# Patient Record
Sex: MALE | Race: WHITE | NOT HISPANIC OR LATINO | URBAN - METROPOLITAN AREA
[De-identification: names, ages, dates, MRNs, and addresses within clinical notes are randomized per-mention and may not be internally consistent; named-entity substitution may affect disease eponyms.]

---

## 2019-07-09 ENCOUNTER — INPATIENT (INPATIENT)
Facility: HOSPITAL | Age: 59
LOS: 8 days | Discharge: NURSING, REHAB OR LONG-TERM CARE | DRG: 59 | End: 2019-07-18
Attending: INTERNAL MEDICINE | Admitting: INTERNAL MEDICINE
Payer: COMMERCIAL

## 2019-07-09 VITALS
HEIGHT: 76 IN | SYSTOLIC BLOOD PRESSURE: 155 MMHG | TEMPERATURE: 98 F | DIASTOLIC BLOOD PRESSURE: 94 MMHG | RESPIRATION RATE: 18 BRPM | WEIGHT: 265 LBS | OXYGEN SATURATION: 96 % | HEART RATE: 83 BPM

## 2019-07-09 DIAGNOSIS — R29.898 OTHER SYMPTOMS AND SIGNS INVOLVING THE MUSCULOSKELETAL SYSTEM: ICD-10-CM

## 2019-07-09 DIAGNOSIS — G35 MULTIPLE SCLEROSIS: ICD-10-CM

## 2019-07-09 DIAGNOSIS — R33.8 OTHER RETENTION OF URINE: ICD-10-CM

## 2019-07-09 DIAGNOSIS — G40.909 EPILEPSY, UNSPECIFIED, NOT INTRACTABLE, WITHOUT STATUS EPILEPTICUS: ICD-10-CM

## 2019-07-09 DIAGNOSIS — K51.90 ULCERATIVE COLITIS, UNSPECIFIED, WITHOUT COMPLICATIONS: ICD-10-CM

## 2019-07-09 DIAGNOSIS — R09.89 OTHER SPECIFIED SYMPTOMS AND SIGNS INVOLVING THE CIRCULATORY AND RESPIRATORY SYSTEMS: ICD-10-CM

## 2019-07-09 DIAGNOSIS — M06.9 RHEUMATOID ARTHRITIS, UNSPECIFIED: ICD-10-CM

## 2019-07-09 DIAGNOSIS — Z29.9 ENCOUNTER FOR PROPHYLACTIC MEASURES, UNSPECIFIED: ICD-10-CM

## 2019-07-09 LAB
ALBUMIN SERPL ELPH-MCNC: 3.8 G/DL — SIGNIFICANT CHANGE UP (ref 3.3–5)
ALP SERPL-CCNC: 86 U/L — SIGNIFICANT CHANGE UP (ref 40–120)
ALT FLD-CCNC: 21 U/L — SIGNIFICANT CHANGE UP (ref 10–45)
ANION GAP SERPL CALC-SCNC: 12 MMOL/L — SIGNIFICANT CHANGE UP (ref 5–17)
APPEARANCE UR: CLEAR — SIGNIFICANT CHANGE UP
AST SERPL-CCNC: 20 U/L — SIGNIFICANT CHANGE UP (ref 10–40)
BASOPHILS # BLD AUTO: 0 K/UL — SIGNIFICANT CHANGE UP (ref 0–0.2)
BASOPHILS NFR BLD AUTO: 0.4 % — SIGNIFICANT CHANGE UP (ref 0–2)
BILIRUB SERPL-MCNC: 0.4 MG/DL — SIGNIFICANT CHANGE UP (ref 0.2–1.2)
BILIRUB UR-MCNC: NEGATIVE — SIGNIFICANT CHANGE UP
BUN SERPL-MCNC: 19 MG/DL — SIGNIFICANT CHANGE UP (ref 7–23)
CALCIUM SERPL-MCNC: 8.6 MG/DL — SIGNIFICANT CHANGE UP (ref 8.4–10.5)
CHLORIDE SERPL-SCNC: 102 MMOL/L — SIGNIFICANT CHANGE UP (ref 96–108)
CO2 SERPL-SCNC: 22 MMOL/L — SIGNIFICANT CHANGE UP (ref 22–31)
COLOR SPEC: SIGNIFICANT CHANGE UP
CREAT SERPL-MCNC: 0.82 MG/DL — SIGNIFICANT CHANGE UP (ref 0.5–1.3)
DIFF PNL FLD: NEGATIVE — SIGNIFICANT CHANGE UP
EOSINOPHIL # BLD AUTO: 0.3 K/UL — SIGNIFICANT CHANGE UP (ref 0–0.5)
EOSINOPHIL NFR BLD AUTO: 4 % — SIGNIFICANT CHANGE UP (ref 0–6)
GAS PNL BLDV: SIGNIFICANT CHANGE UP
GLUCOSE SERPL-MCNC: 86 MG/DL — SIGNIFICANT CHANGE UP (ref 70–99)
GLUCOSE UR QL: NEGATIVE — SIGNIFICANT CHANGE UP
HCT VFR BLD CALC: 47.7 % — SIGNIFICANT CHANGE UP (ref 39–50)
HGB BLD-MCNC: 16.3 G/DL — SIGNIFICANT CHANGE UP (ref 13–17)
KETONES UR-MCNC: NEGATIVE — SIGNIFICANT CHANGE UP
LEUKOCYTE ESTERASE UR-ACNC: NEGATIVE — SIGNIFICANT CHANGE UP
LYMPHOCYTES # BLD AUTO: 1 K/UL — SIGNIFICANT CHANGE UP (ref 1–3.3)
LYMPHOCYTES # BLD AUTO: 12.2 % — LOW (ref 13–44)
MCHC RBC-ENTMCNC: 33.4 PG — SIGNIFICANT CHANGE UP (ref 27–34)
MCHC RBC-ENTMCNC: 34.1 GM/DL — SIGNIFICANT CHANGE UP (ref 32–36)
MCV RBC AUTO: 97.8 FL — SIGNIFICANT CHANGE UP (ref 80–100)
MONOCYTES # BLD AUTO: 1.1 K/UL — HIGH (ref 0–0.9)
MONOCYTES NFR BLD AUTO: 13.8 % — SIGNIFICANT CHANGE UP (ref 2–14)
NEUTROPHILS # BLD AUTO: 5.6 K/UL — SIGNIFICANT CHANGE UP (ref 1.8–7.4)
NEUTROPHILS NFR BLD AUTO: 69.7 % — SIGNIFICANT CHANGE UP (ref 43–77)
NITRITE UR-MCNC: NEGATIVE — SIGNIFICANT CHANGE UP
PH UR: 7 — SIGNIFICANT CHANGE UP (ref 5–8)
PLATELET # BLD AUTO: 200 K/UL — SIGNIFICANT CHANGE UP (ref 150–400)
POTASSIUM SERPL-MCNC: 3.9 MMOL/L — SIGNIFICANT CHANGE UP (ref 3.5–5.3)
POTASSIUM SERPL-SCNC: 3.9 MMOL/L — SIGNIFICANT CHANGE UP (ref 3.5–5.3)
PROT SERPL-MCNC: 6.3 G/DL — SIGNIFICANT CHANGE UP (ref 6–8.3)
PROT UR-MCNC: NEGATIVE — SIGNIFICANT CHANGE UP
RBC # BLD: 4.88 M/UL — SIGNIFICANT CHANGE UP (ref 4.2–5.8)
RBC # FLD: 11.7 % — SIGNIFICANT CHANGE UP (ref 10.3–14.5)
SODIUM SERPL-SCNC: 136 MMOL/L — SIGNIFICANT CHANGE UP (ref 135–145)
SP GR SPEC: 1.02 — SIGNIFICANT CHANGE UP (ref 1.01–1.02)
UROBILINOGEN FLD QL: NEGATIVE — SIGNIFICANT CHANGE UP
WBC # BLD: 8.1 K/UL — SIGNIFICANT CHANGE UP (ref 3.8–10.5)
WBC # FLD AUTO: 8.1 K/UL — SIGNIFICANT CHANGE UP (ref 3.8–10.5)

## 2019-07-09 PROCEDURE — 99223 1ST HOSP IP/OBS HIGH 75: CPT

## 2019-07-09 PROCEDURE — 99285 EMERGENCY DEPT VISIT HI MDM: CPT

## 2019-07-09 RX ORDER — DICLOFENAC SODIUM 75 MG/1
75 TABLET, DELAYED RELEASE ORAL DAILY
Refills: 0 | Status: DISCONTINUED | OUTPATIENT
Start: 2019-07-09 | End: 2019-07-18

## 2019-07-09 RX ORDER — ASCORBIC ACID 60 MG
500 TABLET,CHEWABLE ORAL DAILY
Refills: 0 | Status: DISCONTINUED | OUTPATIENT
Start: 2019-07-09 | End: 2019-07-18

## 2019-07-09 RX ORDER — PANTOPRAZOLE SODIUM 20 MG/1
40 TABLET, DELAYED RELEASE ORAL
Refills: 0 | Status: DISCONTINUED | OUTPATIENT
Start: 2019-07-09 | End: 2019-07-18

## 2019-07-09 RX ORDER — MESALAMINE 400 MG
1.5 TABLET, DELAYED RELEASE (ENTERIC COATED) ORAL DAILY
Refills: 0 | Status: DISCONTINUED | OUTPATIENT
Start: 2019-07-09 | End: 2019-07-18

## 2019-07-09 RX ORDER — CHOLECALCIFEROL (VITAMIN D3) 125 MCG
5000 CAPSULE ORAL DAILY
Refills: 0 | Status: DISCONTINUED | OUTPATIENT
Start: 2019-07-09 | End: 2019-07-18

## 2019-07-09 RX ORDER — PHENOBARBITAL 60 MG
32.4 TABLET ORAL DAILY
Refills: 0 | Status: DISCONTINUED | OUTPATIENT
Start: 2019-07-09 | End: 2019-07-16

## 2019-07-09 RX ORDER — TAMSULOSIN HYDROCHLORIDE 0.4 MG/1
0.4 CAPSULE ORAL AT BEDTIME
Refills: 0 | Status: DISCONTINUED | OUTPATIENT
Start: 2019-07-09 | End: 2019-07-18

## 2019-07-09 RX ORDER — ENOXAPARIN SODIUM 100 MG/ML
40 INJECTION SUBCUTANEOUS EVERY 24 HOURS
Refills: 0 | Status: DISCONTINUED | OUTPATIENT
Start: 2019-07-09 | End: 2019-07-18

## 2019-07-09 RX ORDER — PREGABALIN 225 MG/1
1000 CAPSULE ORAL DAILY
Refills: 0 | Status: DISCONTINUED | OUTPATIENT
Start: 2019-07-09 | End: 2019-07-18

## 2019-07-09 RX ADMIN — TAMSULOSIN HYDROCHLORIDE 0.4 MILLIGRAM(S): 0.4 CAPSULE ORAL at 22:30

## 2019-07-09 NOTE — H&P ADULT - PROBLEM SELECTOR PLAN 4
-c/w diclofenac -c/w home meds if on formulary; pt aware to bring in am  -stable, no diarrhea or abd pain at this time.

## 2019-07-09 NOTE — H&P ADULT - PROBLEM SELECTOR PLAN 1
-progressive b/l sx; not consistent with radiculopathy per history  -ddx includes but not limited to MS flare vs. pseudoflare vs. dvt vs discopathy  -admit to medicine  -fall precautions  -PT  -neuro eval in am; defer to day attending to arrange, ER has already called Dr. Davis, prev seen by Dr. Sanchez in hospital.   -pending neuro eval, will likely need additional imaging such as MRI w/wo head, MRI C/T/L spine.  Will await neuro eval/exam prior to ordering imaging, pt in agreement  -given hx of dvt and mild LE edema, will check LE dopplers  -defer to neuro regarding role for steroids vs oncrevus, will need to get imaging prior to defining therapeutic plan with neurology input.

## 2019-07-09 NOTE — ED PROVIDER NOTE - OBJECTIVE STATEMENT
59M w/ h/o MS, frequent UTIs pw increased urinary frequency over the past few days and worsening weakness over the past few weeks. States he has had these symptoms in the past when she was diagnosed with UTI. Denies fevers, chills, n/v/d, abdominal pain. States he takes ocrevus infusions every 6 months, next due in 1 week. States he feels his MS is flaring up and is having difficulty walking due to bilateral lower extremity weakness.     Neuro Dr. Davis

## 2019-07-09 NOTE — H&P ADULT - NSHPPHYSICALEXAM_GEN_ALL_CORE
PHYSICAL EXAM:  GENERAL: NAD, well-groomed, well-developed  HEAD:  Atraumatic, Normocephalic  EYES: EOMI, PERRLA, conjunctiva and sclera clear  ENMT: No tonsillar erythema, exudates, or enlargement; Moist mucous membranes, Good dentition, No lesions  NECK: Supple, No JVD, Normal thyroid  CHEST/LUNG: Clear to percussion bilaterally; No rales, rhonchi, wheezing, or rubs no resp distress or accessory muscle usage  HEART: Regular rate and rhythm; No murmurs, rubs, or gallops, trace edema b/l LE.   ABDOMEN: Soft, Nontender, Nondistended; Bowel sounds present no rebound/guarding. no suprapubic tenderness  EXTREMITIES:  2+ Peripheral Pulses, No clubbing, cyanosis.   LYMPH: No lymphadenopathy noted, no lymphangitis  SKIN: No rashes or hives. +dry lesion with minimal surrounding erythema on L shin from where he banged his leg few weeks ago. no warmth, purulence/fluctuance or tenderness.   NERVOUS SYSTEM:  Alert & Oriented X3, Good concentration; Motor Strength 5/5 L upper 5/5 R upper, 4/5 LLE, 3/5 RLE. sensation intact b/l ue/le.   strength diminshed R>L.  tongue midline, no droop, no dysarthria.

## 2019-07-09 NOTE — ED ADULT NURSE REASSESSMENT NOTE - NS ED NURSE REASSESS COMMENT FT1
1930- patient received alert & oriented x4. Daughter at bedside. Foleycatheter in placed draining clear yellow urine- 300ml. PAtient at times complaining of urethral burning sensation. MD aware.
pt on bladder scan 282 ml visualized Dr Oliveira notified ordered for ferrari cath
pt with 16 [place] ferrari placed aseptically with 230ml of alda urine to BSD bag specimens to lab pt tolerated procedure well pt pending lab results

## 2019-07-09 NOTE — ED PROVIDER NOTE - PMH
BPH (benign prostatic hypertrophy)    Colitis    GERD (gastroesophageal reflux disease)    Multiple sclerosis    Seizures

## 2019-07-09 NOTE — ED ADULT NURSE NOTE - OBJECTIVE STATEMENT
pt 60 yo male [place] with c/o weakness frequent urination last 2 weeks and today was incontnent of urine pt hx multiple sclerosis colitis pt accompanied by children pt denies any fever or chills no vomiting vitals stable on arrival pt has some swelling to lower right extremity with hx of DVT last year pt was taken off oral blood thinners by PMD p denies sob pending md evaluation by Dr Oliveira

## 2019-07-09 NOTE — H&P ADULT - NSICDXPASTMEDICALHX_GEN_ALL_CORE_FT
PAST MEDICAL HISTORY:  BPH (benign prostatic hypertrophy)     Colitis     GERD (gastroesophageal reflux disease)     Multiple sclerosis     Seizures

## 2019-07-09 NOTE — H&P ADULT - NSHPLABSRESULTS_GEN_ALL_CORE
Labs personally reviewed  cbc unrevealing, cmp unrevealing, vbg with lactate unrevealing. UA negative.   No imaging done in Er available for personal review  NO ekg in chart available for personal review.

## 2019-07-09 NOTE — H&P ADULT - ASSESSMENT
59 M PMH Multiple Sclerosis since 2014, UC, BPH, sz d/o, DVT off a/c, RA, prior UTI, now p/w urinary frequency and weakness.

## 2019-07-09 NOTE — H&P ADULT - NSHPREVIEWOFSYSTEMS_GEN_ALL_CORE
REVIEW OF SYSTEMS:  CONSTITUTIONAL: +weakness. No fevers. No chills. No weight loss. Good appetite.  EYES: No visual changes. No eye pain.  ENT: No hearing difficulty. No vertigo. No dysphagia. No Sinusitis/rhinorrhea.  NECK: No pain. No stiffness/rigidity.  CARDIAC: No chest pain. No palpitations.  RESPIRATORY: No cough. No SOB. No hemoptysis.  GASTROINTESTINAL: No abdominal pain. No nausea. No vomiting. No hematemesis. No diarrhea. No constipation. No melena. No hematochezia.  GENITOURINARY: No dysuria. +frequency. +hesitancy. No hematuria.  NEUROLOGICAL: No numbness. No focal weakness. No incontinence. No headache.  BACK: No back pain.  EXTREMITIES: + b/l lower extremity edema. Full ROM.  SKIN: No rashes. No itching. No other lesions.  PSYCHIATRIC: No depression. No anxiety. No SI/HI.  ALLERGIC: No lip swelling. No hives.  All other review of systems is negative unless indicated above.

## 2019-07-09 NOTE — H&P ADULT - ATTENDING COMMENTS
Care to be assumed by Dr. Vaelntín Ellis at 8 am.  This patient was assigned to me by the hospitalist in charge; my involvement in this case has consisted of the initial history, physical, chart review, and management plan.  This patient was previously unknown to me.

## 2019-07-09 NOTE — ED PROVIDER NOTE - NS ED ROS FT
General: denies fever, chills  HENT: denies nasal congestion, rhinorrhea  Eyes: denies visual changes, blurred vision  CV: denies chest pain, palpitations  Resp: denies difficulty breathing, cough  Abdominal: denies nausea, vomiting, abdominal pain  : + increased frequency, denies dysuria   MSK: denies muscle aches, leg swelling  Neuro: denies headaches, numbness, + weakness lower extremities   Skin: denies rashes, bruises

## 2019-07-09 NOTE — H&P ADULT - PROBLEM SELECTOR PLAN 3
-c/w home meds if on formulary; pt aware to bring in am  -stable, no diarrhea or abd pain at this time. -frequency alternating with hesitancy  -bladder huggins >280 cc, ferrari placed by ER  -possibly related to MS flare  -would c/w ferrari overnight and measure strict i/o; would remove ferrari in am and do TOV -frequency alternating with hesitancy  -bladder huggins >280 cc, ferrari placed by ER  -possibly related to MS flare  -would c/w ferrari overnight and measure strict i/o; would remove ferrari in am and do TOV  -temp holding solifenacin until TOV passed.

## 2019-07-09 NOTE — ED PROVIDER NOTE - PHYSICAL EXAMINATION
CONSTITUTIONAL: NAD, awake, alert  HEAD: Normocephalic; atraumatic  ENMT: External appears normal, MMM  CARD: Normal Sl, S2; no audible murmurs  RESP: normal wob, lungs ctab  ABD: soft, non-distended; non-tender  EXT: no edema, normal ROM in all four extremities  SKIN: Warm, dry, no rashes  NEURO: aaox3, moving all extremities spontaneously, weakness to lower extremities bilaterally

## 2019-07-09 NOTE — ED ADULT NURSE NOTE - CHPI ED NUR SYMPTOMS NEG
no fever/no dizziness/no change in level of consciousness/no confusion/no blurred vision/no loss of consciousness

## 2019-07-09 NOTE — H&P ADULT - HISTORY OF PRESENT ILLNESS
59 M PMH Multiple Sclerosis since 2014, UC, BPH, sz d/o, DVT off a/c, RA, prior UTI, now p/w urinary frequency and weakness.     VS: 98.2, 77, 154/96, 20, 96% RA. Labs: cbc unrevealing, cmp unrevealing, vbg with lactate unrevealing. UA negative. Pt noted to have ~280 cc of urine on bladder scan, with ferrari placed and draining 230 cc alda urine while in ER. 59 M PMH Multiple Sclerosis since 2014, UC, BPH, sz d/o, DVT off a/c, RA, prior UTI, now p/w urinary frequency and weakness. Pt states he has had chronic baseline R sided weakness. Over last few weeks he has had progressive R sided sx, and now has started having left sided sx. +weakness of both legs R>L, R arm weakness and R arm dec  strength, very minimal sx in L arm/hand. Ordinarily is ambulatory with walker in house and wheelchair outside, can bear weight; over last few days, can barely bear wt, and non ambulatory. +knee stiffness b/l worse in am and mildly imp over the day, c/w his known RA. +falls, most recently few weeks ago (once fell backwards onto rear end, once hit his L shin falling forward; no loc, no head trauma, no syncope).  Pt also notes worsening urinary sx x 1-2 days; inc urinary freq and urgency, at times hesitancy; denies dysuria, f/c, abd pain, back pain. Denies cp/sob, n/v/d, cough/uri sx/rash/nodules/oral sores/ha/visual changes/saddle anesthesia. Is due for q 6monthly infusion of Ocrevus, due next week.     VS: 98.2, 77, 154/96, 20, 96% RA. Labs: cbc unrevealing, cmp unrevealing, vbg with lactate unrevealing. UA negative. Pt noted to have ~280 cc of urine on bladder scan, with ferrari placed and draining 230 cc alda urine while in ER.

## 2019-07-09 NOTE — ED PROVIDER NOTE - ATTENDING CONTRIBUTION TO CARE
Attending MD Oliveira: I personally have seen and examined this patient.  Resident note reviewed and agree on plan of care and except where noted.  See below for details.     Seen in [place], accompanied by daughter Nallely Resendez and daughter's friend  Dr. Harjinder Davis, Neurologist  Dr. Hi Valle, PMD  Dr. Worthington, [place] ([facility])    59M with PMH/PSH including seizure on phenobarb, GERD on Rabeprazole, ulcerative colitis on Apriso, BPH on Tamsulosin, bladder spasm on Solifenacin succinate, DVT not on anticoag (d/c'ed after 6 mo, last taken about a year ago), rheumatoid arthritis on Diclofenac, MS with R sided weakness presents to the ED with weakness and increased urinary frequency and urinary incontinence.  Reports has had two previous hospitalizations for UTIs, last was a year ago March 2018.  Reports chills, daughter reports patient has been afebrile at home.  Denies dysuria, reports incontinence, frequency since this morning.  Reports since arrival has been trying to urinate but has not been able.  Denies fevers.  Denies abdominal pain, nausea, vomiting, diarrhea, blood in stools. Denies hematuria.  Denies chest pain, shortness of breath.  Reports generalized weakness, reports typically is able to walk with a walker and is able to stand to walk with a walker but today has not been able to do so, reports is functionally sedentary, reports has PT twice a week.  Reports is due for his MS infusion next week, gets them every six months, on (Ocrelizumab) Ocrevus.  Reports weakness is most notably in LEs.  Denies headaches, denies change in vision, double vision, sudden loss of vision.  Reports fell last month, fell backwards "on butt".  On exam, NAD, head NCAT, PERRL, FROM at neck, no tenderness to midline palpation, no stepoffs along length of spine, L lower back lipoma unable to pull himself up to sitting on his own, lungs CTAB with good inspiratory effort, +S1S2, no m/r/g, abdomen soft with +BS, NT, no CVAT, moving all extremities, unable to lift LEs off of bed with minimal effort to move LEs laterally on bed, able to lift UEs with some drift but no drop, L hand  >> R hand  strength, sensory grossly intact; A/P: 59M with weakness and urinary complaints, will obtain infectious work up, DDx includes UTI, will obtain labs, UA, CXR, reassess Attending MD Oliveira: I personally have seen and examined this patient.  Resident note reviewed and agree on plan of care and except where noted.  See below for details.     Seen in [place], accompanied by daughter Nallely Resendez and daughter's friend  Dr. Harjinder Davis, Neurologist  Dr. Hi Valle, PMD  Dr. Worthington, [place] ([facility])    59M with PMH/PSH including seizure on phenobarb, GERD on Rabeprazole, ulcerative colitis on Apriso, BPH on Tamsulosin, bladder spasm on Solifenacin succinate, DVT not on anticoag (d/c'ed after 6 mo, last taken about a year ago), rheumatoid arthritis on Diclofenac, MS with R sided weakness presents to the ED with weakness and increased urinary frequency and urinary incontinence.  Reports has had two previous hospitalizations for UTIs, last was a year ago March 2018.  Reports chills, daughter reports patient has been afebrile at home.  Denies dysuria, reports incontinence, frequency since this morning.  Reports since arrival has been trying to urinate but has not been able.  Denies fevers.  Denies abdominal pain, nausea, vomiting, diarrhea, blood in stools. Denies hematuria.  Denies chest pain, shortness of breath.  Reports generalized weakness, reports typically is able to walk with a walker and is able to stand to walk with a walker but today has not been able to do so, reports is functionally sedentary, reports has PT twice a week.  Reports is due for his MS infusion next week, gets them every six months, on (Ocrelizumab) Ocrevus.  Reports weakness is most notably in LEs.  Denies headaches, denies change in vision, double vision, sudden loss of vision.  Reports fell last month, fell backwards "on butt".  On exam, NAD, head NCAT, PERRL, FROM at neck, no tenderness to midline palpation, no stepoffs along length of spine, L lower back lipoma unable to pull himself up to sitting on his own, lungs CTAB with good inspiratory effort, +S1S2, no m/r/g, abdomen soft with +BS, NT, no CVAT, moving all extremities, unable to lift LEs off of bed with minimal effort to move LEs laterally on bed, able to lift UEs with some drift but no drop, L hand  >> R hand  strength, sensory grossly intact, +3 pitting edema to knees; A/P: 59M with weakness and urinary complaints, will obtain infectious work up, DDx includes UTI, will obtain labs, UA, CXR, reassess

## 2019-07-09 NOTE — H&P ADULT - PROBLEM SELECTOR PLAN 2
-neuro eval as noted above  -consider further imaging as noted above  -f/u neuro recs regarding role for steroids, oncrevus, or alternate agent.

## 2019-07-09 NOTE — H&P ADULT - NSHPSOCIALHISTORY_GEN_ALL_CORE
Social History:    Marital Status:  (  x )    (   ) Single    (   )    (  )   Occupation: retired , now on disability  Lives with: (  ) alone  (  x) children   ( x) spouse   (  ) parents  (  ) other    Substance Use (street drugs): (  x) never used  (  ) other:  Tobacco Usage:  (   ) never smoked   ( x  ) former smoker   (   ) current smoker  (     ) pack year  (        ) last cigarette date  Alcohol Usage: denies

## 2019-07-09 NOTE — ED ADULT NURSE NOTE - NSIMPLEMENTINTERV_GEN_ALL_ED
Implemented All Fall with Harm Risk Interventions:  [place] to call system. Call bell, personal items and telephone within reach. Instruct patient to call for assistance. Room bathroom lighting operational. Non-slip footwear when patient is off stretcher. Physically safe environment: no spills, clutter or unnecessary equipment. Stretcher in lowest position, wheels locked, appropriate side rails in place. Provide visual cue, wrist band, yellow gown, etc. Monitor gait and stability. Monitor for mental status changes and reorient to person, place, and time. Review medications for side effects contributing to fall risk. Reinforce activity limits and safety measures with patient and family. Provide visual clues: red socks.

## 2019-07-09 NOTE — H&P ADULT - PROBLEM SELECTOR PROBLEM 5
Seizure disorder Rheumatoid arthritis, involving unspecified site, unspecified rheumatoid factor presence

## 2019-07-09 NOTE — H&P ADULT - PROBLEM SELECTOR PROBLEM 4
Rheumatoid arthritis, involving unspecified site, unspecified rheumatoid factor presence Ulcerative colitis

## 2019-07-09 NOTE — ED PROVIDER NOTE - CLINICAL SUMMARY MEDICAL DECISION MAKING FREE TEXT BOX
59M w/ MS p/w UTI symptoms, evalaute for UTI, will cs neurologist for MS recs, low suspicion for CVA as weakness is bilateral

## 2019-07-09 NOTE — ED PROVIDER NOTE - PROGRESS NOTE DETAILS
Terrance: spoke with covering neurologist Dr. Davis who states patient is likely pseudoflaring from UTI, statesthere is no need for further MS workup at this point, will see patient in hospital for admssion Tong: Dr. Valle requesting admission to Dr. Valentín Ellis. Dr. Ellis paged at [phone]. Franco: admitted to Dr. Ellis for increasing weakness in lower extremities. Stable.

## 2019-07-10 LAB
ALBUMIN SERPL ELPH-MCNC: 3.4 G/DL — SIGNIFICANT CHANGE UP (ref 3.3–5)
ALP SERPL-CCNC: 77 U/L — SIGNIFICANT CHANGE UP (ref 40–120)
ALT FLD-CCNC: 21 U/L — SIGNIFICANT CHANGE UP (ref 10–45)
ANION GAP SERPL CALC-SCNC: 9 MMOL/L — SIGNIFICANT CHANGE UP (ref 5–17)
AST SERPL-CCNC: 20 U/L — SIGNIFICANT CHANGE UP (ref 10–40)
BASOPHILS # BLD AUTO: 0.06 K/UL — SIGNIFICANT CHANGE UP (ref 0–0.2)
BASOPHILS NFR BLD AUTO: 1 % — SIGNIFICANT CHANGE UP (ref 0–2)
BILIRUB SERPL-MCNC: 0.4 MG/DL — SIGNIFICANT CHANGE UP (ref 0.2–1.2)
BUN SERPL-MCNC: 18 MG/DL — SIGNIFICANT CHANGE UP (ref 7–23)
CALCIUM SERPL-MCNC: 8.3 MG/DL — LOW (ref 8.4–10.5)
CHLORIDE SERPL-SCNC: 104 MMOL/L — SIGNIFICANT CHANGE UP (ref 96–108)
CO2 SERPL-SCNC: 28 MMOL/L — SIGNIFICANT CHANGE UP (ref 22–31)
CREAT SERPL-MCNC: 0.86 MG/DL — SIGNIFICANT CHANGE UP (ref 0.5–1.3)
CULTURE RESULTS: NO GROWTH — SIGNIFICANT CHANGE UP
EOSINOPHIL # BLD AUTO: 0.36 K/UL — SIGNIFICANT CHANGE UP (ref 0–0.5)
EOSINOPHIL NFR BLD AUTO: 6 % — SIGNIFICANT CHANGE UP (ref 0–6)
GLUCOSE SERPL-MCNC: 84 MG/DL — SIGNIFICANT CHANGE UP (ref 70–99)
HCT VFR BLD CALC: 43.7 % — SIGNIFICANT CHANGE UP (ref 39–50)
HCV AB S/CO SERPL IA: 0.09 S/CO — SIGNIFICANT CHANGE UP (ref 0–0.99)
HCV AB SERPL-IMP: SIGNIFICANT CHANGE UP
HGB BLD-MCNC: 14.8 G/DL — SIGNIFICANT CHANGE UP (ref 13–17)
IMM GRANULOCYTES NFR BLD AUTO: 0.3 % — SIGNIFICANT CHANGE UP (ref 0–1.5)
LYMPHOCYTES # BLD AUTO: 0.78 K/UL — LOW (ref 1–3.3)
LYMPHOCYTES # BLD AUTO: 13 % — SIGNIFICANT CHANGE UP (ref 13–44)
MAGNESIUM SERPL-MCNC: 1.9 MG/DL — SIGNIFICANT CHANGE UP (ref 1.6–2.6)
MCHC RBC-ENTMCNC: 32.2 PG — SIGNIFICANT CHANGE UP (ref 27–34)
MCHC RBC-ENTMCNC: 33.9 GM/DL — SIGNIFICANT CHANGE UP (ref 32–36)
MCV RBC AUTO: 95 FL — SIGNIFICANT CHANGE UP (ref 80–100)
MONOCYTES # BLD AUTO: 0.9 K/UL — SIGNIFICANT CHANGE UP (ref 0–0.9)
MONOCYTES NFR BLD AUTO: 15 % — HIGH (ref 2–14)
NEUTROPHILS # BLD AUTO: 3.89 K/UL — SIGNIFICANT CHANGE UP (ref 1.8–7.4)
NEUTROPHILS NFR BLD AUTO: 64.7 % — SIGNIFICANT CHANGE UP (ref 43–77)
PHOSPHATE SERPL-MCNC: 3.3 MG/DL — SIGNIFICANT CHANGE UP (ref 2.5–4.5)
PLATELET # BLD AUTO: 194 K/UL — SIGNIFICANT CHANGE UP (ref 150–400)
POTASSIUM SERPL-MCNC: 3.7 MMOL/L — SIGNIFICANT CHANGE UP (ref 3.5–5.3)
POTASSIUM SERPL-SCNC: 3.7 MMOL/L — SIGNIFICANT CHANGE UP (ref 3.5–5.3)
PROT SERPL-MCNC: 5.6 G/DL — LOW (ref 6–8.3)
RBC # BLD: 4.6 M/UL — SIGNIFICANT CHANGE UP (ref 4.2–5.8)
RBC # FLD: 12.4 % — SIGNIFICANT CHANGE UP (ref 10.3–14.5)
SODIUM SERPL-SCNC: 141 MMOL/L — SIGNIFICANT CHANGE UP (ref 135–145)
SPECIMEN SOURCE: SIGNIFICANT CHANGE UP
WBC # BLD: 6.01 K/UL — SIGNIFICANT CHANGE UP (ref 3.8–10.5)
WBC # FLD AUTO: 6.01 K/UL — SIGNIFICANT CHANGE UP (ref 3.8–10.5)

## 2019-07-10 PROCEDURE — 70553 MRI BRAIN STEM W/O & W/DYE: CPT | Mod: 26

## 2019-07-10 PROCEDURE — 93970 EXTREMITY STUDY: CPT | Mod: 26

## 2019-07-10 RX ORDER — CHLORHEXIDINE GLUCONATE 213 G/1000ML
1 SOLUTION TOPICAL DAILY
Refills: 0 | Status: DISCONTINUED | OUTPATIENT
Start: 2019-07-10 | End: 2019-07-12

## 2019-07-10 RX ORDER — SENNA PLUS 8.6 MG/1
2 TABLET ORAL AT BEDTIME
Refills: 0 | Status: DISCONTINUED | OUTPATIENT
Start: 2019-07-10 | End: 2019-07-18

## 2019-07-10 RX ORDER — DOCUSATE SODIUM 100 MG
100 CAPSULE ORAL
Refills: 0 | Status: DISCONTINUED | OUTPATIENT
Start: 2019-07-10 | End: 2019-07-18

## 2019-07-10 RX ADMIN — Medication 5000 UNIT(S): at 11:15

## 2019-07-10 RX ADMIN — PREGABALIN 1000 MICROGRAM(S): 225 CAPSULE ORAL at 11:15

## 2019-07-10 RX ADMIN — Medication 32.4 MILLIGRAM(S): at 11:22

## 2019-07-10 RX ADMIN — ENOXAPARIN SODIUM 40 MILLIGRAM(S): 100 INJECTION SUBCUTANEOUS at 05:04

## 2019-07-10 RX ADMIN — Medication 1.5 GRAM(S): at 11:15

## 2019-07-10 RX ADMIN — DICLOFENAC SODIUM 75 MILLIGRAM(S): 75 TABLET, DELAYED RELEASE ORAL at 11:15

## 2019-07-10 RX ADMIN — CHLORHEXIDINE GLUCONATE 1 APPLICATION(S): 213 SOLUTION TOPICAL at 11:15

## 2019-07-10 RX ADMIN — Medication 1 TABLET(S): at 11:15

## 2019-07-10 RX ADMIN — TAMSULOSIN HYDROCHLORIDE 0.4 MILLIGRAM(S): 0.4 CAPSULE ORAL at 22:01

## 2019-07-10 RX ADMIN — PANTOPRAZOLE SODIUM 40 MILLIGRAM(S): 20 TABLET, DELAYED RELEASE ORAL at 05:03

## 2019-07-10 RX ADMIN — SENNA PLUS 2 TABLET(S): 8.6 TABLET ORAL at 22:01

## 2019-07-10 RX ADMIN — Medication 116 MILLIGRAM(S): at 11:04

## 2019-07-10 RX ADMIN — Medication 500 MILLIGRAM(S): at 11:15

## 2019-07-10 NOTE — PHYSICAL THERAPY INITIAL EVALUATION ADULT - TRANSFER SAFETY CONCERNS NOTED: SIT/STAND, REHAB EVAL
decreased weight-shifting ability/decreased step length/decreased balance during turns/losing balance

## 2019-07-10 NOTE — PHYSICAL THERAPY INITIAL EVALUATION ADULT - PLANNED THERAPY INTERVENTIONS, PT EVAL
bed mobility training/transfer training/strengthening/gait training/Stair training... GOAL: In 4 weeks pt will negotiate 1 flight of stairs with min Ax1 & least restrictive device./balance training

## 2019-07-10 NOTE — CONSULT NOTE ADULT - SUBJECTIVE AND OBJECTIVE BOX
Admitting Diagnosis:  Other symptom or sign involving musculoskeletal system (R29.898): OTH SYMPTOMS AND SIGNS INVOLVING THE MUSCULOSKELETAL SYSTEM      HPI:  This is a 59y year old Male with the below past medical history who presents with the chief complaint of weakness PMH Multiple Sclerosis since , UC, BPH, sz d/o, DVT off a/c, RA, prior UTI, now p/w urinary frequency and weakness. Pt states he has had chronic baseline R sided weakness. Over last few weeks he has had progressive R sided sx, and now has started having left sided sx. +weakness of both legs R>L, R arm weakness and R arm dec  strength, very minimal sx in L arm/hand. Ordinarily is ambulatory with walker in house and wheelchair outside, can bear weight; over last few days, can barely bear wt, and non ambulatory. +knee stiffness b/l worse in am and mildly imp over the day, c/w his known RA. +falls, most recently few weeks ago. rt arm is more diffiucltto use, dec coordination.      Pt also notes worsening urinary sx x 1-2 days; inc urinary freq and urgency, at times hesitancy; denies dysuria, f/c, abd pain, back pain. Denies cp/sob, n/v/d, cough/uri sx/rash/nodules/oral sores/ha/visual changes/saddle anesthesia. Is due for q 6monthly infusion of Ocrevus, due next week.         Past Medical History:  Multiple sclerosis (G35)  Seizures (780.39)  GERD (gastroesophageal reflux disease) (530.81)  BPH (benign prostatic hypertrophy) (600.00)  Colitis (558.9)      Past Surgical History:  No significant past surgical history ([id number])      Social History:  No toxic habits    Family History:  FAMILY HISTORY:  No pertinent family history in first degree relatives      Allergies:  No Known Allergies      ROS:  Constitutional: Patient offers no complaints of fevers or significant weight loss  Ears, Nose, Mouth and Throat: The patient presents with no abnormalities of the head, ears, eyes, nose or throat  Skin: Patient offers no concerns of new rashes or lesions  Respiratory: The patient presents with no abnormalities of the respiratory tract  Cardiovascular: The patient presents with no cardiac abnormalities  Gastrointestinal: The patient presents with no abnormalities of the GI system  Genitourinary: The patient presents with no dysuria, hematuria or frequent urination  Neurological: See HPI  Endocrine: Patient offers no complaints of excessive thirst, urination, or heat/cold intolerance    Advanced care planning reviewed and noted in the chart.    Medications:  ascorbic acid 500 milliGRAM(s) Oral daily  chlorhexidine 2% Cloths 1 Application(s) Topical daily  cholecalciferol 5000 Unit(s) Oral daily  cyanocobalamin 1000 MICROGram(s) Oral daily  diclofenac 75 milliGRAM(s) Oral daily  enoxaparin Injectable 40 milliGRAM(s) SubCutaneous every 24 hours  mesalamine ER (24-Hour) Capsule 1.5 Gram(s) Oral daily  multivitamin 1 Tablet(s) Oral daily  pantoprazole    Tablet 40 milliGRAM(s) Oral before breakfast  PHENobarbital 32.4 milliGRAM(s) Oral daily  tamsulosin 0.4 milliGRAM(s) Oral at bedtime      Labs:  CBC Full  -  ( 2019 17:43 )  WBC Count : 8.1 K/uL  RBC Count : 4.88 M/uL  Hemoglobin : 16.3 g/dL  Hematocrit : 47.7 %  Platelet Count - Automated : 200 K/uL  Mean Cell Volume : 97.8 fl  Mean Cell Hemoglobin : 33.4 pg  Mean Cell Hemoglobin Concentration : 34.1 gm/dL  Auto Neutrophil # : 5.6 K/uL  Auto Lymphocyte # : 1.0 K/uL  Auto Monocyte # : 1.1 K/uL  Auto Eosinophil # : 0.3 K/uL  Auto Basophil # : 0.0 K/uL  Auto Neutrophil % : 69.7 %  Auto Lymphocyte % : 12.2 %  Auto Monocyte % : 13.8 %  Auto Eosinophil % : 4.0 %  Auto Basophil % : 0.4 %    07-10    141  |  104  |  18  ----------------------------<  84  3.7   |  28  |  0.86    Ca    8.3<L>      10 Jul 2019 06:23  Phos  3.3     07-10  Mg     1.9     07-10    TPro  5.6<L>  /  Alb  3.4  /  TBili  0.4  /  DBili  x   /  AST  20  /  ALT  21  /  AlkPhos  77  07-10    CAPILLARY BLOOD GLUCOSE        LIVER FUNCTIONS - ( 10 Jul 2019 06:23 )  Alb: 3.4 g/dL / Pro: 5.6 g/dL / ALK PHOS: 77 U/L / ALT: 21 U/L / AST: 20 U/L / GGT: x             Urinalysis Basic - ( 2019 18:31 )    Color: Light Yellow / Appearance: Clear / S.019 / pH: x  Gluc: x / Ketone: Negative  / Bili: Negative / Urobili: Negative   Blood: x / Protein: Negative / Nitrite: Negative   Leuk Esterase: Negative / RBC: x / WBC x   Sq Epi: x / Non Sq Epi: x / Bacteria: x      Male    Vitals:  Vital Signs Last 24 Hrs  T(C): 36.7 (10 Jul 2019 06:27), Max: 36.8 (2019 17:21)  T(F): 98.1 (10 Jul 2019 06:27), Max: 98.3 (2019 22:42)  HR: 69 (10 Jul 2019 06:27) (69 - 83)  BP: 146/84 (10 Jul 2019 06:27) (145/83 - 155/94)  BP(mean): --  RR: 18 (10 Jul 2019 06:27) (18 - 20)  SpO2: 98% (10 Jul 2019 06:27) (96% - 99%)    NEUROLOGICAL EXAM:    Mental status: Awake, alert, and in no apparent distress. Oriented to person, place and time. Language function is normal. Recent memory, digit span and concentration were normal.     Cranial Nerves: Pupils were equal, round, reactive to light. Extraocular movements were intact. Visual field were full. Fundoscopic exam was deferred. Facial sensation was intact to light touch. There was no facial asymmetry. The palate was upgoing symmetrically and tongue was midline. Hearing acuity was intact to finger rub AU. Shoulder shrug was full bilaterally    Motor exam: Bulk and tone were normal. Strength was 5/5 in left arm, right is 4+ with dec fadi.  Right leg is barely antigravity.  Left leg 4/5 dorsi is 2/3    Reflexes: 2+ in the bilateral upper extremities. 2+ in the bilateral lower extremities. Toes silent    Sensation: Intact to light touch, temperature, vibration and proprioception.     Coordination: Finger-nose-finger awithout dysmetria.     Gait: defer

## 2019-07-10 NOTE — PHYSICAL THERAPY INITIAL EVALUATION ADULT - GAIT DEVIATIONS NOTED, PT EVAL
decreased velocity of limb motion/increased time in double stance/footdrop/decreased step length/decreased stride length/decreased melani/decreased swing-to-stance ratio/decreased weight-shifting ability/Pt unable to lift R foot, able to shuffle

## 2019-07-10 NOTE — CONSULT NOTE ADULT - ASSESSMENT
This is a 59y year old Male with the below past medical history who presents with the chief complaint of weakness PMH Multiple Sclerosis since 2014, UC, BPH, sz d/o, DVT off a/c, RA, prior UTI, now p/w urinary frequency and weakness. Pt states he has had chronic baseline R sided weakness. Over last few weeks he has had progressive R sided sx, and now has started having left sided sx. +weakness of both legs R>L, R arm weakness and R arm dec  strength, very minimal sx in L arm/hand. Ordinarily is ambulatory with walker in house and wheelchair outside, can bear weight; over last few days, can barely bear wt, and non ambulatory. +knee stiffness b/l worse in am and mildly imp over the day, c/w his known RA. +falls, most recently few weeks ago. rt arm is more diffiucltto use, dec coordination.      Pt also notes worsening urinary sx x 1-2 days; inc urinary freq and urgency, at times hesitancy; denies dysuria, f/c, abd pain, back pain. Denies cp/sob, n/v/d, cough/uri sx/rash/nodules/oral sores/ha/visual changes/saddle anesthesia. Is due for q 6monthly infusion of Ocrevus, due next week.     exam with weakness, right arm/leg > left    likely exacerbation of MS    No evidence of infection    recc:  3 days of IV solumedrol 1 gram  MRi brain with and without    d/w pt and NP TIM

## 2019-07-10 NOTE — PHYSICAL THERAPY INITIAL EVALUATION ADULT - ADDITIONAL COMMENTS
Pt reports [place] with no steps from the outside & 6+8 steps to the main level he stays on. Pt amb household distances with RW & uses transport w/c when going to MD appointment. Pt required minimal assistance on stairs prior to admit. Pt owns, RW, WC, bed rails, commode, shower chair, & single axis cane.

## 2019-07-11 LAB
ANION GAP SERPL CALC-SCNC: 13 MMOL/L — SIGNIFICANT CHANGE UP (ref 5–17)
BUN SERPL-MCNC: 28 MG/DL — HIGH (ref 7–23)
CALCIUM SERPL-MCNC: 8.6 MG/DL — SIGNIFICANT CHANGE UP (ref 8.4–10.5)
CHLORIDE SERPL-SCNC: 99 MMOL/L — SIGNIFICANT CHANGE UP (ref 96–108)
CO2 SERPL-SCNC: 22 MMOL/L — SIGNIFICANT CHANGE UP (ref 22–31)
CREAT SERPL-MCNC: 0.77 MG/DL — SIGNIFICANT CHANGE UP (ref 0.5–1.3)
GLUCOSE SERPL-MCNC: 150 MG/DL — HIGH (ref 70–99)
HCT VFR BLD CALC: 47.1 % — SIGNIFICANT CHANGE UP (ref 39–50)
HGB BLD-MCNC: 16 G/DL — SIGNIFICANT CHANGE UP (ref 13–17)
MCHC RBC-ENTMCNC: 32 PG — SIGNIFICANT CHANGE UP (ref 27–34)
MCHC RBC-ENTMCNC: 34 GM/DL — SIGNIFICANT CHANGE UP (ref 32–36)
MCV RBC AUTO: 94.2 FL — SIGNIFICANT CHANGE UP (ref 80–100)
PLATELET # BLD AUTO: 216 K/UL — SIGNIFICANT CHANGE UP (ref 150–400)
POTASSIUM SERPL-MCNC: 4 MMOL/L — SIGNIFICANT CHANGE UP (ref 3.5–5.3)
POTASSIUM SERPL-SCNC: 4 MMOL/L — SIGNIFICANT CHANGE UP (ref 3.5–5.3)
RBC # BLD: 5 M/UL — SIGNIFICANT CHANGE UP (ref 4.2–5.8)
RBC # FLD: 11.9 % — SIGNIFICANT CHANGE UP (ref 10.3–14.5)
SODIUM SERPL-SCNC: 134 MMOL/L — LOW (ref 135–145)
WBC # BLD: 12.15 K/UL — HIGH (ref 3.8–10.5)
WBC # FLD AUTO: 12.15 K/UL — HIGH (ref 3.8–10.5)

## 2019-07-11 RX ADMIN — DICLOFENAC SODIUM 75 MILLIGRAM(S): 75 TABLET, DELAYED RELEASE ORAL at 11:21

## 2019-07-11 RX ADMIN — Medication 1 TABLET(S): at 11:21

## 2019-07-11 RX ADMIN — ENOXAPARIN SODIUM 40 MILLIGRAM(S): 100 INJECTION SUBCUTANEOUS at 05:07

## 2019-07-11 RX ADMIN — Medication 100 MILLIGRAM(S): at 05:08

## 2019-07-11 RX ADMIN — PANTOPRAZOLE SODIUM 40 MILLIGRAM(S): 20 TABLET, DELAYED RELEASE ORAL at 05:12

## 2019-07-11 RX ADMIN — Medication 500 MILLIGRAM(S): at 11:21

## 2019-07-11 RX ADMIN — TAMSULOSIN HYDROCHLORIDE 0.4 MILLIGRAM(S): 0.4 CAPSULE ORAL at 21:49

## 2019-07-11 RX ADMIN — DICLOFENAC SODIUM 75 MILLIGRAM(S): 75 TABLET, DELAYED RELEASE ORAL at 11:25

## 2019-07-11 RX ADMIN — Medication 32.4 MILLIGRAM(S): at 11:20

## 2019-07-11 RX ADMIN — Medication 116 MILLIGRAM(S): at 05:08

## 2019-07-11 RX ADMIN — Medication 1.5 GRAM(S): at 11:21

## 2019-07-11 RX ADMIN — PREGABALIN 1000 MICROGRAM(S): 225 CAPSULE ORAL at 11:21

## 2019-07-11 RX ADMIN — Medication 5000 UNIT(S): at 11:21

## 2019-07-12 ENCOUNTER — TRANSCRIPTION ENCOUNTER (OUTPATIENT)
Age: 59
End: 2019-07-12

## 2019-07-12 LAB
ANION GAP SERPL CALC-SCNC: 10 MMOL/L — SIGNIFICANT CHANGE UP (ref 5–17)
BUN SERPL-MCNC: 33 MG/DL — HIGH (ref 7–23)
CALCIUM SERPL-MCNC: 8.9 MG/DL — SIGNIFICANT CHANGE UP (ref 8.4–10.5)
CHLORIDE SERPL-SCNC: 101 MMOL/L — SIGNIFICANT CHANGE UP (ref 96–108)
CO2 SERPL-SCNC: 25 MMOL/L — SIGNIFICANT CHANGE UP (ref 22–31)
CREAT SERPL-MCNC: 0.82 MG/DL — SIGNIFICANT CHANGE UP (ref 0.5–1.3)
GLUCOSE SERPL-MCNC: 130 MG/DL — HIGH (ref 70–99)
HCT VFR BLD CALC: 47 % — SIGNIFICANT CHANGE UP (ref 39–50)
HGB BLD-MCNC: 16.1 G/DL — SIGNIFICANT CHANGE UP (ref 13–17)
MCHC RBC-ENTMCNC: 32.1 PG — SIGNIFICANT CHANGE UP (ref 27–34)
MCHC RBC-ENTMCNC: 34.3 GM/DL — SIGNIFICANT CHANGE UP (ref 32–36)
MCV RBC AUTO: 93.8 FL — SIGNIFICANT CHANGE UP (ref 80–100)
PLATELET # BLD AUTO: 220 K/UL — SIGNIFICANT CHANGE UP (ref 150–400)
POTASSIUM SERPL-MCNC: 4.1 MMOL/L — SIGNIFICANT CHANGE UP (ref 3.5–5.3)
POTASSIUM SERPL-SCNC: 4.1 MMOL/L — SIGNIFICANT CHANGE UP (ref 3.5–5.3)
RBC # BLD: 5.01 M/UL — SIGNIFICANT CHANGE UP (ref 4.2–5.8)
RBC # FLD: 12.2 % — SIGNIFICANT CHANGE UP (ref 10.3–14.5)
SODIUM SERPL-SCNC: 136 MMOL/L — SIGNIFICANT CHANGE UP (ref 135–145)
WBC # BLD: 15.42 K/UL — HIGH (ref 3.8–10.5)
WBC # FLD AUTO: 15.42 K/UL — HIGH (ref 3.8–10.5)

## 2019-07-12 RX ORDER — SENNA PLUS 8.6 MG/1
2 TABLET ORAL
Qty: 0 | Refills: 0 | DISCHARGE
Start: 2019-07-12

## 2019-07-12 RX ORDER — DOCUSATE SODIUM 100 MG
1 CAPSULE ORAL
Qty: 0 | Refills: 0 | DISCHARGE
Start: 2019-07-12

## 2019-07-12 RX ORDER — SOLIFENACIN SUCCINATE 10 MG/1
1 TABLET ORAL
Qty: 0 | Refills: 0 | DISCHARGE

## 2019-07-12 RX ADMIN — Medication 1 TABLET(S): at 15:00

## 2019-07-12 RX ADMIN — Medication 1.5 GRAM(S): at 15:00

## 2019-07-12 RX ADMIN — DICLOFENAC SODIUM 75 MILLIGRAM(S): 75 TABLET, DELAYED RELEASE ORAL at 15:00

## 2019-07-12 RX ADMIN — PREGABALIN 1000 MICROGRAM(S): 225 CAPSULE ORAL at 15:00

## 2019-07-12 RX ADMIN — ENOXAPARIN SODIUM 40 MILLIGRAM(S): 100 INJECTION SUBCUTANEOUS at 05:17

## 2019-07-12 RX ADMIN — Medication 500 MILLIGRAM(S): at 15:00

## 2019-07-12 RX ADMIN — Medication 32.4 MILLIGRAM(S): at 15:04

## 2019-07-12 RX ADMIN — DICLOFENAC SODIUM 75 MILLIGRAM(S): 75 TABLET, DELAYED RELEASE ORAL at 15:30

## 2019-07-12 RX ADMIN — PANTOPRAZOLE SODIUM 40 MILLIGRAM(S): 20 TABLET, DELAYED RELEASE ORAL at 05:16

## 2019-07-12 RX ADMIN — TAMSULOSIN HYDROCHLORIDE 0.4 MILLIGRAM(S): 0.4 CAPSULE ORAL at 21:56

## 2019-07-12 RX ADMIN — Medication 5000 UNIT(S): at 14:59

## 2019-07-12 RX ADMIN — Medication 116 MILLIGRAM(S): at 05:17

## 2019-07-12 NOTE — OCCUPATIONAL THERAPY INITIAL EVALUATION ADULT - LIVES WITH, PROFILE
children/spouse/Pt lives with adult children (3) and spouse in hi ranch home, +7 stoop steps with rail to front door. Pt enters through back door no steps and ascend 6+7 steps up with rail to living area, +walkin shower with grab bars and shower chair

## 2019-07-12 NOTE — OCCUPATIONAL THERAPY INITIAL EVALUATION ADULT - GENERAL OBSERVATIONS, REHAB EVAL
Pt received semisupine in bed, A+Ox4, reports BLE weakness with RUE weakness /decreased coordination recent onset

## 2019-07-12 NOTE — OCCUPATIONAL THERAPY INITIAL EVALUATION ADULT - PRECAUTIONS/LIMITATIONS, REHAB EVAL
fall precautions/over last few days,can barely bear wt, and non ambulatory. +knee stiffness b/l worse in am and mildly imp over the day, c/w his known RA. +falls, most recently few weeks ago (once fell backwards onto rear end, once hit his L shin falling forward; no loc, no head trauma, no syncope).  Pt also notes worsening urinary sx x 1-2 days; inc urinary freq and urgency, at times hesitancy; denies dysuria, f/c, abd pain, back pain. Denies cp/sob, n/v/d, cough/uri sx/rash/nodules/oral sores/ha/visual changes/saddle anesthesia. Is due for q 6monthly infusion of Ocrevus, due next week

## 2019-07-12 NOTE — OCCUPATIONAL THERAPY INITIAL EVALUATION ADULT - PERTINENT HX OF CURRENT PROBLEM, REHAB EVAL
60yo M PMH MS since 2014,UC,BPH, sz d/o, DVT off a/c, RA, prior UTI, now p/w urinary frequency and weakness. Pt states he has had chronic baseline R sided weakness. Over last few weeks, he has had progressive R sided sx, and now has started having left sided sx. +weakness of both legs R>L, R arm weakness and R arm dec  strength, very minimal sx in L arm/hand.Ordinarily is ambulatory with walker in house and wheelchair outside,can bear weight;

## 2019-07-12 NOTE — DISCHARGE NOTE PROVIDER - CARE PROVIDER_API CALL
Harjinder Davis (MD)  Internal Medicine; Neurology  [address] [facility], [place] [address]  Phone: [phone]  Fax: [phone]  Follow Up Time:

## 2019-07-12 NOTE — OCCUPATIONAL THERAPY INITIAL EVALUATION ADULT - FINE MOTOR COORDINATION TRAINING, OT EVAL
Goal: Goal: Pt will independently manipulate buttons/zippers/fasteners on shirts/pants in 2 weeks to increase independence for ADL performance

## 2019-07-12 NOTE — DISCHARGE NOTE PROVIDER - NSDCCPCAREPLAN_GEN_ALL_CORE_FT
PRINCIPAL DISCHARGE DIAGNOSIS  Diagnosis: Weakness of both legs  Assessment and Plan of Treatment: improved with iv steroids; take all prescribed medication; f/u with Dr Paredes      SECONDARY DISCHARGE DIAGNOSES  Diagnosis: Ulcerative colitis  Assessment and Plan of Treatment: take prescibed medication; f/u with private GI    Diagnosis: Acute urinary retention  Assessment and Plan of Treatment: resolved; ferrari d/c; take prescribed medications PRINCIPAL DISCHARGE DIAGNOSIS  Diagnosis: Weakness of both legs  Assessment and Plan of Treatment: improved with iv steroids; take all prescribed medication; f/u with Dr Paredes      SECONDARY DISCHARGE DIAGNOSES  Diagnosis: Acute urinary retention  Assessment and Plan of Treatment: resolved; ferrari d/c; take prescribed medications    Diagnosis: Seizure disorder  Assessment and Plan of Treatment: continue phenobarb and follow up with your neurologist.    Diagnosis: Ulcerative colitis  Assessment and Plan of Treatment: take prescibed medication; f/u with private GI

## 2019-07-12 NOTE — OCCUPATIONAL THERAPY INITIAL EVALUATION ADULT - DIAGNOSIS, OT EVAL
Pt currently presents with decreased endurance, balance, fine motor coordination, bed mobility and strength limiting independence with ADLs and functional mobility.

## 2019-07-12 NOTE — OCCUPATIONAL THERAPY INITIAL EVALUATION ADULT - TRANSFER TRAINING, PT EVAL
Goal: Pt will perform sit to stand, bed <-> chair, toilet and shower transfers independently with 2 weeks

## 2019-07-12 NOTE — OCCUPATIONAL THERAPY INITIAL EVALUATION ADULT - ADL RETRAINING, OT EVAL
Goal: Pt will perform UE/LE dressing independently within 2 weeks. Goal: Pt will perform bathing with appropriate AD within 2 weeks.

## 2019-07-13 LAB
ANION GAP SERPL CALC-SCNC: 14 MMOL/L — SIGNIFICANT CHANGE UP (ref 5–17)
BUN SERPL-MCNC: 40 MG/DL — HIGH (ref 7–23)
CALCIUM SERPL-MCNC: 8.5 MG/DL — SIGNIFICANT CHANGE UP (ref 8.4–10.5)
CHLORIDE SERPL-SCNC: 97 MMOL/L — SIGNIFICANT CHANGE UP (ref 96–108)
CO2 SERPL-SCNC: 25 MMOL/L — SIGNIFICANT CHANGE UP (ref 22–31)
CREAT SERPL-MCNC: 0.83 MG/DL — SIGNIFICANT CHANGE UP (ref 0.5–1.3)
GLUCOSE SERPL-MCNC: 125 MG/DL — HIGH (ref 70–99)
HCT VFR BLD CALC: 45.8 % — SIGNIFICANT CHANGE UP (ref 39–50)
HGB BLD-MCNC: 15.5 G/DL — SIGNIFICANT CHANGE UP (ref 13–17)
MCHC RBC-ENTMCNC: 32.2 PG — SIGNIFICANT CHANGE UP (ref 27–34)
MCHC RBC-ENTMCNC: 33.8 GM/DL — SIGNIFICANT CHANGE UP (ref 32–36)
MCV RBC AUTO: 95 FL — SIGNIFICANT CHANGE UP (ref 80–100)
PLATELET # BLD AUTO: 214 K/UL — SIGNIFICANT CHANGE UP (ref 150–400)
POTASSIUM SERPL-MCNC: 4 MMOL/L — SIGNIFICANT CHANGE UP (ref 3.5–5.3)
POTASSIUM SERPL-SCNC: 4 MMOL/L — SIGNIFICANT CHANGE UP (ref 3.5–5.3)
RBC # BLD: 4.82 M/UL — SIGNIFICANT CHANGE UP (ref 4.2–5.8)
RBC # FLD: 12.3 % — SIGNIFICANT CHANGE UP (ref 10.3–14.5)
SODIUM SERPL-SCNC: 136 MMOL/L — SIGNIFICANT CHANGE UP (ref 135–145)
WBC # BLD: 14.78 K/UL — HIGH (ref 3.8–10.5)
WBC # FLD AUTO: 14.78 K/UL — HIGH (ref 3.8–10.5)

## 2019-07-13 RX ADMIN — DICLOFENAC SODIUM 75 MILLIGRAM(S): 75 TABLET, DELAYED RELEASE ORAL at 14:20

## 2019-07-13 RX ADMIN — ENOXAPARIN SODIUM 40 MILLIGRAM(S): 100 INJECTION SUBCUTANEOUS at 06:00

## 2019-07-13 RX ADMIN — Medication 500 MILLIGRAM(S): at 13:50

## 2019-07-13 RX ADMIN — DICLOFENAC SODIUM 75 MILLIGRAM(S): 75 TABLET, DELAYED RELEASE ORAL at 13:50

## 2019-07-13 RX ADMIN — Medication 1 TABLET(S): at 13:51

## 2019-07-13 RX ADMIN — Medication 1.5 GRAM(S): at 13:50

## 2019-07-13 RX ADMIN — PREGABALIN 1000 MICROGRAM(S): 225 CAPSULE ORAL at 13:50

## 2019-07-13 RX ADMIN — Medication 32.4 MILLIGRAM(S): at 13:59

## 2019-07-13 RX ADMIN — Medication 100 MILLIGRAM(S): at 06:00

## 2019-07-13 RX ADMIN — Medication 5000 UNIT(S): at 13:50

## 2019-07-13 RX ADMIN — TAMSULOSIN HYDROCHLORIDE 0.4 MILLIGRAM(S): 0.4 CAPSULE ORAL at 21:02

## 2019-07-13 RX ADMIN — PANTOPRAZOLE SODIUM 40 MILLIGRAM(S): 20 TABLET, DELAYED RELEASE ORAL at 06:00

## 2019-07-14 LAB
ANION GAP SERPL CALC-SCNC: 9 MMOL/L — SIGNIFICANT CHANGE UP (ref 5–17)
BUN SERPL-MCNC: 36 MG/DL — HIGH (ref 7–23)
CALCIUM SERPL-MCNC: 7.7 MG/DL — LOW (ref 8.4–10.5)
CHLORIDE SERPL-SCNC: 103 MMOL/L — SIGNIFICANT CHANGE UP (ref 96–108)
CO2 SERPL-SCNC: 26 MMOL/L — SIGNIFICANT CHANGE UP (ref 22–31)
CREAT SERPL-MCNC: 0.89 MG/DL — SIGNIFICANT CHANGE UP (ref 0.5–1.3)
GLUCOSE SERPL-MCNC: 90 MG/DL — SIGNIFICANT CHANGE UP (ref 70–99)
HCT VFR BLD CALC: 46.9 % — SIGNIFICANT CHANGE UP (ref 39–50)
HGB BLD-MCNC: 15.6 G/DL — SIGNIFICANT CHANGE UP (ref 13–17)
MCHC RBC-ENTMCNC: 32 PG — SIGNIFICANT CHANGE UP (ref 27–34)
MCHC RBC-ENTMCNC: 33.3 GM/DL — SIGNIFICANT CHANGE UP (ref 32–36)
MCV RBC AUTO: 96.1 FL — SIGNIFICANT CHANGE UP (ref 80–100)
PLATELET # BLD AUTO: 164 K/UL — SIGNIFICANT CHANGE UP (ref 150–400)
POTASSIUM SERPL-MCNC: 4 MMOL/L — SIGNIFICANT CHANGE UP (ref 3.5–5.3)
POTASSIUM SERPL-SCNC: 4 MMOL/L — SIGNIFICANT CHANGE UP (ref 3.5–5.3)
RBC # BLD: 4.88 M/UL — SIGNIFICANT CHANGE UP (ref 4.2–5.8)
RBC # FLD: 12.4 % — SIGNIFICANT CHANGE UP (ref 10.3–14.5)
SODIUM SERPL-SCNC: 138 MMOL/L — SIGNIFICANT CHANGE UP (ref 135–145)
WBC # BLD: 8.44 K/UL — SIGNIFICANT CHANGE UP (ref 3.8–10.5)
WBC # FLD AUTO: 8.44 K/UL — SIGNIFICANT CHANGE UP (ref 3.8–10.5)

## 2019-07-14 RX ADMIN — Medication 1 TABLET(S): at 11:05

## 2019-07-14 RX ADMIN — Medication 5000 UNIT(S): at 11:10

## 2019-07-14 RX ADMIN — TAMSULOSIN HYDROCHLORIDE 0.4 MILLIGRAM(S): 0.4 CAPSULE ORAL at 22:33

## 2019-07-14 RX ADMIN — PANTOPRAZOLE SODIUM 40 MILLIGRAM(S): 20 TABLET, DELAYED RELEASE ORAL at 06:02

## 2019-07-14 RX ADMIN — Medication 32.4 MILLIGRAM(S): at 11:09

## 2019-07-14 RX ADMIN — PREGABALIN 1000 MICROGRAM(S): 225 CAPSULE ORAL at 11:03

## 2019-07-14 RX ADMIN — DICLOFENAC SODIUM 75 MILLIGRAM(S): 75 TABLET, DELAYED RELEASE ORAL at 11:04

## 2019-07-14 RX ADMIN — Medication 500 MILLIGRAM(S): at 11:04

## 2019-07-14 RX ADMIN — ENOXAPARIN SODIUM 40 MILLIGRAM(S): 100 INJECTION SUBCUTANEOUS at 06:02

## 2019-07-14 RX ADMIN — Medication 1.5 GRAM(S): at 11:02

## 2019-07-15 DIAGNOSIS — F32.9 MAJOR DEPRESSIVE DISORDER, SINGLE EPISODE, UNSPECIFIED: ICD-10-CM

## 2019-07-15 DIAGNOSIS — F43.21 ADJUSTMENT DISORDER WITH DEPRESSED MOOD: ICD-10-CM

## 2019-07-15 LAB
ANION GAP SERPL CALC-SCNC: 9 MMOL/L — SIGNIFICANT CHANGE UP (ref 5–17)
BUN SERPL-MCNC: 25 MG/DL — HIGH (ref 7–23)
CALCIUM SERPL-MCNC: 8 MG/DL — LOW (ref 8.4–10.5)
CHLORIDE SERPL-SCNC: 98 MMOL/L — SIGNIFICANT CHANGE UP (ref 96–108)
CO2 SERPL-SCNC: 29 MMOL/L — SIGNIFICANT CHANGE UP (ref 22–31)
CREAT SERPL-MCNC: 0.89 MG/DL — SIGNIFICANT CHANGE UP (ref 0.5–1.3)
GLUCOSE SERPL-MCNC: 89 MG/DL — SIGNIFICANT CHANGE UP (ref 70–99)
POTASSIUM SERPL-MCNC: 3.9 MMOL/L — SIGNIFICANT CHANGE UP (ref 3.5–5.3)
POTASSIUM SERPL-SCNC: 3.9 MMOL/L — SIGNIFICANT CHANGE UP (ref 3.5–5.3)
SODIUM SERPL-SCNC: 136 MMOL/L — SIGNIFICANT CHANGE UP (ref 135–145)

## 2019-07-15 PROCEDURE — 99222 1ST HOSP IP/OBS MODERATE 55: CPT

## 2019-07-15 RX ADMIN — Medication 100 MILLIGRAM(S): at 17:05

## 2019-07-15 RX ADMIN — Medication 32.4 MILLIGRAM(S): at 13:32

## 2019-07-15 RX ADMIN — DICLOFENAC SODIUM 75 MILLIGRAM(S): 75 TABLET, DELAYED RELEASE ORAL at 14:00

## 2019-07-15 RX ADMIN — PREGABALIN 1000 MICROGRAM(S): 225 CAPSULE ORAL at 13:26

## 2019-07-15 RX ADMIN — ENOXAPARIN SODIUM 40 MILLIGRAM(S): 100 INJECTION SUBCUTANEOUS at 05:46

## 2019-07-15 RX ADMIN — Medication 1.5 GRAM(S): at 13:27

## 2019-07-15 RX ADMIN — Medication 1 TABLET(S): at 13:26

## 2019-07-15 RX ADMIN — Medication 500 MILLIGRAM(S): at 13:26

## 2019-07-15 RX ADMIN — PANTOPRAZOLE SODIUM 40 MILLIGRAM(S): 20 TABLET, DELAYED RELEASE ORAL at 05:46

## 2019-07-15 RX ADMIN — DICLOFENAC SODIUM 75 MILLIGRAM(S): 75 TABLET, DELAYED RELEASE ORAL at 13:27

## 2019-07-15 RX ADMIN — Medication 5000 UNIT(S): at 13:26

## 2019-07-15 RX ADMIN — TAMSULOSIN HYDROCHLORIDE 0.4 MILLIGRAM(S): 0.4 CAPSULE ORAL at 21:21

## 2019-07-15 NOTE — DIETITIAN INITIAL EVALUATION ADULT. - OTHER INFO
Per RN, pt with fair po intakes in-house, nursing flowsheet reviewed. No acute GI distress noted, +BM 7/12 per flowsheet. Per previous ED nurse note, pt wt was documented as 246.9 pounds (9/16/2016), current wt is 265 pounds - suspect wt gain over time vs fluids as pt with edema.

## 2019-07-15 NOTE — DIETITIAN INITIAL EVALUATION ADULT. - ENERGY NEEDS
Height: 76 inches, Weight: 265 pounds  BMI: 32 kg/m2 IBW: 202 pounds (+/-10%), %IBW: 131%  Pertinent Info: No edema, no pressure ulcers noted at this time. Height: 76 inches, Weight: 265 pounds  BMI: 32 kg/m2 IBW: 202 pounds (+/-10%), %IBW: 131%  Pertinent Info: Per chart, 60y/o Male with PMH Multiple Sclerosis, UC, seizure d/o, DVT , RA, admitted with urinary frequency and bilateral legs weakness, psych following for depression. +2 bilateral legs edema, no pressure ulcers noted at this time.

## 2019-07-15 NOTE — DIETITIAN INITIAL EVALUATION ADULT. - PROBLEM SELECTOR PLAN 3
-frequency alternating with hesitancy  -bladder huggins >280 cc, ferrari placed by ER  -possibly related to MS flare  -would c/w ferrari overnight and measure strict i/o; would remove ferrari in am and do TOV  -temp holding solifenacin until TOV passed.

## 2019-07-15 NOTE — DIETITIAN INITIAL EVALUATION ADULT. - ADD RECOMMEND
1) Continue to monitor weight, lab values, and diet tolerance. 2) Will add Health Shakes 2 x day as nutrient dense snack option for pt to have between meals. 3) RD remains available.

## 2019-07-15 NOTE — BEHAVIORAL HEALTH ASSESSMENT NOTE - SUMMARY
60 y/o M, , on disabled, domiciled with wife Abran, 2 sons, and 1 daughter, no PPHx, PMH of Multiple Sclerosis since 2014, UC, BPH, sz d/o, DVT off a/c, RA, prior UTI, use of Marijuana long time ago, drinks socially,  admitted urinary frequency and weakness. Psychiatry team consulted for depression. pt reported mild depressive symptoms, mostly related to his medical issues, passive SI occasionally, no active si/i/p. Pt reported some anxiety, dec sleep.

## 2019-07-15 NOTE — DIETITIAN INITIAL EVALUATION ADULT. - PROBLEM SELECTOR PLAN 4
-c/w home meds if on formulary; pt aware to bring in am  -stable, no diarrhea or abd pain at this time.

## 2019-07-15 NOTE — DIETITIAN INITIAL EVALUATION ADULT. - PERTINENT LABORATORY DATA
Na 136 [135 - 145], K+ 3.9 [3.5 - 5.3], BUN 25 [7 - 23], Cr 0.89 [0.50 - 1.30], BG 89 [70 - 99], Phos --, Alk Phos --, AST --, ALT --, Mg --, Ca 8.0 [8.4 - 10.5], HbA1c --

## 2019-07-15 NOTE — BEHAVIORAL HEALTH ASSESSMENT NOTE - RISK ASSESSMENT
pt overall low risk for suicide attempt, no active si, no hx attempts, future oriented, supportive family. risk factors include medical issues.

## 2019-07-15 NOTE — DIETITIAN INITIAL EVALUATION ADULT. - PERTINENT MEDS FT
MEDICATIONS  (STANDING):  ascorbic acid 500 milliGRAM(s) Oral daily  cholecalciferol 5000 Unit(s) Oral daily  cyanocobalamin 1000 MICROGram(s) Oral daily  diclofenac 75 milliGRAM(s) Oral daily  docusate sodium 100 milliGRAM(s) Oral two times a day  enoxaparin Injectable 40 milliGRAM(s) SubCutaneous every 24 hours  mesalamine ER (24-Hour) Capsule 1.5 Gram(s) Oral daily  multivitamin 1 Tablet(s) Oral daily  pantoprazole    Tablet 40 milliGRAM(s) Oral before breakfast  PHENobarbital 32.4 milliGRAM(s) Oral daily  senna 2 Tablet(s) Oral at bedtime  tamsulosin 0.4 milliGRAM(s) Oral at bedtime

## 2019-07-15 NOTE — BEHAVIORAL HEALTH ASSESSMENT NOTE - NSBHCHARTREVIEWLAB_PSY_A_CORE FT
15.6   8.44  )-----------( 164      ( 14 Jul 2019 10:01 )             46.9     07-15    136  |  98  |  25<H>  ----------------------------<  89  3.9   |  29  |  0.89    Ca    8.0<L>      15 Jul 2019 06:25

## 2019-07-15 NOTE — BEHAVIORAL HEALTH ASSESSMENT NOTE - NSBHCHARTREVIEWVS_PSY_A_CORE FT
Vital Signs Last 24 Hrs  T(C): 36.4 (15 Jul 2019 05:34), Max: 36.8 (14 Jul 2019 21:00)  T(F): 97.5 (15 Jul 2019 05:34), Max: 98.2 (14 Jul 2019 21:00)  HR: 64 (15 Jul 2019 05:34) (64 - 76)  BP: 113/77 (15 Jul 2019 05:34) (113/77 - 129/83)  BP(mean): --  RR: 18 (15 Jul 2019 05:34) (18 - 18)  SpO2: 95% (15 Jul 2019 05:34) (95% - 98%)

## 2019-07-15 NOTE — BEHAVIORAL HEALTH ASSESSMENT NOTE - NSBHSUICPROTECTFACT_PSY_A_CORE
Future oriented/Supportive social network or family/Identifies reasons for living/Responsibility to family and others/Fear of death or dying due to pain/suffering

## 2019-07-15 NOTE — DIETITIAN INITIAL EVALUATION ADULT. - REASON INDICATOR FOR ASSESSMENT
Pt seen for LOS initial assessment. Source: RN, EMR including previous admission records; limited subjective information obtained as pt declined conversation

## 2019-07-15 NOTE — BEHAVIORAL HEALTH ASSESSMENT NOTE - HPI (INCLUDE ILLNESS QUALITY, SEVERITY, DURATION, TIMING, CONTEXT, MODIFYING FACTORS, ASSOCIATED SIGNS AND SYMPTOMS)
58 y/o M, , on disabled, domiciled with wife Abran, 2 sons, and 1 daughter, no PPHx, PMH of Multiple Sclerosis since 2014, UC, BPH, sz d/o, DVT off a/c, RA, prior UTI, use of Marijuana long time ago, drinks socially,  admitted urinary frequency and weakness. Psychiatry team consulted for depression.    During interview, pt is alert and oriented to person, place, time, and events. He feels depressed and less energetic because of worsening weakness and frequent urination which he cannot control. He is also anxious about urinary frequency now. It kept him up almost all night last night since he had to go to bathroom constantly. Before coming to the hospital he avoids going out because he is afraid that he cannot find an accessible bathroom on time. Pt reported passive SI at times due to his medical issues, no active si, intent, plan. no access to guns. no hx suicide attempts. Pt states he would never hurt himself due to his family. He feels angry that he has MS, but he joe with it by talking to his family. Pt reports increased irritable mood at times, arguments with his family, feels his mood is impacting his relationships. no hx physical violence. pt  denies loss of concentration or interest, AVH, or maniac symptoms. He had marijuana use long time ago and denies other substance abuse; alcohol use socially. His second son was diagnosed with anxiety a few years ago.     spoke to Daughter with permission: daughter didn't . message left.

## 2019-07-16 RX ORDER — SERTRALINE 25 MG/1
50 TABLET, FILM COATED ORAL DAILY
Refills: 0 | Status: DISCONTINUED | OUTPATIENT
Start: 2019-07-16 | End: 2019-07-18

## 2019-07-16 RX ORDER — ACETAMINOPHEN 500 MG
650 TABLET ORAL EVERY 6 HOURS
Refills: 0 | Status: DISCONTINUED | OUTPATIENT
Start: 2019-07-16 | End: 2019-07-18

## 2019-07-16 RX ORDER — PHENOBARBITAL 60 MG
32.4 TABLET ORAL DAILY
Refills: 0 | Status: DISCONTINUED | OUTPATIENT
Start: 2019-07-16 | End: 2019-07-18

## 2019-07-16 RX ADMIN — Medication 650 MILLIGRAM(S): at 14:20

## 2019-07-16 RX ADMIN — Medication 5000 UNIT(S): at 11:34

## 2019-07-16 RX ADMIN — DICLOFENAC SODIUM 75 MILLIGRAM(S): 75 TABLET, DELAYED RELEASE ORAL at 12:34

## 2019-07-16 RX ADMIN — Medication 1 TABLET(S): at 11:35

## 2019-07-16 RX ADMIN — TAMSULOSIN HYDROCHLORIDE 0.4 MILLIGRAM(S): 0.4 CAPSULE ORAL at 21:02

## 2019-07-16 RX ADMIN — Medication 500 MILLIGRAM(S): at 11:35

## 2019-07-16 RX ADMIN — PANTOPRAZOLE SODIUM 40 MILLIGRAM(S): 20 TABLET, DELAYED RELEASE ORAL at 06:12

## 2019-07-16 RX ADMIN — Medication 32.4 MILLIGRAM(S): at 11:35

## 2019-07-16 RX ADMIN — SERTRALINE 50 MILLIGRAM(S): 25 TABLET, FILM COATED ORAL at 11:35

## 2019-07-16 RX ADMIN — DICLOFENAC SODIUM 75 MILLIGRAM(S): 75 TABLET, DELAYED RELEASE ORAL at 11:34

## 2019-07-16 RX ADMIN — Medication 650 MILLIGRAM(S): at 13:20

## 2019-07-16 RX ADMIN — PREGABALIN 1000 MICROGRAM(S): 225 CAPSULE ORAL at 11:35

## 2019-07-16 RX ADMIN — Medication 1.5 GRAM(S): at 11:34

## 2019-07-16 RX ADMIN — ENOXAPARIN SODIUM 40 MILLIGRAM(S): 100 INJECTION SUBCUTANEOUS at 06:12

## 2019-07-17 RX ORDER — SERTRALINE 25 MG/1
1 TABLET, FILM COATED ORAL
Qty: 0 | Refills: 0 | DISCHARGE
Start: 2019-07-17

## 2019-07-17 RX ORDER — ACETAMINOPHEN 500 MG
2 TABLET ORAL
Qty: 0 | Refills: 0 | DISCHARGE
Start: 2019-07-17

## 2019-07-17 RX ADMIN — PREGABALIN 1000 MICROGRAM(S): 225 CAPSULE ORAL at 11:45

## 2019-07-17 RX ADMIN — Medication 500 MILLIGRAM(S): at 11:44

## 2019-07-17 RX ADMIN — DICLOFENAC SODIUM 75 MILLIGRAM(S): 75 TABLET, DELAYED RELEASE ORAL at 12:30

## 2019-07-17 RX ADMIN — Medication 1.5 GRAM(S): at 11:46

## 2019-07-17 RX ADMIN — Medication 5000 UNIT(S): at 11:44

## 2019-07-17 RX ADMIN — ENOXAPARIN SODIUM 40 MILLIGRAM(S): 100 INJECTION SUBCUTANEOUS at 05:05

## 2019-07-17 RX ADMIN — DICLOFENAC SODIUM 75 MILLIGRAM(S): 75 TABLET, DELAYED RELEASE ORAL at 11:45

## 2019-07-17 RX ADMIN — Medication 1 TABLET(S): at 11:46

## 2019-07-17 RX ADMIN — TAMSULOSIN HYDROCHLORIDE 0.4 MILLIGRAM(S): 0.4 CAPSULE ORAL at 21:09

## 2019-07-17 RX ADMIN — SERTRALINE 50 MILLIGRAM(S): 25 TABLET, FILM COATED ORAL at 11:47

## 2019-07-17 RX ADMIN — Medication 32.4 MILLIGRAM(S): at 11:51

## 2019-07-17 RX ADMIN — PANTOPRAZOLE SODIUM 40 MILLIGRAM(S): 20 TABLET, DELAYED RELEASE ORAL at 05:05

## 2019-07-18 ENCOUNTER — TRANSCRIPTION ENCOUNTER (OUTPATIENT)
Age: 59
End: 2019-07-18

## 2019-07-18 VITALS
TEMPERATURE: 98 F | DIASTOLIC BLOOD PRESSURE: 82 MMHG | RESPIRATION RATE: 18 BRPM | HEART RATE: 62 BPM | OXYGEN SATURATION: 96 % | SYSTOLIC BLOOD PRESSURE: 134 MMHG

## 2019-07-18 PROCEDURE — 80053 COMPREHEN METABOLIC PANEL: CPT

## 2019-07-18 PROCEDURE — 99285 EMERGENCY DEPT VISIT HI MDM: CPT | Mod: 25

## 2019-07-18 PROCEDURE — 84132 ASSAY OF SERUM POTASSIUM: CPT

## 2019-07-18 PROCEDURE — 97530 THERAPEUTIC ACTIVITIES: CPT

## 2019-07-18 PROCEDURE — A9585: CPT

## 2019-07-18 PROCEDURE — 82947 ASSAY GLUCOSE BLOOD QUANT: CPT

## 2019-07-18 PROCEDURE — 87086 URINE CULTURE/COLONY COUNT: CPT

## 2019-07-18 PROCEDURE — 86803 HEPATITIS C AB TEST: CPT

## 2019-07-18 PROCEDURE — 85014 HEMATOCRIT: CPT

## 2019-07-18 PROCEDURE — 82435 ASSAY OF BLOOD CHLORIDE: CPT

## 2019-07-18 PROCEDURE — 51702 INSERT TEMP BLADDER CATH: CPT

## 2019-07-18 PROCEDURE — 97116 GAIT TRAINING THERAPY: CPT

## 2019-07-18 PROCEDURE — 82803 BLOOD GASES ANY COMBINATION: CPT

## 2019-07-18 PROCEDURE — 82330 ASSAY OF CALCIUM: CPT

## 2019-07-18 PROCEDURE — 81003 URINALYSIS AUTO W/O SCOPE: CPT

## 2019-07-18 PROCEDURE — 85027 COMPLETE CBC AUTOMATED: CPT

## 2019-07-18 PROCEDURE — 80048 BASIC METABOLIC PNL TOTAL CA: CPT

## 2019-07-18 PROCEDURE — 97162 PT EVAL MOD COMPLEX 30 MIN: CPT

## 2019-07-18 PROCEDURE — 70553 MRI BRAIN STEM W/O & W/DYE: CPT

## 2019-07-18 PROCEDURE — 84100 ASSAY OF PHOSPHORUS: CPT

## 2019-07-18 PROCEDURE — 84295 ASSAY OF SERUM SODIUM: CPT

## 2019-07-18 PROCEDURE — 83605 ASSAY OF LACTIC ACID: CPT

## 2019-07-18 PROCEDURE — 83735 ASSAY OF MAGNESIUM: CPT

## 2019-07-18 PROCEDURE — 97110 THERAPEUTIC EXERCISES: CPT

## 2019-07-18 PROCEDURE — 93970 EXTREMITY STUDY: CPT

## 2019-07-18 PROCEDURE — 97166 OT EVAL MOD COMPLEX 45 MIN: CPT

## 2019-07-18 RX ORDER — CHOLECALCIFEROL (VITAMIN D3) 125 MCG
1 CAPSULE ORAL
Qty: 0 | Refills: 0 | DISCHARGE

## 2019-07-18 RX ORDER — PREGABALIN 225 MG/1
1 CAPSULE ORAL
Qty: 0 | Refills: 0 | DISCHARGE

## 2019-07-18 RX ORDER — ASCORBIC ACID 60 MG
1 TABLET,CHEWABLE ORAL
Qty: 0 | Refills: 0 | DISCHARGE

## 2019-07-18 RX ADMIN — Medication 650 MILLIGRAM(S): at 00:39

## 2019-07-18 RX ADMIN — ENOXAPARIN SODIUM 40 MILLIGRAM(S): 100 INJECTION SUBCUTANEOUS at 05:45

## 2019-07-18 RX ADMIN — Medication 500 MILLIGRAM(S): at 11:42

## 2019-07-18 RX ADMIN — Medication 32.4 MILLIGRAM(S): at 11:42

## 2019-07-18 RX ADMIN — Medication 5000 UNIT(S): at 11:42

## 2019-07-18 RX ADMIN — PANTOPRAZOLE SODIUM 40 MILLIGRAM(S): 20 TABLET, DELAYED RELEASE ORAL at 05:45

## 2019-07-18 RX ADMIN — Medication 1 TABLET(S): at 11:42

## 2019-07-18 RX ADMIN — SERTRALINE 50 MILLIGRAM(S): 25 TABLET, FILM COATED ORAL at 11:42

## 2019-07-18 RX ADMIN — Medication 650 MILLIGRAM(S): at 00:09

## 2019-07-18 NOTE — PROGRESS NOTE ADULT - PROBLEM SELECTOR PLAN 4
continue home meds
continue Diclofenac
continue home meds
continue home meds
continue Diclofenac
continue Diclofenac

## 2019-07-18 NOTE — PROGRESS NOTE ADULT - ATTENDING COMMENTS
discussed with patient
discussed with patient in detail, all questions answered.
discussed with  psych evaluation for depression
discussed with patient in detail, all questions answered.
discussed with patient in detail, all questions answered.
stable for discharge today
discussed with patient in detail, all questions answered.
discharge to Subacute Rehab when bed available
discussed with patient in detail, all questions answered.

## 2019-07-18 NOTE — DISCHARGE NOTE NURSING/CASE MANAGEMENT/SOCIAL WORK - NSDCPEEMAIL_GEN_ALL_CORE
[facility] email [email or web address]

## 2019-07-18 NOTE — PROGRESS NOTE ADULT - PROBLEM SELECTOR PLAN 3
no evidence of infection  TOV tomorrow
ruled out on admission   voiding well  TOV successful   will continue to monitor
UTI ruled out  voiding well
UTI ruled out  voiding well
no evidence of infection  TOV successful   will continue to monitor
no evidence of infection  TOV today
ruled out on admission   voiding well  TOV successful   will continue to monitor
Bladder non-tender and non-distended. Urine clear yellow.
ruled out on admission   voiding well  TOV successful   will continue to monitor
Bladder non-tender and non-distended. Urine clear yellow.
ruled out on admission   voiding well  TOV successful   will continue to monitor

## 2019-07-18 NOTE — PROGRESS NOTE ADULT - PROBLEM SELECTOR PLAN 6
psych Recommend Zoloft 50 po qd  will discuss with patient to see if amenable to start
psych Recommend Zoloft 50 po qd  will continue   patient amenable
continue phenobarbitone
SQ enoxaparin
SQ enoxaparin
continue phenobarbitone
continue phenobarbitone
will continue  Zoloft 50 po qd  patient amenable
will continue  Zoloft 50 po qd  patient amenable

## 2019-07-18 NOTE — DISCHARGE NOTE NURSING/CASE MANAGEMENT/SOCIAL WORK - NSDCDPATPORTLINK_GEN_ALL_CORE
You can access the [facility], offered by [facility], by registering with the following website: [email or web address]

## 2019-07-18 NOTE — PROGRESS NOTE ADULT - PROBLEM SELECTOR PROBLEM 5
Seizure disorder
Rheumatoid arthritis, involving unspecified site, unspecified rheumatoid factor presence
Seizure disorder
Rheumatoid arthritis, involving unspecified site, unspecified rheumatoid factor presence
Rheumatoid arthritis, involving unspecified site, unspecified rheumatoid factor presence
Seizure disorder

## 2019-07-18 NOTE — PROGRESS NOTE ADULT - PROBLEM SELECTOR PROBLEM 3
Acute urinary retention

## 2019-07-18 NOTE — PROGRESS NOTE ADULT - PROBLEM SELECTOR PLAN 5
continue phenobarbitone
continue Diclofenac
continue phenobarbitone
continue Diclofenac
continue Diclofenac
continue phenobarbitone

## 2019-07-18 NOTE — PROGRESS NOTE ADULT - PROBLEM SELECTOR PROBLEM 4
Ulcerative colitis
Rheumatoid arthritis, involving unspecified site, unspecified rheumatoid factor presence
Ulcerative colitis
Ulcerative colitis
Rheumatoid arthritis, involving unspecified site, unspecified rheumatoid factor presence
Rheumatoid arthritis, involving unspecified site, unspecified rheumatoid factor presence

## 2019-07-18 NOTE — PROGRESS NOTE ADULT - PROBLEM SELECTOR PROBLEM 6
Depression
Depression
Seizure disorder
Prophylactic measure
Depression
Depression
Prophylactic measure
Seizure disorder
Seizure disorder

## 2019-07-18 NOTE — PROGRESS NOTE ADULT - PROBLEM SELECTOR PROBLEM 1
Weakness of both legs

## 2019-07-18 NOTE — PROGRESS NOTE ADULT - PROBLEM SELECTOR PLAN 1
will defer to neurology  start IV solumedrol 1000 mg qd  likely MS flare
s/p 3 doses of solumedrol IV with improved strength  states however that he is weakening again  will discuss again
s/p 3 doses of solumedrol IV with improved strength  awaiting Subacute Rehab
s/p 3 doses of solumedrol IV with improved strength  awaiting Subacute Rehab today
s/p 3 doses of solumedrol IV with improved strength  no further intervention required at this time  discharge to Subacute Rehab pending
will continue to follow neurology recommendations    continue IV solumedrol 1000 mg qd  likely MS flare
will continue to follow neurology recommendations   s/p 3 doses of solumedrol IV with improved strength  no further intervention required at this time
s/p 3 doses of solumedrol IV with improved strength  d/w Dr Payton neurology attending who feels that any additional doses are unlikely to benefit patient, and he recommends Subacute Rehab asap
s/p 3 doses of solumedrol IV with improved strength  awaiting Subacute Rehab

## 2019-07-18 NOTE — PROGRESS NOTE ADULT - PROBLEM SELECTOR PLAN 2
continue high dose solumedrol IV
defer to neurology recommendations   PT evaluation: Subacute Rehab   fall precautions
continue high dose solumedrol IV  PT evaluation pending  fall precautions
defer to neurology recommendations   PT evaluation: Subacute Rehab   fall precautions
defer to neurology recommendations   PT evaluation: Subacute Rehab   fall precautions
improved  PT evaluation: Subacute Rehab   fall precautions
improved  PT evaluation: Subacute Rehab   fall precautions
defer to neurology recommendations   PT evaluation: Subacute Rehab   fall precautions
defer to neurology recommendations   PT evaluation: Subacute Rehab   fall precautions

## 2019-07-18 NOTE — PROGRESS NOTE ADULT - REASON FOR ADMISSION
weakness, urinary frequency

## 2019-07-18 NOTE — PROGRESS NOTE ADULT - ASSESSMENT
59 M PMH Multiple Sclerosis since 2014, UC, BPH, sz d/o, DVT off a/c, RA, prior UTI, now p/w urinary frequency and weakness.
59 M PMH Multiple Sclerosis since 2014, UC, BPH, sz d/o, DVT off a/c, RA, prior UTI, now p/w urinary frequency and weakness.
This is a 59y year old Male with the below past medical history who presents with the chief complaint of weakness PMH Multiple Sclerosis since 2014, UC, BPH, sz d/o, DVT off a/c, RA, prior UTI, now p/w urinary frequency and weakness. Pt states he has had chronic baseline R sided weakness. today moving his R leg with more power than yesterday, some improvement.    Dx: Relapsing/Remitting Multiple Sclerosis with exacerbation    1. S/p 3 days of pulse dose IV solumedrol for suspected MS exacerbation.     2. pt states over weekend feels weaker but in fact remains stronger than when came in, likely feels off as stopped steroids which can bring feeling of strenght that disappears when stop, but more subjective.  Do not believe requires more steroids    3. Plan for discharge to rehab , awaiting approval by insurance     4. PT/OT for patient to get OOB    5. Will follow up with his neurologist, Dr. Davis of my office, as outpatient after completing rehab    6. Otherwise no further inpatient neurologic intervention at this time. no neuro c/i to d/c to rehab asap
59 M PMH Multiple Sclerosis since 2014, UC, BPH, sz d/o, DVT off a/c, RA, prior UTI, now p/w urinary frequency and weakness likely secondary to MS exacerbation
59 M PMH Multiple Sclerosis since 2014, UC, BPH, sz d/o, DVT off a/c, RA, prior UTI, now p/w urinary frequency and weakness likely secondary to MS exacerbation
59 M PMH Multiple Sclerosis since 2014, UC, BPH, sz d/o, DVT off a/c, RA, prior UTI, now p/w urinary frequency and weakness.
This is a 59y year old Male with the below past medical history who presents with the chief complaint of weakness PMH Multiple Sclerosis since 2014, UC, BPH, sz d/o, DVT off a/c, RA, prior UTI, now p/w urinary frequency and weakness. Pt states he has had chronic baseline R sided weakness.     Dx: Relapsing/Remitting Multiple Sclerosis with exacerbation    1. S/p 3 days of pulse dose IV solumedrol for suspected MS exacerbation.     2. Monitor leukocytosis. Likely steroid related    3. Plan for eventual discharge to rehab     4. PT/OT    5. Will follow up with his neurologist, Dr. Davis of my office, as outpatient after completing rehab    6. Otherwise no further inpatient neurologic intervention at this time. Please call back with any questions
This is a 59y year old Male with the below past medical history who presents with the chief complaint of weakness PMH Multiple Sclerosis since 2014, UC, BPH, sz d/o, DVT off a/c, RA, prior UTI, now p/w urinary frequency and weakness. Pt states he has had chronic baseline R sided weakness.     Dx: Relapsing/Remitting Multiple Sclerosis with exacerbation    1. S/p 3 days of pulse dose IV solumedrol for suspected MS exacerbation.     2. pt states over weekend feels weaker but in fact remains stronger than when came in, likely feel off as stopped steroids which can bring feeling of strenght that disappears when stop, but more subjective.  Do not believe requires more steroids    3. Plan for eventual discharge to rehab     4. PT/OT    5. Will follow up with his neurologist, Dr. Davis of my office, as outpatient after completing rehab    6. Otherwise no further inpatient neurologic intervention at this time. no neuro c/i to d/c to rehab asap
This is a 59y year old Male with the below past medical history who presents with the chief complaint of weakness PMH Multiple Sclerosis since 2014, UC, BPH, sz d/o, DVT off a/c, RA, prior UTI, now p/w urinary frequency and weakness. Pt states he has had chronic baseline R sided weakness.     Dx: Relapsing/Remitting Multiple Sclerosis with exacerbation    1. S/p 3 days of pulse dose IV solumedrol for suspected MS exacerbation.     2. pt states over weekend feels weaker but in fact remains stronger than when came in, likely feels off as stopped steroids which can bring feeling of strenght that disappears when stop, but more subjective.  Do not believe requires more steroids    3. Plan for eventual discharge to rehab , awaiting    4. PT/OT    5. Will follow up with his neurologist, Dr. Davis of my office, as outpatient after completing rehab    6. Otherwise no further inpatient neurologic intervention at this time. no neuro c/i to d/c to rehab asap
This is a 59y year old Male with the below past medical history who presents with the chief complaint of weakness PMH Multiple Sclerosis since 2014, UC, BPH, sz d/o, DVT off a/c, RA, prior UTI, now p/w urinary frequency and weakness. Pt states he has had chronic baseline R sided weakness. Over last few weeks he has had progressive R sided sx, and now has started having left sided sx. +weakness of both legs R>L, R arm weakness and R arm dec  strength, very minimal sx in L arm/hand. Ordinarily is ambulatory with walker in house and wheelchair outside, can bear weight; over last few days, can barely bear wt, and non ambulatory. +knee stiffness b/l worse in am and mildly imp over the day, c/w his known RA. +falls, most recently few weeks ago. rt arm is more diffiucltto use, dec coordination.      Pt also notes worsening urinary sx x 1-2 days; inc urinary freq and urgency, at times hesitancy; denies dysuria, f/c, abd pain, back pain. Denies cp/sob, n/v/d, cough/uri sx/rash/nodules/oral sores/ha/visual changes/saddle anesthesia. Is due for q 6monthly infusion of Ocrevus, due next week.     exam with weakness, right arm/leg > left    likely exacerbation of MS    No evidence of infection    recc:  3 days of IV solumedrol 1 gram done, now improved  MRi brain noted above, cspine not done here in past so cannot compare, will hold   believe that would be appropriate to stop steroids and get him to subacute rehab at this time  no further steroids or imaging      d/w pt and NP TIM
This is a 59y year old Male with the below past medical history who presents with the chief complaint of weakness PMH Multiple Sclerosis since 2014, UC, BPH, sz d/o, DVT off a/c, RA, prior UTI, now p/w urinary frequency and weakness. Pt states he has had chronic baseline R sided weakness. Over last few weeks he has had progressive R sided sx, and now has started having left sided sx. +weakness of both legs R>L, R arm weakness and R arm dec  strength, very minimal sx in L arm/hand. Ordinarily is ambulatory with walker in house and wheelchair outside, can bear weight; over last few days, can barely bear wt, and non ambulatory. +knee stiffness b/l worse in am and mildly imp over the day, c/w his known RA. +falls, most recently few weeks ago. rt arm is more diffiucltto use, dec coordination.      Pt also notes worsening urinary sx x 1-2 days; inc urinary freq and urgency, at times hesitancy; denies dysuria, f/c, abd pain, back pain. Denies cp/sob, n/v/d, cough/uri sx/rash/nodules/oral sores/ha/visual changes/saddle anesthesia. Is due for q 6monthly infusion of Ocrevus, due next week.     exam with weakness, right arm/leg > left    likely exacerbation of MS    No evidence of infection    recc:  3 days of IV solumedrol 1 gram now day 2, appears to be improving  MRi brain noted above, cspine not done here in past so cannot compare, will decide on this  d/w TIM, need pt to assess for walking and then will decide if need to do 3 or 5 days of steroids.  if goes home, often do iv steroids in office    MRi brain with and without    d/w pt and NP TIM
This is a 59y year old Male with the below past medical history who presents with the chief complaint of weakness PMH Multiple Sclerosis since 2014, UC, BPH, sz d/o, DVT off a/c, RA, prior UTI, now p/w urinary frequency and weakness. Pt states he has had chronic baseline R sided weakness. today moving his R leg with more power than yesterday, some improvement.    Dx: Relapsing/Remitting Multiple Sclerosis with exacerbation    1. S/p 3 days of pulse dose IV solumedrol for suspected MS exacerbation.     2. pt states over weekend feels weaker but in fact remains stronger than when came in, likely feels off as stopped steroids which can bring feeling of strenght that disappears when stop, but more subjective.  Do not believe requires more steroids    3. Plan for discharge to rehab ,     4. PT/OT for patient to get OOB    5. Will follow up with his neurologist, Dr. Davis of my office, as outpatient after completing rehab    6.  no neuro c/i to d/c to rehab asap
59 M PMH Multiple Sclerosis since 2014, UC, BPH, sz d/o, DVT off a/c, RA, prior UTI, now p/w urinary frequency and weakness.
59 M PMH Multiple Sclerosis since 2014, UC, BPH, sz d/o, DVT off a/c, RA, prior UTI, now p/w urinary frequency and weakness.

## 2019-07-18 NOTE — PROGRESS NOTE ADULT - PROBLEM SELECTOR PROBLEM 2
Multiple sclerosis

## 2019-07-18 NOTE — PROGRESS NOTE ADULT - PROVIDER SPECIALTY LIST ADULT
Internal Medicine
Neurology
Internal Medicine

## 2019-07-18 NOTE — DISCHARGE NOTE NURSING/CASE MANAGEMENT/SOCIAL WORK - NSDCPEWEB_GEN_ALL_CORE
[facility] for Tobacco Control website --- [email or web address] --- [email or web address]

## 2020-02-12 NOTE — PROGRESS NOTE ADULT - SUBJECTIVE AND OBJECTIVE BOX
Patient is a 59y old  Male who presents with a chief complaint of weakness, urinary frequency (13 Jul 2019 11:23)      SUBJECTIVE / OVERNIGHT EVENTS: overnight events noted    ROS:  Resp: No cough no sputum production  CVS: No chest pain no palpitations no orthopnea  GI: no N/V/D  : no dysuria, no hematuria  Neuro: no weakness no paresthesias  Heme: No petechiae no easy bruising  Msk: No joint pain no swelling  Skin: No rash no itching        MEDICATIONS  (STANDING):  ascorbic acid 500 milliGRAM(s) Oral daily  cholecalciferol 5000 Unit(s) Oral daily  cyanocobalamin 1000 MICROGram(s) Oral daily  diclofenac 75 milliGRAM(s) Oral daily  docusate sodium 100 milliGRAM(s) Oral two times a day  enoxaparin Injectable 40 milliGRAM(s) SubCutaneous every 24 hours  mesalamine ER (24-Hour) Capsule 1.5 Gram(s) Oral daily  multivitamin 1 Tablet(s) Oral daily  pantoprazole    Tablet 40 milliGRAM(s) Oral before breakfast  PHENobarbital 32.4 milliGRAM(s) Oral daily  senna 2 Tablet(s) Oral at bedtime  tamsulosin 0.4 milliGRAM(s) Oral at bedtime    MEDICATIONS  (PRN):        CAPILLARY BLOOD GLUCOSE        I&O's Summary    13 Jul 2019 07:01  -  14 Jul 2019 07:00  --------------------------------------------------------  IN: 160 mL / OUT: 508 mL / NET: -348 mL        Vital Signs Last 24 Hrs  T(C): 36.6 (14 Jul 2019 13:59), Max: 36.8 (13 Jul 2019 14:37)  T(F): 97.8 (14 Jul 2019 13:59), Max: 98.3 (13 Jul 2019 14:37)  HR: 67 (14 Jul 2019 13:59) (66 - 76)  BP: 122/81 (14 Jul 2019 13:59) (122/80 - 146/87)  BP(mean): --  RR: 18 (14 Jul 2019 13:59) (18 - 18)  SpO2: 97% (14 Jul 2019 13:59) (96% - 98%)    PHYSICAL EXAM: vital signs as above  in no apparent distress  HEENT: ROGER EOMI  Neck: Supple, no JVD, no thyromegaly  Lungs: no rhonchi, no wheeze, no crackles  CVS: S1 S2 no M/R/G  Abdomen: no tenderness, no organomegaly, BS present obese  Neuro: AO x 3 improved strength LE 3 - 4/5 bilaterally  no sensory abnormalities  Psych: appropriate affect  Skin: warm, dry  Ext: no cyanosis or clubbing, much improved edema  Msk: no joint swelling or deformities  Back: no CVA tenderness, no kyphosis/scoliosis    LABS:                        15.6   8.44  )-----------( 164      ( 14 Jul 2019 10:01 )             46.9     07-14    138  |  103  |  36<H>  ----------------------------<  90  4.0   |  26  |  0.89    Ca    7.7<L>      14 Jul 2019 05:51                  All consultant(s) notes reviewed and care discussed with other providers        Contact Number, Dr Ellis [phone]
SUBJECTIVE:    patient feels stronger in left leg and some in right, states he's still waiting for insurance approval for rehab. really wants to get out of bed but he cannot get his legs out of bed and stand on his own.    Medications:  acetaminophen   Tablet .. 650 milliGRAM(s) Oral every 6 hours PRN  ascorbic acid 500 milliGRAM(s) Oral daily  cholecalciferol 5000 Unit(s) Oral daily  cyanocobalamin 1000 MICROGram(s) Oral daily  diclofenac 75 milliGRAM(s) Oral daily  docusate sodium 100 milliGRAM(s) Oral two times a day  enoxaparin Injectable 40 milliGRAM(s) SubCutaneous every 24 hours  mesalamine ER (24-Hour) Capsule 1.5 Gram(s) Oral daily  multivitamin 1 Tablet(s) Oral daily  pantoprazole    Tablet 40 milliGRAM(s) Oral before breakfast  PHENobarbital 32.4 milliGRAM(s) Oral daily  senna 2 Tablet(s) Oral at bedtime  sertraline 50 milliGRAM(s) Oral daily  tamsulosin 0.4 milliGRAM(s) Oral at bedtime      Labs:          CAPILLARY BLOOD GLUCOSE                  Vitals:  Vital Signs Last 24 Hrs  T(C): 36.3 (17 Jul 2019 05:16), Max: 36.8 (16 Jul 2019 14:10)  T(F): 97.3 (17 Jul 2019 05:16), Max: 98.2 (16 Jul 2019 14:10)  HR: 66 (17 Jul 2019 05:16) (66 - 92)  BP: 127/77 (17 Jul 2019 05:16) (127/77 - 137/81)  BP(mean): --  RR: 18 (17 Jul 2019 05:16) (18 - 18)  SpO2: 97% (17 Jul 2019 05:16) (95% - 97%)    NEUROLOGICAL EXAM:    Mental status: Awake, alert, and in no apparent distress. lying in bed in good spirits. Oriented to person, place and time. Language function is normal. Recent memory, digit span and concentration were normal.     Cranial Nerves: Pupils were equal, round, reactive to light. Extraocular movements were intact. Visual field were full. Facial sensation was intact to light touch. There was no facial asymmetry. The palate was upgoing symmetrically and tongue was midline. Hearing acuity was intact to finger rub AU. Shoulder shrug was full bilaterally    Motor exam: Bulk and tone were normal. Strength was 5/5 in b/l arms.  Right leg improved hip flexion and knee flexion, 5-/5, .   Left leg 5-/5 throughout maybe improved slightly    Reflexes: 2+ in the bilateral upper extremities. 2+ in the bilateral lower extremities. Toes mute b/l    Sensation: Intact to light touch, temperature, vibration and proprioception.     Coordination: Finger-nose-finger without dysmetria.     Gait: defer    < from: MR Head w/wo IV Cont (07.10.19 @ 17:48) >    EXAM:  MR BRAIN WAW IC                            PROCEDURE DATE:  07/10/2019            INTERPRETATION:  HISTORY: Multiple sclerosis. Weakness. Multiple   sclerosis exacerbation. Weakness in the legs for 3 weeks..    Description: MRI of the brain with and without gadolinium contrast was   performed.    COMPARISON: Brain MRI 09/20/2016..    Sagittal T1, axial T1, T2, FLAIR, SWI, and diffusion-weighted series were   obtained before contrast. After intravenous gadolinium contrast   administration, sagittal, coronal, and axial T1 postcontrast series were   obtained.    10 cc intravenous Gadovist gadolinium contrast was administered, 0 cc   contrast was discarded.    Multiple intracranial white matter demyelinating plaques are again noted,   with associated increased T2 and FLAIR signal. The size, number, and   distribution of the white matter plaques is grossly stable compared to   the 2016 MRI study, given the differences in slice selection, scan   angulation, and technique between the examinations. No enhancing plaques   or plaques associated with restricted diffusion are present.    There is no evidence for intracranial enhancing mass, acute infarct,   acute hemorrhage, or hydrocephalus.    Generalized cerebral volume loss is noted with secondary proportional   prominence of the sulci and ventricles, mildly increased compared to the   2016 MRI study.    IMPRESSION:    The intracranial demyelinating plaques are grossly stable compared to the   2016 brain MRI study. If the patienthas a new and persistent neurologic   deficit, consider spine MRI imaging follow-up, given that the   intracranial plaques appear grossly stable.                    PAM RIVAS M.D., ATTENDING RADIOLOGIST  This document has been electronically signed. Jul 11 2019  8:14AM                < end of copied text >
Admitting Diagnosis:  Other symptom or sign involving musculoskeletal system (R29.898): OTH SYMPTOMS AND SIGNS INVOLVING THE MUSCULOSKELETAL SYSTEM      HPI:  This is a 59y year old Male with the below past medical history who presents with the chief complaint of weakness PMH Multiple Sclerosis since , UC, BPH, sz d/o, DVT off a/c, RA, prior UTI, now p/w urinary frequency and weakness. Pt states he has had chronic baseline R sided weakness. Over last few weeks he has had progressive R sided sx, and now has started having left sided sx. +weakness of both legs R>L, R arm weakness and R arm dec  strength, very minimal sx in L arm/hand. Ordinarily is ambulatory with walker in house and wheelchair outside, can bear weight; over last few days, can barely bear wt, and non ambulatory. +knee stiffness b/l worse in am and mildly imp over the day, c/w his known RA. +falls, most recently few weeks ago. rt arm is more diffiucltto use, dec coordination.      Pt also notes worsening urinary sx x 1-2 days; inc urinary freq and urgency, at times hesitancy; denies dysuria, f/c, abd pain, back pain. Denies cp/sob, n/v/d, cough/uri sx/rash/nodules/oral sores/ha/visual changes/saddle anesthesia. Is due for q 6monthly infusion of Ocrevus, due next week.     pt feels getting benefit from the steroids        Past Medical History:  Multiple sclerosis (G35)  Seizures (780.39)  GERD (gastroesophageal reflux disease) (530.81)  BPH (benign prostatic hypertrophy) (600.00)  Colitis (558.9)      Past Surgical History:  No significant past surgical history ([id number])      Social History:  No toxic habits    Family History:  FAMILY HISTORY:  No pertinent family history in first degree relatives      Allergies:  No Known Allergies      ROS:  Constitutional: Patient offers no complaints of fevers or significant weight loss  Ears, Nose, Mouth and Throat: The patient presents with no abnormalities of the head, ears, eyes, nose or throat  Skin: Patient offers no concerns of new rashes or lesions  Respiratory: The patient presents with no abnormalities of the respiratory tract  Cardiovascular: The patient presents with no cardiac abnormalities  Gastrointestinal: The patient presents with no abnormalities of the GI system  Genitourinary: The patient presents with no dysuria, hematuria or frequent urination  Neurological: See HPI  Endocrine: Patient offers no complaints of excessive thirst, urination, or heat/cold intolerance    Advanced care planning reviewed and noted in the chart.    Medications:  ascorbic acid 500 milliGRAM(s) Oral daily  chlorhexidine 2% Cloths 1 Application(s) Topical daily  cholecalciferol 5000 Unit(s) Oral daily  cyanocobalamin 1000 MICROGram(s) Oral daily  diclofenac 75 milliGRAM(s) Oral daily  docusate sodium 100 milliGRAM(s) Oral two times a day  enoxaparin Injectable 40 milliGRAM(s) SubCutaneous every 24 hours  mesalamine ER (24-Hour) Capsule 1.5 Gram(s) Oral daily  methylPREDNISolone sodium succinate IVPB 1000 milliGRAM(s) IV Intermittent daily  multivitamin 1 Tablet(s) Oral daily  pantoprazole    Tablet 40 milliGRAM(s) Oral before breakfast  PHENobarbital 32.4 milliGRAM(s) Oral daily  senna 2 Tablet(s) Oral at bedtime  tamsulosin 0.4 milliGRAM(s) Oral at bedtime      Labs:  CBC Full  -  ( 2019 08:18 )  WBC Count : 12.15 K/uL  RBC Count : 5.00 M/uL  Hemoglobin : 16.0 g/dL  Hematocrit : 47.1 %  Platelet Count - Automated : 216 K/uL  Mean Cell Volume : 94.2 fl  Mean Cell Hemoglobin : 32.0 pg  Mean Cell Hemoglobin Concentration : 34.0 gm/dL  Auto Neutrophil # : x  Auto Lymphocyte # : x  Auto Monocyte # : x  Auto Eosinophil # : x  Auto Basophil # : x  Auto Neutrophil % : x  Auto Lymphocyte % : x  Auto Monocyte % : x  Auto Eosinophil % : x  Auto Basophil % : x    -    134<L>  |  99  |  28<H>  ----------------------------<  150<H>  4.0   |  22  |  0.77    Ca    8.6      2019 06:26  Phos  3.3     07-10  Mg     1.9     07-10    TPro  5.6<L>  /  Alb  3.4  /  TBili  0.4  /  DBili  x   /  AST  20  /  ALT  21  /  AlkPhos  77  07-10    CAPILLARY BLOOD GLUCOSE        LIVER FUNCTIONS - ( 10 Jul 2019 06:23 )  Alb: 3.4 g/dL / Pro: 5.6 g/dL / ALK PHOS: 77 U/L / ALT: 21 U/L / AST: 20 U/L / GGT: x             Urinalysis Basic - ( 2019 18:31 )    Color: Light Yellow / Appearance: Clear / S.019 / pH: x  Gluc: x / Ketone: Negative  / Bili: Negative / Urobili: Negative   Blood: x / Protein: Negative / Nitrite: Negative   Leuk Esterase: Negative / RBC: x / WBC x   Sq Epi: x / Non Sq Epi: x / Bacteria: x          Vitals:  Vital Signs Last 24 Hrs  T(C): 36.6 (2019 07:55), Max: 37 (10 Jul 2019 13:40)  T(F): 97.9 (2019 07:55), Max: 98.6 (10 Jul 2019 13:40)  HR: 84 (2019 07:55) (76 - 91)  BP: 145/94 (2019 07:55) (145/94 - 148/81)  BP(mean): --  RR: 16 (2019 07:55) (16 - 18)  SpO2: 94% (2019 07:55) (91% - 96%)    NEUROLOGICAL EXAM:    Mental status: Awake, alert, and in no apparent distress. Oriented to person, place and time. Language function is normal. Recent memory, digit span and concentration were normal.     Cranial Nerves: Pupils were equal, round, reactive to light. Extraocular movements were intact. Visual field were full. Fundoscopic exam was deferred. Facial sensation was intact to light touch. There was no facial asymmetry. The palate was upgoing symmetrically and tongue was midline. Hearing acuity was intact to finger rub AU. Shoulder shrug was full bilaterally    Motor exam: Bulk and tone were normal. Strength was 5/5 in left arm, right is 4+ with dec fadi.  Right leg 2-3/5 with foot drop.   Left leg 4/5    Reflexes: 2+ in the bilateral upper extremities. 2+ in the bilateral lower extremities. Toes silent    Sensation: Intact to light touch, temperature, vibration and proprioception.     Coordination: Finger-nose-finger without dysmetria.     Gait: defer    < from: MR Head w/wo IV Cont (07.10.19 @ 17:48) >    EXAM:  MR BRAIN WAW IC                            PROCEDURE DATE:  07/10/2019            INTERPRETATION:  HISTORY: Multiple sclerosis. Weakness. Multiple   sclerosis exacerbation. Weakness in the legs for 3 weeks..    Description: MRI of the brain with and without gadolinium contrast was   performed.    COMPARISON: Brain MRI 2016..    Sagittal T1, axial T1, T2, FLAIR, SWI, and diffusion-weighted series were   obtained before contrast. After intravenous gadolinium contrast   administration, sagittal, coronal, and axial T1 postcontrast series were   obtained.    10 cc intravenous Gadovist gadolinium contrast was administered, 0 cc   contrast was discarded.    Multiple intracranial white matter demyelinating plaques are again noted,   with associated increased T2 and FLAIR signal. The size, number, and   distribution of the white matter plaques is grossly stable compared to   the 2016 MRI study, given the differences in slice selection, scan   angulation, and technique between the examinations. No enhancing plaques   or plaques associated with restricted diffusion are present.    There is no evidence for intracranial enhancing mass, acute infarct,   acute hemorrhage, or hydrocephalus.    Generalized cerebral volume loss is noted with secondary proportional   prominence of the sulci and ventricles, mildly increased compared to the   2016 MRI study.    IMPRESSION:    The intracranial demyelinating plaques are grossly stable compared to the   2016 brain MRI study. If the patienthas a new and persistent neurologic   deficit, consider spine MRI imaging follow-up, given that the   intracranial plaques appear grossly stable.                    PAM RIVAS M.D., ATTENDING RADIOLOGIST  This document has been electronically signed. 2019  8:14AM                < end of copied text >
Admitting Diagnosis:  Other symptom or sign involving musculoskeletal system (R29.898): OTH SYMPTOMS AND SIGNS INVOLVING THE MUSCULOSKELETAL SYSTEM      HPI:  This is a 59y year old Male with the below past medical history who presents with the chief complaint of weakness PMH Multiple Sclerosis since 2014, UC, BPH, sz d/o, DVT off a/c, RA, prior UTI, now p/w urinary frequency and weakness. Pt states he has had chronic baseline R sided weakness. Over last few weeks he has had progressive R sided sx, and now has started having left sided sx. +weakness of both legs R>L, R arm weakness and R arm dec  strength, very minimal sx in L arm/hand. Ordinarily is ambulatory with walker in house and wheelchair outside, can bear weight; over last few days, can barely bear wt, and non ambulatory. +knee stiffness b/l worse in am and mildly imp over the day, c/w his known RA. +falls, most recently few weeks ago. rt arm is more diffiucltto use, dec coordination.      Pt also notes worsening urinary sx x 1-2 days; inc urinary freq and urgency, at times hesitancy; denies dysuria, f/c, abd pain, back pain. Denies cp/sob, n/v/d, cough/uri sx/rash/nodules/oral sores/ha/visual changes/saddle anesthesia. Is due for q 6monthly infusion of Ocrevus, due next week.     pt feels getting benefit from the steroids, was seen by PT        Past Medical History:  Multiple sclerosis (G35)  Seizures (780.39)  GERD (gastroesophageal reflux disease) (530.81)  BPH (benign prostatic hypertrophy) (600.00)  Colitis (558.9)      Past Surgical History:  No significant past surgical history ([id number])      Social History:  No toxic habits    Family History:  FAMILY HISTORY:  No pertinent family history in first degree relatives      Allergies:  No Known Allergies      ROS:  Constitutional: Patient offers no complaints of fevers or significant weight loss  Ears, Nose, Mouth and Throat: The patient presents with no abnormalities of the head, ears, eyes, nose or throat  Skin: Patient offers no concerns of new rashes or lesions  Respiratory: The patient presents with no abnormalities of the respiratory tract  Cardiovascular: The patient presents with no cardiac abnormalities  Gastrointestinal: The patient presents with no abnormalities of the GI system  Genitourinary: The patient presents with no dysuria, hematuria or frequent urination  Neurological: See HPI  Endocrine: Patient offers no complaints of excessive thirst, urination, or heat/cold intolerance    Advanced care planning reviewed and noted in the chart.    Medications:  ascorbic acid 500 milliGRAM(s) Oral daily  chlorhexidine 2% Cloths 1 Application(s) Topical daily  cholecalciferol 5000 Unit(s) Oral daily  cyanocobalamin 1000 MICROGram(s) Oral daily  diclofenac 75 milliGRAM(s) Oral daily  docusate sodium 100 milliGRAM(s) Oral two times a day  enoxaparin Injectable 40 milliGRAM(s) SubCutaneous every 24 hours  mesalamine ER (24-Hour) Capsule 1.5 Gram(s) Oral daily  methylPREDNISolone sodium succinate IVPB 1000 milliGRAM(s) IV Intermittent daily  multivitamin 1 Tablet(s) Oral daily  pantoprazole    Tablet 40 milliGRAM(s) Oral before breakfast  PHENobarbital 32.4 milliGRAM(s) Oral daily  senna 2 Tablet(s) Oral at bedtime  tamsulosin 0.4 milliGRAM(s) Oral at bedtime      Labs:  CBC Full  -  ( 11 Jul 2019 08:18 )  WBC Count : 12.15 K/uL  RBC Count : 5.00 M/uL  Hemoglobin : 16.0 g/dL  Hematocrit : 47.1 %  Platelet Count - Automated : 216 K/uL  Mean Cell Volume : 94.2 fl  Mean Cell Hemoglobin : 32.0 pg  Mean Cell Hemoglobin Concentration : 34.0 gm/dL  Auto Neutrophil # : x  Auto Lymphocyte # : x  Auto Monocyte # : x  Auto Eosinophil # : x  Auto Basophil # : x  Auto Neutrophil % : x  Auto Lymphocyte % : x  Auto Monocyte % : x  Auto Eosinophil % : x  Auto Basophil % : x    07-12    136  |  101  |  33<H>  ----------------------------<  130<H>  4.1   |  25  |  0.82    Ca    8.9      12 Jul 2019 07:23      CAPILLARY BLOOD GLUCOSE                  Vitals:  Vital Signs Last 24 Hrs  T(C): 36.4 (12 Jul 2019 05:02), Max: 36.8 (11 Jul 2019 21:00)  T(F): 97.5 (12 Jul 2019 05:02), Max: 98.2 (11 Jul 2019 21:00)  HR: 80 (12 Jul 2019 05:02) (80 - 88)  BP: 114/87 (12 Jul 2019 05:02) (114/87 - 147/84)  BP(mean): --  RR: 18 (12 Jul 2019 05:02) (18 - 18)  SpO2: 95% (12 Jul 2019 05:02) (93% - 95%)    NEUROLOGICAL EXAM:    Mental status: Awake, alert, and in no apparent distress. Oriented to person, place and time. Language function is normal. Recent memory, digit span and concentration were normal.     Cranial Nerves: Pupils were equal, round, reactive to light. Extraocular movements were intact. Visual field were full. Facial sensation was intact to light touch. There was no facial asymmetry. The palate was upgoing symmetrically and tongue was midline. Hearing acuity was intact to finger rub AU. Shoulder shrug was full bilaterally    Motor exam: Bulk and tone were normal. Strength was 5/5 in left arm, right is 4+ with dec fadi.  Right leg 4/5 with foot drop.   Left leg 4/5    Reflexes: 2+ in the bilateral upper extremities. 2+ in the bilateral lower extremities. Toes silent    Sensation: Intact to light touch, temperature, vibration and proprioception.     Coordination: Finger-nose-finger without dysmetria.     Gait: defer    < from: MR Head w/wo IV Cont (07.10.19 @ 17:48) >    EXAM:  MR BRAIN WAW IC                            PROCEDURE DATE:  07/10/2019            INTERPRETATION:  HISTORY: Multiple sclerosis. Weakness. Multiple   sclerosis exacerbation. Weakness in the legs for 3 weeks..    Description: MRI of the brain with and without gadolinium contrast was   performed.    COMPARISON: Brain MRI 09/20/2016..    Sagittal T1, axial T1, T2, FLAIR, SWI, and diffusion-weighted series were   obtained before contrast. After intravenous gadolinium contrast   administration, sagittal, coronal, and axial T1 postcontrast series were   obtained.    10 cc intravenous Gadovist gadolinium contrast was administered, 0 cc   contrast was discarded.    Multiple intracranial white matter demyelinating plaques are again noted,   with associated increased T2 and FLAIR signal. The size, number, and   distribution of the white matter plaques is grossly stable compared to   the 2016 MRI study, given the differences in slice selection, scan   angulation, and technique between the examinations. No enhancing plaques   or plaques associated with restricted diffusion are present.    There is no evidence for intracranial enhancing mass, acute infarct,   acute hemorrhage, or hydrocephalus.    Generalized cerebral volume loss is noted with secondary proportional   prominence of the sulci and ventricles, mildly increased compared to the   2016 MRI study.    IMPRESSION:    The intracranial demyelinating plaques are grossly stable compared to the   2016 brain MRI study. If the patienthas a new and persistent neurologic   deficit, consider spine MRI imaging follow-up, given that the   intracranial plaques appear grossly stable.                    PAM RIVAS M.D., ATTENDING RADIOLOGIST  This document has been electronically signed. Jul 11 2019  8:14AM                < end of copied text >
Patient is a 59y old  Male who presents with a chief complaint of weakness, urinary frequency (12 Jul 2019 11:14)      SUBJECTIVE / OVERNIGHT EVENTS: overnight events noted    ROS:  Resp: No cough no sputum production  CVS: No chest pain no palpitations no orthopnea  GI: no N/V/D  : no dysuria, no hematuria  Neuro: no weakness no paresthesias  Heme: No petechiae no easy bruising  Msk: No joint pain no swelling  Skin: No rash no itching        MEDICATIONS  (STANDING):  ascorbic acid 500 milliGRAM(s) Oral daily  cholecalciferol 5000 Unit(s) Oral daily  cyanocobalamin 1000 MICROGram(s) Oral daily  diclofenac 75 milliGRAM(s) Oral daily  docusate sodium 100 milliGRAM(s) Oral two times a day  enoxaparin Injectable 40 milliGRAM(s) SubCutaneous every 24 hours  mesalamine ER (24-Hour) Capsule 1.5 Gram(s) Oral daily  multivitamin 1 Tablet(s) Oral daily  pantoprazole    Tablet 40 milliGRAM(s) Oral before breakfast  PHENobarbital 32.4 milliGRAM(s) Oral daily  senna 2 Tablet(s) Oral at bedtime  tamsulosin 0.4 milliGRAM(s) Oral at bedtime    MEDICATIONS  (PRN):        CAPILLARY BLOOD GLUCOSE        I&O's Summary    11 Jul 2019 07:01  -  12 Jul 2019 07:00  --------------------------------------------------------  IN: 330 mL / OUT: 1275 mL / NET: -945 mL        Vital Signs Last 24 Hrs  T(C): 36.4 (12 Jul 2019 05:02), Max: 36.8 (11 Jul 2019 21:00)  T(F): 97.5 (12 Jul 2019 05:02), Max: 98.2 (11 Jul 2019 21:00)  HR: 67 (12 Jul 2019 12:17) (67 - 88)  BP: 151/95 (12 Jul 2019 12:17) (114/87 - 151/95)  BP(mean): --  RR: 18 (12 Jul 2019 05:02) (18 - 18)  SpO2: 96% (12 Jul 2019 12:17) (93% - 96%)    PHYSICAL EXAM: vital signs as above  in no apparent distress  HEENT: ROGER EOMI  Neck: Supple, no JVD, no thyromegaly  Lungs: no rhonchi, no wheeze, no crackles  CVS: S1 S2 no M/R/G  Abdomen: no tenderness, no organomegaly, BS present obese  Neuro: AO x 3 improved strength LE 4/5 bilaterally  no sensory abnormalities  Psych: appropriate affect  Skin: warm, dry  Ext: no cyanosis or clubbing, much improved edema  Msk: no joint swelling or deformities  Back: no CVA tenderness, no kyphosis/scoliosis     LABS:                        16.1   15.42 )-----------( 220      ( 12 Jul 2019 09:55 )             47.0     07-12    136  |  101  |  33<H>  ----------------------------<  130<H>  4.1   |  25  |  0.82    Ca    8.9      12 Jul 2019 07:23                  All consultant(s) notes reviewed and care discussed with other providers        Contact Number, Dr Ellis [phone]
Patient is a 59y old  Male who presents with a chief complaint of weakness, urinary frequency (13 Jul 2019 09:27)      SUBJECTIVE / OVERNIGHT EVENTS: overnight events noted    ROS:  Resp: No cough no sputum production  CVS: No chest pain no palpitations no orthopnea  GI: no N/V/D  : no dysuria, no hematuria  Neuro: no weakness no paresthesias  Heme: No petechiae no easy bruising  Msk: No joint pain no swelling  Skin: No rash no itching        MEDICATIONS  (STANDING):  ascorbic acid 500 milliGRAM(s) Oral daily  cholecalciferol 5000 Unit(s) Oral daily  cyanocobalamin 1000 MICROGram(s) Oral daily  diclofenac 75 milliGRAM(s) Oral daily  docusate sodium 100 milliGRAM(s) Oral two times a day  enoxaparin Injectable 40 milliGRAM(s) SubCutaneous every 24 hours  mesalamine ER (24-Hour) Capsule 1.5 Gram(s) Oral daily  multivitamin 1 Tablet(s) Oral daily  pantoprazole    Tablet 40 milliGRAM(s) Oral before breakfast  PHENobarbital 32.4 milliGRAM(s) Oral daily  senna 2 Tablet(s) Oral at bedtime  tamsulosin 0.4 milliGRAM(s) Oral at bedtime    MEDICATIONS  (PRN):        CAPILLARY BLOOD GLUCOSE        I&O's Summary    12 Jul 2019 07:01  -  13 Jul 2019 07:00  --------------------------------------------------------  IN: 360 mL / OUT: 1325 mL / NET: -965 mL        Vital Signs Last 24 Hrs  T(C): 36.6 (13 Jul 2019 05:43), Max: 36.8 (12 Jul 2019 20:36)  T(F): 97.9 (13 Jul 2019 05:43), Max: 98.3 (12 Jul 2019 20:36)  HR: 64 (13 Jul 2019 05:43) (64 - 88)  BP: 143/88 (13 Jul 2019 05:43) (137/84 - 160/86)  BP(mean): --  RR: 18 (13 Jul 2019 05:43) (18 - 18)  SpO2: 94% (13 Jul 2019 05:43) (94% - 96%)    PHYSICAL EXAM: vital signs as above  in no apparent distress  HEENT: ROGER EOMI  Neck: Supple, no JVD, no thyromegaly  Lungs: no rhonchi, no wheeze, no crackles  CVS: S1 S2 no M/R/G  Abdomen: no tenderness, no organomegaly, BS present obese  Neuro: AO x 3 improved strength LE 4/5 bilaterally  no sensory abnormalities  Psych: appropriate affect  Skin: warm, dry  Ext: no cyanosis or clubbing, much improved edema  Msk: no joint swelling or deformities  Back: no CVA tenderness, no kyphosis/scoliosis     LABS:                        15.5   14.78 )-----------( 214      ( 13 Jul 2019 10:54 )             45.8     07-13    136  |  97  |  40<H>  ----------------------------<  125<H>  4.0   |  25  |  0.83    Ca    8.5      13 Jul 2019 07:01                  All consultant(s) notes reviewed and care discussed with other providers        Contact Number, Dr Ellis [phone]
Patient is a 59y old  Male who presents with a chief complaint of weakness, urinary frequency (17 Jul 2019 09:47)      SUBJECTIVE / OVERNIGHT EVENTS: overnight events noted  no new events    ROS:  Resp: No cough no sputum production  CVS: No chest pain no palpitations no orthopnea  GI: no N/V/D  : no dysuria, no hematuria  Neuro: no weakness no paresthesias  Heme: No petechiae no easy bruising  Msk: No joint pain no swelling  Skin: No rash no itching        MEDICATIONS  (STANDING):  ascorbic acid 500 milliGRAM(s) Oral daily  cholecalciferol 5000 Unit(s) Oral daily  cyanocobalamin 1000 MICROGram(s) Oral daily  diclofenac 75 milliGRAM(s) Oral daily  docusate sodium 100 milliGRAM(s) Oral two times a day  enoxaparin Injectable 40 milliGRAM(s) SubCutaneous every 24 hours  mesalamine ER (24-Hour) Capsule 1.5 Gram(s) Oral daily  multivitamin 1 Tablet(s) Oral daily  pantoprazole    Tablet 40 milliGRAM(s) Oral before breakfast  PHENobarbital 32.4 milliGRAM(s) Oral daily  senna 2 Tablet(s) Oral at bedtime  sertraline 50 milliGRAM(s) Oral daily  tamsulosin 0.4 milliGRAM(s) Oral at bedtime    MEDICATIONS  (PRN):  acetaminophen   Tablet .. 650 milliGRAM(s) Oral every 6 hours PRN Mild Pain (1 - 3)        CAPILLARY BLOOD GLUCOSE        I&O's Summary    16 Jul 2019 07:01  -  17 Jul 2019 07:00  --------------------------------------------------------  IN: 820 mL / OUT: 1325 mL / NET: -505 mL    17 Jul 2019 07:01  -  17 Jul 2019 16:04  --------------------------------------------------------  IN: 0 mL / OUT: 700 mL / NET: -700 mL        Vital Signs Last 24 Hrs  T(C): 36.9 (17 Jul 2019 14:13), Max: 36.9 (17 Jul 2019 14:13)  T(F): 98.4 (17 Jul 2019 14:13), Max: 98.4 (17 Jul 2019 14:13)  HR: 79 (17 Jul 2019 14:13) (66 - 79)  BP: 134/87 (17 Jul 2019 14:13) (127/77 - 137/81)  BP(mean): --  RR: 189 (17 Jul 2019 14:13) (18 - 189)  SpO2: 97% (17 Jul 2019 05:16) (95% - 97%)    PHYSICAL EXAM: vital signs as above  in no apparent distress  HEENT: ROGER EOMI  Neck: Supple, no JVD, no thyromegaly  Lungs: no rhonchi, no wheeze, no crackles  CVS: S1 S2 no M/R/G  Abdomen: no tenderness, no organomegaly, BS present obese  Neuro: AO x 3 improved strength LE 3 - 4/5 bilaterally  no sensory abnormalities  Psych: appropriate affect  Skin: warm, dry  Ext: no cyanosis or clubbing, much improved edema  Msk: no joint swelling or deformities  Back: no CVA tenderness, no kyphosis/scoliosis    LABS:                      All consultant(s) notes reviewed and care discussed with other providers        Contact Number, Dr Ellis [phone]
Patient is a 59y old  Male who presents with a chief complaint of weakness, urinary frequency (18 Jul 2019 09:36)      SUBJECTIVE / OVERNIGHT EVENTS: overnight events noted    ROS:  Resp: No cough no sputum production  CVS: No chest pain no palpitations no orthopnea  GI: no N/V/D  : no dysuria, no hematuria  Neuro: no weakness no paresthesias  Heme: No petechiae no easy bruising  Msk: No joint pain no swelling  Skin: No rash no itching        MEDICATIONS  (STANDING):  ascorbic acid 500 milliGRAM(s) Oral daily  cholecalciferol 5000 Unit(s) Oral daily  cyanocobalamin 1000 MICROGram(s) Oral daily  diclofenac 75 milliGRAM(s) Oral daily  docusate sodium 100 milliGRAM(s) Oral two times a day  enoxaparin Injectable 40 milliGRAM(s) SubCutaneous every 24 hours  mesalamine ER (24-Hour) Capsule 1.5 Gram(s) Oral daily  multivitamin 1 Tablet(s) Oral daily  pantoprazole    Tablet 40 milliGRAM(s) Oral before breakfast  PHENobarbital 32.4 milliGRAM(s) Oral daily  senna 2 Tablet(s) Oral at bedtime  sertraline 50 milliGRAM(s) Oral daily  tamsulosin 0.4 milliGRAM(s) Oral at bedtime    MEDICATIONS  (PRN):  acetaminophen   Tablet .. 650 milliGRAM(s) Oral every 6 hours PRN Mild Pain (1 - 3)        CAPILLARY BLOOD GLUCOSE        I&O's Summary    17 Jul 2019 07:01  -  18 Jul 2019 07:00  --------------------------------------------------------  IN: 800 mL / OUT: 1100 mL / NET: -300 mL    18 Jul 2019 07:01  -  18 Jul 2019 12:36  --------------------------------------------------------  IN: 240 mL / OUT: 250 mL / NET: -10 mL        Vital Signs Last 24 Hrs  T(C): 36.4 (18 Jul 2019 05:53), Max: 36.9 (17 Jul 2019 14:13)  T(F): 97.5 (18 Jul 2019 05:53), Max: 98.4 (17 Jul 2019 14:13)  HR: 62 (18 Jul 2019 05:53) (62 - 79)  BP: 134/82 (18 Jul 2019 05:53) (134/77 - 134/87)  BP(mean): --  RR: 18 (18 Jul 2019 05:53) (18 - 189)  SpO2: 96% (18 Jul 2019 05:53) (96% - 96%)    PHYSICAL EXAM: vital signs as above  in no apparent distress  HEENT: ROGER EOMI  Neck: Supple, no JVD, no thyromegaly  Lungs: no rhonchi, no wheeze, no crackles  CVS: S1 S2 no M/R/G  Abdomen: no tenderness, no organomegaly, BS present obese  Neuro: AO x 3 improved strength LE 3 - 4/5 bilaterally  no sensory abnormalities  Psych: appropriate affect  Skin: warm, dry  Ext: no cyanosis or clubbing, resolved edema  Msk: no joint swelling or deformities  Back: no CVA tenderness, no kyphosis/scoliosis    LABS:                      All consultant(s) notes reviewed and care discussed with other providers        Contact Number, Dr Ellis [phone]
Patient is a 59y old  Male who presents with a chief complaint of weakness, urinary frequency (2019 09:58)      SUBJECTIVE / OVERNIGHT EVENTS: overnight events noted    ROS:  Resp: No cough no sputum production  CVS: No chest pain no palpitations no orthopnea  GI: no N/V/D  : no dysuria, no hematuria  Neuro: no weakness no paresthesias  Heme: No petechiae no easy bruising  Msk: No joint pain no swelling  Skin: No rash no itching        MEDICATIONS  (STANDING):  ascorbic acid 500 milliGRAM(s) Oral daily  chlorhexidine 2% Cloths 1 Application(s) Topical daily  cholecalciferol 5000 Unit(s) Oral daily  cyanocobalamin 1000 MICROGram(s) Oral daily  diclofenac 75 milliGRAM(s) Oral daily  docusate sodium 100 milliGRAM(s) Oral two times a day  enoxaparin Injectable 40 milliGRAM(s) SubCutaneous every 24 hours  mesalamine ER (24-Hour) Capsule 1.5 Gram(s) Oral daily  methylPREDNISolone sodium succinate IVPB 1000 milliGRAM(s) IV Intermittent daily  multivitamin 1 Tablet(s) Oral daily  pantoprazole    Tablet 40 milliGRAM(s) Oral before breakfast  PHENobarbital 32.4 milliGRAM(s) Oral daily  senna 2 Tablet(s) Oral at bedtime  tamsulosin 0.4 milliGRAM(s) Oral at bedtime    MEDICATIONS  (PRN):        CAPILLARY BLOOD GLUCOSE        I&O's Summary    10 Jul 2019 07:  -  2019 07:00  --------------------------------------------------------  IN: 1750 mL / OUT: 2700 mL / NET: -950 mL    2019 07:  -  2019 16:12  --------------------------------------------------------  IN: 0 mL / OUT: 450 mL / NET: -450 mL        Vital Signs Last 24 Hrs  T(C): 36.6 (2019 14:16), Max: 36.9 (10 Jul 2019 21:30)  T(F): 97.9 (2019 14:16), Max: 98.5 (10 Jul 2019 21:30)  HR: 88 (2019 14:16) (84 - 91)  BP: 139/89 (2019 14:16) (139/89 - 146/82)  BP(mean): --  RR: 18 (2019 14:16) (16 - 18)  SpO2: 94% (2019 14:16) (91% - 94%)    PHYSICAL EXAM: vital signs as above  in no apparent distress  HEENT: ROGER EOMI  Neck: Supple, no JVD, no thyromegaly  Lungs: no rhonchi, no wheeze, no crackles  CVS: S1 S2 no M/R/G  Abdomen: no tenderness, no organomegaly, BS present obese  Neuro: AO x 3 no focal weakness, no sensory abnormalities  Psych: appropriate affect  Skin: warm, dry  Ext: no cyanosis or clubbing, much improved edema  Msk: no joint swelling or deformities  Back: no CVA tenderness, no kyphosis/scoliosis     LABS:                        16.0   12.15 )-----------( 216      ( 2019 08:18 )             47.1     07-11    134<L>  |  99  |  28<H>  ----------------------------<  150<H>  4.0   |  22  |  0.77    Ca    8.6      2019 06:26  Phos  3.3     07-10  Mg     1.9     07-10    TPro  5.6<L>  /  Alb  3.4  /  TBili  0.4  /  DBili  x   /  AST  20  /  ALT  21  /  AlkPhos  77  07-10          Urinalysis Basic - ( 2019 18:31 )    Color: Light Yellow / Appearance: Clear / S.019 / pH: x  Gluc: x / Ketone: Negative  / Bili: Negative / Urobili: Negative   Blood: x / Protein: Negative / Nitrite: Negative   Leuk Esterase: Negative / RBC: x / WBC x   Sq Epi: x / Non Sq Epi: x / Bacteria: x          All consultant(s) notes reviewed and care discussed with other providers        Contact Number, Dr Ellis [phone]
SUBJECTIVE:    doing little better      NEUROLOGICAL EXAM:    Mental status: Awake, alert, and in no apparent distress. lying in bed in good spirits. Oriented to person, place and time. Language function is normal. Recent memory, digit span and concentration were normal.     Cranial Nerves: Pupils were equal, round, reactive to light. Extraocular movements were intact. Visual field were full. Facial sensation was intact to light touch. There was no facial asymmetry. The palate was upgoing symmetrically and tongue was midline. Hearing acuity was intact to finger rub AU. Shoulder shrug was full bilaterally    Motor exam: Bulk and tone were normal. Strength was 5/5 in b/l arms.  Right leg improved hip flexion and knee flexion, 5-/5, .   Left leg 5-/5 throughout maybe improved slightly    Reflexes: 2+ in the bilateral upper extremities. 2+ in the bilateral lower extremities. Toes mute b/l    Sensation: Intact to light touch, temperature, vibration and proprioception.     Coordination: Finger-nose-finger without dysmetria.     Gait: defer    < from: MR Head w/wo IV Cont (07.10.19 @ 17:48) >    EXAM:  MR BRAIN WAW IC                            PROCEDURE DATE:  07/10/2019            INTERPRETATION:  HISTORY: Multiple sclerosis. Weakness. Multiple   sclerosis exacerbation. Weakness in the legs for 3 weeks..    Description: MRI of the brain with and without gadolinium contrast was   performed.    COMPARISON: Brain MRI 09/20/2016..    Sagittal T1, axial T1, T2, FLAIR, SWI, and diffusion-weighted series were   obtained before contrast. After intravenous gadolinium contrast   administration, sagittal, coronal, and axial T1 postcontrast series were   obtained.    10 cc intravenous Gadovist gadolinium contrast was administered, 0 cc   contrast was discarded.    Multiple intracranial white matter demyelinating plaques are again noted,   with associated increased T2 and FLAIR signal. The size, number, and   distribution of the white matter plaques is grossly stable compared to   the 2016 MRI study, given the differences in slice selection, scan   angulation, and technique between the examinations. No enhancing plaques   or plaques associated with restricted diffusion are present.    There is no evidence for intracranial enhancing mass, acute infarct,   acute hemorrhage, or hydrocephalus.    Generalized cerebral volume loss is noted with secondary proportional   prominence of the sulci and ventricles, mildly increased compared to the   2016 MRI study.    IMPRESSION:    The intracranial demyelinating plaques are grossly stable compared to the   2016 brain MRI study. If the patienthas a new and persistent neurologic   deficit, consider spine MRI imaging follow-up, given that the   intracranial plaques appear grossly stable.                    PAM RIVAS M.D., ATTENDING RADIOLOGIST  This document has been electronically signed. Jul 11 2019  8:14AM                < end of copied text >
SUBJECTIVE:    pt says had bad weekend, felt weaker, but did not sleep at all    Medications:  ascorbic acid 500 milliGRAM(s) Oral daily  cholecalciferol 5000 Unit(s) Oral daily  cyanocobalamin 1000 MICROGram(s) Oral daily  diclofenac 75 milliGRAM(s) Oral daily  docusate sodium 100 milliGRAM(s) Oral two times a day  enoxaparin Injectable 40 milliGRAM(s) SubCutaneous every 24 hours  mesalamine ER (24-Hour) Capsule 1.5 Gram(s) Oral daily  multivitamin 1 Tablet(s) Oral daily  pantoprazole    Tablet 40 milliGRAM(s) Oral before breakfast  PHENobarbital 32.4 milliGRAM(s) Oral daily  senna 2 Tablet(s) Oral at bedtime  tamsulosin 0.4 milliGRAM(s) Oral at bedtime      Labs:  CBC Full  -  ( 14 Jul 2019 10:01 )  WBC Count : 8.44 K/uL  RBC Count : 4.88 M/uL  Hemoglobin : 15.6 g/dL  Hematocrit : 46.9 %  Platelet Count - Automated : 164 K/uL  Mean Cell Volume : 96.1 fl  Mean Cell Hemoglobin : 32.0 pg  Mean Cell Hemoglobin Concentration : 33.3 gm/dL  Auto Neutrophil # : x  Auto Lymphocyte # : x  Auto Monocyte # : x  Auto Eosinophil # : x  Auto Basophil # : x  Auto Neutrophil % : x  Auto Lymphocyte % : x  Auto Monocyte % : x  Auto Eosinophil % : x  Auto Basophil % : x    07-15    136  |  98  |  25<H>  ----------------------------<  89  3.9   |  29  |  0.89    Ca    8.0<L>      15 Jul 2019 06:25      CAPILLARY BLOOD GLUCOSE                  Vitals:  Vital Signs Last 24 Hrs  T(C): 36.4 (15 Jul 2019 05:34), Max: 36.8 (14 Jul 2019 21:00)  T(F): 97.5 (15 Jul 2019 05:34), Max: 98.2 (14 Jul 2019 21:00)  HR: 64 (15 Jul 2019 05:34) (64 - 76)  BP: 113/77 (15 Jul 2019 05:34) (113/77 - 129/83)  BP(mean): --  RR: 18 (15 Jul 2019 05:34) (18 - 18)  SpO2: 95% (15 Jul 2019 05:34) (95% - 98%)  NEUROLOGICAL EXAM:    Mental status: Awake, alert, and in no apparent distress. Oriented to person, place and time. Language function is normal. Recent memory, digit span and concentration were normal.     Cranial Nerves: Pupils were equal, round, reactive to light. Extraocular movements were intact. Visual field were full. Facial sensation was intact to light touch. There was no facial asymmetry. The palate was upgoing symmetrically and tongue was midline. Hearing acuity was intact to finger rub AU. Shoulder shrug was full bilaterally    Motor exam: Bulk and tone were normal. Strength was 5/5 in left arm, right is 4+ with dec fadi.  Right leg 4/5 with foot drop.   Left leg 4/5    Reflexes: 2+ in the bilateral upper extremities. 2+ in the bilateral lower extremities. Toes silent    Sensation: Intact to light touch, temperature, vibration and proprioception.     Coordination: Finger-nose-finger without dysmetria.     Gait: defer    < from: MR Head w/wo IV Cont (07.10.19 @ 17:48) >    EXAM:  MR BRAIN WAW IC                            PROCEDURE DATE:  07/10/2019            INTERPRETATION:  HISTORY: Multiple sclerosis. Weakness. Multiple   sclerosis exacerbation. Weakness in the legs for 3 weeks..    Description: MRI of the brain with and without gadolinium contrast was   performed.    COMPARISON: Brain MRI 09/20/2016..    Sagittal T1, axial T1, T2, FLAIR, SWI, and diffusion-weighted series were   obtained before contrast. After intravenous gadolinium contrast   administration, sagittal, coronal, and axial T1 postcontrast series were   obtained.    10 cc intravenous Gadovist gadolinium contrast was administered, 0 cc   contrast was discarded.    Multiple intracranial white matter demyelinating plaques are again noted,   with associated increased T2 and FLAIR signal. The size, number, and   distribution of the white matter plaques is grossly stable compared to   the 2016 MRI study, given the differences in slice selection, scan   angulation, and technique between the examinations. No enhancing plaques   or plaques associated with restricted diffusion are present.    There is no evidence for intracranial enhancing mass, acute infarct,   acute hemorrhage, or hydrocephalus.    Generalized cerebral volume loss is noted with secondary proportional   prominence of the sulci and ventricles, mildly increased compared to the   2016 MRI study.    IMPRESSION:    The intracranial demyelinating plaques are grossly stable compared to the   2016 brain MRI study. If the patienthas a new and persistent neurologic   deficit, consider spine MRI imaging follow-up, given that the   intracranial plaques appear grossly stable.                    PAM RIVAS M.D., ATTENDING RADIOLOGIST  This document has been electronically signed. Jul 11 2019  8:14AM                < end of copied text >
SUBJECTIVE:    pt says initially felt stronger over weekend, but now feels he has ceased to improve his weakness and he cannot get out of bed at all. cannot move his legs to side of bed and get up.    Medications:  acetaminophen   Tablet .. 650 milliGRAM(s) Oral every 6 hours PRN  ascorbic acid 500 milliGRAM(s) Oral daily  cholecalciferol 5000 Unit(s) Oral daily  cyanocobalamin 1000 MICROGram(s) Oral daily  diclofenac 75 milliGRAM(s) Oral daily  docusate sodium 100 milliGRAM(s) Oral two times a day  enoxaparin Injectable 40 milliGRAM(s) SubCutaneous every 24 hours  mesalamine ER (24-Hour) Capsule 1.5 Gram(s) Oral daily  multivitamin 1 Tablet(s) Oral daily  pantoprazole    Tablet 40 milliGRAM(s) Oral before breakfast  senna 2 Tablet(s) Oral at bedtime  sertraline 50 milliGRAM(s) Oral daily  tamsulosin 0.4 milliGRAM(s) Oral at bedtime      Labs:    07-15    136  |  98  |  25<H>  ----------------------------<  89  3.9   |  29  |  0.89    Ca    8.0<L>      15 Jul 2019 06:25      CAPILLARY BLOOD GLUCOSE                  Vitals:  Vital Signs Last 24 Hrs  T(C): 36.8 (16 Jul 2019 14:10), Max: 36.9 (15 Jul 2019 21:35)  T(F): 98.2 (16 Jul 2019 14:10), Max: 98.5 (15 Jul 2019 21:35)  HR: 92 (16 Jul 2019 14:10) (74 - 92)  BP: 132/78 (16 Jul 2019 14:10) (104/68 - 132/78)  BP(mean): --  RR: 18 (16 Jul 2019 14:10) (18 - 18)  SpO2: 96% (16 Jul 2019 14:10) (94% - 96%)    NEUROLOGICAL EXAM:    Mental status: Awake, alert, and in no apparent distress. lying in bed. Oriented to person, place and time. Language function is normal. Recent memory, digit span and concentration were normal.     Cranial Nerves: Pupils were equal, round, reactive to light. Extraocular movements were intact. Visual field were full. Facial sensation was intact to light touch. There was no facial asymmetry. The palate was upgoing symmetrically and tongue was midline. Hearing acuity was intact to finger rub AU. Shoulder shrug was full bilaterally    Motor exam: Bulk and tone were normal. Strength was 5/5 in left arm, right is 4+ with dec ROM.  Right leg 4/5, .   Left leg 5-/5 improved from yesterday    Reflexes: 2+ in the bilateral upper extremities. 2+ in the bilateral lower extremities. Toes mute b/l    Sensation: Intact to light touch, temperature, vibration and proprioception.     Coordination: Finger-nose-finger without dysmetria.     Gait: defer    < from: MR Head w/wo IV Cont (07.10.19 @ 17:48) >    EXAM:  MR BRAIN WAW IC                            PROCEDURE DATE:  07/10/2019            INTERPRETATION:  HISTORY: Multiple sclerosis. Weakness. Multiple   sclerosis exacerbation. Weakness in the legs for 3 weeks..    Description: MRI of the brain with and without gadolinium contrast was   performed.    COMPARISON: Brain MRI 09/20/2016..    Sagittal T1, axial T1, T2, FLAIR, SWI, and diffusion-weighted series were   obtained before contrast. After intravenous gadolinium contrast   administration, sagittal, coronal, and axial T1 postcontrast series were   obtained.    10 cc intravenous Gadovist gadolinium contrast was administered, 0 cc   contrast was discarded.    Multiple intracranial white matter demyelinating plaques are again noted,   with associated increased T2 and FLAIR signal. The size, number, and   distribution of the white matter plaques is grossly stable compared to   the 2016 MRI study, given the differences in slice selection, scan   angulation, and technique between the examinations. No enhancing plaques   or plaques associated with restricted diffusion are present.    There is no evidence for intracranial enhancing mass, acute infarct,   acute hemorrhage, or hydrocephalus.    Generalized cerebral volume loss is noted with secondary proportional   prominence of the sulci and ventricles, mildly increased compared to the   2016 MRI study.    IMPRESSION:    The intracranial demyelinating plaques are grossly stable compared to the   2016 brain MRI study. If the patienthas a new and persistent neurologic   deficit, consider spine MRI imaging follow-up, given that the   intracranial plaques appear grossly stable.                    PAM RIVAS M.D., ATTENDING RADIOLOGIST  This document has been electronically signed. Jul 11 2019  8:14AM                < end of copied text >
SUBJECTIVE:  No new complaints. Doing well.     Medications:  ascorbic acid 500 milliGRAM(s) Oral daily  cholecalciferol 5000 Unit(s) Oral daily  cyanocobalamin 1000 MICROGram(s) Oral daily  diclofenac 75 milliGRAM(s) Oral daily  docusate sodium 100 milliGRAM(s) Oral two times a day  enoxaparin Injectable 40 milliGRAM(s) SubCutaneous every 24 hours  mesalamine ER (24-Hour) Capsule 1.5 Gram(s) Oral daily  multivitamin 1 Tablet(s) Oral daily  pantoprazole    Tablet 40 milliGRAM(s) Oral before breakfast  PHENobarbital 32.4 milliGRAM(s) Oral daily  senna 2 Tablet(s) Oral at bedtime  tamsulosin 0.4 milliGRAM(s) Oral at bedtime      Labs:  CBC Full  -  ( 12 Jul 2019 09:55 )  WBC Count : 15.42 K/uL  RBC Count : 5.01 M/uL  Hemoglobin : 16.1 g/dL  Hematocrit : 47.0 %  Platelet Count - Automated : 220 K/uL  Mean Cell Volume : 93.8 fl  Mean Cell Hemoglobin : 32.1 pg  Mean Cell Hemoglobin Concentration : 34.3 gm/dL  Auto Neutrophil # : x  Auto Lymphocyte # : x  Auto Monocyte # : x  Auto Eosinophil # : x  Auto Basophil # : x  Auto Neutrophil % : x  Auto Lymphocyte % : x  Auto Monocyte % : x  Auto Eosinophil % : x  Auto Basophil % : x    07-13    136  |  97  |  40<H>  ----------------------------<  125<H>  4.0   |  25  |  0.83    Ca    8.5      13 Jul 2019 07:01      CAPILLARY BLOOD GLUCOSE                  Vitals:  Vital Signs Last 24 Hrs  T(C): 36.6 (13 Jul 2019 05:43), Max: 36.8 (12 Jul 2019 20:36)  T(F): 97.9 (13 Jul 2019 05:43), Max: 98.3 (12 Jul 2019 20:36)  HR: 64 (13 Jul 2019 05:43) (64 - 88)  BP: 143/88 (13 Jul 2019 05:43) (137/84 - 160/86)  BP(mean): --  RR: 18 (13 Jul 2019 05:43) (18 - 18)  SpO2: 94% (13 Jul 2019 05:43) (94% - 96%)  NEUROLOGICAL EXAM:    Mental status: Awake, alert, and in no apparent distress. Oriented to person, place and time. Language function is normal. Recent memory, digit span and concentration were normal.     Cranial Nerves: Pupils were equal, round, reactive to light. Extraocular movements were intact. Visual field were full. Facial sensation was intact to light touch. There was no facial asymmetry. The palate was upgoing symmetrically and tongue was midline. Hearing acuity was intact to finger rub AU. Shoulder shrug was full bilaterally    Motor exam: Bulk and tone were normal. Strength was 5/5 in left arm, right is 4+ with dec fadi.  Right leg 4/5 with foot drop.   Left leg 4/5    Reflexes: 2+ in the bilateral upper extremities. 2+ in the bilateral lower extremities. Toes silent    Sensation: Intact to light touch, temperature, vibration and proprioception.     Coordination: Finger-nose-finger without dysmetria.     Gait: defer    < from: MR Head w/wo IV Cont (07.10.19 @ 17:48) >    EXAM:  MR BRAIN WAW IC                            PROCEDURE DATE:  07/10/2019            INTERPRETATION:  HISTORY: Multiple sclerosis. Weakness. Multiple   sclerosis exacerbation. Weakness in the legs for 3 weeks..    Description: MRI of the brain with and without gadolinium contrast was   performed.    COMPARISON: Brain MRI 09/20/2016..    Sagittal T1, axial T1, T2, FLAIR, SWI, and diffusion-weighted series were   obtained before contrast. After intravenous gadolinium contrast   administration, sagittal, coronal, and axial T1 postcontrast series were   obtained.    10 cc intravenous Gadovist gadolinium contrast was administered, 0 cc   contrast was discarded.    Multiple intracranial white matter demyelinating plaques are again noted,   with associated increased T2 and FLAIR signal. The size, number, and   distribution of the white matter plaques is grossly stable compared to   the 2016 MRI study, given the differences in slice selection, scan   angulation, and technique between the examinations. No enhancing plaques   or plaques associated with restricted diffusion are present.    There is no evidence for intracranial enhancing mass, acute infarct,   acute hemorrhage, or hydrocephalus.    Generalized cerebral volume loss is noted with secondary proportional   prominence of the sulci and ventricles, mildly increased compared to the   2016 MRI study.    IMPRESSION:    The intracranial demyelinating plaques are grossly stable compared to the   2016 brain MRI study. If the patienthas a new and persistent neurologic   deficit, consider spine MRI imaging follow-up, given that the   intracranial plaques appear grossly stable.                    PAM RIVAS M.D., ATTENDING RADIOLOGIST  This document has been electronically signed. Jul 11 2019  8:14AM                < end of copied text >
Patient is a 59y old  Male who presents with a chief complaint of weakness, urinary frequency (15 Jul 2019 08:38)      SUBJECTIVE / OVERNIGHT EVENTS: overnight events noted  about the same     ROS:  Resp: No cough no sputum production  CVS: No chest pain no palpitations no orthopnea  GI: no N/V/D  : no dysuria, no hematuria  Neuro: no weakness no paresthesias  Heme: No petechiae no easy bruising  Msk: No joint pain no swelling  Skin: No rash no itching        MEDICATIONS  (STANDING):  ascorbic acid 500 milliGRAM(s) Oral daily  cholecalciferol 5000 Unit(s) Oral daily  cyanocobalamin 1000 MICROGram(s) Oral daily  diclofenac 75 milliGRAM(s) Oral daily  docusate sodium 100 milliGRAM(s) Oral two times a day  enoxaparin Injectable 40 milliGRAM(s) SubCutaneous every 24 hours  mesalamine ER (24-Hour) Capsule 1.5 Gram(s) Oral daily  multivitamin 1 Tablet(s) Oral daily  pantoprazole    Tablet 40 milliGRAM(s) Oral before breakfast  PHENobarbital 32.4 milliGRAM(s) Oral daily  senna 2 Tablet(s) Oral at bedtime  tamsulosin 0.4 milliGRAM(s) Oral at bedtime    MEDICATIONS  (PRN):        CAPILLARY BLOOD GLUCOSE        I&O's Summary    14 Jul 2019 07:01  -  15 Jul 2019 07:00  --------------------------------------------------------  IN: 0 mL / OUT: 2525 mL / NET: -2525 mL    15 Jul 2019 07:01  -  15 Jul 2019 11:58  --------------------------------------------------------  IN: 0 mL / OUT: 450 mL / NET: -450 mL        Vital Signs Last 24 Hrs  T(C): 36.4 (15 Jul 2019 05:34), Max: 36.8 (14 Jul 2019 21:00)  T(F): 97.5 (15 Jul 2019 05:34), Max: 98.2 (14 Jul 2019 21:00)  HR: 64 (15 Jul 2019 05:34) (64 - 76)  BP: 113/77 (15 Jul 2019 05:34) (113/77 - 129/83)  BP(mean): --  RR: 18 (15 Jul 2019 05:34) (18 - 18)  SpO2: 95% (15 Jul 2019 05:34) (95% - 98%)    PHYSICAL EXAM: vital signs as above  in no apparent distress  HEENT: ROGER EOMI  Neck: Supple, no JVD, no thyromegaly  Lungs: no rhonchi, no wheeze, no crackles  CVS: S1 S2 no M/R/G  Abdomen: no tenderness, no organomegaly, BS present obese  Neuro: AO x 3 improved strength LE 3 - 4/5 bilaterally  no sensory abnormalities  Psych: appropriate affect  Skin: warm, dry  Ext: no cyanosis or clubbing, much improved edema  Msk: no joint swelling or deformities  Back: no CVA tenderness, no kyphosis/scoliosis    LABS:                        15.6   8.44  )-----------( 164      ( 14 Jul 2019 10:01 )             46.9     07-15    136  |  98  |  25<H>  ----------------------------<  89  3.9   |  29  |  0.89    Ca    8.0<L>      15 Jul 2019 06:25                  All consultant(s) notes reviewed and care discussed with other providers        Contact Number, Dr Ellis [phone]
Patient is a 59y old  Male who presents with a chief complaint of weakness, urinary frequency (16 Jul 2019 15:10)      SUBJECTIVE / OVERNIGHT EVENTS: overnight events noted    ROS:  Resp: No cough no sputum production  CVS: No chest pain no palpitations no orthopnea  GI: no N/V/D  : no dysuria, no hematuria  Neuro: no weakness no paresthesias  Heme: No petechiae no easy bruising  Msk: No joint pain no swelling  Skin: No rash no itching        MEDICATIONS  (STANDING):  ascorbic acid 500 milliGRAM(s) Oral daily  cholecalciferol 5000 Unit(s) Oral daily  cyanocobalamin 1000 MICROGram(s) Oral daily  diclofenac 75 milliGRAM(s) Oral daily  docusate sodium 100 milliGRAM(s) Oral two times a day  enoxaparin Injectable 40 milliGRAM(s) SubCutaneous every 24 hours  mesalamine ER (24-Hour) Capsule 1.5 Gram(s) Oral daily  multivitamin 1 Tablet(s) Oral daily  pantoprazole    Tablet 40 milliGRAM(s) Oral before breakfast  senna 2 Tablet(s) Oral at bedtime  sertraline 50 milliGRAM(s) Oral daily  tamsulosin 0.4 milliGRAM(s) Oral at bedtime    MEDICATIONS  (PRN):  acetaminophen   Tablet .. 650 milliGRAM(s) Oral every 6 hours PRN Mild Pain (1 - 3)        CAPILLARY BLOOD GLUCOSE        I&O's Summary    15 Jul 2019 07:01  -  16 Jul 2019 07:00  --------------------------------------------------------  IN: 1270 mL / OUT: 1983 mL / NET: -713 mL    16 Jul 2019 07:01  -  16 Jul 2019 15:44  --------------------------------------------------------  IN: 250 mL / OUT: 500 mL / NET: -250 mL        Vital Signs Last 24 Hrs  T(C): 36.8 (16 Jul 2019 14:10), Max: 36.9 (15 Jul 2019 21:35)  T(F): 98.2 (16 Jul 2019 14:10), Max: 98.5 (15 Jul 2019 21:35)  HR: 92 (16 Jul 2019 14:10) (74 - 92)  BP: 132/78 (16 Jul 2019 14:10) (104/68 - 132/78)  BP(mean): --  RR: 18 (16 Jul 2019 14:10) (18 - 18)  SpO2: 96% (16 Jul 2019 14:10) (94% - 96%)    PHYSICAL EXAM: vital signs as above  in no apparent distress  HEENT: ROGER EOMI  Neck: Supple, no JVD, no thyromegaly  Lungs: no rhonchi, no wheeze, no crackles  CVS: S1 S2 no M/R/G  Abdomen: no tenderness, no organomegaly, BS present obese  Neuro: AO x 3 improved strength LE 3 - 4/5 bilaterally  no sensory abnormalities  Psych: appropriate affect  Skin: warm, dry  Ext: no cyanosis or clubbing, much improved edema  Msk: no joint swelling or deformities  Back: no CVA tenderness, no kyphosis/scoliosis    LABS:    07-15    136  |  98  |  25<H>  ----------------------------<  89  3.9   |  29  |  0.89    Ca    8.0<L>      15 Jul 2019 06:25                  All consultant(s) notes reviewed and care discussed with other providers        Contact Number, Dr Ellis [phone]
Patient is a 59y old  Male who presents with a chief complaint of weakness, urinary frequency (10 Jul 2019 09:32)  patient not known to me, assigned this AM to assume care  chart reviewed and events thus far noted  admitted overnight by full time hospitalist service     SUBJECTIVE / OVERNIGHT EVENTS: overnight events noted    ROS:  Resp: No cough no sputum production  CVS: No chest pain no palpitations no orthopnea  GI: no N/V/D  : no dysuria, no hematuria  Neuro: no weakness no paresthesias  Heme: No petechiae no easy bruising  Msk: No joint pain no swelling  Skin: No rash no itching        MEDICATIONS  (STANDING):  ascorbic acid 500 milliGRAM(s) Oral daily  chlorhexidine 2% Cloths 1 Application(s) Topical daily  cholecalciferol 5000 Unit(s) Oral daily  cyanocobalamin 1000 MICROGram(s) Oral daily  diclofenac 75 milliGRAM(s) Oral daily  enoxaparin Injectable 40 milliGRAM(s) SubCutaneous every 24 hours  mesalamine ER (24-Hour) Capsule 1.5 Gram(s) Oral daily  methylPREDNISolone sodium succinate IVPB 1000 milliGRAM(s) IV Intermittent daily  multivitamin 1 Tablet(s) Oral daily  pantoprazole    Tablet 40 milliGRAM(s) Oral before breakfast  PHENobarbital 32.4 milliGRAM(s) Oral daily  tamsulosin 0.4 milliGRAM(s) Oral at bedtime    MEDICATIONS  (PRN):        CAPILLARY BLOOD GLUCOSE        I&O's Summary    2019 07:  -  10 Jul 2019 07:00  --------------------------------------------------------  IN: 170 mL / OUT: 1225 mL / NET: -1055 mL    10 Jul 2019 07:01  -  10 Jul 2019 11:36  --------------------------------------------------------  IN: 480 mL / OUT: 750 mL / NET: -270 mL        Vital Signs Last 24 Hrs  T(C): 36.7 (10 Jul 2019 06:27), Max: 36.8 (2019 17:21)  T(F): 98.1 (10 Jul 2019 06:27), Max: 98.3 (2019 22:42)  HR: 69 (10 Jul 2019 06:27) (69 - 83)  BP: 146/84 (10 Jul 2019 06:27) (145/83 - 155/94)  BP(mean): --  RR: 18 (10 Jul 2019 06:27) (18 - 20)  SpO2: 98% (10 Jul 2019 06:27) (96% - 99%)    PHYSICAL EXAM: vital signs as above  in no apparent distress  HEENT: ROGER EOMI  Neck: Supple, no JVD, no thyromegaly  Lungs: no rhonchi, no wheeze, no crackles  CVS: S1 S2 no M/R/G  Abdomen: no tenderness, no organomegaly, BS present obese  Neuro: AO x 3 no focal weakness, no sensory abnormalities  Psych: appropriate affect  Skin: warm, dry  Ext: no cyanosis or clubbing, 2 + pitting edema  Msk: no joint swelling or deformities  Back: no CVA tenderness, no kyphosis/scoliosis     LABS:                        14.8   6.01  )-----------( 194      ( 10 Jul 2019 10:27 )             43.7     07-10    141  |  104  |  18  ----------------------------<  84  3.7   |  28  |  0.86    Ca    8.3<L>      10 Jul 2019 06:23  Phos  3.3     07-10  Mg     1.9     07-10    TPro  5.6<L>  /  Alb  3.4  /  TBili  0.4  /  DBili  x   /  AST  20  /  ALT  21  /  AlkPhos  77  07-10          Urinalysis Basic - ( 2019 18:31 )    Color: Light Yellow / Appearance: Clear / S.019 / pH: x  Gluc: x / Ketone: Negative  / Bili: Negative / Urobili: Negative   Blood: x / Protein: Negative / Nitrite: Negative   Leuk Esterase: Negative / RBC: x / WBC x   Sq Epi: x / Non Sq Epi: x / Bacteria: x          All consultant(s) notes reviewed and care discussed with other providers        Contact Number, Dr Ellis [phone]
independent
independent

## 2020-11-13 ENCOUNTER — APPOINTMENT (OUTPATIENT)
Dept: UROLOGY | Facility: CLINIC | Age: 60
End: 2020-11-13
Payer: MEDICARE

## 2020-11-13 VITALS
HEIGHT: 76 IN | HEART RATE: 76 BPM | TEMPERATURE: 97.5 F | WEIGHT: 236 LBS | SYSTOLIC BLOOD PRESSURE: 108 MMHG | DIASTOLIC BLOOD PRESSURE: 75 MMHG | BODY MASS INDEX: 28.74 KG/M2 | RESPIRATION RATE: 16 BRPM

## 2020-11-13 DIAGNOSIS — Z80.52 FAMILY HISTORY OF MALIGNANT NEOPLASM OF BLADDER: ICD-10-CM

## 2020-11-13 DIAGNOSIS — G35 MULTIPLE SCLEROSIS: ICD-10-CM

## 2020-11-13 PROCEDURE — 51798 US URINE CAPACITY MEASURE: CPT

## 2020-11-13 PROCEDURE — 99214 OFFICE O/P EST MOD 30 MIN: CPT | Mod: 25

## 2020-11-13 PROCEDURE — 51701 INSERT BLADDER CATHETER: CPT

## 2020-11-13 NOTE — PHYSICAL EXAM
[General Appearance - Well Developed] : well developed [General Appearance - Well Nourished] : well nourished [Normal Appearance] : normal appearance [Well Groomed] : well groomed [General Appearance - In No Acute Distress] : no acute distress [Edema] : no peripheral edema [Respiration, Rhythm And Depth] : normal respiratory rhythm and effort [Exaggerated Use Of Accessory Muscles For Inspiration] : no accessory muscle use [Abdomen Soft] : soft [Abdomen Tenderness] : non-tender [Costovertebral Angle Tenderness] : no ~M costovertebral angle tenderness [Urethral Meatus] : meatus normal [Urinary Bladder Findings] : the bladder was normal on palpation [Scrotum] : the scrotum was normal [Testes Mass (___cm)] : there were no testicular masses [No Prostate Nodules] : no prostate nodules [Normal Station and Gait] : the gait and station were normal for the patient's age [] : no rash [No Focal Deficits] : no focal deficits [Oriented To Time, Place, And Person] : oriented to person, place, and time [Affect] : the affect was normal [Mood] : the mood was normal [Not Anxious] : not anxious [No Palpable Adenopathy] : no palpable adenopathy [Penis Abnormality] : normal uncircumcised penis [Epididymis] : the epididymides were normal [Testes Tenderness] : no tenderness of the testes [Anus Abnormality] : the anus and perineum were normal [Rectal Exam - Rectum] : digital rectal exam was normal [Prostate Enlargement] : the prostate was not enlarged [Prostate Tenderness] : the prostate was not tender

## 2020-11-15 PROBLEM — G35 MULTIPLE SCLEROSIS, PRIMARY PROGRESSIVE: Status: ACTIVE | Noted: 2020-11-15

## 2020-11-15 PROBLEM — Z80.52 FAMILY HISTORY OF MALIGNANT NEOPLASM OF URINARY BLADDER: Status: ACTIVE | Noted: 2020-11-13

## 2020-11-15 RX ORDER — SERTRALINE 25 MG/1
TABLET, FILM COATED ORAL
Refills: 0 | Status: ACTIVE | COMMUNITY

## 2020-11-15 RX ORDER — MESALAMINE 0.38 G/1
0.38 CAPSULE, EXTENDED RELEASE ORAL
Refills: 0 | Status: ACTIVE | COMMUNITY

## 2020-11-15 RX ORDER — TAMSULOSIN HYDROCHLORIDE 0.4 MG/1
0.4 CAPSULE ORAL
Refills: 0 | Status: ACTIVE | COMMUNITY

## 2020-11-15 RX ORDER — SERTRALINE HYDROCHLORIDE 50 MG/1
50 TABLET, FILM COATED ORAL
Refills: 0 | Status: ACTIVE | COMMUNITY

## 2020-11-15 RX ORDER — RABEPRAZOLE SODIUM 20 MG/1
20 TABLET, DELAYED RELEASE ORAL
Refills: 0 | Status: ACTIVE | COMMUNITY

## 2020-11-15 RX ORDER — DICLOFENAC SODIUM 75 MG/1
75 TABLET, DELAYED RELEASE ORAL
Refills: 0 | Status: ACTIVE | COMMUNITY

## 2020-11-15 NOTE — END OF VISIT
[FreeTextEntry3] : Medical record entries made by the scribe today, were at my direction and personally dictated to them by me, Dr. Isai Vasquez on 11/13/2020. I have reviewed the chart and agree that the record accurately reflects my personal performance of the history, physical exam, assessment, and plan.

## 2020-11-15 NOTE — ASSESSMENT
[FreeTextEntry1] : 60 year old male with urinary urge incontinence.\par \par Cath culture tpday\par PSA sent today\par \par Explained that both MS and his immobility may have an effect on his incontinence. I suggested that he keep a urinal near him at all times.\par ?increase anticolinergic\par \par await prior record\par

## 2020-11-15 NOTE — LETTER BODY
[Dear  ___] : Dear  [unfilled], [Courtesy Letter:] : I had the pleasure of seeing your patient, [unfilled], in my office today. [Please see my note below.] : Please see my note below. [Sincerely,] : Sincerely, [FreeTextEntry1] : see below\par poss increase anticholinergic [FreeTextEntry3] : Isai Vasquez MD FACS\par \par Attending Urologist-[facility]\par Director - Strategic Operations Urology\par Assistant Clinical Professor-[facility]\par \par

## 2020-11-15 NOTE — HISTORY OF PRESENT ILLNESS
[FreeTextEntry1] : 60 year old male Hx of MS since 2014 with c/o incontinence, pt is currently in a wheelchair.\par IPSS: 21\par Improved with medical therapy\par Incontinence largely related to mobility issues and urgency\par \par bowels okay, no dysuria, hematuria\par \par Has had full evaluation in prior  practice\par Has had subsequent  retention and UTI\par \par Tried on anticholinergic and diuretics in the past.- Pending previous records as well as full list of current medications

## 2020-11-15 NOTE — ADDENDUM
[FreeTextEntry1] : I, Kristen Parks, solely acted as scribe for Dr. Isai Vasquez on 11/13/2020.\par

## 2020-11-18 LAB
BACTERIA UR CULT: ABNORMAL
PSA SERPL-MCNC: 2.76 NG/ML

## 2020-12-30 ENCOUNTER — APPOINTMENT (OUTPATIENT)
Dept: UROLOGY | Facility: CLINIC | Age: 60
End: 2020-12-30

## 2021-01-11 ENCOUNTER — APPOINTMENT (OUTPATIENT)
Dept: UROLOGY | Facility: CLINIC | Age: 61
End: 2021-01-11

## 2021-01-25 ENCOUNTER — APPOINTMENT (OUTPATIENT)
Dept: UROLOGY | Facility: CLINIC | Age: 61
End: 2021-01-25

## 2021-03-12 ENCOUNTER — APPOINTMENT (OUTPATIENT)
Dept: UROLOGY | Facility: CLINIC | Age: 61
End: 2021-03-12
Payer: MEDICARE

## 2021-03-12 VITALS
HEART RATE: 76 BPM | RESPIRATION RATE: 85 BRPM | SYSTOLIC BLOOD PRESSURE: 132 MMHG | DIASTOLIC BLOOD PRESSURE: 87 MMHG | TEMPERATURE: 98 F

## 2021-03-12 PROCEDURE — 51701 INSERT BLADDER CATHETER: CPT

## 2021-03-12 PROCEDURE — 99212 OFFICE O/P EST SF 10 MIN: CPT | Mod: 25

## 2021-03-14 RX ORDER — BACLOFEN 10 MG/1
10 TABLET ORAL
Qty: 60 | Refills: 0 | Status: ACTIVE | COMMUNITY
Start: 2021-02-03

## 2021-03-14 RX ORDER — SULFAMETHOXAZOLE AND TRIMETHOPRIM 800; 160 MG/1; MG/1
800-160 TABLET ORAL
Qty: 14 | Refills: 0 | Status: DISCONTINUED | COMMUNITY
Start: 2020-08-24 | End: 2021-03-14

## 2021-03-14 RX ORDER — CEFDINIR 300 MG/1
300 CAPSULE ORAL TWICE DAILY
Qty: 20 | Refills: 0 | Status: DISCONTINUED | COMMUNITY
Start: 2020-11-18 | End: 2021-03-14

## 2021-03-14 NOTE — ASSESSMENT
[FreeTextEntry1] : 61 year old male with urinary urge incontinence, ?UTI.\par Ua micro, cath culture today\par PVR: 100\par \par Continue on Flomax, oxybutynin \par Follow up pending report.

## 2021-03-14 NOTE — HISTORY OF PRESENT ILLNESS
[FreeTextEntry1] : 60 year old male with urinary urge incontinence.\par Last visit 11/20-uti-symptoms resolved with atb though always has baseline LUTS likely related to MS and BPH\par \par Mobility issues also large component of incont\par \par UTI last visit 11/13 - given 10 days cefdinir symptoms improved but have now returned.\par \par Hx of MS since 2014 .\par \par  pt is currently in a wheelchair.-had exacerbation of MS recently with multiple rescheduled office visitis\par \par bowels okay. no dysuria, pain or hematuria . c/o frequency, urgency, incontinence with occasional burning.\par Patient took Uristat yesterday,\par \par PSA 11/13: 2.76

## 2021-03-14 NOTE — END OF VISIT
[FreeTextEntry3] : Medical record entries made by the scribe today, were at my direction and personally dictated to them by me, Dr. Isai Vasquez on 03/12/2021. I have reviewed the chart and agree that the record accurately reflects my personal performance of the history, physical exam, assessment, and plan.

## 2021-03-15 LAB — BACTERIA UR CULT: ABNORMAL

## 2021-03-16 ENCOUNTER — NON-APPOINTMENT (OUTPATIENT)
Age: 61
End: 2021-03-16

## 2021-04-27 ENCOUNTER — APPOINTMENT (OUTPATIENT)
Dept: UROLOGY | Facility: CLINIC | Age: 61
End: 2021-04-27
Payer: MEDICARE

## 2021-04-27 DIAGNOSIS — R33.9 RETENTION OF URINE, UNSPECIFIED: ICD-10-CM

## 2021-04-27 DIAGNOSIS — N39.41 URGE INCONTINENCE: ICD-10-CM

## 2021-04-27 DIAGNOSIS — R35.0 FREQUENCY OF MICTURITION: ICD-10-CM

## 2021-04-27 DIAGNOSIS — N13.30 UNSPECIFIED HYDRONEPHROSIS: ICD-10-CM

## 2021-04-27 DIAGNOSIS — N39.0 URINARY TRACT INFECTION, SITE NOT SPECIFIED: ICD-10-CM

## 2021-04-27 PROCEDURE — 99213 OFFICE O/P EST LOW 20 MIN: CPT | Mod: 25

## 2021-04-27 PROCEDURE — 51701 INSERT BLADDER CATHETER: CPT | Mod: 59

## 2021-04-27 PROCEDURE — 76775 US EXAM ABDO BACK WALL LIM: CPT

## 2021-04-27 RX ORDER — CEFDINIR 300 MG/1
300 CAPSULE ORAL TWICE DAILY
Qty: 20 | Refills: 0 | Status: DISCONTINUED | COMMUNITY
Start: 2021-03-15 | End: 2021-04-27

## 2021-04-27 NOTE — ASSESSMENT
[FreeTextEntry1] : 61 year old male with hydronephrosis, LUTS, UTI\par Cath culture, BMP sent today\par \par PVR: 250 - pt did not urinate before-not true pvr\par \par Renal US: The left kidney demonstrates moderate hydronephrosis with no obstructive stones seen. The right kidney appears unremarkable. Both kidneys are normal in size and echogenicity without stones, or solid masses visualized\par \par Continue on Flomax, oxybutynin\par \par To have CT urogram-abd pelvis\par Follow up pending results, repeat pvr

## 2021-04-27 NOTE — PHYSICAL EXAM
[General Appearance - Well Developed] : well developed [General Appearance - Well Nourished] : well nourished [Normal Appearance] : normal appearance [Well Groomed] : well groomed [General Appearance - In No Acute Distress] : no acute distress [Abdomen Soft] : soft [Abdomen Tenderness] : non-tender [Costovertebral Angle Tenderness] : no ~M costovertebral angle tenderness [Urethral Meatus] : meatus normal [FreeTextEntry1] : wheelchair bound-appears brighter than previously

## 2021-04-27 NOTE — END OF VISIT
[FreeTextEntry3] : Medical record entries made by the scribe today, were at my direction and personally dictated to them by me, Dr. Isai Vasquez on 04/27/2021. I have reviewed the chart and agree that the record accurately reflects my personal performance of the history, physical exam, assessment, and plan.

## 2021-04-27 NOTE — HISTORY OF PRESENT ILLNESS
[FreeTextEntry1] : 61 year old male following up UTI, urge incontinence\par here today for renal sono\par \par Culture 3/13/21: positive \par given course of cefdinir- symptoms improved but still has intermittent freq/urgency and some urge incont\par -corresponds to exacerbation of MS\par \par c/o incomplete emptying\par \par Hx MS since 2014

## 2021-04-29 LAB
ANION GAP SERPL CALC-SCNC: 15 MMOL/L
BACTERIA UR CULT: NORMAL
BUN SERPL-MCNC: 28 MG/DL
CALCIUM SERPL-MCNC: 9.3 MG/DL
CHLORIDE SERPL-SCNC: 101 MMOL/L
CO2 SERPL-SCNC: 26 MMOL/L
CREAT SERPL-MCNC: 0.87 MG/DL
GLUCOSE SERPL-MCNC: 71 MG/DL
POTASSIUM SERPL-SCNC: 4.2 MMOL/L
SODIUM SERPL-SCNC: 142 MMOL/L

## 2021-06-09 ENCOUNTER — OUTPATIENT (OUTPATIENT)
Dept: OUTPATIENT SERVICES | Facility: HOSPITAL | Age: 61
LOS: 1 days | End: 2021-06-09
Payer: MEDICARE

## 2021-06-09 ENCOUNTER — APPOINTMENT (OUTPATIENT)
Dept: ULTRASOUND IMAGING | Facility: CLINIC | Age: 61
End: 2021-06-09
Payer: MEDICARE

## 2021-06-09 ENCOUNTER — APPOINTMENT (OUTPATIENT)
Dept: CT IMAGING | Facility: CLINIC | Age: 61
End: 2021-06-09

## 2021-06-09 DIAGNOSIS — R33.9 RETENTION OF URINE, UNSPECIFIED: ICD-10-CM

## 2021-06-09 PROCEDURE — 76775 US EXAM ABDO BACK WALL LIM: CPT | Mod: 26

## 2021-06-09 PROCEDURE — 76775 US EXAM ABDO BACK WALL LIM: CPT

## 2021-06-09 PROCEDURE — 82565 ASSAY OF CREATININE: CPT

## 2021-06-09 PROCEDURE — 74178 CT ABD&PLV WO CNTR FLWD CNTR: CPT | Mod: 26,MH

## 2021-06-09 PROCEDURE — 74178 CT ABD&PLV WO CNTR FLWD CNTR: CPT

## 2021-06-14 ENCOUNTER — NON-APPOINTMENT (OUTPATIENT)
Age: 61
End: 2021-06-14

## 2021-07-19 ENCOUNTER — INPATIENT (INPATIENT)
Facility: HOSPITAL | Age: 61
LOS: 3 days | Discharge: ANOTHER HOSPITAL | DRG: 59 | End: 2021-07-23
Attending: INTERNAL MEDICINE | Admitting: INTERNAL MEDICINE
Payer: MEDICARE

## 2021-07-19 VITALS
HEIGHT: 76 IN | HEART RATE: 76 BPM | DIASTOLIC BLOOD PRESSURE: 97 MMHG | OXYGEN SATURATION: 95 % | SYSTOLIC BLOOD PRESSURE: 158 MMHG | WEIGHT: 270.07 LBS | TEMPERATURE: 98 F | RESPIRATION RATE: 18 BRPM

## 2021-07-19 DIAGNOSIS — R60.0 LOCALIZED EDEMA: ICD-10-CM

## 2021-07-19 LAB
ALBUMIN SERPL ELPH-MCNC: 3.9 G/DL — SIGNIFICANT CHANGE UP (ref 3.3–5)
ALP SERPL-CCNC: 109 U/L — SIGNIFICANT CHANGE UP (ref 40–120)
ALT FLD-CCNC: 22 U/L — SIGNIFICANT CHANGE UP (ref 10–45)
ANION GAP SERPL CALC-SCNC: 12 MMOL/L — SIGNIFICANT CHANGE UP (ref 5–17)
AST SERPL-CCNC: 18 U/L — SIGNIFICANT CHANGE UP (ref 10–40)
BASOPHILS # BLD AUTO: 0.05 K/UL — SIGNIFICANT CHANGE UP (ref 0–0.2)
BASOPHILS NFR BLD AUTO: 0.8 % — SIGNIFICANT CHANGE UP (ref 0–2)
BILIRUB SERPL-MCNC: 0.4 MG/DL — SIGNIFICANT CHANGE UP (ref 0.2–1.2)
BUN SERPL-MCNC: 28 MG/DL — HIGH (ref 7–23)
CALCIUM SERPL-MCNC: 9 MG/DL — SIGNIFICANT CHANGE UP (ref 8.4–10.5)
CHLORIDE SERPL-SCNC: 101 MMOL/L — SIGNIFICANT CHANGE UP (ref 96–108)
CO2 SERPL-SCNC: 27 MMOL/L — SIGNIFICANT CHANGE UP (ref 22–31)
CREAT SERPL-MCNC: 0.8 MG/DL — SIGNIFICANT CHANGE UP (ref 0.5–1.3)
EOSINOPHIL # BLD AUTO: 0.5 K/UL — SIGNIFICANT CHANGE UP (ref 0–0.5)
EOSINOPHIL NFR BLD AUTO: 8.1 % — HIGH (ref 0–6)
GLUCOSE SERPL-MCNC: 88 MG/DL — SIGNIFICANT CHANGE UP (ref 70–99)
HCT VFR BLD CALC: 45.9 % — SIGNIFICANT CHANGE UP (ref 39–50)
HGB BLD-MCNC: 15.3 G/DL — SIGNIFICANT CHANGE UP (ref 13–17)
IMM GRANULOCYTES NFR BLD AUTO: 0.3 % — SIGNIFICANT CHANGE UP (ref 0–1.5)
LYMPHOCYTES # BLD AUTO: 0.83 K/UL — LOW (ref 1–3.3)
LYMPHOCYTES # BLD AUTO: 13.5 % — SIGNIFICANT CHANGE UP (ref 13–44)
MCHC RBC-ENTMCNC: 31.9 PG — SIGNIFICANT CHANGE UP (ref 27–34)
MCHC RBC-ENTMCNC: 33.3 GM/DL — SIGNIFICANT CHANGE UP (ref 32–36)
MCV RBC AUTO: 95.8 FL — SIGNIFICANT CHANGE UP (ref 80–100)
MONOCYTES # BLD AUTO: 0.73 K/UL — SIGNIFICANT CHANGE UP (ref 0–0.9)
MONOCYTES NFR BLD AUTO: 11.8 % — SIGNIFICANT CHANGE UP (ref 2–14)
NEUTROPHILS # BLD AUTO: 4.04 K/UL — SIGNIFICANT CHANGE UP (ref 1.8–7.4)
NEUTROPHILS NFR BLD AUTO: 65.5 % — SIGNIFICANT CHANGE UP (ref 43–77)
NRBC # BLD: 0 /100 WBCS — SIGNIFICANT CHANGE UP (ref 0–0)
PLATELET # BLD AUTO: 215 K/UL — SIGNIFICANT CHANGE UP (ref 150–400)
POTASSIUM SERPL-MCNC: 3.7 MMOL/L — SIGNIFICANT CHANGE UP (ref 3.5–5.3)
POTASSIUM SERPL-SCNC: 3.7 MMOL/L — SIGNIFICANT CHANGE UP (ref 3.5–5.3)
PROT SERPL-MCNC: 6.4 G/DL — SIGNIFICANT CHANGE UP (ref 6–8.3)
RBC # BLD: 4.79 M/UL — SIGNIFICANT CHANGE UP (ref 4.2–5.8)
RBC # FLD: 12.9 % — SIGNIFICANT CHANGE UP (ref 10.3–14.5)
SARS-COV-2 RNA SPEC QL NAA+PROBE: SIGNIFICANT CHANGE UP
SODIUM SERPL-SCNC: 140 MMOL/L — SIGNIFICANT CHANGE UP (ref 135–145)
TROPONIN T, HIGH SENSITIVITY RESULT: 12 NG/L — SIGNIFICANT CHANGE UP (ref 0–51)
WBC # BLD: 6.17 K/UL — SIGNIFICANT CHANGE UP (ref 3.8–10.5)
WBC # FLD AUTO: 6.17 K/UL — SIGNIFICANT CHANGE UP (ref 3.8–10.5)

## 2021-07-19 PROCEDURE — 93010 ELECTROCARDIOGRAM REPORT: CPT

## 2021-07-19 PROCEDURE — 93970 EXTREMITY STUDY: CPT | Mod: 26

## 2021-07-19 PROCEDURE — 99285 EMERGENCY DEPT VISIT HI MDM: CPT | Mod: 25

## 2021-07-19 PROCEDURE — 71045 X-RAY EXAM CHEST 1 VIEW: CPT | Mod: 26

## 2021-07-19 RX ORDER — SERTRALINE 25 MG/1
50 TABLET, FILM COATED ORAL DAILY
Refills: 0 | Status: DISCONTINUED | OUTPATIENT
Start: 2021-07-19 | End: 2021-07-23

## 2021-07-19 RX ORDER — HEPARIN SODIUM 5000 [USP'U]/ML
5000 INJECTION INTRAVENOUS; SUBCUTANEOUS EVERY 12 HOURS
Refills: 0 | Status: DISCONTINUED | OUTPATIENT
Start: 2021-07-19 | End: 2021-07-23

## 2021-07-19 RX ORDER — FUROSEMIDE 40 MG
20 TABLET ORAL DAILY
Refills: 0 | Status: DISCONTINUED | OUTPATIENT
Start: 2021-07-19 | End: 2021-07-20

## 2021-07-19 RX ORDER — PANTOPRAZOLE SODIUM 20 MG/1
40 TABLET, DELAYED RELEASE ORAL
Refills: 0 | Status: DISCONTINUED | OUTPATIENT
Start: 2021-07-19 | End: 2021-07-23

## 2021-07-19 RX ORDER — TAMSULOSIN HYDROCHLORIDE 0.4 MG/1
0.4 CAPSULE ORAL AT BEDTIME
Refills: 0 | Status: DISCONTINUED | OUTPATIENT
Start: 2021-07-19 | End: 2021-07-23

## 2021-07-19 RX ORDER — PHENOBARBITAL 60 MG
32.4 TABLET ORAL AT BEDTIME
Refills: 0 | Status: DISCONTINUED | OUTPATIENT
Start: 2021-07-19 | End: 2021-07-19

## 2021-07-19 RX ORDER — FUROSEMIDE 40 MG
20 TABLET ORAL ONCE
Refills: 0 | Status: COMPLETED | OUTPATIENT
Start: 2021-07-19 | End: 2021-07-19

## 2021-07-19 RX ORDER — BACLOFEN 100 %
5 POWDER (GRAM) MISCELLANEOUS AT BEDTIME
Refills: 0 | Status: DISCONTINUED | OUTPATIENT
Start: 2021-07-19 | End: 2021-07-23

## 2021-07-19 RX ADMIN — Medication 20 MILLIGRAM(S): at 14:42

## 2021-07-19 NOTE — ED PROVIDER NOTE - OBJECTIVE STATEMENT
Patient is a 61 year old male with pmhx DVT, MS, RA, UC, BPH(following PSA yearly), recurrent UTI, acid reflux who presents with LLE swelling for the last two days. Patient states he has had long term RLE swelling as well but is worried since the swelling is now B/L. Patient states compression socks make the swelling better in the past. Patient denies pain but is TTP in his swollen feet. Patient states the swelling is worse at night. Patient states he is weak on his right side with right foot drop due to MS. Patient denies CP, SOB, palpitations, fevers, recent illness, N/V/D, travel, sick contacts. Patient denies heart disease, strokes, HTN, DM2. Patient admits to smoking in the past but has not smoked in the past 10 years. Patient is a 61 year old male with pmhx DVT, MS, RA, UC, BPH(following PSA yearly), recurrent UTI, acid reflux who presents with LLE swelling for the last two days. Patient states he has had long term RLE swelling as well but is worried since the swelling is now B/L. Patient states compression socks make the swelling better in the past. Patient denies pain but is TTP in his swollen feet. Patient states the swelling is worse at night. Patient states he is weak on his right side with right foot drop due to MS. Ambulates with a walker. Reports unprovoked DVT in RLE in 2016, short course of AC that has since been d/c. Patient denies CP, SOB, palpitations, fevers, recent illness, N/V/D, recent travel, sick contacts, injury to lower extremities. Patient denies heart disease, strokes, HTN, DM2. Patient admits to smoking in the past but has not smoked in the past 10 years.    No PCP

## 2021-07-19 NOTE — H&P ADULT - NSHPADDITIONALINFOADULT_GEN_ALL_CORE
advance acre planning : d/w patinet regarding advance directive, remain full code. d/w him regarding intubation , CPR , chest compression if the need arise, and he agrees for every thing . time spend 15 min

## 2021-07-19 NOTE — H&P ADULT - NSHPLABSRESULTS_GEN_ALL_CORE
15.3   6.17  )-----------( 215      ( 19 Jul 2021 11:57 )             45.9     07-19    140  |  101  |  28<H>  ----------------------------<  88  3.7   |  27  |  0.80    Ca    9.0      19 Jul 2021 11:57    TPro  6.4  /  Alb  3.9  /  TBili  0.4  /  DBili  x   /  AST  18  /  ALT  22  /  AlkPhos  109  07-19

## 2021-07-19 NOTE — ED ADULT NURSE NOTE - NSIMPLEMENTINTERV_GEN_ALL_ED
Implemented All Fall Risk Interventions:  Lavinia to call system. Call bell, personal items and telephone within reach. Instruct patient to call for assistance. Room bathroom lighting operational. Non-slip footwear when patient is off stretcher. Physically safe environment: no spills, clutter or unnecessary equipment. Stretcher in lowest position, wheels locked, appropriate side rails in place. Provide visual cue, wrist band, yellow gown, etc. Monitor gait and stability. Monitor for mental status changes and reorient to person, place, and time. Review medications for side effects contributing to fall risk. Reinforce activity limits and safety measures with patient and family.

## 2021-07-19 NOTE — ED ADULT NURSE REASSESSMENT NOTE - NS ED NURSE REASSESS COMMENT FT1
patient resting comfortably in bed in no acute distress. denies pain at this time. pending delta trops. patient and daughter aware of plan of care, will continue to monitor. call bell in reach

## 2021-07-19 NOTE — H&P ADULT - HISTORY OF PRESENT ILLNESS
Patient is a 61 year old male with pmhx DVT, MS, RA, UC, BPH(following PSA yearly), recurrent UTI, acid reflux who presents with LLE swelling for the last two days. Patient states he has had long term RLE swelling as well but is worried since the swelling is now B/L. Patient states compression socks make the swelling better in the past. Patient denies pain but is TTP in his swollen feet. Patient states the swelling is worse at night. Patient states he is weak on his right side with right foot drop due to MS. Ambulates with a walker. Reports unprovoked DVT in RLE in 2016, short course of AC that has since been d/c. Patient denies CP, SOB, palpitations, fevers, recent illness, N/V/D, recent travel, sick contacts, injury to lower extremities. Patient denies heart disease, strokes, HTN, DM2. Patient admits to smoking in the past but has not smoked in the past 10 years.    In The ED : had venous doppler : neg for DVT

## 2021-07-19 NOTE — ED PROVIDER NOTE - ATTENDING CONTRIBUTION TO CARE
RGUJRAL 60 yo male hx of Multiple Sclerosis since 2014, UC, BPH, seizure d/o, DVT not on AC, RA, prior UTI presents with B/L LE swelling x 2 days. Pt has chronic RLE edema and now with new onset LLE edema. As per daughter pt was on lasix which was stopped prior. Denies any chest pain, palp, SOB, orthopnea. No recent travel, fall, f/c. Pt has R side weakness due to MS.   On exam, Patient is awake, alert and oriented x 3.  Patient is well appearing and in no acute distress.  NCAT  Neck is supple, No LAD.  Lungs are CTA B/L,+S1S2 no murmurs,  Abdomen:Soft nd/nt+bs no rebound or guarding.  B/L LE edema 3+   Check labs, xray chest, US to eval for DVT. DDx ro CHF, venous insufficiency.

## 2021-07-19 NOTE — ED ADULT NURSE NOTE - OBJECTIVE STATEMENT
60 yo male with PMH BPH, UTI, DVT on aspirin, RA, MS, colitis on immunotherapy Q6 months, next therapy is in 1 month presents, to the ED from home c/o bilateral lower extremity edema worsening over the last 2 days. patient states right foot has been swollen for months and over the last 2 days swelling has progressed up the leg to the knee along with left lower extremity swelling. no numbness or tinging. no pain, redness, warmth on palpation. +4 pitting edema noted. patient is AAOx3. ambulatory with walker due to MS. right sided weakness at baseline as per patient from MS. patient lung sounds CTA bilaterally. cap refill <3sec. +2 radial pulses noted. abdomen is soft, distended at baseline, nontender. skin intact. denies chest pain, SOB, fevers, chills, cough, abdominal pain, back pain, dysuria, HA, dizziness, N/V/D. HTN noted in ED. NSR on monitor. call bell in reach. PA and MD at the bedside. call bell in reach, will continue to monitor.

## 2021-07-19 NOTE — H&P ADULT - NSHPPHYSICALEXAM_GEN_ALL_CORE
pt. seen and examined, NAD     Vital Signs Last 24 Hrs  T(C): 36.9 (19 Jul 2021 23:22), Max: 36.9 (19 Jul 2021 23:22)  T(F): 98.5 (19 Jul 2021 23:22), Max: 98.5 (19 Jul 2021 23:22)  HR: 80 (19 Jul 2021 23:22) (76 - 89)  BP: 151/94 (19 Jul 2021 23:22) (141/98 - 162/107)  BP(mean): 113 (19 Jul 2021 19:43) (113 - 113)  RR: 18 (19 Jul 2021 23:22) (16 - 18)  SpO2: 96% (19 Jul 2021 23:22) (95% - 98%)    heent: nc/at , no pallor   neck: supple, no JVD  Lungs: B/l fair A/E , no w/r/r  heart: s1s2 nml  abd: soft, NABS, NT/ND  ext: B/L edema ++ , no c/c pulses 1+  neuro: aaox3 , generalized weakness 2/2 MS

## 2021-07-19 NOTE — ED PROVIDER NOTE - EXTREMITY EXAM
RUE and RLE strength 3-4/5; LUE 5/5, LLE able to range but decreased 2/2 edema RUE and RLE strength 3-4/5; LUE 5/5, LLE able to range but decreased ROM and strength 2/2 edema

## 2021-07-20 DIAGNOSIS — K21.9 GASTRO-ESOPHAGEAL REFLUX DISEASE WITHOUT ESOPHAGITIS: ICD-10-CM

## 2021-07-20 DIAGNOSIS — K52.9 NONINFECTIVE GASTROENTERITIS AND COLITIS, UNSPECIFIED: ICD-10-CM

## 2021-07-20 DIAGNOSIS — G35 MULTIPLE SCLEROSIS: ICD-10-CM

## 2021-07-20 DIAGNOSIS — R60.0 LOCALIZED EDEMA: ICD-10-CM

## 2021-07-20 LAB
ALBUMIN SERPL ELPH-MCNC: 3.5 G/DL — SIGNIFICANT CHANGE UP (ref 3.3–5)
ALP SERPL-CCNC: 99 U/L — SIGNIFICANT CHANGE UP (ref 40–120)
ALT FLD-CCNC: 21 U/L — SIGNIFICANT CHANGE UP (ref 10–45)
ANION GAP SERPL CALC-SCNC: 10 MMOL/L — SIGNIFICANT CHANGE UP (ref 5–17)
AST SERPL-CCNC: 19 U/L — SIGNIFICANT CHANGE UP (ref 10–40)
BILIRUB SERPL-MCNC: 0.3 MG/DL — SIGNIFICANT CHANGE UP (ref 0.2–1.2)
BUN SERPL-MCNC: 25 MG/DL — HIGH (ref 7–23)
CALCIUM SERPL-MCNC: 8.9 MG/DL — SIGNIFICANT CHANGE UP (ref 8.4–10.5)
CHLORIDE SERPL-SCNC: 103 MMOL/L — SIGNIFICANT CHANGE UP (ref 96–108)
CO2 SERPL-SCNC: 27 MMOL/L — SIGNIFICANT CHANGE UP (ref 22–31)
COVID-19 SPIKE DOMAIN AB INTERP: NEGATIVE — SIGNIFICANT CHANGE UP
COVID-19 SPIKE DOMAIN ANTIBODY RESULT: 0.4 U/ML — SIGNIFICANT CHANGE UP
CREAT SERPL-MCNC: 0.75 MG/DL — SIGNIFICANT CHANGE UP (ref 0.5–1.3)
GLUCOSE SERPL-MCNC: 125 MG/DL — HIGH (ref 70–99)
HCT VFR BLD CALC: 44 % — SIGNIFICANT CHANGE UP (ref 39–50)
HGB BLD-MCNC: 14.8 G/DL — SIGNIFICANT CHANGE UP (ref 13–17)
MCHC RBC-ENTMCNC: 32 PG — SIGNIFICANT CHANGE UP (ref 27–34)
MCHC RBC-ENTMCNC: 33.6 GM/DL — SIGNIFICANT CHANGE UP (ref 32–36)
MCV RBC AUTO: 95.2 FL — SIGNIFICANT CHANGE UP (ref 80–100)
NRBC # BLD: 0 /100 WBCS — SIGNIFICANT CHANGE UP (ref 0–0)
PLATELET # BLD AUTO: 206 K/UL — SIGNIFICANT CHANGE UP (ref 150–400)
POTASSIUM SERPL-MCNC: 3.6 MMOL/L — SIGNIFICANT CHANGE UP (ref 3.5–5.3)
POTASSIUM SERPL-SCNC: 3.6 MMOL/L — SIGNIFICANT CHANGE UP (ref 3.5–5.3)
PROT SERPL-MCNC: 5.9 G/DL — LOW (ref 6–8.3)
RBC # BLD: 4.62 M/UL — SIGNIFICANT CHANGE UP (ref 4.2–5.8)
RBC # FLD: 12.9 % — SIGNIFICANT CHANGE UP (ref 10.3–14.5)
SARS-COV-2 IGG+IGM SERPL QL IA: 0.4 U/ML — SIGNIFICANT CHANGE UP
SARS-COV-2 IGG+IGM SERPL QL IA: NEGATIVE — SIGNIFICANT CHANGE UP
SODIUM SERPL-SCNC: 140 MMOL/L — SIGNIFICANT CHANGE UP (ref 135–145)
WBC # BLD: 6.99 K/UL — SIGNIFICANT CHANGE UP (ref 3.8–10.5)
WBC # FLD AUTO: 6.99 K/UL — SIGNIFICANT CHANGE UP (ref 3.8–10.5)

## 2021-07-20 PROCEDURE — 93306 TTE W/DOPPLER COMPLETE: CPT | Mod: 26

## 2021-07-20 RX ORDER — ACETAMINOPHEN 500 MG
650 TABLET ORAL ONCE
Refills: 0 | Status: COMPLETED | OUTPATIENT
Start: 2021-07-20 | End: 2021-07-20

## 2021-07-20 RX ORDER — FUROSEMIDE 40 MG
20 TABLET ORAL ONCE
Refills: 0 | Status: COMPLETED | OUTPATIENT
Start: 2021-07-20 | End: 2021-07-20

## 2021-07-20 RX ORDER — OXYBUTYNIN CHLORIDE 5 MG
10 TABLET ORAL DAILY
Refills: 0 | Status: DISCONTINUED | OUTPATIENT
Start: 2021-07-20 | End: 2021-07-20

## 2021-07-20 RX ORDER — SENNA PLUS 8.6 MG/1
2 TABLET ORAL AT BEDTIME
Refills: 0 | Status: DISCONTINUED | OUTPATIENT
Start: 2021-07-20 | End: 2021-07-23

## 2021-07-20 RX ORDER — MESALAMINE 400 MG
4 TABLET, DELAYED RELEASE (ENTERIC COATED) ORAL
Qty: 0 | Refills: 0 | DISCHARGE

## 2021-07-20 RX ORDER — MESALAMINE 400 MG
1.5 TABLET, DELAYED RELEASE (ENTERIC COATED) ORAL DAILY
Refills: 0 | Status: DISCONTINUED | OUTPATIENT
Start: 2021-07-20 | End: 2021-07-23

## 2021-07-20 RX ORDER — OXYCODONE AND ACETAMINOPHEN 5; 325 MG/1; MG/1
1 TABLET ORAL ONCE
Refills: 0 | Status: DISCONTINUED | OUTPATIENT
Start: 2021-07-20 | End: 2021-07-20

## 2021-07-20 RX ORDER — FUROSEMIDE 40 MG
40 TABLET ORAL DAILY
Refills: 0 | Status: DISCONTINUED | OUTPATIENT
Start: 2021-07-21 | End: 2021-07-21

## 2021-07-20 RX ORDER — POLYETHYLENE GLYCOL 3350 17 G/17G
17 POWDER, FOR SOLUTION ORAL ONCE
Refills: 0 | Status: COMPLETED | OUTPATIENT
Start: 2021-07-20 | End: 2021-07-20

## 2021-07-20 RX ORDER — DICLOFENAC SODIUM 75 MG/1
75 TABLET, DELAYED RELEASE ORAL
Refills: 0 | Status: DISCONTINUED | OUTPATIENT
Start: 2021-07-20 | End: 2021-07-23

## 2021-07-20 RX ORDER — OXYBUTYNIN CHLORIDE 5 MG
5 TABLET ORAL
Refills: 0 | Status: DISCONTINUED | OUTPATIENT
Start: 2021-07-20 | End: 2021-07-23

## 2021-07-20 RX ADMIN — TAMSULOSIN HYDROCHLORIDE 0.4 MILLIGRAM(S): 0.4 CAPSULE ORAL at 00:22

## 2021-07-20 RX ADMIN — Medication 5 MILLIGRAM(S): at 21:27

## 2021-07-20 RX ADMIN — HEPARIN SODIUM 5000 UNIT(S): 5000 INJECTION INTRAVENOUS; SUBCUTANEOUS at 05:02

## 2021-07-20 RX ADMIN — Medication 20 MILLIGRAM(S): at 12:15

## 2021-07-20 RX ADMIN — DICLOFENAC SODIUM 75 MILLIGRAM(S): 75 TABLET, DELAYED RELEASE ORAL at 17:52

## 2021-07-20 RX ADMIN — DICLOFENAC SODIUM 75 MILLIGRAM(S): 75 TABLET, DELAYED RELEASE ORAL at 17:32

## 2021-07-20 RX ADMIN — SERTRALINE 50 MILLIGRAM(S): 25 TABLET, FILM COATED ORAL at 11:07

## 2021-07-20 RX ADMIN — Medication 650 MILLIGRAM(S): at 08:26

## 2021-07-20 RX ADMIN — Medication 1.5 GRAM(S): at 17:31

## 2021-07-20 RX ADMIN — TAMSULOSIN HYDROCHLORIDE 0.4 MILLIGRAM(S): 0.4 CAPSULE ORAL at 21:27

## 2021-07-20 RX ADMIN — POLYETHYLENE GLYCOL 3350 17 GRAM(S): 17 POWDER, FOR SOLUTION ORAL at 04:31

## 2021-07-20 RX ADMIN — PANTOPRAZOLE SODIUM 40 MILLIGRAM(S): 20 TABLET, DELAYED RELEASE ORAL at 05:02

## 2021-07-20 RX ADMIN — Medication 650 MILLIGRAM(S): at 01:17

## 2021-07-20 RX ADMIN — Medication 5 MILLIGRAM(S): at 00:46

## 2021-07-20 RX ADMIN — HEPARIN SODIUM 5000 UNIT(S): 5000 INJECTION INTRAVENOUS; SUBCUTANEOUS at 17:32

## 2021-07-20 RX ADMIN — Medication 650 MILLIGRAM(S): at 00:22

## 2021-07-20 RX ADMIN — Medication 650 MILLIGRAM(S): at 10:00

## 2021-07-20 RX ADMIN — Medication 20 MILLIGRAM(S): at 05:02

## 2021-07-20 RX ADMIN — Medication 5 MILLIGRAM(S): at 17:32

## 2021-07-20 NOTE — CONSULT NOTE ADULT - ASSESSMENT
ECHO 9/22/16: normal LV function    a/p     61 year old male with pmhx DVT, MS, RA, UC, BPH(following PSA yearly), recurrent UTI, acid reflux who presents with LLE swelling for the last two days.     #Acute CHF, unknown EF   -pt. presenting with b/l le edema improved with IV lasix  -no CP/SOB/PEOPLES   -EKG with no acute ischemia  -c/w lasix 20mg IVP daily   -strict I/IOs , daily weights  -check echo , further w/u pending echo results  -bp mildly elevated, continue to monitor for now, will consider low dose BB if needed.     # MS, hx  -neuro eval pending    dvt ppx      ECHO 9/22/16: normal LV function    a/p     61 year old male with pmhx DVT, MS, RA, UC, BPH(following PSA yearly), recurrent UTI, acid reflux who presents with LLE swelling for the last two days.     #Acute CHF, unknown EF   -pt. presenting with b/l le edema   -slightly improved with IV lasix, however pt. reports no increase in UO   -no CP/SOB/PEOPLES   -EKG with no acute ischemia  -give extra lasix 20mg IVP now, increase lasix to 40mg daily   -strict I/IOs , daily weights  -check echo , further w/u pending echo results  -bp mildly elevated, continue to monitor for now, will consider low dose BB if needed.     # MS, hx  -neuro eval pending    dvt ppx

## 2021-07-20 NOTE — CONSULT NOTE ADULT - TIME BILLING
Patient seen and examined, agree with the above assessment and plan by [facility] Stallings.  61 year old male with pmhx DVT, MS, RA, UC, BPH(following PSA yearly), recurrent UTI, acid reflux who presents with LLE swelling for the last two days.     #Acute CHF, unknown EF   -pt. presenting with b/l le edema   -slightly improved with IV lasix, however pt. reports no increase in UO   -no CP/SOB/PEOPLES   -EKG with no acute ischemia  -give extra lasix 20mg IVP now, increase lasix to 40mg daily   -strict I/IOs , daily weights  -check echo , further w/u pending echo results  -bp mildly elevated, continue to monitor for now, will consider low dose BB if needed.     # MS, hx  -neuro eval pending    dvt ppx

## 2021-07-20 NOTE — CONSULT NOTE ADULT - SUBJECTIVE AND OBJECTIVE BOX
CARDIOLOGY CONSULT - Dr. Dennis     CHIEF COMPLAINT: LE swelling.     HPI: 61 year old male with pmhx DVT, MS, RA, UC, BPH(following PSA yearly), recurrent UTI, acid reflux who presents with LLE swelling for the last two days. Patient states he has had long term RLE swelling as well but is worried since the swelling is now B/L. Patient states compression socks make the swelling better in the past. Patient denies pain but is TTP in his swollen feet. Patient states the swelling is worse at night. Patient states he is weak on his right side with right foot drop due to MS. Ambulates with a walker. Reports unprovoked DVT in RLE in 2016, short course of AC that has since been d/c. Patient denies CP, SOB, palpitations, fevers, recent illness, N/V/D, recent travel, sick contacts, injury to lower extremities. Patient denies heart disease, strokes, HTN, DM2. Patient admits to smoking in the past but has not smoked in the past 10 years.    In The ED : had venous doppler : neg for DVT    pt. seen and examined, started on IV lasix with reported improvement. Patient denies any chest pain, dyspnea, palpitations, cough, syncope, edema, exertional symptoms, nausea, abdominal pain, fever, chills,  or rash.  Denies any history of CAD, MI, valve disease, cardiomyopathy, or congenital heart disease. NO recent cardiac testing.       PAST MEDICAL & SURGICAL HISTORY:  Colitis    BPH (benign prostatic hypertrophy)    GERD (gastroesophageal reflux disease)    Seizures    Multiple sclerosis    No significant past surgical history            PREVIOUS DIAGNOSTIC TESTING:    [ ] Echocardiogram: < from: Transthoracic Echocardiogram (09.22.16 @ 12:10) >  ------------------------------------------------------------------------  Conclusions:  1. Normal left ventricular internal dimensions and wall  thicknesses.  2. Normal left ventricular systolic function. No segmental  wall motion abnormalities.  3. Normal right ventricular size and function.  4. Normal tricuspid valve. Mild tricuspid regurgitation.  *** No previous Echo exam.    < end of copied text >    [ ]  Catheterization:   [ ] Stress Test:  	    MEDICATIONS:  HOME MEDICATIONS:  acetaminophen 325 mg oral tablet: 2 tab(s) orally every 6 hours, As needed, Mild Pain (1 - 3) (18 Jul 2019 09:07)  Apriso 0.375 g oral capsule, extended release: 4 cap(s) orally once a day (in the morning) (18 Jul 2019 09:07)  Cranberry oral capsule:  (18 Jul 2019 09:07)  diclofenac sodium 75 mg oral delayed release tablet: 1 tab(s) orally once a day (18 Jul 2019 09:07)  docusate sodium 100 mg oral capsule: 1 cap(s) orally 2 times a day (18 Jul 2019 09:07)  multivitamin: 1 tab(s) orally once a day (18 Jul 2019 09:07)  PHENobarbital 32.4 mg oral tablet: 1 tab(s) orally once a day (at bedtime) (18 Jul 2019 09:07)  RABEprazole 20 mg oral delayed release tablet: 1 tab(s) orally once a day (18 Jul 2019 09:07)  senna oral tablet: 2 tab(s) orally once a day (at bedtime) (18 Jul 2019 09:07)  sertraline 50 mg oral tablet: 1 tab(s) orally once a day (18 Jul 2019 09:07)  tamsulosin 0.4 mg oral capsule: 1 cap(s) orally once a day (at bedtime) (18 Jul 2019 09:07)  Vitamin B12 1000 mcg oral tablet: 1 tab(s) orally once a day (18 Jul 2019 09:07)  Vitamin C 500 mg oral tablet: 1 tab(s) orally once a day (18 Jul 2019 09:07)  Vitamin D3 5000 intl units oral capsule: 1 cap(s) orally once a day (18 Jul 2019 09:07)      MEDICATIONS  (STANDING):  baclofen 5 milliGRAM(s) Oral at bedtime  furosemide   Injectable 20 milliGRAM(s) IV Push daily  heparin   Injectable 5000 Unit(s) SubCutaneous every 12 hours  pantoprazole    Tablet 40 milliGRAM(s) Oral before breakfast  senna 2 Tablet(s) Oral at bedtime  sertraline 50 milliGRAM(s) Oral daily  tamsulosin 0.4 milliGRAM(s) Oral at bedtime          FAMILY HISTORY:  No pertinent family history in first degree relatives        SOCIAL HISTORY:    [x ] Non-smoker  [ ] Smoker  [ ] Alcohol    Allergies    No Known Allergies    Intolerances    	    REVIEW OF SYSTEMS:  CONSTITUTIONAL: No fever, weight loss, or fatigue  EYES: No eye pain, visual disturbances, or discharge  ENMT:  No difficulty hearing, tinnitus, vertigo; No sinus or throat pain  NECK: No pain or stiffness  RESPIRATORY: No cough, wheezing, chills or hemoptysis; No Shortness of Breath  CARDIOVASCULAR: No chest pain, palpitations, passing out, dizziness, + leg swelling  GASTROINTESTINAL: No abdominal or epigastric pain. No nausea, vomiting, or hematemesis; No diarrhea or constipation. No melena or hematochezia.  GENITOURINARY: No dysuria, frequency, hematuria, or incontinence  NEUROLOGICAL: No headaches, memory loss, loss of strength, numbness, or tremors  SKIN: No itching, burning, rashes, or lesions   	     x] All others negative	  [ ] Unable to obtain    PHYSICAL EXAM:  T(C): 36.7 (07-20-21 @ 04:34), Max: 36.9 (07-19-21 @ 23:22)  HR: 80 (07-20-21 @ 04:34) (79 - 89)  BP: 144/87 (07-20-21 @ 04:34) (141/98 - 162/107)  RR: 18 (07-20-21 @ 04:34) (16 - 18)  SpO2: 95% (07-20-21 @ 04:34) (95% - 98%)  Wt(kg): --  I&O's Summary    19 Jul 2021 07:01  -  20 Jul 2021 07:00  --------------------------------------------------------  IN: 480 mL / OUT: 925 mL / NET: -445 mL    20 Jul 2021 07:01  -  20 Jul 2021 10:53  --------------------------------------------------------  IN: 0 mL / OUT: 300 mL / NET: -300 mL        Appearance: Normal	  Psychiatry: A & O x 3, Mood & affect appropriate  HEENT:   Normal oral mucosa, PERRL, EOMI	  Lymphatic: No lymphadenopathy  Cardiovascular: Normal S1 S2,RRR, No JVD, No murmurs  Respiratory: Lungs clear to auscultation	  Gastrointestinal:  Soft, Non-tender, + BS	  Skin: No rashes, No ecchymoses, No cyanosis	  Neurologic: Non-focal  Extremities: Normal range of motion, No clubbing, b/l le edema   Vascular: Peripheral pulses palpable 2+ bilaterally    TELEMETRY:     ECG:  	NSR 81 bpm   RADIOLOGY: < from: Xray Chest 1 View AP/PA (07.19.21 @ 12:41) >  IMPRESSION:    The heart is normal in size. Lungs are clear. No pleural effusion. No pneumothorax. No acute bony pathology could be identified.    --- End of Report ---    < end of copied text >    OTHER: 	  	  LABS:	 	    CARDIAC MARKERS:                                  14.8   6.99  )-----------( 206      ( 20 Jul 2021 06:27 )             44.0     07-20    140  |  103  |  25<H>  ----------------------------<  125<H>  3.6   |  27  |  0.75    Ca    8.9      20 Jul 2021 06:27    TPro  5.9<L>  /  Alb  3.5  /  TBili  0.3  /  DBili  x   /  AST  19  /  ALT  21  /  AlkPhos  99  07-20      proBNP: Serum Pro-Brain Natriuretic Peptide: 58 pg/mL (07-19 @ 11:57)    Lipid Profile:   HgA1c:   TSH:    CARDIOLOGY CONSULT - Dr. Dennis     CHIEF COMPLAINT: LE swelling.     HPI: 61 year old male with pmhx DVT, MS, RA, UC, BPH(following PSA yearly), recurrent UTI, acid reflux who presents with LLE swelling for the last two days. Patient states he has had long term RLE swelling as well but is worried since the swelling is now B/L. Patient states compression socks make the swelling better in the past. Patient denies pain but is TTP in his swollen feet. Patient states the swelling is worse at night. Patient states he is weak on his right side with right foot drop due to MS. Ambulates with a walker. Reports unprovoked DVT in RLE in 2016, short course of AC that has since been d/c. Patient denies CP, SOB, palpitations, fevers, recent illness, N/V/D, recent travel, sick contacts, injury to lower extremities. Patient denies heart disease, strokes, HTN, DM2. Patient admits to smoking in the past but has not smoked in the past 10 years.    In The ED : had venous doppler : neg for DVT    pt. seen and examined, started on IV lasix 20mg IVP daily, pt. states that swelling mildly improved, but does not report increased UO w diuresis. Patient denies any chest pain, dyspnea, palpitations, cough, syncope, edema, exertional symptoms, nausea, abdominal pain, fever, chills,  or rash.  Denies any history of CAD, MI, valve disease, cardiomyopathy, or congenital heart disease. NO recent cardiac testing.       PAST MEDICAL & SURGICAL HISTORY:  Colitis    BPH (benign prostatic hypertrophy)    GERD (gastroesophageal reflux disease)    Seizures    Multiple sclerosis    No significant past surgical history            PREVIOUS DIAGNOSTIC TESTING:    [ ] Echocardiogram: < from: Transthoracic Echocardiogram (09.22.16 @ 12:10) >  ------------------------------------------------------------------------  Conclusions:  1. Normal left ventricular internal dimensions and wall  thicknesses.  2. Normal left ventricular systolic function. No segmental  wall motion abnormalities.  3. Normal right ventricular size and function.  4. Normal tricuspid valve. Mild tricuspid regurgitation.  *** No previous Echo exam.    < end of copied text >    [ ]  Catheterization:   [ ] Stress Test:  	    MEDICATIONS:  HOME MEDICATIONS:  acetaminophen 325 mg oral tablet: 2 tab(s) orally every 6 hours, As needed, Mild Pain (1 - 3) (18 Jul 2019 09:07)  Apriso 0.375 g oral capsule, extended release: 4 cap(s) orally once a day (in the morning) (18 Jul 2019 09:07)  Cranberry oral capsule:  (18 Jul 2019 09:07)  diclofenac sodium 75 mg oral delayed release tablet: 1 tab(s) orally once a day (18 Jul 2019 09:07)  docusate sodium 100 mg oral capsule: 1 cap(s) orally 2 times a day (18 Jul 2019 09:07)  multivitamin: 1 tab(s) orally once a day (18 Jul 2019 09:07)  PHENobarbital 32.4 mg oral tablet: 1 tab(s) orally once a day (at bedtime) (18 Jul 2019 09:07)  RABEprazole 20 mg oral delayed release tablet: 1 tab(s) orally once a day (18 Jul 2019 09:07)  senna oral tablet: 2 tab(s) orally once a day (at bedtime) (18 Jul 2019 09:07)  sertraline 50 mg oral tablet: 1 tab(s) orally once a day (18 Jul 2019 09:07)  tamsulosin 0.4 mg oral capsule: 1 cap(s) orally once a day (at bedtime) (18 Jul 2019 09:07)  Vitamin B12 1000 mcg oral tablet: 1 tab(s) orally once a day (18 Jul 2019 09:07)  Vitamin C 500 mg oral tablet: 1 tab(s) orally once a day (18 Jul 2019 09:07)  Vitamin D3 5000 intl units oral capsule: 1 cap(s) orally once a day (18 Jul 2019 09:07)      MEDICATIONS  (STANDING):  baclofen 5 milliGRAM(s) Oral at bedtime  furosemide   Injectable 20 milliGRAM(s) IV Push daily  heparin   Injectable 5000 Unit(s) SubCutaneous every 12 hours  pantoprazole    Tablet 40 milliGRAM(s) Oral before breakfast  senna 2 Tablet(s) Oral at bedtime  sertraline 50 milliGRAM(s) Oral daily  tamsulosin 0.4 milliGRAM(s) Oral at bedtime          FAMILY HISTORY:  No pertinent family history in first degree relatives        SOCIAL HISTORY:    [x ] Non-smoker  [ ] Smoker  [ ] Alcohol    Allergies    No Known Allergies    Intolerances    	    REVIEW OF SYSTEMS:  CONSTITUTIONAL: No fever, weight loss, or fatigue  EYES: No eye pain, visual disturbances, or discharge  ENMT:  No difficulty hearing, tinnitus, vertigo; No sinus or throat pain  NECK: No pain or stiffness  RESPIRATORY: No cough, wheezing, chills or hemoptysis; No Shortness of Breath  CARDIOVASCULAR: No chest pain, palpitations, passing out, dizziness, + leg swelling  GASTROINTESTINAL: No abdominal or epigastric pain. No nausea, vomiting, or hematemesis; No diarrhea or constipation. No melena or hematochezia.  GENITOURINARY: No dysuria, frequency, hematuria, or incontinence  NEUROLOGICAL: No headaches, memory loss, loss of strength, numbness, or tremors  SKIN: No itching, burning, rashes, or lesions   	     x] All others negative	  [ ] Unable to obtain    PHYSICAL EXAM:  T(C): 36.7 (07-20-21 @ 04:34), Max: 36.9 (07-19-21 @ 23:22)  HR: 80 (07-20-21 @ 04:34) (79 - 89)  BP: 144/87 (07-20-21 @ 04:34) (141/98 - 162/107)  RR: 18 (07-20-21 @ 04:34) (16 - 18)  SpO2: 95% (07-20-21 @ 04:34) (95% - 98%)  Wt(kg): --  I&O's Summary    19 Jul 2021 07:01  -  20 Jul 2021 07:00  --------------------------------------------------------  IN: 480 mL / OUT: 925 mL / NET: -445 mL    20 Jul 2021 07:01  -  20 Jul 2021 10:53  --------------------------------------------------------  IN: 0 mL / OUT: 300 mL / NET: -300 mL        Appearance: Normal	  Psychiatry: A & O x 3, Mood & affect appropriate  HEENT:   Normal oral mucosa, PERRL, EOMI	  Lymphatic: No lymphadenopathy  Cardiovascular: Normal S1 S2,RRR, No JVD, No murmurs  Respiratory: Lungs clear to auscultation	  Gastrointestinal:  Soft, Non-tender, + BS	  Skin: No rashes, No ecchymoses, No cyanosis	  Neurologic: Non-focal  Extremities: Normal range of motion, No clubbing, b/l le edema   Vascular: Peripheral pulses palpable 2+ bilaterally    TELEMETRY:     ECG:  	NSR 81 bpm   RADIOLOGY: < from: Xray Chest 1 View AP/PA (07.19.21 @ 12:41) >  IMPRESSION:    The heart is normal in size. Lungs are clear. No pleural effusion. No pneumothorax. No acute bony pathology could be identified.    --- End of Report ---    < end of copied text >    OTHER: 	  	  LABS:	 	    CARDIAC MARKERS:                                  14.8   6.99  )-----------( 206      ( 20 Jul 2021 06:27 )             44.0     07-20    140  |  103  |  25<H>  ----------------------------<  125<H>  3.6   |  27  |  0.75    Ca    8.9      20 Jul 2021 06:27    TPro  5.9<L>  /  Alb  3.5  /  TBili  0.3  /  DBili  x   /  AST  19  /  ALT  21  /  AlkPhos  99  07-20      proBNP: Serum Pro-Brain Natriuretic Peptide: 58 pg/mL (07-19 @ 11:57)    Lipid Profile:   HgA1c:   TSH:

## 2021-07-20 NOTE — PROGRESS NOTE ADULT - SUBJECTIVE AND OBJECTIVE BOX
Patient is a 61y old  Male who presents with a chief complaint of Leg swelling (20 Jul 2021 10:53)      INTERVAL HPI/OVERNIGHT EVENTS: seen and examined, leg edema is little better   neg DVt on doppler     T(C): 36.6 (07-20-21 @ 12:08), Max: 36.9 (07-19-21 @ 23:22)  HR: 71 (07-20-21 @ 12:08) (71 - 89)  BP: 142/86 (07-20-21 @ 12:08) (141/98 - 151/94)  RR: 18 (07-20-21 @ 12:08) (18 - 18)  SpO2: 96% (07-20-21 @ 12:08) (95% - 98%)  Wt(kg): --  I&O's Summary    19 Jul 2021 07:01  -  20 Jul 2021 07:00  --------------------------------------------------------  IN: 480 mL / OUT: 925 mL / NET: -445 mL    20 Jul 2021 07:01  -  20 Jul 2021 20:13  --------------------------------------------------------  IN: 1050 mL / OUT: 1000 mL / NET: 50 mL        PAST MEDICAL & SURGICAL HISTORY:  Colitis    BPH (benign prostatic hypertrophy)    GERD (gastroesophageal reflux disease)    Seizures    Multiple sclerosis    No significant past surgical history        SOCIAL HISTORY  Alcohol:  Tobacco:  Illicit substance use:    FAMILY HISTORY:    REVIEW OF SYSTEMS:  CONSTITUTIONAL: No fever, weight loss, or fatigue  EYES: No eye pain, visual disturbances, or discharge  ENMT:  No difficulty hearing, tinnitus, vertigo; No sinus or throat pain  NECK: No pain or stiffness  RESPIRATORY: No cough, wheezing, chills or hemoptysis; No shortness of breath  CARDIOVASCULAR: No chest pain, palpitations, dizziness, or leg swelling  GASTROINTESTINAL: No abdominal or epigastric pain. No nausea, vomiting, or hematemesis; No diarrhea or constipation. No melena or hematochezia.  GENITOURINARY: No dysuria, frequency, hematuria, or incontinence  NEUROLOGICAL: No headaches, memory loss, loss of strength, numbness, or tremors  SKIN: No itching, burning, rashes, or lesions   LYMPH NODES: No enlarged glands  ENDOCRINE: No heat or cold intolerance; No hair loss  MUSCULOSKELETAL: No joint pain or swelling; No muscle, back, or extremity pain  PSYCHIATRIC: No depression, anxiety, mood swings, or difficulty sleeping  HEME/LYMPH: No easy bruising, or bleeding gums  ALLERY AND IMMUNOLOGIC: No hives or eczema    RADIOLOGY & ADDITIONAL TESTS:    Imaging Personally Reviewed:  [ ] YES  [ ] NO    Consultant(s) Notes Reviewed:  [ ] YES  [ ] NO    PHYSICAL EXAM:  GENERAL: NAD, well-groomed, well-developed  HEAD:  Atraumatic, Normocephalic  EYES: EOMI, PERRLA, conjunctiva and sclera clear  ENMT: No tonsillar erythema, exudates, or enlargement; Moist mucous membranes, Good dentition, No lesions  NECK: Supple, No JVD, Normal thyroid  NERVOUS SYSTEM:  Alert & Oriented X3, Good concentration; Motor Strength 5/5 B/L upper and lower extremities; DTRs 2+ intact and symmetric  CHEST/LUNG: Clear to percussion bilaterally; No rales, rhonchi, wheezing, or rubs  HEART: Regular rate and rhythm; No murmurs, rubs, or gallops  ABDOMEN: Soft, Nontender, Nondistended; Bowel sounds present  EXTREMITIES:  2+ Peripheral Pulses, No clubbing, cyanosis, or edema  LYMPH: No lymphadenopathy noted  SKIN: No rashes or lesions    LABS:                        14.8   6.99  )-----------( 206      ( 20 Jul 2021 06:27 )             44.0     07-20    140  |  103  |  25<H>  ----------------------------<  125<H>  3.6   |  27  |  0.75    Ca    8.9      20 Jul 2021 06:27    TPro  5.9<L>  /  Alb  3.5  /  TBili  0.3  /  DBili  x   /  AST  19  /  ALT  21  /  AlkPhos  99  07-20        CAPILLARY BLOOD GLUCOSE                MEDICATIONS  (STANDING):  baclofen 5 milliGRAM(s) Oral at bedtime  diclofenac 75 milliGRAM(s) Oral two times a day  heparin   Injectable 5000 Unit(s) SubCutaneous every 12 hours  mesalamine ER (24-Hour) Capsule 1.5 Gram(s) Oral daily  oxybutynin 5 milliGRAM(s) Oral two times a day  pantoprazole    Tablet 40 milliGRAM(s) Oral before breakfast  senna 2 Tablet(s) Oral at bedtime  sertraline 50 milliGRAM(s) Oral daily  tamsulosin 0.4 milliGRAM(s) Oral at bedtime    MEDICATIONS  (PRN):      Care Discussed with Consultants/Other Providers [ ] YES  [ ] NO

## 2021-07-21 DIAGNOSIS — H61.20 IMPACTED CERUMEN, UNSPECIFIED EAR: ICD-10-CM

## 2021-07-21 LAB
ANION GAP SERPL CALC-SCNC: 11 MMOL/L — SIGNIFICANT CHANGE UP (ref 5–17)
BUN SERPL-MCNC: 24 MG/DL — HIGH (ref 7–23)
CALCIUM SERPL-MCNC: 9 MG/DL — SIGNIFICANT CHANGE UP (ref 8.4–10.5)
CHLORIDE SERPL-SCNC: 101 MMOL/L — SIGNIFICANT CHANGE UP (ref 96–108)
CO2 SERPL-SCNC: 27 MMOL/L — SIGNIFICANT CHANGE UP (ref 22–31)
CREAT SERPL-MCNC: 0.81 MG/DL — SIGNIFICANT CHANGE UP (ref 0.5–1.3)
GLUCOSE SERPL-MCNC: 88 MG/DL — SIGNIFICANT CHANGE UP (ref 70–99)
MAGNESIUM SERPL-MCNC: 2 MG/DL — SIGNIFICANT CHANGE UP (ref 1.6–2.6)
POTASSIUM SERPL-MCNC: 3.7 MMOL/L — SIGNIFICANT CHANGE UP (ref 3.5–5.3)
POTASSIUM SERPL-SCNC: 3.7 MMOL/L — SIGNIFICANT CHANGE UP (ref 3.5–5.3)
SODIUM SERPL-SCNC: 139 MMOL/L — SIGNIFICANT CHANGE UP (ref 135–145)

## 2021-07-21 PROCEDURE — 69210 REMOVE IMPACTED EAR WAX UNI: CPT

## 2021-07-21 PROCEDURE — 99222 1ST HOSP IP/OBS MODERATE 55: CPT | Mod: 25

## 2021-07-21 RX ORDER — FUROSEMIDE 40 MG
40 TABLET ORAL
Refills: 0 | Status: DISCONTINUED | OUTPATIENT
Start: 2021-07-21 | End: 2021-07-22

## 2021-07-21 RX ADMIN — Medication 1.5 GRAM(S): at 11:44

## 2021-07-21 RX ADMIN — DICLOFENAC SODIUM 75 MILLIGRAM(S): 75 TABLET, DELAYED RELEASE ORAL at 06:06

## 2021-07-21 RX ADMIN — TAMSULOSIN HYDROCHLORIDE 0.4 MILLIGRAM(S): 0.4 CAPSULE ORAL at 21:22

## 2021-07-21 RX ADMIN — DICLOFENAC SODIUM 75 MILLIGRAM(S): 75 TABLET, DELAYED RELEASE ORAL at 17:15

## 2021-07-21 RX ADMIN — SERTRALINE 50 MILLIGRAM(S): 25 TABLET, FILM COATED ORAL at 11:44

## 2021-07-21 RX ADMIN — Medication 5 MILLIGRAM(S): at 21:21

## 2021-07-21 RX ADMIN — Medication 40 MILLIGRAM(S): at 17:14

## 2021-07-21 RX ADMIN — HEPARIN SODIUM 5000 UNIT(S): 5000 INJECTION INTRAVENOUS; SUBCUTANEOUS at 17:14

## 2021-07-21 RX ADMIN — Medication 40 MILLIGRAM(S): at 05:39

## 2021-07-21 RX ADMIN — Medication 5 MILLIGRAM(S): at 17:15

## 2021-07-21 RX ADMIN — HEPARIN SODIUM 5000 UNIT(S): 5000 INJECTION INTRAVENOUS; SUBCUTANEOUS at 05:39

## 2021-07-21 RX ADMIN — DICLOFENAC SODIUM 75 MILLIGRAM(S): 75 TABLET, DELAYED RELEASE ORAL at 05:40

## 2021-07-21 RX ADMIN — PANTOPRAZOLE SODIUM 40 MILLIGRAM(S): 20 TABLET, DELAYED RELEASE ORAL at 05:39

## 2021-07-21 RX ADMIN — SENNA PLUS 2 TABLET(S): 8.6 TABLET ORAL at 21:22

## 2021-07-21 RX ADMIN — Medication 5 MILLIGRAM(S): at 05:39

## 2021-07-21 NOTE — CONSULT NOTE ADULT - ASSESSMENT
62yo male seen for b/l hearing loss. On exam, + b/l cerumen impaction noted. Large amount of cerumen removed from b/l ears, partial visualization of TMs. No evidence of infection. Pt states improvement of hearing bilaterally. Recommend debrox ear drops to b/l ears x 5 days and follow up outpatient.  60yo male seen for b/l hearing loss. On exam, + b/l cerumen impaction noted. Large amount of cerumen removed from b/l ears, partial visualization of TMs. No evidence of infection. Pt states improvement of hearing bilaterally.  follow up outpatient.

## 2021-07-21 NOTE — PROGRESS NOTE ADULT - SUBJECTIVE AND OBJECTIVE BOX
Patient is a 61y old  Male who presents with a chief complaint of Leg swelling (21 Jul 2021 14:03)      INTERVAL HPI/OVERNIGHT EVENTS: doing fair , no c/o   T(C): 36.6 (07-21-21 @ 12:25), Max: 36.7 (07-20-21 @ 21:51)  HR: 73 (07-21-21 @ 12:25) (73 - 85)  BP: 120/82 (07-21-21 @ 12:25) (120/82 - 134/89)  RR: 18 (07-21-21 @ 12:25) (18 - 18)  SpO2: 94% (07-21-21 @ 12:25) (94% - 95%)  Wt(kg): --  I&O's Summary    20 Jul 2021 07:01  -  21 Jul 2021 07:00  --------------------------------------------------------  IN: 1050 mL / OUT: 1950 mL / NET: -900 mL    21 Jul 2021 07:01  -  21 Jul 2021 15:02  --------------------------------------------------------  IN: 0 mL / OUT: 800 mL / NET: -800 mL        PAST MEDICAL & SURGICAL HISTORY:  Colitis    BPH (benign prostatic hypertrophy)    GERD (gastroesophageal reflux disease)    Seizures    Multiple sclerosis    No significant past surgical history        SOCIAL HISTORY  Alcohol:  Tobacco:  Illicit substance use:    FAMILY HISTORY:    REVIEW OF SYSTEMS:  CONSTITUTIONAL: No fever, weight loss, or fatigue  EYES: No eye pain, visual disturbances, or discharge  ENMT:  No difficulty hearing, tinnitus, vertigo; No sinus or throat pain  NECK: No pain or stiffness  RESPIRATORY: No cough, wheezing, chills or hemoptysis; No shortness of breath  CARDIOVASCULAR: No chest pain, palpitations, dizziness, or leg swelling  GASTROINTESTINAL: No abdominal or epigastric pain. No nausea, vomiting, or hematemesis; No diarrhea or constipation. No melena or hematochezia.  GENITOURINARY: No dysuria, frequency, hematuria, or incontinence  NEUROLOGICAL: No headaches, memory loss, loss of strength, numbness, or tremors  SKIN: No itching, burning, rashes, or lesions   LYMPH NODES: No enlarged glands  ENDOCRINE: No heat or cold intolerance; No hair loss  MUSCULOSKELETAL: No joint pain or swelling; No muscle, back, or extremity pain  PSYCHIATRIC: No depression, anxiety, mood swings, or difficulty sleeping  HEME/LYMPH: No easy bruising, or bleeding gums  ALLERY AND IMMUNOLOGIC: No hives or eczema    RADIOLOGY & ADDITIONAL TESTS:    Imaging Personally Reviewed:  [ ] YES  [ ] NO    Consultant(s) Notes Reviewed:  [ ] YES  [ ] NO    PHYSICAL EXAM:  GENERAL: NAD, well-groomed, well-developed  HEAD:  Atraumatic, Normocephalic  EYES: EOMI, PERRLA, conjunctiva and sclera clear  ENMT: No tonsillar erythema, exudates, or enlargement; Moist mucous membranes, Good dentition, No lesions  NECK: Supple, No JVD, Normal thyroid  NERVOUS SYSTEM:  Alert & Oriented X3, Good concentration; Motor Strength 5/5 B/L upper and lower extremities; DTRs 2+ intact and symmetric  CHEST/LUNG: Clear to percussion bilaterally; No rales, rhonchi, wheezing, or rubs  HEART: Regular rate and rhythm; No murmurs, rubs, or gallops  ABDOMEN: Soft, Nontender, Nondistended; Bowel sounds present  EXTREMITIES:  2+ Peripheral Pulses, No clubbing, cyanosis, or edema  LYMPH: No lymphadenopathy noted  SKIN: No rashes or lesions    LABS:                        14.8   6.99  )-----------( 206      ( 20 Jul 2021 06:27 )             44.0     07-21    139  |  101  |  24<H>  ----------------------------<  88  3.7   |  27  |  0.81    Ca    9.0      21 Jul 2021 06:16  Mg     2.0     07-21    TPro  5.9<L>  /  Alb  3.5  /  TBili  0.3  /  DBili  x   /  AST  19  /  ALT  21  /  AlkPhos  99  07-20        CAPILLARY BLOOD GLUCOSE                MEDICATIONS  (STANDING):  baclofen 5 milliGRAM(s) Oral at bedtime  diclofenac 75 milliGRAM(s) Oral two times a day  furosemide   Injectable 40 milliGRAM(s) IV Push daily  heparin   Injectable 5000 Unit(s) SubCutaneous every 12 hours  mesalamine ER (24-Hour) Capsule 1.5 Gram(s) Oral daily  oxybutynin 5 milliGRAM(s) Oral two times a day  pantoprazole    Tablet 40 milliGRAM(s) Oral before breakfast  senna 2 Tablet(s) Oral at bedtime  sertraline 50 milliGRAM(s) Oral daily  tamsulosin 0.4 milliGRAM(s) Oral at bedtime    MEDICATIONS  (PRN):      Care Discussed with Consultants/Other Providers [ ] YES  [ ] NO

## 2021-07-21 NOTE — PHYSICAL THERAPY INITIAL EVALUATION ADULT - ADDITIONAL COMMENTS
Pt lives with his family in a house with 1 steps to enter. Pt with 1st floor setup. Pt states he uses a RW for ambulation indoors and wheelchair outdoors. Pt states his family is available to assist when needed. +eyeglasses.

## 2021-07-21 NOTE — CONSULT NOTE ADULT - SUBJECTIVE AND OBJECTIVE BOX
CC: b/l hearing loss    HPI: 60yo male with PMHx DVT, MS, RA, UC, BPH(following PSA yearly), recurrent UTI, acid reflux, admitted for lower extremity edema. ENT called to evaluate for bilateral hearing loss. Pt states his ears have been bothering him for the past month. He c/o hearing loss and intermittent ear pain bilaterally. He denies fever, chills, n/v, HA, tinnitus, ear discharge, vertigo.       PAST MEDICAL & SURGICAL HISTORY:  Colitis    BPH (benign prostatic hypertrophy)    GERD (gastroesophageal reflux disease)    Seizures    Multiple sclerosis    No significant past surgical history      Allergies    No Known Allergies    Intolerances      MEDICATIONS  (STANDING):  baclofen 5 milliGRAM(s) Oral at bedtime  diclofenac 75 milliGRAM(s) Oral two times a day  furosemide   Injectable 40 milliGRAM(s) IV Push daily  heparin   Injectable 5000 Unit(s) SubCutaneous every 12 hours  mesalamine ER (24-Hour) Capsule 1.5 Gram(s) Oral daily  oxybutynin 5 milliGRAM(s) Oral two times a day  pantoprazole    Tablet 40 milliGRAM(s) Oral before breakfast  senna 2 Tablet(s) Oral at bedtime  sertraline 50 milliGRAM(s) Oral daily  tamsulosin 0.4 milliGRAM(s) Oral at bedtime    MEDICATIONS  (PRN):      Social History: Pt denies tobacco use     Family history: Pt denies any significant family history     ROS:   ENT: all negative except as noted in HPI   CV: denies palpitations  Pulm: denies SOB, cough, hemoptysis  GI: denies change in apetite, indigestion, n/v  : denies pertinent urinary symptoms, urgency  Neuro: denies numbness/tingling, loss of sensation  Psych: denies anxiety  MS: denies muscle weakness, instability  Heme: denies easy bruising or bleeding  Endo: denies heat/cold intolerance, excessive sweating  Vascular: denies LE edema    Vital Signs Last 24 Hrs  T(C): 36.6 (21 Jul 2021 12:25), Max: 36.7 (20 Jul 2021 21:51)  T(F): 97.8 (21 Jul 2021 12:25), Max: 98 (20 Jul 2021 21:51)  HR: 73 (21 Jul 2021 12:25) (73 - 85)  BP: 120/82 (21 Jul 2021 12:25) (120/82 - 134/89)  BP(mean): --  RR: 18 (21 Jul 2021 12:25) (18 - 18)  SpO2: 94% (21 Jul 2021 12:25) (94% - 95%)                          14.8   6.99  )-----------( 206      ( 20 Jul 2021 06:27 )             44.0    07-21    139  |  101  |  24<H>  ----------------------------<  88  3.7   |  27  |  0.81    Ca    9.0      21 Jul 2021 06:16  Mg     2.0     07-21    TPro  5.9<L>  /  Alb  3.5  /  TBili  0.3  /  DBili  x   /  AST  19  /  ALT  21  /  AlkPhos  99  07-20       PHYSICAL EXAM:  Gen: NAD  Skin: No rashes, bruises, or lesions  Head: Normocephalic, Atraumatic  Face: no edema, erythema, or fluctuance. Parotid glands soft without mass  Eyes: no scleral injection  Ears: Right: + cerumen impaction. Large amount of cerumen removed. Partial visualization of TM, no erythema. No evidence of any fluid drainage. No mastoid tenderness, erythema, or ear bulging            Left: + cerumen impaction. Large amount of cerumen removed. Partial visualization of TM, no erythema. No evidence of any fluid drainage. No mastoid tenderness, erythema, or ear bulging  Nose: Nares bilaterally patent, no discharge  Mouth: No Stridor / Drooling / Trismus.  Mucosa moist, tongue/uvula midline, oropharynx clear  Neck: Flat, supple, no lymphadenopathy, trachea midline, no masses  Lymphatic: No lymphadenopathy  Resp: breathing easily, no stridor  CV: no peripheral edema/cyanosis  GI: nondistended   Peripheral vascular: no JVD or edema  Neuro: facial nerve intact, no facial droop

## 2021-07-21 NOTE — CONSULT NOTE ADULT - PROBLEM SELECTOR RECOMMENDATION 9
- debrox ear drops 4 drops to b/l ears bid x 5 days  - Follow up outpatient with Dr. Velasquez/Malorie/Wisam [phone]   - call with questions/concerns [phone]

## 2021-07-21 NOTE — PROGRESS NOTE ADULT - SUBJECTIVE AND OBJECTIVE BOX
CARDIOLOGY FOLLOW UP - Dr. Dennis  Date of Service: 7/21/21  CC: le edema improving, no cp/sob     Review of Systems:  Constitutional: No fever, weight loss, or fatigue  Respiratory: No cough, wheezing, or hemoptysis, no shortness of breath  Cardiovascular: No chest pain, palpitations, passing out, dizziness, +leg swelling  Gastrointestinal: No abd or epigastric pain.  No nausea, vomiting, or hematemesis; no diarrhea or constipation, no melena or hematochezia  Vascular: no edema       PHYSICAL EXAM:  T(C): 36.6 (07-21-21 @ 12:25), Max: 36.7 (07-20-21 @ 21:51)  HR: 73 (07-21-21 @ 12:25) (73 - 85)  BP: 120/82 (07-21-21 @ 12:25) (120/82 - 134/89)  RR: 18 (07-21-21 @ 12:25) (18 - 18)  SpO2: 94% (07-21-21 @ 12:25) (94% - 95%)  Wt(kg): --  I&O's Summary    20 Jul 2021 07:01  -  21 Jul 2021 07:00  --------------------------------------------------------  IN: 1050 mL / OUT: 1950 mL / NET: -900 mL    21 Jul 2021 07:01  -  21 Jul 2021 14:05  --------------------------------------------------------  IN: 0 mL / OUT: 600 mL / NET: -600 mL        Appearance: Normal	  Cardiovascular: Normal S1 S2,RRR, No JVD, No murmurs  Respiratory: Lungs clear to auscultation	  Gastrointestinal:  Soft, Non-tender, + BS	  Extremities: Normal range of motion, No clubbing, cyanosis + le edema      Home Medications:  acetaminophen 325 mg oral tablet: 2 tab(s) orally every 6 hours, As needed, Mild Pain (1 - 3) (18 Jul 2019 09:07)  Apriso 0.375 g oral capsule, extended release: 4 cap(s) orally once a day (in the morning) (18 Jul 2019 09:07)  Cranberry oral capsule:  (18 Jul 2019 09:07)  diclofenac sodium 75 mg oral delayed release tablet: 1 tab(s) orally once a day (18 Jul 2019 09:07)  docusate sodium 100 mg oral capsule: 1 cap(s) orally 2 times a day (18 Jul 2019 09:07)  mesalamine 0.375 g oral capsule, extended release: 4 cap(s) orally once a day (in the morning) (20 Jul 2021 13:52)  multivitamin: 1 tab(s) orally once a day (18 Jul 2019 09:07)  PHENobarbital 32.4 mg oral tablet: 1 tab(s) orally once a day (at bedtime) (18 Jul 2019 09:07)  RABEprazole 20 mg oral delayed release tablet: 1 tab(s) orally once a day (18 Jul 2019 09:07)  senna oral tablet: 2 tab(s) orally once a day (at bedtime) (18 Jul 2019 09:07)  sertraline 50 mg oral tablet: 1 tab(s) orally once a day (18 Jul 2019 09:07)  tamsulosin 0.4 mg oral capsule: 1 cap(s) orally once a day (at bedtime) (18 Jul 2019 09:07)  Vitamin B12 1000 mcg oral tablet: 1 tab(s) orally once a day (18 Jul 2019 09:07)  Vitamin C 500 mg oral tablet: 1 tab(s) orally once a day (18 Jul 2019 09:07)  Vitamin D3 5000 intl units oral capsule: 1 cap(s) orally once a day (18 Jul 2019 09:07)      MEDICATIONS  (STANDING):  baclofen 5 milliGRAM(s) Oral at bedtime  diclofenac 75 milliGRAM(s) Oral two times a day  furosemide   Injectable 40 milliGRAM(s) IV Push daily  heparin   Injectable 5000 Unit(s) SubCutaneous every 12 hours  mesalamine ER (24-Hour) Capsule 1.5 Gram(s) Oral daily  oxybutynin 5 milliGRAM(s) Oral two times a day  pantoprazole    Tablet 40 milliGRAM(s) Oral before breakfast  senna 2 Tablet(s) Oral at bedtime  sertraline 50 milliGRAM(s) Oral daily  tamsulosin 0.4 milliGRAM(s) Oral at bedtime      TELEMETRY: 	    ECG:  	  RADIOLOGY:   DIAGNOSTIC TESTING:  [ x] Echocardiogram:   < from: TTE with Doppler (w/Cont) (07.20.21 @ 23:02) >  Dimensions:    Normal Values:  LA:     3.5    2.0 - 4.0 cm  Ao:     3.5    2.0 - 3.8 cm  SEPTUM: 0.8    0.6 - 1.2 cm  PWT:    0.9    0.6 - 1.1 cm  LVIDd:  3.9    3.0 - 5.6 cm  LVIDs:  2.3    1.8 - 4.0 cm  Derived variables:  LVMI: 45 g/m2  RWT: 0.47  EF (Visual Estimate): 70 %  Doppler Peak Velocity (m/sec): AoV=1.0  ------------------------------------------------------------------------  Observations:  Mitral Valve:Normal mitral valve.  Aortic Valve/Aorta: Normal aortic valve.  Normal aortic root.  Left Atrium: Normal left atrium.  Left Ventricle: Endocardial visualization enhanced with  intravenous injection of Ultrasonic Enhancing Agent  (Definity).  Normal left ventricular internal dimensions and wall  thicknesses.  Normal left ventricular systolic function. No segmental  wall motion abnormalities.  Right Heart: Normal right atrium. Normal right ventricular  size and systolic function.  Normal tricuspid valve. Normal pulmonic valve.  Pericardium/Pleura: Pericardial fat pad noted.  Hemodynamic: Estimated right atrial pressure is normal.  No evidence of pulmonary hypertension.  ------------------------------------------------------------------------  Conclusions:  Endocardial visualization enhanced with intravenous  injection of Ultrasonic Enhancing Agent (Definity).  Normal left ventricular systolic function. No segmental  wall motion abnormalities.  ------------------------------------------------------------------------    < end of copied text >    [ ]  Catheterization:  [ ] Stress Test:    OTHER: 	    LABS:	 	    Troponin T, High Sensitivity Result: 12 ng/L [0 - 51] (07-19 @ 14:39)  Troponin T, High Sensitivity Result: 16 ng/L [0 - 51] (07-19 @ 11:57)                          14.8   6.99  )-----------( 206 ( 20 Jul 2021 06:27 )             44.0     07-21    139  |  101  |  24<H>  ----------------------------<  88  3.7   |  27  |  0.81    Ca    9.0      21 Jul 2021 06:16  Mg     2.0     07-21    TPro  5.9<L>  /  Alb  3.5  /  TBili  0.3  /  DBili  x   /  AST  19  /  ALT  21  /  AlkPhos  99  07-20

## 2021-07-21 NOTE — CONSULT NOTE ADULT - ATTENDING COMMENTS
Hearing loss like b/l CHL improved with cerumen removal. if any sx persists can follow up for formal audiogram

## 2021-07-21 NOTE — PHYSICAL THERAPY INITIAL EVALUATION ADULT - PLANNED THERAPY INTERVENTIONS, PT EVAL
GOAL: Stair Negotiation Training: Patient will be able to negotiate up & down 1 step step to gait pattern, in 4 weeks./balance training/bed mobility training/gait training/strengthening/transfer training

## 2021-07-21 NOTE — PHYSICAL THERAPY INITIAL EVALUATION ADULT - PERTINENT HX OF CURRENT PROBLEM, REHAB EVAL
Pt is a 62 yo male hx of Multiple Sclerosis since 2014, UC, BPH, seizure d/o, DVT not on AC, RA, prior UTI presents with B/L LE swelling x 2 days. Pt has chronic RLE edema and now with new onset LLE edema. As per daughter pt was on lasix which was stopped prior. Denies any chest pain, palp, SOB, orthopnea. No recent travel, fall, f/c. Pt has R side weakness due to MS. (-) [facility] Duplex BLE Vein Scan 7/19/21. (-) CXR 7/19/21. (-) TTE with doppler 7/20/21.

## 2021-07-22 ENCOUNTER — TRANSCRIPTION ENCOUNTER (OUTPATIENT)
Age: 61
End: 2021-07-22

## 2021-07-22 RX ORDER — OXYBUTYNIN CHLORIDE 5 MG
1 TABLET ORAL
Qty: 0 | Refills: 0 | DISCHARGE
Start: 2021-07-22

## 2021-07-22 RX ORDER — FUROSEMIDE 40 MG
20 TABLET ORAL DAILY
Refills: 0 | Status: DISCONTINUED | OUTPATIENT
Start: 2021-07-22 | End: 2021-07-23

## 2021-07-22 RX ORDER — CARBAMIDE PEROXIDE 81.86 MG/ML
4 SOLUTION/ DROPS AURICULAR (OTIC) EVERY 12 HOURS
Refills: 0 | Status: DISCONTINUED | OUTPATIENT
Start: 2021-07-22 | End: 2021-07-23

## 2021-07-22 RX ORDER — FUROSEMIDE 40 MG
1 TABLET ORAL
Qty: 0 | Refills: 0 | DISCHARGE
Start: 2021-07-22

## 2021-07-22 RX ORDER — BACLOFEN 100 %
1 POWDER (GRAM) MISCELLANEOUS
Qty: 0 | Refills: 0 | DISCHARGE
Start: 2021-07-22

## 2021-07-22 RX ADMIN — HEPARIN SODIUM 5000 UNIT(S): 5000 INJECTION INTRAVENOUS; SUBCUTANEOUS at 05:49

## 2021-07-22 RX ADMIN — DICLOFENAC SODIUM 75 MILLIGRAM(S): 75 TABLET, DELAYED RELEASE ORAL at 17:30

## 2021-07-22 RX ADMIN — TAMSULOSIN HYDROCHLORIDE 0.4 MILLIGRAM(S): 0.4 CAPSULE ORAL at 21:29

## 2021-07-22 RX ADMIN — SERTRALINE 50 MILLIGRAM(S): 25 TABLET, FILM COATED ORAL at 12:24

## 2021-07-22 RX ADMIN — Medication 5 MILLIGRAM(S): at 05:49

## 2021-07-22 RX ADMIN — Medication 1.5 GRAM(S): at 12:24

## 2021-07-22 RX ADMIN — Medication 5 MILLIGRAM(S): at 17:29

## 2021-07-22 RX ADMIN — DICLOFENAC SODIUM 75 MILLIGRAM(S): 75 TABLET, DELAYED RELEASE ORAL at 05:49

## 2021-07-22 RX ADMIN — HEPARIN SODIUM 5000 UNIT(S): 5000 INJECTION INTRAVENOUS; SUBCUTANEOUS at 17:32

## 2021-07-22 RX ADMIN — PANTOPRAZOLE SODIUM 40 MILLIGRAM(S): 20 TABLET, DELAYED RELEASE ORAL at 05:49

## 2021-07-22 RX ADMIN — Medication 20 MILLIGRAM(S): at 17:30

## 2021-07-22 RX ADMIN — DICLOFENAC SODIUM 75 MILLIGRAM(S): 75 TABLET, DELAYED RELEASE ORAL at 18:29

## 2021-07-22 RX ADMIN — DICLOFENAC SODIUM 75 MILLIGRAM(S): 75 TABLET, DELAYED RELEASE ORAL at 06:24

## 2021-07-22 RX ADMIN — Medication 5 MILLIGRAM(S): at 21:29

## 2021-07-22 RX ADMIN — Medication 40 MILLIGRAM(S): at 05:49

## 2021-07-22 NOTE — PROGRESS NOTE ADULT - SUBJECTIVE AND OBJECTIVE BOX
Patient is a 61y old  Male who presents with a chief complaint of Leg swelling (22 Jul 2021 18:14)      INTERVAL HPI/OVERNIGHT EVENTS:  T(C): 37.2 (07-22-21 @ 21:57), Max: 37.2 (07-22-21 @ 21:57)  HR: 75 (07-22-21 @ 21:57) (69 - 75)  BP: 120/76 (07-22-21 @ 21:57) (120/76 - 127/88)  RR: 18 (07-22-21 @ 21:57) (18 - 18)  SpO2: 96% (07-22-21 @ 21:57) (94% - 96%)  Wt(kg): --  I&O's Summary    21 Jul 2021 07:01  -  22 Jul 2021 07:00  --------------------------------------------------------  IN: 820 mL / OUT: 2200 mL / NET: -1380 mL    22 Jul 2021 07:01  -  22 Jul 2021 23:53  --------------------------------------------------------  IN: 0 mL / OUT: 600 mL / NET: -600 mL        PAST MEDICAL & SURGICAL HISTORY:  Colitis    BPH (benign prostatic hypertrophy)    GERD (gastroesophageal reflux disease)    Seizures    Multiple sclerosis    No significant past surgical history        SOCIAL HISTORY  Alcohol:  Tobacco:  Illicit substance use:    FAMILY HISTORY:    REVIEW OF SYSTEMS:  CONSTITUTIONAL: No fever, weight loss, or fatigue  EYES: No eye pain, visual disturbances, or discharge  ENMT:  No difficulty hearing, tinnitus, vertigo; No sinus or throat pain  NECK: No pain or stiffness  RESPIRATORY: No cough, wheezing, chills or hemoptysis; No shortness of breath  CARDIOVASCULAR: No chest pain, palpitations, dizziness, or leg swelling  GASTROINTESTINAL: No abdominal or epigastric pain. No nausea, vomiting, or hematemesis; No diarrhea or constipation. No melena or hematochezia.  GENITOURINARY: No dysuria, frequency, hematuria, or incontinence  NEUROLOGICAL: No headaches, memory loss, loss of strength, numbness, or tremors  SKIN: No itching, burning, rashes, or lesions   LYMPH NODES: No enlarged glands  ENDOCRINE: No heat or cold intolerance; No hair loss  MUSCULOSKELETAL: No joint pain or swelling; No muscle, back, or extremity pain  PSYCHIATRIC: No depression, anxiety, mood swings, or difficulty sleeping  HEME/LYMPH: No easy bruising, or bleeding gums  ALLERY AND IMMUNOLOGIC: No hives or eczema    RADIOLOGY & ADDITIONAL TESTS:    Imaging Personally Reviewed:  [ ] YES  [ ] NO    Consultant(s) Notes Reviewed:  [ ] YES  [ ] NO    PHYSICAL EXAM:  GENERAL: NAD, well-groomed, well-developed  HEAD:  Atraumatic, Normocephalic  EYES: EOMI, PERRLA, conjunctiva and sclera clear  ENMT: No tonsillar erythema, exudates, or enlargement; Moist mucous membranes, Good dentition, No lesions  NECK: Supple, No JVD, Normal thyroid  NERVOUS SYSTEM:  Alert & Oriented X3, Good concentration; Motor Strength 5/5 B/L upper and lower extremities; DTRs 2+ intact and symmetric  CHEST/LUNG: Clear to percussion bilaterally; No rales, rhonchi, wheezing, or rubs  HEART: Regular rate and rhythm; No murmurs, rubs, or gallops  ABDOMEN: Soft, Nontender, Nondistended; Bowel sounds present  EXTREMITIES:  2+ Peripheral Pulses, No clubbing, cyanosis, or edema  LYMPH: No lymphadenopathy noted  SKIN: No rashes or lesions    LABS:    07-21    139  |  101  |  24<H>  ----------------------------<  88  3.7   |  27  |  0.81    Ca    9.0      21 Jul 2021 06:16  Mg     2.0     07-21          CAPILLARY BLOOD GLUCOSE                MEDICATIONS  (STANDING):  baclofen 5 milliGRAM(s) Oral at bedtime  carbamide peroxide Otic Solution 4 Drop(s) Both Ears every 12 hours  diclofenac 75 milliGRAM(s) Oral two times a day  furosemide    Tablet 20 milliGRAM(s) Oral daily  heparin   Injectable 5000 Unit(s) SubCutaneous every 12 hours  mesalamine ER (24-Hour) Capsule 1.5 Gram(s) Oral daily  oxybutynin 5 milliGRAM(s) Oral two times a day  pantoprazole    Tablet 40 milliGRAM(s) Oral before breakfast  senna 2 Tablet(s) Oral at bedtime  sertraline 50 milliGRAM(s) Oral daily  tamsulosin 0.4 milliGRAM(s) Oral at bedtime    MEDICATIONS  (PRN):      Care Discussed with Consultants/Other Providers [ ] YES  [ ] NO Patient is a 61y old  Male who presents with a chief complaint of Leg swelling (22 Jul 2021 18:14)      INTERVAL HPI/OVERNIGHT EVENTS: improving  T(C): 37.2 (07-22-21 @ 21:57), Max: 37.2 (07-22-21 @ 21:57)  HR: 75 (07-22-21 @ 21:57) (69 - 75)  BP: 120/76 (07-22-21 @ 21:57) (120/76 - 127/88)  RR: 18 (07-22-21 @ 21:57) (18 - 18)  SpO2: 96% (07-22-21 @ 21:57) (94% - 96%)  Wt(kg): --  I&O's Summary    21 Jul 2021 07:01  -  22 Jul 2021 07:00  --------------------------------------------------------  IN: 820 mL / OUT: 2200 mL / NET: -1380 mL    22 Jul 2021 07:01  -  22 Jul 2021 23:53  --------------------------------------------------------  IN: 0 mL / OUT: 600 mL / NET: -600 mL        PAST MEDICAL & SURGICAL HISTORY:  Colitis    BPH (benign prostatic hypertrophy)    GERD (gastroesophageal reflux disease)    Seizures    Multiple sclerosis    No significant past surgical history        SOCIAL HISTORY  Alcohol:  Tobacco:  Illicit substance use:    FAMILY HISTORY:    REVIEW OF SYSTEMS:  CONSTITUTIONAL: No fever, weight loss, or fatigue  EYES: No eye pain, visual disturbances, or discharge  ENMT:  No difficulty hearing, tinnitus, vertigo; No sinus or throat pain  NECK: No pain or stiffness  RESPIRATORY: No cough, wheezing, chills or hemoptysis; No shortness of breath  CARDIOVASCULAR: No chest pain, palpitations, dizziness, or leg swelling  GASTROINTESTINAL: No abdominal or epigastric pain. No nausea, vomiting, or hematemesis; No diarrhea or constipation. No melena or hematochezia.  GENITOURINARY: No dysuria, frequency, hematuria, or incontinence  NEUROLOGICAL: No headaches, memory loss, loss of strength, numbness, or tremors  SKIN: No itching, burning, rashes, or lesions   LYMPH NODES: No enlarged glands  ENDOCRINE: No heat or cold intolerance; No hair loss  MUSCULOSKELETAL: No joint pain or swelling; No muscle, back, or extremity pain  PSYCHIATRIC: No depression, anxiety, mood swings, or difficulty sleeping  HEME/LYMPH: No easy bruising, or bleeding gums  ALLERY AND IMMUNOLOGIC: No hives or eczema    RADIOLOGY & ADDITIONAL TESTS:    Imaging Personally Reviewed:  [ ] YES  [ ] NO    Consultant(s) Notes Reviewed:  [ ] YES  [ ] NO    PHYSICAL EXAM:  GENERAL: NAD, well-groomed, well-developed  HEAD:  Atraumatic, Normocephalic  EYES: EOMI, PERRLA, conjunctiva and sclera clear  ENMT: No tonsillar erythema, exudates, or enlargement; Moist mucous membranes, Good dentition, No lesions  NECK: Supple, No JVD, Normal thyroid  NERVOUS SYSTEM:  Alert & Oriented X3, Good concentration; Motor Strength 5/5 B/L upper and lower extremities; DTRs 2+ intact and symmetric  CHEST/LUNG: Clear to percussion bilaterally; No rales, rhonchi, wheezing, or rubs  HEART: Regular rate and rhythm; No murmurs, rubs, or gallops  ABDOMEN: Soft, Nontender, Nondistended; Bowel sounds present  EXTREMITIES:  2+ Peripheral Pulses, No clubbing, cyanosis, or edema  LYMPH: No lymphadenopathy noted  SKIN: No rashes or lesions    LABS:    07-21    139  |  101  |  24<H>  ----------------------------<  88  3.7   |  27  |  0.81    Ca    9.0      21 Jul 2021 06:16  Mg     2.0     07-21          CAPILLARY BLOOD GLUCOSE                MEDICATIONS  (STANDING):  baclofen 5 milliGRAM(s) Oral at bedtime  carbamide peroxide Otic Solution 4 Drop(s) Both Ears every 12 hours  diclofenac 75 milliGRAM(s) Oral two times a day  furosemide    Tablet 20 milliGRAM(s) Oral daily  heparin   Injectable 5000 Unit(s) SubCutaneous every 12 hours  mesalamine ER (24-Hour) Capsule 1.5 Gram(s) Oral daily  oxybutynin 5 milliGRAM(s) Oral two times a day  pantoprazole    Tablet 40 milliGRAM(s) Oral before breakfast  senna 2 Tablet(s) Oral at bedtime  sertraline 50 milliGRAM(s) Oral daily  tamsulosin 0.4 milliGRAM(s) Oral at bedtime    MEDICATIONS  (PRN):      Care Discussed with Consultants/Other Providers [ ] YES  [ ] NO

## 2021-07-22 NOTE — PROGRESS NOTE ADULT - PROBLEM SELECTOR PLAN 1
started on lasix : dose increased to 40 mg   cardio consulted Dr. saez  echo : nml EF  neg DVT on doppler

## 2021-07-22 NOTE — PROGRESS NOTE ADULT - PROBLEM SELECTOR PLAN 2
neurology consult Dr. tess messina  on IV infusion every 6 months as out pt

## 2021-07-22 NOTE — PROGRESS NOTE ADULT - ASSESSMENT
Echo 7/20/21: EF 70%, nl lv sys fx   ECHO 9/22/16: normal LV function    a/p     61 year old male with pmhx DVT, MS, RA, UC, BPH(following PSA yearly), recurrent UTI, acid reflux who presents with LLE swelling for the last two days.     #LE edema   -? r/t MS   -improving with IV lasix  -transition to PO lasix 20 mg QD   -Echo noted w nl lv sys fx, no sig valvular dx   -no CP/SOB/PEOPLES , EKG with no acute ischemia  -strict I/IOs , daily weights  -bp stable      # MS, hx  -neuro eval noted  -med f/u     dvt ppx   DCP per primary team   plan discussed with ACP 
Echo 7/20/21: EF 70%, nl lv sys fx   ECHO 9/22/16: normal LV function    a/p     61 year old male with pmhx DVT, MS, RA, UC, BPH(following PSA yearly), recurrent UTI, acid reflux who presents with LLE swelling for the last two days.     #LE edema   -? r/t MS   -improving with IV lasix, cont IVP lasix 40 mg QD   -Echo noted w nl lv sys fx, no sig valvular dx   -no CP/SOB/PEOPLES , EKG with no acute ischemia  -strict I/IOs , daily weights  -bp stable      # MS, hx  -neuro eval pending  -med f/u     dvt ppx     plan discussed with ACP

## 2021-07-22 NOTE — PROGRESS NOTE ADULT - NSPROGADDITIONALINFOA_GEN_ALL_CORE
dispo: d/w patient , PT recommend STR, he is willing to go to rehab . loly;l c/w lasix today and from tomorrow will change to 20 mg PO daily.   c/w compression stockings to LE   d/c planning in am
dispo: ok to be d/c to rehab in am
dispo: may need compression device/ stockings for leg edema , likely dependent edema as he is not ambulatory 2/2 MS.   his EF is wnl   will plan for d/c home , will ask cardio if he needs any further lasix

## 2021-07-22 NOTE — PROGRESS NOTE ADULT - TIME BILLING
Patient seen and examined, agree with the above assessment and plan by [facility]  Edema improving  ECHO w normal LVEF  Increase lasix to 40mg Q12  Cr stable  DVT ppx
Agree with above NP note.  cv stable  cont current tx  edema improved  cont po lasix   echo with nl lv and valve function  d/c planning

## 2021-07-22 NOTE — DISCHARGE NOTE PROVIDER - HOSPITAL COURSE
61 year old male with pmhx DVT, MS, RA, UC, BPH(following PSA yearly), recurrent UTI, acid reflux who presents with LLE swelling for the last two days. Admitted for CHF Exacerbation started on Lasix 40mg IV BID per cards, sp Echo noted with normal Left ventricular systolic function. No segmental wall motion abnormalities . No CP/SOB/PEOPLES , EKG with no acute ischemia, bp stable. Patient seen by Neurology, edema likely secondary to progressive multiple sclerosis, doubt edema has neurologic basis, right leg weaker than last exam, suspect combination of progression of multiple sclerosis and edema. Medical management of edema with close follow-up with Dr. Davis on discharge, continue Lasix 20mg QD. Patient is stable for discharge to rehab.

## 2021-07-22 NOTE — CONSULT NOTE ADULT - ASSESSMENT
Impression:  Patient is a 61 year old man with PMHx including DVT, RA, UC, BPH (following PSA yearly), recurrent UTI, acid reflux, and multiple sclerosis who presented to [facility] on 7/19/21 with leg swelling for about two weeks or so being treated with furosemide which seems to be helping.  At baseline he walks with a walker in the house and a wheelchair when he leaves the house.  This is on the basis of his leg weakness secondary to multiple sclerosis as well as knee pain secondary to arthritis, now complicated by leg edema as well.  He was diagnosed with multiple sclerosis in 2013 and follows with Dr. Paredes at [facility], and has been on several disease modifying treatments in the past but is currently on ocrelizumab infusions.  He has been on dalfampridine in the past but this was discontinued after he had a seizure.  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  His main issues with multiple sclerosis are right-sided weakness and incoordination, within the past several months left leg weakness, and increased urinary frequency and urgency.    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 Paredes on discharge   PT and OOB

## 2021-07-22 NOTE — DISCHARGE NOTE PROVIDER - CARE PROVIDER_API CALL
Harjinder Davis  INTERNAL MEDICINE  [address], [place] [address]  Phone: [phone]  Fax: [phone]  Follow Up Time:

## 2021-07-22 NOTE — DISCHARGE NOTE PROVIDER - NSDCCPCAREPLAN_GEN_ALL_CORE_FT
PRINCIPAL DISCHARGE DIAGNOSIS  Diagnosis: Lower extremity edema  Assessment and Plan of Treatment: Likely due to progression of Multiple sclerosis, edema is not related to cardiac issues, as Echo was normal. Please continue Lasix 20mg daily with ACE wraps and rehab.      SECONDARY DISCHARGE DIAGNOSES  Diagnosis: Multiple sclerosis  Assessment and Plan of Treatment: Please continue close follow up with Dr. Paredes    Diagnosis: Cerumen impaction  Assessment and Plan of Treatment: + b/l cerumen impaction noted. Large amount of cerumen removed from bilateral ears, partial visualization of TMs. No evidence of infection. Now with improvement of hearing bilaterally.    Follow up as outpatient   - debrox ear drops 4 drops to b/l ears bid x 5 days  - Follow up outpatient with Dr. Velasquez/Malorie/Wisam [phone]

## 2021-07-22 NOTE — CONSULT NOTE ADULT - SUBJECTIVE AND OBJECTIVE BOX
Admitting Diagnosis:  Localized edema [R60.0]  LOCALIZED EDEMA        HPI:  This is a 61y year old Male with the below past medical history who presents with the chief complaint of    ****CHART HPI:  HPI:  Patient is a 61 year old man with PMHx including DVT, RA, UC, BPH (following PSA yearly), recurrent UTI, acid reflux, and multiple sclerosis who presented to [facility] on 7/19/21 with leg swelling for about two weeks or so being treated with furosemide which seems to be helping.  At baseline he walks with a walker in the house and a wheelchair when he leaves the house.  This is on the basis of his leg weakness secondary to multiple sclerosis as well as knee pain secondary to arthritis, now complicated by leg edema as well.  He was diagnosed with multiple sclerosis in 2013 and follows with Dr. Paredes at [facility], and has been on several disease modifying treatments in the past but is currently on ocrelizumab infusions.  He has been on dalfampridine in the past but this was discontinued after he had a seizure.  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  His main issues with multiple sclerosis are right-sided weakness and incoordination, within the past several months left leg weakness, and increased urinary frequency and urgency.    ******    Past Medical History:  Colitis [558.9]    BPH (benign prostatic hypertrophy) [600.00]    GERD (gastroesophageal reflux disease) [530.81]    Seizures [780.39]    Multiple sclerosis [G35]        Past Surgical History:  No significant past surgical history [[id number]]        Social History:  No toxic habits    Family History:  FAMILY HISTORY:  No pertinent family history in first degree relatives        Allergies:  No Known Allergies      ROS:  Constitutional: Patient offers no complaints of fevers or significant weight loss  Ears, Nose, Mouth and Throat: The patient presents with no abnormalities of the head, ears, eyes, nose or throat  Skin: Patient offers no concerns of new rashes or lesions  Respiratory: The patient presents with no abnormalities of the respiratory tract  Cardiovascular: The patient presents with no cardiac abnormalities  Gastrointestinal: The patient presents with no abnormalities of the GI system  Genitourinary: The patient presents with no dysuria, hematuria or frequent urination  Neurological: See HPI  Endocrine: Patient offers no complaints of excessive thirst, urination, or heat/cold intolerance    Advanced care planning reviewed and noted in the chart.    Medications:  baclofen 5 milliGRAM(s) Oral at bedtime  diclofenac 75 milliGRAM(s) Oral two times a day  furosemide   Injectable 40 milliGRAM(s) IV Push two times a day  heparin   Injectable 5000 Unit(s) SubCutaneous every 12 hours  mesalamine ER (24-Hour) Capsule 1.5 Gram(s) Oral daily  oxybutynin 5 milliGRAM(s) Oral two times a day  pantoprazole    Tablet 40 milliGRAM(s) Oral before breakfast  senna 2 Tablet(s) Oral at bedtime  sertraline 50 milliGRAM(s) Oral daily  tamsulosin 0.4 milliGRAM(s) Oral at bedtime      Labs:    07-21    139  |  101  |  24<H>  ----------------------------<  88  3.7   |  27  |  0.81    Ca    9.0      21 Jul 2021 06:16  Mg     2.0     07-21      CAPILLARY BLOOD GLUCOSE              Male    Vitals:  Vital Signs Last 24 Hrs  T(C): 36.5 (22 Jul 2021 05:05), Max: 36.9 (21 Jul 2021 19:44)  T(F): 97.7 (22 Jul 2021 05:05), Max: 98.5 (21 Jul 2021 19:44)  HR: 75 (22 Jul 2021 05:05) (73 - 82)  BP: 127/88 (22 Jul 2021 05:05) (120/82 - 134/82)  BP(mean): --  RR: 18 (22 Jul 2021 05:05) (18 - 18)  SpO2: 94% (22 Jul 2021 05:05) (94% - 96%)    NEUROLOGICAL EXAM:    Mental status: Awake, alert, and in no apparent distress. Oriented to person, place and time. Language function is normal. Recent memory, digit span and concentration were mildly impaired     Cranial Nerves: Pupils were equal, round, reactive to light. Extraocular movements were intact. Visual field were full. Fundoscopic exam was deferred. Facial sensation was intact to light touch. There was no facial asymmetry. The palate was upgoing symmetrically and tongue was midline. Hearing acuity was intact to finger rub AU. Shoulder shrug was full bilaterally    Motor exam: Bulk normal, tone slightly increased on right.  Left arm 4+/5, right arm 4-/5.  Left leg 4-/5.  Right leg 2/5.  Fine finger movements were slower on the right. There was pronator drift on the right    Reflexes: 2+ in the bilateral upper extremities. 3+ in the bilateral lower extremities. Did not check Babinsky as feet were wrapped    Sensation: Intact to light touch, temperature, could not assess proprioception or vibration as feet as feet were wrapped     Coordination: Finger-nose-finger and heel-to-shin was with some dysmetria on the right     Gait: non-ambulatory

## 2021-07-22 NOTE — DISCHARGE NOTE PROVIDER - NSDCMRMEDTOKEN_GEN_ALL_CORE_FT
acetaminophen 325 mg oral tablet: 2 tab(s) orally every 6 hours, As needed, Mild Pain (1 - 3)  Apriso 0.375 g oral capsule, extended release: 4 cap(s) orally once a day (in the morning)  baclofen 5 mg oral tablet: 1 tab(s) orally once a day (at bedtime)  Cranberry oral capsule:   diclofenac sodium 75 mg oral delayed release tablet: 1 tab(s) orally once a day  docusate sodium 100 mg oral capsule: 1 cap(s) orally 2 times a day  furosemide 20 mg oral tablet: 1 tab(s) orally once a day  mesalamine 0.375 g oral capsule, extended release: 4 cap(s) orally once a day (in the morning)  multivitamin: 1 tab(s) orally once a day  oxybutynin 5 mg oral tablet: 1 tab(s) orally 2 times a day  PHENobarbital 32.4 mg oral tablet: 1 tab(s) orally once a day (at bedtime)  RABEprazole 20 mg oral delayed release tablet: 1 tab(s) orally once a day  senna oral tablet: 2 tab(s) orally once a day (at bedtime)  sertraline 50 mg oral tablet: 1 tab(s) orally once a day  tamsulosin 0.4 mg oral capsule: 1 cap(s) orally once a day (at bedtime)  Vitamin B12 1000 mcg oral tablet: 1 tab(s) orally once a day  Vitamin C 500 mg oral tablet: 1 tab(s) orally once a day  Vitamin D3 5000 intl units oral capsule: 1 cap(s) orally once a day

## 2021-07-23 ENCOUNTER — TRANSCRIPTION ENCOUNTER (OUTPATIENT)
Age: 61
End: 2021-07-23

## 2021-07-23 VITALS
SYSTOLIC BLOOD PRESSURE: 114 MMHG | HEART RATE: 80 BPM | TEMPERATURE: 98 F | RESPIRATION RATE: 18 BRPM | DIASTOLIC BLOOD PRESSURE: 69 MMHG | OXYGEN SATURATION: 95 %

## 2021-07-23 PROCEDURE — 84484 ASSAY OF TROPONIN QUANT: CPT

## 2021-07-23 PROCEDURE — 97162 PT EVAL MOD COMPLEX 30 MIN: CPT

## 2021-07-23 PROCEDURE — 85025 COMPLETE CBC W/AUTO DIFF WBC: CPT

## 2021-07-23 PROCEDURE — 71045 X-RAY EXAM CHEST 1 VIEW: CPT

## 2021-07-23 PROCEDURE — 83880 ASSAY OF NATRIURETIC PEPTIDE: CPT

## 2021-07-23 PROCEDURE — 93970 EXTREMITY STUDY: CPT

## 2021-07-23 PROCEDURE — 96374 THER/PROPH/DIAG INJ IV PUSH: CPT

## 2021-07-23 PROCEDURE — 80048 BASIC METABOLIC PNL TOTAL CA: CPT

## 2021-07-23 PROCEDURE — C8929: CPT

## 2021-07-23 PROCEDURE — U0003: CPT

## 2021-07-23 PROCEDURE — 97110 THERAPEUTIC EXERCISES: CPT

## 2021-07-23 PROCEDURE — 83735 ASSAY OF MAGNESIUM: CPT

## 2021-07-23 PROCEDURE — U0005: CPT

## 2021-07-23 PROCEDURE — 99285 EMERGENCY DEPT VISIT HI MDM: CPT | Mod: 25

## 2021-07-23 PROCEDURE — 86769 SARS-COV-2 COVID-19 ANTIBODY: CPT

## 2021-07-23 PROCEDURE — 80053 COMPREHEN METABOLIC PANEL: CPT

## 2021-07-23 PROCEDURE — 97530 THERAPEUTIC ACTIVITIES: CPT

## 2021-07-23 PROCEDURE — 85027 COMPLETE CBC AUTOMATED: CPT

## 2021-07-23 RX ORDER — CARBAMIDE PEROXIDE 81.86 MG/ML
4 SOLUTION/ DROPS AURICULAR (OTIC)
Qty: 0 | Refills: 0 | DISCHARGE
Start: 2021-07-23 | End: 2021-07-27

## 2021-07-23 RX ADMIN — CARBAMIDE PEROXIDE 4 DROP(S): 81.86 SOLUTION/ DROPS AURICULAR (OTIC) at 05:50

## 2021-07-23 RX ADMIN — DICLOFENAC SODIUM 75 MILLIGRAM(S): 75 TABLET, DELAYED RELEASE ORAL at 06:32

## 2021-07-23 RX ADMIN — Medication 20 MILLIGRAM(S): at 05:49

## 2021-07-23 RX ADMIN — Medication 1.5 GRAM(S): at 10:13

## 2021-07-23 RX ADMIN — Medication 5 MILLIGRAM(S): at 05:49

## 2021-07-23 RX ADMIN — HEPARIN SODIUM 5000 UNIT(S): 5000 INJECTION INTRAVENOUS; SUBCUTANEOUS at 05:50

## 2021-07-23 RX ADMIN — DICLOFENAC SODIUM 75 MILLIGRAM(S): 75 TABLET, DELAYED RELEASE ORAL at 05:50

## 2021-07-23 RX ADMIN — PANTOPRAZOLE SODIUM 40 MILLIGRAM(S): 20 TABLET, DELAYED RELEASE ORAL at 05:49

## 2021-07-23 NOTE — DISCHARGE NOTE NURSING/CASE MANAGEMENT/SOCIAL WORK - PATIENT PORTAL LINK FT
You can access the FollowMyHealth Patient Portal offered by [facility] by registering at the following website: [email or web address]. By joining "[employer]’s FollowMyHealth portal, you will also be able to view your health information using other applications (apps) compatible with our system.

## 2021-07-23 NOTE — DISCHARGE NOTE NURSING/CASE MANAGEMENT/SOCIAL WORK - NSDCVIVACCINE_GEN_ALL_CORE_FT
influenza, injectable, quadrivalent, preservative free; 05-Oct-2014 13:54; Hanane Coyle (RN); [employer]; UI [id number]; IntraMuscular; Deltoid Right.; 0.5 milliLiter(s);   influenza, injectable, quadrivalent, preservative free; 26-Sep-2016 17:23; Adali Roe (RN); [employer]; [id number]; IntraMuscular; Deltoid Right.; 0.5 milliLiter(s); VIS (VIS Published: 07-Aug-2015, VIS Presented: 26-Sep-2016);

## 2021-08-05 ENCOUNTER — APPOINTMENT (OUTPATIENT)
Dept: FAMILY MEDICINE | Facility: CLINIC | Age: 61
End: 2021-08-05

## 2021-08-09 ENCOUNTER — APPOINTMENT (OUTPATIENT)
Dept: FAMILY MEDICINE | Facility: CLINIC | Age: 61
End: 2021-08-09

## 2021-10-10 ENCOUNTER — EMERGENCY (EMERGENCY)
Facility: HOSPITAL | Age: 61
LOS: 1 days | Discharge: HOME | End: 2021-10-10
Attending: STUDENT IN AN ORGANIZED HEALTH CARE EDUCATION/TRAINING PROGRAM
Payer: MEDICARE

## 2021-10-10 VITALS
TEMPERATURE: 99 F | SYSTOLIC BLOOD PRESSURE: 112 MMHG | OXYGEN SATURATION: 96 % | HEIGHT: 76 IN | RESPIRATION RATE: 20 BRPM | HEART RATE: 90 BPM | DIASTOLIC BLOOD PRESSURE: 77 MMHG

## 2021-10-10 LAB
ALBUMIN SERPL ELPH-MCNC: 3.4 G/DL — SIGNIFICANT CHANGE UP (ref 3.3–5)
ALP SERPL-CCNC: 117 U/L — SIGNIFICANT CHANGE UP (ref 40–120)
ALT FLD-CCNC: 24 U/L — SIGNIFICANT CHANGE UP (ref 10–45)
ANION GAP SERPL CALC-SCNC: 13 MMOL/L — SIGNIFICANT CHANGE UP (ref 5–17)
APTT BLD: 31.9 SEC — SIGNIFICANT CHANGE UP (ref 27.5–35.5)
AST SERPL-CCNC: 18 U/L — SIGNIFICANT CHANGE UP (ref 10–40)
BASE EXCESS BLDV CALC-SCNC: 3 MMOL/L — HIGH (ref -2–2)
BASOPHILS # BLD AUTO: 0.07 K/UL — SIGNIFICANT CHANGE UP (ref 0–0.2)
BASOPHILS NFR BLD AUTO: 0.6 % — SIGNIFICANT CHANGE UP (ref 0–2)
BILIRUB SERPL-MCNC: 0.4 MG/DL — SIGNIFICANT CHANGE UP (ref 0.2–1.2)
BUN SERPL-MCNC: 26 MG/DL — HIGH (ref 7–23)
CA-I SERPL-SCNC: 1.14 MMOL/L — LOW (ref 1.15–1.33)
CALCIUM SERPL-MCNC: 8.6 MG/DL — SIGNIFICANT CHANGE UP (ref 8.4–10.5)
CHLORIDE BLDV-SCNC: 102 MMOL/L — SIGNIFICANT CHANGE UP (ref 96–108)
CHLORIDE SERPL-SCNC: 104 MMOL/L — SIGNIFICANT CHANGE UP (ref 96–108)
CO2 BLDV-SCNC: 30 MMOL/L — HIGH (ref 22–26)
CO2 SERPL-SCNC: 23 MMOL/L — SIGNIFICANT CHANGE UP (ref 22–31)
CREAT SERPL-MCNC: 0.81 MG/DL — SIGNIFICANT CHANGE UP (ref 0.5–1.3)
EOSINOPHIL # BLD AUTO: 0.73 K/UL — HIGH (ref 0–0.5)
EOSINOPHIL NFR BLD AUTO: 5.9 % — SIGNIFICANT CHANGE UP (ref 0–6)
GAS PNL BLDV: 137 MMOL/L — SIGNIFICANT CHANGE UP (ref 136–145)
GAS PNL BLDV: SIGNIFICANT CHANGE UP
GAS PNL BLDV: SIGNIFICANT CHANGE UP
GLUCOSE BLDV-MCNC: 103 MG/DL — HIGH (ref 70–99)
GLUCOSE SERPL-MCNC: 100 MG/DL — HIGH (ref 70–99)
HCO3 BLDV-SCNC: 28 MMOL/L — SIGNIFICANT CHANGE UP (ref 22–29)
HCT VFR BLD CALC: 44.8 % — SIGNIFICANT CHANGE UP (ref 39–50)
HCT VFR BLDA CALC: 40 % — SIGNIFICANT CHANGE UP (ref 39–51)
HGB BLD CALC-MCNC: 13.2 G/DL — SIGNIFICANT CHANGE UP (ref 12.6–17.4)
HGB BLD-MCNC: 15.1 G/DL — SIGNIFICANT CHANGE UP (ref 13–17)
IMM GRANULOCYTES NFR BLD AUTO: 0.4 % — SIGNIFICANT CHANGE UP (ref 0–1.5)
INR BLD: 1.11 RATIO — SIGNIFICANT CHANGE UP (ref 0.88–1.16)
LACTATE BLDV-MCNC: 1.3 MMOL/L — SIGNIFICANT CHANGE UP (ref 0.7–2)
LYMPHOCYTES # BLD AUTO: 1.18 K/UL — SIGNIFICANT CHANGE UP (ref 1–3.3)
LYMPHOCYTES # BLD AUTO: 9.5 % — LOW (ref 13–44)
MCHC RBC-ENTMCNC: 31.9 PG — SIGNIFICANT CHANGE UP (ref 27–34)
MCHC RBC-ENTMCNC: 33.7 GM/DL — SIGNIFICANT CHANGE UP (ref 32–36)
MCV RBC AUTO: 94.5 FL — SIGNIFICANT CHANGE UP (ref 80–100)
MONOCYTES # BLD AUTO: 0.96 K/UL — HIGH (ref 0–0.9)
MONOCYTES NFR BLD AUTO: 7.7 % — SIGNIFICANT CHANGE UP (ref 2–14)
NEUTROPHILS # BLD AUTO: 9.45 K/UL — HIGH (ref 1.8–7.4)
NEUTROPHILS NFR BLD AUTO: 75.9 % — SIGNIFICANT CHANGE UP (ref 43–77)
NRBC # BLD: 0 /100 WBCS — SIGNIFICANT CHANGE UP (ref 0–0)
PCO2 BLDV: 46 MMHG — SIGNIFICANT CHANGE UP (ref 42–55)
PH BLDV: 7.4 — SIGNIFICANT CHANGE UP (ref 7.32–7.43)
PLATELET # BLD AUTO: 237 K/UL — SIGNIFICANT CHANGE UP (ref 150–400)
PO2 BLDV: 44 MMHG — SIGNIFICANT CHANGE UP (ref 25–45)
POTASSIUM BLDV-SCNC: 3.5 MMOL/L — SIGNIFICANT CHANGE UP (ref 3.5–5.1)
POTASSIUM SERPL-MCNC: 3.5 MMOL/L — SIGNIFICANT CHANGE UP (ref 3.5–5.3)
POTASSIUM SERPL-SCNC: 3.5 MMOL/L — SIGNIFICANT CHANGE UP (ref 3.5–5.3)
PROT SERPL-MCNC: 5.6 G/DL — LOW (ref 6–8.3)
PROTHROM AB SERPL-ACNC: 13.3 SEC — SIGNIFICANT CHANGE UP (ref 10.6–13.6)
RBC # BLD: 4.74 M/UL — SIGNIFICANT CHANGE UP (ref 4.2–5.8)
RBC # FLD: 13.2 % — SIGNIFICANT CHANGE UP (ref 10.3–14.5)
SAO2 % BLDV: 78.9 % — SIGNIFICANT CHANGE UP (ref 67–88)
SODIUM SERPL-SCNC: 140 MMOL/L — SIGNIFICANT CHANGE UP (ref 135–145)
WBC # BLD: 12.44 K/UL — HIGH (ref 3.8–10.5)
WBC # FLD AUTO: 12.44 K/UL — HIGH (ref 3.8–10.5)

## 2021-10-10 PROCEDURE — 93010 ELECTROCARDIOGRAM REPORT: CPT | Mod: 76

## 2021-10-10 PROCEDURE — 99285 EMERGENCY DEPT VISIT HI MDM: CPT

## 2021-10-10 RX ORDER — IPRATROPIUM/ALBUTEROL SULFATE 18-103MCG
3 AEROSOL WITH ADAPTER (GRAM) INHALATION ONCE
Refills: 0 | Status: COMPLETED | OUTPATIENT
Start: 2021-10-10 | End: 2021-10-10

## 2021-10-10 RX ORDER — SODIUM CHLORIDE 9 MG/ML
1000 INJECTION INTRAMUSCULAR; INTRAVENOUS; SUBCUTANEOUS ONCE
Refills: 0 | Status: COMPLETED | OUTPATIENT
Start: 2021-10-10 | End: 2021-10-10

## 2021-10-10 NOTE — ED PROVIDER NOTE - CARE PLAN
1 Principal Discharge DX:	Urinary tract infection  Secondary Diagnosis:	Fever  Secondary Diagnosis:	Cough  Secondary Diagnosis:	Shortness of breath

## 2021-10-10 NOTE — ED PROVIDER NOTE - PHYSICAL EXAMINATION
General: non-toxic appearing, in no respiratory distress, diaphoretic  HEENT: atraumatic, normocephalic; pupils are equal, round and react to light, extraocular movements intact bilaterally without deficits, no conjunctival pallor, mucous membranes moist  Neck: no jugular venous distension, full range of motion  Chest/Lung: clear to auscultation bilaterally, no wheezes/rhonchi/rales  Heart: regular rate and rhythm, no murmur/gallops/rubs  Abdomen: normal bowel sounds, soft, non-tender, non-distended  Extremities: no lower extremity edema, +2 radial pulses bilaterally, +2 dorsalis pedis pulses bilaterally  Musculoskeletal: full range of motion of all 4 extremities, 5/5 strength and normal sensation in all 4 extremities   Nervous System: alert and oriented, no motor deficits or sensory deficits; CNII-XII grossly intact; no focal neurologic deficits  Skin: no rashes/lacerations noted

## 2021-10-10 NOTE — ED PROVIDER NOTE - NSFOLLOWUPINSTRUCTIONS_ED_ALL_ED_FT
You were seen in the emergency department for fever, cough and urinary symptoms. Bloodwork are normal. Your urine showed an infection.     You were given ceftriaxone in the ED.     You were prescribed:   - Cefuroxime: 500mg twice a day for 7 days.     For pain, you may take Tylenol (acetaminophen) and/or ibuprofen (advil or motrin). Please follow the instructions on the label/container.     Please follow up with your primary care doctor in 1-3 days for continuation of your care.     Return to the emergency department if you experience any new/concerning/worsening symptoms such as but not limited to: fever (>100.3F), severe nausea, vomiting, shortness of breath, chest pain, abdominal pain, worsening urinary symptoms or any other concerning symptoms.     ===========================  Urinary Tract Infection in Men    WHAT YOU NEED TO KNOW:    What is a urinary tract infection (UTI)? A UTI is caused by bacteria that get inside your urinary tract. Most bacteria that enter your urinary tract come out when you urinate. If the bacteria stay in your urinary tract, you may get an infection. Your urinary tract includes your kidneys, ureters, bladder, and urethra. Urine is made in your kidneys, and it flows from the ureters to the bladder. Urine leaves the bladder through the urethra. A UTI is more common in your lower urinary tract, which includes your bladder and urethra.    What increases my risk for a UTI?   •A urinary catheter or self-catheterization  •Incontinence (not able to control when you urinate)  •Urinary tract problems, such as narrowing, kidney stones, or not being able to empty your bladder completely  •Not being circumcised  •Past UTI or urinary tract surgery  •Elderly age  •Obesity or diabetes    What are the signs and symptoms of a UTI?   •Urinating more often than usual, leaking urine, or waking from sleep to urinate  •Pain or burning when you urinate  •Pain or pressure in your lower abdomen or back  •Urine that smells bad  •Blood in your urine    How is a UTI diagnosed? Your healthcare provider will ask about your signs and symptoms. Your provider may press on your abdomen, sides, and back to check if you feel pain. Your urine will be tested for bacteria that may be causing your infection. If you have UTIs often, you may need other tests to find the cause.     How is a UTI treated?   •Antibiotics help fight a bacterial infection.   •Medicines may be given to decrease pain and burning when you urinate. They will also help decrease the feeling that you need to urinate often. These medicines will make your urine orange or red.    What can I do to prevent a UTI?   •Empty your bladder often. Urinate and empty your bladder as soon as you feel the need. Do not hold your urine for long periods of time.  •Drink liquids as directed. Ask how much liquid to drink each day and which liquids are best for you. You may need to drink more liquids than usual to help flush out the bacteria. Do not drink alcohol, caffeine, or citrus juices. These can irritate your bladder and increase your symptoms. Your healthcare provider may recommend cranberry juice to help prevent a UTI.  •Urinate after you have sex. This can help flush out bacteria passed during sex.  •Do pelvic muscle exercises often. Pelvic muscle exercises may help you start and stop urinating. Strong pelvic muscles may help you empty your bladder easier. Squeeze these muscles tightly for 5 seconds like you are trying to hold back urine. Then relax for 5 seconds. Gradually work up to squeezing for 10 seconds. Do 3 sets of 15 repetitions a day, or as directed.    When should I seek immediate care?   •You are urinating very little or not at all.  •You have a high fever with shaking chills.   •You have side or back pain that gets worse.    When should I contact my healthcare provider?   •You have a mild fever.  •You do not feel better after 2 days of taking antibiotics.  •You are vomiting.   •You have new symptoms, such as blood or pus in your urine.   •You have questions or concerns about your condition or care.    CARE AGREEMENT:  You have the right to help plan your care. Learn about your health condition and how it may be treated. Discuss treatment options with your healthcare providers to decide what care you want to receive. You always have the right to refuse treatment.

## 2021-10-10 NOTE — ED PROVIDER NOTE - NS ED ROS FT
General: Reports fever, chills and sweats; denies unexpected weight loss/gain  HEENT: Denies headache, vision changes, tinnitus, sore throat  Cardiovascular: Denies chest pain, palpitations  Skin: Denies rash, erythema, color changes  Respiratory: Reports cough; denies shortness of breath, difficulty breathing  Endocrine: Denies sensitivity to heat, cold, increased urination  Gastrointestinal: Denies abdominal pain, nausea, vomiting, diarrhea, constipation, changes in bowel habits  Musculoskeletal: Denies back pain, lower extremity swelling, extremity pain  Genitourinary: Reports decreased urinary frequency; denies hematuria, dysuria, increased frequency  Neurologic: Denies loss of consciousness, weakness, numbness, tingling  Psychiatric: Denies history of psych, hallucinations

## 2021-10-10 NOTE — ED PROVIDER NOTE - PATIENT PORTAL LINK FT
You can access the FollowMyHealth Patient Portal offered by [facility] by registering at the following website: [email or web address]. By joining [employer]’s FollowMyHealth portal, you will also be able to view your health information using other applications (apps) compatible with our system.

## 2021-10-10 NOTE — ED PROVIDER NOTE - CLINICAL SUMMARY MEDICAL DECISION MAKING FREE TEXT BOX
61 year-old-male with history of UTI, multiple sclerosis and ulcerative colitis presents with fever, cough and urinary symptoms. Currently afebrile after tylenol, hemodynamically stable and saturating well on room air. Most likely viral pneumonia. Sepsis workup and reassess.

## 2021-10-10 NOTE — ED PROVIDER NOTE - PROGRESS NOTE DETAILS
Labworks unremarkable. Urine showed a UTI - ordered ceftriaxone. Will send Cefuroxime to pharmacy.     Patient is saturating at 95% on room air. Patient reports that he has not been ambulating at home in the last few months due to his MS. He has an appointment with his neurologist in the coming weeks for further management.   Spoke to patient's daughter on the phone - daughter confirms that the patient has not been walking at home. She feels that the patient is looking much better now after two liters of fluids and reports that there are family at home that can take care of the patient.   Both patient and daughter are agreeable for discharge if no further workup required.

## 2021-10-10 NOTE — ED PROVIDER NOTE - OBJECTIVE STATEMENT
Patient is a 61 year-old-male with history of UTI, multiple sclerosis and ulcerative colitis presents with fever, cough and urinary symptoms. Initially presented yesterday with fever of 100.6F, cough with clear sputum production, no associated chest pain/shortness of breath, Patient is a 61 year-old-male with history of UTI, multiple sclerosis and ulcerative colitis presents with fever, cough and urinary symptoms. Initially presented yesterday with fever of 100.6F, cough with clear sputum production, + chills, + sweats, no associated chest pain/shortness of breath, abdominal pain. Also has decreased urinary frequency, no dysuria/hematuria. Patient took tylenol at 8pm. Vaccinated for covid.

## 2021-10-10 NOTE — ED PROVIDER NOTE - ATTENDING CONTRIBUTION TO CARE
61 year old male with history of  UTI, multiple sclerosis and ulcerative colitis presented to ED with fever, URI symptoms and dysuria. No flank pain, N/V. Pt well appearing on exam. Lungs CTA. Abdomen soft, non-tender, non-distended. URI likely viral. Will start empiric abx given history of UTI. Obtain blood work including cbc, blood gas, and cultures.  Reassess

## 2021-10-11 VITALS
DIASTOLIC BLOOD PRESSURE: 92 MMHG | RESPIRATION RATE: 16 BRPM | SYSTOLIC BLOOD PRESSURE: 145 MMHG | TEMPERATURE: 98 F | HEART RATE: 72 BPM | OXYGEN SATURATION: 98 %

## 2021-10-11 LAB
APPEARANCE UR: CLEAR — SIGNIFICANT CHANGE UP
BACTERIA # UR AUTO: NEGATIVE — SIGNIFICANT CHANGE UP
BILIRUB UR-MCNC: NEGATIVE — SIGNIFICANT CHANGE UP
COLOR SPEC: YELLOW — SIGNIFICANT CHANGE UP
DIFF PNL FLD: NEGATIVE — SIGNIFICANT CHANGE UP
EPI CELLS # UR: 2 /HPF — SIGNIFICANT CHANGE UP
GLUCOSE UR QL: NEGATIVE — SIGNIFICANT CHANGE UP
HYALINE CASTS # UR AUTO: 6 /LPF — HIGH (ref 0–2)
KETONES UR-MCNC: NEGATIVE — SIGNIFICANT CHANGE UP
LEUKOCYTE ESTERASE UR-ACNC: ABNORMAL
NITRITE UR-MCNC: NEGATIVE — SIGNIFICANT CHANGE UP
PH UR: 6 — SIGNIFICANT CHANGE UP (ref 5–8)
PROT UR-MCNC: ABNORMAL
RAPID RVP RESULT: SIGNIFICANT CHANGE UP
RBC CASTS # UR COMP ASSIST: 2 /HPF — SIGNIFICANT CHANGE UP (ref 0–4)
SARS-COV-2 RNA SPEC QL NAA+PROBE: SIGNIFICANT CHANGE UP
SP GR SPEC: 1.03 — HIGH (ref 1.01–1.02)
UROBILINOGEN FLD QL: ABNORMAL
WBC UR QL: 48 /HPF — HIGH (ref 0–5)

## 2021-10-11 PROCEDURE — 85730 THROMBOPLASTIN TIME PARTIAL: CPT

## 2021-10-11 PROCEDURE — 82435 ASSAY OF BLOOD CHLORIDE: CPT

## 2021-10-11 PROCEDURE — 0225U NFCT DS DNA&RNA 21 SARSCOV2: CPT

## 2021-10-11 PROCEDURE — 94640 AIRWAY INHALATION TREATMENT: CPT

## 2021-10-11 PROCEDURE — 85014 HEMATOCRIT: CPT

## 2021-10-11 PROCEDURE — 87040 BLOOD CULTURE FOR BACTERIA: CPT

## 2021-10-11 PROCEDURE — 82803 BLOOD GASES ANY COMBINATION: CPT

## 2021-10-11 PROCEDURE — 71045 X-RAY EXAM CHEST 1 VIEW: CPT | Mod: 26

## 2021-10-11 PROCEDURE — 87086 URINE CULTURE/COLONY COUNT: CPT

## 2021-10-11 PROCEDURE — 71045 X-RAY EXAM CHEST 1 VIEW: CPT

## 2021-10-11 PROCEDURE — 84132 ASSAY OF SERUM POTASSIUM: CPT

## 2021-10-11 PROCEDURE — 85610 PROTHROMBIN TIME: CPT

## 2021-10-11 PROCEDURE — 85018 HEMOGLOBIN: CPT

## 2021-10-11 PROCEDURE — 85025 COMPLETE CBC W/AUTO DIFF WBC: CPT

## 2021-10-11 PROCEDURE — 83605 ASSAY OF LACTIC ACID: CPT

## 2021-10-11 PROCEDURE — 80053 COMPREHEN METABOLIC PANEL: CPT

## 2021-10-11 PROCEDURE — 84295 ASSAY OF SERUM SODIUM: CPT

## 2021-10-11 PROCEDURE — 82947 ASSAY GLUCOSE BLOOD QUANT: CPT

## 2021-10-11 PROCEDURE — 82330 ASSAY OF CALCIUM: CPT

## 2021-10-11 PROCEDURE — 81001 URINALYSIS AUTO W/SCOPE: CPT

## 2021-10-11 PROCEDURE — 96374 THER/PROPH/DIAG INJ IV PUSH: CPT

## 2021-10-11 PROCEDURE — 99284 EMERGENCY DEPT VISIT MOD MDM: CPT | Mod: 25

## 2021-10-11 PROCEDURE — 93005 ELECTROCARDIOGRAM TRACING: CPT

## 2021-10-11 RX ORDER — SODIUM CHLORIDE 9 MG/ML
1000 INJECTION INTRAMUSCULAR; INTRAVENOUS; SUBCUTANEOUS ONCE
Refills: 0 | Status: COMPLETED | OUTPATIENT
Start: 2021-10-11 | End: 2021-10-11

## 2021-10-11 RX ORDER — CEFTRIAXONE 500 MG/1
1000 INJECTION, POWDER, FOR SOLUTION INTRAMUSCULAR; INTRAVENOUS ONCE
Refills: 0 | Status: COMPLETED | OUTPATIENT
Start: 2021-10-11 | End: 2021-10-11

## 2021-10-11 RX ORDER — CEFUROXIME AXETIL 250 MG
1 TABLET ORAL
Qty: 14 | Refills: 0
Start: 2021-10-11 | End: 2021-10-17

## 2021-10-11 RX ADMIN — SODIUM CHLORIDE 1000 MILLILITER(S): 9 INJECTION INTRAMUSCULAR; INTRAVENOUS; SUBCUTANEOUS at 00:34

## 2021-10-11 RX ADMIN — Medication 3 MILLILITER(S): at 00:34

## 2021-10-11 RX ADMIN — CEFTRIAXONE 100 MILLIGRAM(S): 500 INJECTION, POWDER, FOR SOLUTION INTRAMUSCULAR; INTRAVENOUS at 02:48

## 2021-10-11 RX ADMIN — SODIUM CHLORIDE 1000 MILLILITER(S): 9 INJECTION INTRAMUSCULAR; INTRAVENOUS; SUBCUTANEOUS at 02:48

## 2021-10-11 NOTE — ED ADULT NURSE REASSESSMENT NOTE - NS ED NURSE REASSESS COMMENT FT1
Call from tele glory room, pt said to be in 2nd degree heart block repeat EKG completed and given to Provider Abhishek. Pt denies any distress at this time. VSS.

## 2021-10-11 NOTE — ED ADULT NURSE NOTE - OBJECTIVE STATEMENT
61y M hx MS, seizures GERD presents w/ urinary symptoms, fever and cough. since yesterday pt began having fevers (tmax 100.6) controlled with Tylenol. pt says he has been having to urinate more frequently. brought in by EMS covered in foul smelling urine. pt also endorsing cough for a few days but worse last night and states "last night was bad it kept me up all night." septic w/u ordered. 2L NS hung and labs sent. pt on cardiac monitor pending dispo.

## 2021-10-12 LAB
CULTURE RESULTS: SIGNIFICANT CHANGE UP
SPECIMEN SOURCE: SIGNIFICANT CHANGE UP

## 2021-10-16 LAB
CULTURE RESULTS: SIGNIFICANT CHANGE UP
CULTURE RESULTS: SIGNIFICANT CHANGE UP
SPECIMEN SOURCE: SIGNIFICANT CHANGE UP
SPECIMEN SOURCE: SIGNIFICANT CHANGE UP

## 2022-03-28 ENCOUNTER — RX RENEWAL (OUTPATIENT)
Age: 62
End: 2022-03-28

## 2022-07-08 ENCOUNTER — INPATIENT (INPATIENT)
Facility: HOSPITAL | Age: 62
LOS: 5 days | Discharge: NURSING, REHAB OR LONG-TERM CARE | DRG: 872 | End: 2022-07-14
Attending: INTERNAL MEDICINE | Admitting: INTERNAL MEDICINE
Payer: MEDICARE

## 2022-07-08 VITALS
WEIGHT: 169.98 LBS | DIASTOLIC BLOOD PRESSURE: 82 MMHG | TEMPERATURE: 101 F | HEART RATE: 94 BPM | OXYGEN SATURATION: 98 % | SYSTOLIC BLOOD PRESSURE: 137 MMHG | RESPIRATION RATE: 18 BRPM | HEIGHT: 76 IN

## 2022-07-08 DIAGNOSIS — A41.9 SEPSIS, UNSPECIFIED ORGANISM: ICD-10-CM

## 2022-07-08 LAB
ALBUMIN SERPL ELPH-MCNC: 3.5 G/DL — SIGNIFICANT CHANGE UP (ref 3.3–5)
ALP SERPL-CCNC: 136 U/L — HIGH (ref 40–120)
ALT FLD-CCNC: 15 U/L — SIGNIFICANT CHANGE UP (ref 10–45)
ANION GAP SERPL CALC-SCNC: 11 MMOL/L — SIGNIFICANT CHANGE UP (ref 5–17)
APPEARANCE UR: CLEAR — SIGNIFICANT CHANGE UP
AST SERPL-CCNC: 26 U/L — SIGNIFICANT CHANGE UP (ref 10–40)
BACTERIA # UR AUTO: ABNORMAL
BASE EXCESS BLDV CALC-SCNC: 4.2 MMOL/L — HIGH (ref -2–2)
BASE EXCESS BLDV CALC-SCNC: 5.1 MMOL/L — HIGH (ref -2–2)
BASOPHILS # BLD AUTO: 0.06 K/UL — SIGNIFICANT CHANGE UP (ref 0–0.2)
BASOPHILS NFR BLD AUTO: 0.3 % — SIGNIFICANT CHANGE UP (ref 0–2)
BILIRUB SERPL-MCNC: 0.5 MG/DL — SIGNIFICANT CHANGE UP (ref 0.2–1.2)
BILIRUB UR-MCNC: NEGATIVE — SIGNIFICANT CHANGE UP
BLOOD GAS VENOUS - CREATININE: SIGNIFICANT CHANGE UP MG/DL (ref 0.5–1.3)
BLOOD GAS VENOUS - CREATININE: SIGNIFICANT CHANGE UP MG/DL (ref 0.5–1.3)
BUN SERPL-MCNC: 22 MG/DL — SIGNIFICANT CHANGE UP (ref 7–23)
CA-I SERPL-SCNC: 1.1 MMOL/L — LOW (ref 1.15–1.33)
CA-I SERPL-SCNC: 1.12 MMOL/L — LOW (ref 1.15–1.33)
CALCIUM SERPL-MCNC: 8.9 MG/DL — SIGNIFICANT CHANGE UP (ref 8.4–10.5)
CHLORIDE BLDV-SCNC: 102 MMOL/L — SIGNIFICANT CHANGE UP (ref 96–108)
CHLORIDE BLDV-SCNC: 102 MMOL/L — SIGNIFICANT CHANGE UP (ref 96–108)
CHLORIDE SERPL-SCNC: 102 MMOL/L — SIGNIFICANT CHANGE UP (ref 96–108)
CO2 BLDV-SCNC: 31 MMOL/L — HIGH (ref 22–26)
CO2 BLDV-SCNC: 33 MMOL/L — HIGH (ref 22–26)
CO2 SERPL-SCNC: 24 MMOL/L — SIGNIFICANT CHANGE UP (ref 22–31)
COLOR SPEC: SIGNIFICANT CHANGE UP
CREAT SERPL-MCNC: 0.63 MG/DL — SIGNIFICANT CHANGE UP (ref 0.5–1.3)
DIFF PNL FLD: ABNORMAL
EGFR: 106 ML/MIN/1.73M2 — SIGNIFICANT CHANGE UP
EOSINOPHIL # BLD AUTO: 0.58 K/UL — HIGH (ref 0–0.5)
EOSINOPHIL NFR BLD AUTO: 3.1 % — SIGNIFICANT CHANGE UP (ref 0–6)
EPI CELLS # UR: 0 /HPF — SIGNIFICANT CHANGE UP
FLUAV AG NPH QL: SIGNIFICANT CHANGE UP
FLUBV AG NPH QL: SIGNIFICANT CHANGE UP
GAS PNL BLDV: 132 MMOL/L — LOW (ref 136–145)
GAS PNL BLDV: 133 MMOL/L — LOW (ref 136–145)
GAS PNL BLDV: SIGNIFICANT CHANGE UP
GLUCOSE BLDV-MCNC: 110 MG/DL — HIGH (ref 70–99)
GLUCOSE BLDV-MCNC: 84 MG/DL — SIGNIFICANT CHANGE UP (ref 70–99)
GLUCOSE SERPL-MCNC: 84 MG/DL — SIGNIFICANT CHANGE UP (ref 70–99)
GLUCOSE UR QL: NEGATIVE — SIGNIFICANT CHANGE UP
HCO3 BLDV-SCNC: 29 MMOL/L — SIGNIFICANT CHANGE UP (ref 22–29)
HCO3 BLDV-SCNC: 32 MMOL/L — HIGH (ref 22–29)
HCT VFR BLD CALC: 45.1 % — SIGNIFICANT CHANGE UP (ref 39–50)
HCT VFR BLDA CALC: 41 % — SIGNIFICANT CHANGE UP (ref 39–51)
HCT VFR BLDA CALC: 45 % — SIGNIFICANT CHANGE UP (ref 39–51)
HGB BLD CALC-MCNC: 13.7 G/DL — SIGNIFICANT CHANGE UP (ref 12.6–17.4)
HGB BLD CALC-MCNC: 15 G/DL — SIGNIFICANT CHANGE UP (ref 12.6–17.4)
HGB BLD-MCNC: 14.7 G/DL — SIGNIFICANT CHANGE UP (ref 13–17)
HYALINE CASTS # UR AUTO: 5 /LPF — HIGH (ref 0–2)
IMM GRANULOCYTES NFR BLD AUTO: 0.5 % — SIGNIFICANT CHANGE UP (ref 0–1.5)
KETONES UR-MCNC: NEGATIVE — SIGNIFICANT CHANGE UP
LACTATE BLDV-MCNC: 0.8 MMOL/L — SIGNIFICANT CHANGE UP (ref 0.7–2)
LACTATE BLDV-MCNC: 2.2 MMOL/L — HIGH (ref 0.7–2)
LEUKOCYTE ESTERASE UR-ACNC: ABNORMAL
LYMPHOCYTES # BLD AUTO: 0.65 K/UL — LOW (ref 1–3.3)
LYMPHOCYTES # BLD AUTO: 3.5 % — LOW (ref 13–44)
MCHC RBC-ENTMCNC: 30.8 PG — SIGNIFICANT CHANGE UP (ref 27–34)
MCHC RBC-ENTMCNC: 32.6 GM/DL — SIGNIFICANT CHANGE UP (ref 32–36)
MCV RBC AUTO: 94.5 FL — SIGNIFICANT CHANGE UP (ref 80–100)
MONOCYTES # BLD AUTO: 1.48 K/UL — HIGH (ref 0–0.9)
MONOCYTES NFR BLD AUTO: 8 % — SIGNIFICANT CHANGE UP (ref 2–14)
NEUTROPHILS # BLD AUTO: 15.65 K/UL — HIGH (ref 1.8–7.4)
NEUTROPHILS NFR BLD AUTO: 84.6 % — HIGH (ref 43–77)
NITRITE UR-MCNC: POSITIVE
NRBC # BLD: 0 /100 WBCS — SIGNIFICANT CHANGE UP (ref 0–0)
PCO2 BLDV: 44 MMHG — SIGNIFICANT CHANGE UP (ref 42–55)
PCO2 BLDV: 52 MMHG — SIGNIFICANT CHANGE UP (ref 42–55)
PH BLDV: 7.39 — SIGNIFICANT CHANGE UP (ref 7.32–7.43)
PH BLDV: 7.43 — SIGNIFICANT CHANGE UP (ref 7.32–7.43)
PH UR: 6 — SIGNIFICANT CHANGE UP (ref 5–8)
PLATELET # BLD AUTO: 232 K/UL — SIGNIFICANT CHANGE UP (ref 150–400)
PO2 BLDV: 39 MMHG — SIGNIFICANT CHANGE UP (ref 25–45)
PO2 BLDV: 58 MMHG — HIGH (ref 25–45)
POTASSIUM BLDV-SCNC: 4.8 MMOL/L — SIGNIFICANT CHANGE UP (ref 3.5–5.1)
POTASSIUM BLDV-SCNC: 5.4 MMOL/L — HIGH (ref 3.5–5.1)
POTASSIUM SERPL-MCNC: 5 MMOL/L — SIGNIFICANT CHANGE UP (ref 3.5–5.3)
POTASSIUM SERPL-SCNC: 5 MMOL/L — SIGNIFICANT CHANGE UP (ref 3.5–5.3)
PROT SERPL-MCNC: 6.6 G/DL — SIGNIFICANT CHANGE UP (ref 6–8.3)
PROT UR-MCNC: NEGATIVE — SIGNIFICANT CHANGE UP
RBC # BLD: 4.77 M/UL — SIGNIFICANT CHANGE UP (ref 4.2–5.8)
RBC # FLD: 13.6 % — SIGNIFICANT CHANGE UP (ref 10.3–14.5)
RBC CASTS # UR COMP ASSIST: 5 /HPF — HIGH (ref 0–4)
RSV RNA NPH QL NAA+NON-PROBE: SIGNIFICANT CHANGE UP
SAO2 % BLDV: 65.1 % — LOW (ref 67–88)
SAO2 % BLDV: 90.9 % — HIGH (ref 67–88)
SARS-COV-2 RNA SPEC QL NAA+PROBE: SIGNIFICANT CHANGE UP
SODIUM SERPL-SCNC: 137 MMOL/L — SIGNIFICANT CHANGE UP (ref 135–145)
SP GR SPEC: 1.02 — SIGNIFICANT CHANGE UP (ref 1.01–1.02)
UROBILINOGEN FLD QL: NEGATIVE — SIGNIFICANT CHANGE UP
WBC # BLD: 18.51 K/UL — HIGH (ref 3.8–10.5)
WBC # FLD AUTO: 18.51 K/UL — HIGH (ref 3.8–10.5)
WBC UR QL: 118 /HPF — HIGH (ref 0–5)

## 2022-07-08 PROCEDURE — 71045 X-RAY EXAM CHEST 1 VIEW: CPT | Mod: 26

## 2022-07-08 PROCEDURE — 99285 EMERGENCY DEPT VISIT HI MDM: CPT | Mod: CS

## 2022-07-08 RX ORDER — PANTOPRAZOLE SODIUM 20 MG/1
40 TABLET, DELAYED RELEASE ORAL
Refills: 0 | Status: DISCONTINUED | OUTPATIENT
Start: 2022-07-08 | End: 2022-07-14

## 2022-07-08 RX ORDER — CEFTRIAXONE 500 MG/1
1000 INJECTION, POWDER, FOR SOLUTION INTRAMUSCULAR; INTRAVENOUS EVERY 24 HOURS
Refills: 0 | Status: DISCONTINUED | OUTPATIENT
Start: 2022-07-08 | End: 2022-07-12

## 2022-07-08 RX ORDER — PHENOBARBITAL 60 MG
32.4 TABLET ORAL AT BEDTIME
Refills: 0 | Status: DISCONTINUED | OUTPATIENT
Start: 2022-07-08 | End: 2022-07-14

## 2022-07-08 RX ORDER — TAMSULOSIN HYDROCHLORIDE 0.4 MG/1
0.4 CAPSULE ORAL AT BEDTIME
Refills: 0 | Status: DISCONTINUED | OUTPATIENT
Start: 2022-07-08 | End: 2022-07-14

## 2022-07-08 RX ORDER — BACLOFEN 100 %
5 POWDER (GRAM) MISCELLANEOUS EVERY 8 HOURS
Refills: 0 | Status: DISCONTINUED | OUTPATIENT
Start: 2022-07-08 | End: 2022-07-14

## 2022-07-08 RX ORDER — OXYBUTYNIN CHLORIDE 5 MG
5 TABLET ORAL
Refills: 0 | Status: DISCONTINUED | OUTPATIENT
Start: 2022-07-08 | End: 2022-07-14

## 2022-07-08 RX ORDER — HEPARIN SODIUM 5000 [USP'U]/ML
5000 INJECTION INTRAVENOUS; SUBCUTANEOUS EVERY 12 HOURS
Refills: 0 | Status: DISCONTINUED | OUTPATIENT
Start: 2022-07-08 | End: 2022-07-14

## 2022-07-08 RX ORDER — FUROSEMIDE 40 MG
20 TABLET ORAL DAILY
Refills: 0 | Status: DISCONTINUED | OUTPATIENT
Start: 2022-07-08 | End: 2022-07-14

## 2022-07-08 RX ORDER — SENNA PLUS 8.6 MG/1
2 TABLET ORAL AT BEDTIME
Refills: 0 | Status: DISCONTINUED | OUTPATIENT
Start: 2022-07-08 | End: 2022-07-14

## 2022-07-08 RX ORDER — ONDANSETRON 8 MG/1
4 TABLET, FILM COATED ORAL ONCE
Refills: 0 | Status: COMPLETED | OUTPATIENT
Start: 2022-07-08 | End: 2022-07-08

## 2022-07-08 RX ORDER — SERTRALINE 25 MG/1
50 TABLET, FILM COATED ORAL DAILY
Refills: 0 | Status: DISCONTINUED | OUTPATIENT
Start: 2022-07-08 | End: 2022-07-14

## 2022-07-08 RX ORDER — CEFTRIAXONE 500 MG/1
1000 INJECTION, POWDER, FOR SOLUTION INTRAMUSCULAR; INTRAVENOUS ONCE
Refills: 0 | Status: COMPLETED | OUTPATIENT
Start: 2022-07-08 | End: 2022-07-08

## 2022-07-08 RX ORDER — MESALAMINE 400 MG
400 TABLET, DELAYED RELEASE (ENTERIC COATED) ORAL DAILY
Refills: 0 | Status: DISCONTINUED | OUTPATIENT
Start: 2022-07-08 | End: 2022-07-08

## 2022-07-08 RX ORDER — ACETAMINOPHEN 500 MG
1000 TABLET ORAL ONCE
Refills: 0 | Status: COMPLETED | OUTPATIENT
Start: 2022-07-08 | End: 2022-07-08

## 2022-07-08 RX ADMIN — Medication 1000 MILLIGRAM(S): at 16:20

## 2022-07-08 RX ADMIN — SENNA PLUS 2 TABLET(S): 8.6 TABLET ORAL at 22:42

## 2022-07-08 RX ADMIN — TAMSULOSIN HYDROCHLORIDE 0.4 MILLIGRAM(S): 0.4 CAPSULE ORAL at 22:42

## 2022-07-08 RX ADMIN — CEFTRIAXONE 100 MILLIGRAM(S): 500 INJECTION, POWDER, FOR SOLUTION INTRAMUSCULAR; INTRAVENOUS at 16:04

## 2022-07-08 RX ADMIN — CEFTRIAXONE 1000 MILLIGRAM(S): 500 INJECTION, POWDER, FOR SOLUTION INTRAMUSCULAR; INTRAVENOUS at 17:00

## 2022-07-08 RX ADMIN — Medication 1000 MILLIGRAM(S): at 17:30

## 2022-07-08 RX ADMIN — Medication 5 MILLIGRAM(S): at 22:42

## 2022-07-08 RX ADMIN — ONDANSETRON 4 MILLIGRAM(S): 8 TABLET, FILM COATED ORAL at 16:03

## 2022-07-08 RX ADMIN — Medication 400 MILLIGRAM(S): at 16:04

## 2022-07-08 NOTE — ED ADULT NURSE NOTE - INTERVENTIONS DEFINITIONS
[place] to call system/Call bell, personal items and telephone within reach/Instruct patient to call for assistance/Physically safe environment: no spills, clutter or unnecessary equipment/Stretcher in lowest position, wheels locked, appropriate side rails in place/Provide visual cue, wrist band, yellow gown, etc./Monitor for mental status changes and reorient to person, place, and time/Provide visual clues: red socks

## 2022-07-08 NOTE — ED ADULT NURSE REASSESSMENT NOTE - NS ED NURSE REASSESS COMMENT FT1
pt resting comfortably on stretcher in no distress. Safety and comfort measures provided, bed locked and in lowest position, side rails up for safety. Call bell within reach. Awaiting admitting bed

## 2022-07-08 NOTE — ED ADULT TRIAGE NOTE - HAVE YOU HAD A FIRST COVID-19 BOOSTER?
----- Message from JACKLYN Cedillo sent at 4/27/2022 12:38 PM EDT -----  1. No significant laryngeal penetration or aspiration identified.  2. 13 mm barium tablet becomes lodged in the vallecula momentarily before passing into the stomach-recommend referral to GI for the abnormal barium swallow  
Phoned patient and told her results, she states she does not want to see another doctor at this time, she will let us know if she changes her mind.  
Yes

## 2022-07-08 NOTE — ED ADULT TRIAGE NOTE - SOURCE OF INFORMATION
Patient Eye Protection Verbiage: Before proceeding with the stage, a plastic scleral shield was inserted. The globe was anesthetized with a few drops of 1% lidocaine with 1:100,000 epinephrine. Then, an appropriate sized scleral shield was chosen and coated with lacrilube ointment. The shield was gently inserted and left in place for the duration of each stage. After the stage was completed, the shield was gently removed.

## 2022-07-08 NOTE — H&P ADULT - NSHPPHYSICALEXAM_GEN_ALL_CORE
PHYSICAL EXAMINATION:  Vital Signs Last 24 Hrs  T(C): 36.7 (08 Jul 2022 20:35), Max: 38.6 (08 Jul 2022 15:54)  T(F): 98 (08 Jul 2022 20:35), Max: 101.5 (08 Jul 2022 15:54)  HR: 83 (08 Jul 2022 20:35) (83 - 96)  BP: 135/83 (08 Jul 2022 20:35) (120/79 - 151/85)  BP(mean): 104 (08 Jul 2022 15:53) (104 - 104)  RR: 18 (08 Jul 2022 20:35) (18 - 21)  SpO2: 98% (08 Jul 2022 20:35) (93% - 98%)    Parameters below as of 08 Jul 2022 20:35  Patient On (Oxygen Delivery Method): nasal cannula  O2 Flow (L/min): 2    CAPILLARY BLOOD GLUCOSE            GENERAL: NAD, well-groomed,  HEAD:  atraumatic, normocephalic  EYES: sclera anicteric  ENMT: mucous membranes moist  NECK: supple, No JVD  CHEST/LUNG: clear to auscultation bilaterally;    no      rales   ,   no rhonchi,   HEART: normal S1, S2  ABDOMEN: BS+, soft, ND, NT   EXTREMITIES:    no    edema    b/l LEs  NEURO: awake, ,     moves all extremities  SKIN: no     rash

## 2022-07-08 NOTE — ED PROVIDER NOTE - PHYSICAL EXAMINATION
LOS:     VITALS:   T(C): 38.6 (07-08-22 @ 15:54), Max: 38.6 (07-08-22 @ 15:54)  HR: 95 (07-08-22 @ 15:53) (94 - 95)  BP: 151/85 (07-08-22 @ 15:53) (137/82 - 151/85)  RR: 21 (07-08-22 @ 15:53) (18 - 21)  SpO2: 98% (07-08-22 @ 15:54) (93% - 98%)    GENERAL: mildly ill appearing  HEAD:  Atraumatic, Normocephalic  EYES: EOMI, PERRLA, conjunctiva and sclera clear  ENT: Moist mucous membranes  NECK: Supple, No JVD  CHEST/LUNG: Clear to auscultation bilaterally; No rales, rhonchi, wheezing, or rubs. Unlabored respirations  HEART: Regular rate and rhythm; No murmurs, rubs, or gallops  ABDOMEN: BSx4; Soft, nontender, nondistended  EXTREMITIES:  2+ Peripheral Pulses, brisk capillary refill. No clubbing, cyanosis, or edema  NERVOUS SYSTEM:  A&Ox3, no focal deficits   SKIN: No rashes or lesions

## 2022-07-08 NOTE — ED ADULT NURSE NOTE - OBJECTIVE STATEMENT
61 y/o Male presenting to the ED by EMS from home, A&Ox3, complaining of fever, lethargy and burning with urination. Pt hx of multiple UTI and reoprts this feels exactly like the past ones. Pt incontinent, in a diaper. Pt hx of MS and is bed bound for the past year. Pt endorses nausea and SOB when coughing or moving. Pt 93% on RA, placed on 2L of O2 NC and now 98%. Pt reports constipation for the past 3 days. Pt denies CP, dizziness, vomiting, diarrhea, hematuria, recent falls, dizziness. Pt well appearing in no current distress. Pt on cardiac monitor and continuous pulse ox. Abdomen soft, nontender, nondistended. urine noted in diaper dark and foul smelling, no hematuria noted. Pt warm to touch, rectal temperature taken and found to be febrile 101.5. Safety and comfort measures provided, bed locked and in lowest position, side rails up for safety. Call bell within reach. Red socks in place. Awaiting results.

## 2022-07-08 NOTE — ED PROVIDER NOTE - OBJECTIVE STATEMENT
61 yo man with MS (bed bound), UC, GERD, BPH, panic disorder, presents to ED with fever and chills since 12pm on 7/8. Reports he's been "constantly urinating" since earlier today and has a history of frequent UTIs, thus called EMS to take the pt to the ED. Denies sore throat, sick contacts, CP, new SOB, new abd pain.

## 2022-07-08 NOTE — ED PROVIDER NOTE - NS ED ROS FT
REVIEW OF SYSTEMS:    CONSTITUTIONAL: +weakness, fevers and chills  EYES: No visual changes; no sclera icterics, no pain or drainage  ENT:  No vertigo or throat pain   NECK: No pain or stiffness  RESPIRATORY: No cough, wheezing, hemoptysis  CARDIOVASCULAR: No chest pain or palpitations  GASTROINTESTINAL: No abdominal or epigastric pain. No nausea, vomiting, or hematemesis  GENITOURINARY: +frequency  NEUROLOGICAL: No numbness   SKIN: No itching, rashes  Psych: No depression

## 2022-07-08 NOTE — H&P ADULT - HISTORY OF PRESENT ILLNESS
61 yo man     ith MS (bed bound), UC, GERD, BPH, panic disorder,        presents to ED with fever and chills since 12pm on 7/8.      Reports he's been "constantly urinating" since earlier today     and has a history of frequent UTIs    , thus called EMS to take the pt to the ED.     Denies sore throat, sick contacts, CP, new SOB, new abd pain.

## 2022-07-08 NOTE — ED PROVIDER NOTE - ATTENDING CONTRIBUTION TO CARE
I, Ritesh Womack, performed a history and physical exam of the patient and discussed their management with the resident and/or advanced care provider. I reviewed the resident and/or advanced care provider's note and agree with the documented findings and plan of care. I was present and available for all procedures.    61 yo man with MS (bed bound), UC, GERD, BPH, panic disorder, presents to ED with fever and chills since 12pm on 7/8. Reports he's been "constantly urinating" since earlier today and has a history of frequent UTIs, thus called EMS to take the pt to the ED. Denies recent trauma, chills, headache, dizziness, nausea, vomiting hematuria, diarrhea, constipation, chest pain, shortness of breath, cough.    Well appearing and in NAD, head normal appearing atraumatic, trachea midline, no respiratory distress, lungs cta bilaterally, rrr no murmurs, soft NT ND abdomen, no visible extremity deformities, Alert and oriented, non focal neuro exam, skin warm and dry, normal affect and mood, bilateral le edema without pitting or calf ttp     concerning for urosepsis febrile normo tensive slight hypoxia with likely uti otherwise well appearing needs abx screening labs admit unlikely septic stone, no abd ttp or shortness of breath to suggest need for further imaging at this time. hold fluids as I believe this patient is euvolemic at this time and not in septic shock with chf hx.

## 2022-07-08 NOTE — CONSULT NOTE ADULT - SUBJECTIVE AND OBJECTIVE BOX
CHIEF COMPLAINT:Patient is a 62y old  Male who presents with a chief complaint of fevrs (08 Jul 2022 20:53)      HPI:   63 yo man ith MS (bed bound), UC, GERD, BPH, panic disorder, presents to ED with fever and chills since 12pm on 7/8.      Reports he's been "constantly urinating" since earlier today     and has a history of frequent UTIs  pt was found to have le edema with ?hx of hfpef.,  no hx of cad.    PAST MEDICAL & SURGICAL HISTORY:  Colitis      BPH (benign prostatic hypertrophy)      GERD (gastroesophageal reflux disease)      Seizures      Multiple sclerosis      No significant past surgical history          MEDICATIONS  (STANDING):  baclofen 5 milliGRAM(s) Oral every 8 hours  cefTRIAXone   IVPB 1000 milliGRAM(s) IV Intermittent every 24 hours  furosemide    Tablet 20 milliGRAM(s) Oral daily  heparin   Injectable 5000 Unit(s) SubCutaneous every 12 hours  oxybutynin 5 milliGRAM(s) Oral two times a day  pantoprazole    Tablet 40 milliGRAM(s) Oral before breakfast  PHENobarbital 32.4 milliGRAM(s) Oral at bedtime  senna 2 Tablet(s) Oral at bedtime  sertraline 50 milliGRAM(s) Oral daily  tamsulosin 0.4 milliGRAM(s) Oral at bedtime    MEDICATIONS  (PRN):      FAMILY HISTORY:      SOCIAL HISTORY:    [x ] Non-smoker  [ ] Smoker  [ ] Alcohol    Allergies    No Known Allergies    Intolerances    	    REVIEW OF SYSTEMS:  CONSTITUTIONAL: No fever, weight loss, or fatigue  EYES: No eye pain, visual disturbances, or discharge  ENT:  No difficulty hearing, tinnitus, vertigo; No sinus or throat pain  NECK: No pain or stiffness  RESPIRATORY: No cough, wheezing, chills or hemoptysis; No Shortness of Breath  CARDIOVASCULAR: No chest pain, palpitations, passing out, dizziness, + leg swelling  GASTROINTESTINAL: No abdominal or epigastric pain. No nausea, vomiting, or hematemesis; No diarrhea or constipation. No melena or hematochezia.  GENITOURINARY: No dysuria, frequency, hematuria, or incontinence  NEUROLOGICAL: No headaches, memory loss, loss of strength, numbness, or tremors  SKIN: No itching, burning, rashes, or lesions   LYMPH Nodes: No enlarged glands  ENDOCRINE: No heat or cold intolerance; No hair loss  MUSCULOSKELETAL: No joint pain or swelling; No muscle, back, or extremity pain  PSYCHIATRIC: No depression, anxiety, mood swings, or difficulty sleeping  HEME/LYMPH: No easy bruising, or bleeding gums  ALLERGY AND IMMUNOLOGIC: No hives or eczema	    [ ] All others negative	  [ ] Unable to obtain    PHYSICAL EXAM:  T(C): 36.7 (07-08-22 @ 20:35), Max: 38.6 (07-08-22 @ 15:54)  HR: 83 (07-08-22 @ 20:35) (83 - 96)  BP: 135/83 (07-08-22 @ 20:35) (120/79 - 151/85)  RR: 18 (07-08-22 @ 20:35) (18 - 21)  SpO2: 98% (07-08-22 @ 20:35) (93% - 98%)  Wt(kg): --  I&O's Summary      Appearance: Normal	  HEENT:   Normal oral mucosa, PERRL, EOMI	  Lymphatic: No lymphadenopathy  Cardiovascular: Normal S1 S2, No JVD, + murmurs, +edema  Respiratory: Lungs clear to auscultation	  Psychiatry: A & O x 3, Mood & affect appropriate  Gastrointestinal:  Soft, Non-tender, + BS	  Skin: No rashes, No ecchymoses, No cyanosis	  Neurologic: Non-focal  Extremities: Normal range of motion, No clubbing, cyanosis , + edema  Vascular: Peripheral pulses palpable 2+ bilaterally    TELEMETRY: 	    ECG:  	  RADIOLOGY:  OTHER: 	  	  LABS:	 	    CARDIAC MARKERS:                              14.7   18.51 )-----------( 232      ( 08 Jul 2022 15:58 )             45.1     07-08    137  |  102  |  22  ----------------------------<  84  5.0   |  24  |  0.63    Ca    8.9      08 Jul 2022 15:58    TPro  6.6  /  Alb  3.5  /  TBili  0.5  /  DBili  x   /  AST  26  /  ALT  15  /  AlkPhos  136<H>  07-08    proBNP:   Lipid Profile:   HgA1c:   TSH:       PREVIOUS DIAGNOSTIC TESTING:    < from: 12 Lead ECG (10.11.21 @ 07:24) >  Diagnosis Line SINUS RHYTHM WITH 1ST DEGREE A-V BLOCK  OTHERWISE NORMAL ECG  WHEN COMPARED WITH ECG OF 10-OCT-2021 23:48, (UNCONFIRMED)  NO SIGNIFICANT CHANGE WAS FOUND    < from: TTE with Doppler (w/Cont) (07.20.21 @ 23:02) >  Mitral Valve:Normal mitral valve.  Aortic Valve/Aorta: Normal aortic valve.  Normal aortic root.  Left Atrium: Normal left atrium.  Left Ventricle: Endocardial visualization enhanced with  intravenous injection of Ultrasonic Enhancing Agent  (Definity).  Normal left ventricular internal dimensions and wall  thicknesses.  Normal left ventricular systolic function. No segmental  wall motion abnormalities.  Right Heart: Normal right atrium. Normal right ventricular  size and systolic function.  Normal tricuspid valve. Normal pulmonic valve.  Pericardium/Pleura: Pericardial fat pad noted.  Hemodynamic: Estimated right atrial pressure is normal.  No evidence of pulmonary hypertension.  ------------------------------------------------------------------------  Conclusions:  Endocardial visualization enhanced with intravenous  injection of Ultrasonic Enhancing Agent (Definity).  Normal left ventricular systolic function. No segmental  wall motion abnormalities.

## 2022-07-08 NOTE — ED ADULT NURSE REASSESSMENT NOTE - NS ED NURSE REASSESS COMMENT FT1
Pt reports feeling slightly better at this time. Pt well appearing, in no current distress. Safety and comfort measures provided, bed locked and in lowest position, side rails up for safety. Call bell within reach.

## 2022-07-08 NOTE — H&P ADULT - NSHPLABSRESULTS_GEN_ALL_CORE
LABS:                        14.7   18.51 )-----------( 232      ( 2022 15:58 )             45.1         137  |  102  |  22  ----------------------------<  84  5.0   |  24  |  0.63    Ca    8.9      2022 15:58    TPro  6.6  /  Alb  3.5  /  TBili  0.5  /  DBili  x   /  AST  26  /  ALT  15  /  AlkPhos  136<H>            Urinalysis Basic - ( 2022 15:59 )    Color: Light Yellow / Appearance: Clear / S.021 / pH: x  Gluc: x / Ketone: Negative  / Bili: Negative / Urobili: Negative   Blood: x / Protein: Negative / Nitrite: Positive   Leuk Esterase: Large / RBC: 5 /hpf /  /HPF   Sq Epi: x / Non Sq Epi: 0 /hpf / Bacteria: Many           @ 18:00  4.8  58

## 2022-07-08 NOTE — ED PROVIDER NOTE - CLINICAL SUMMARY MEDICAL DECISION MAKING FREE TEXT BOX
63 yo man with MS (bed bound), UC, GERD, BPH, panic disorder, presents to ED with fever and chills since 12pm on 7/8. Reports he's been "constantly urinating" since earlier today and has a history of frequent UTIs, thus called EMS to take the pt to the ED. Denies sore throat, sick contacts, CP, new SOB, new abd pain. Septic with WBC count, elevated lactate. To be admitted for IV abx.

## 2022-07-08 NOTE — ED ADULT NURSE REASSESSMENT NOTE - NS ED NURSE REASSESS COMMENT FT1
Patient straight cathed using sterile technique. Second RN at bedside to confirm sterility. Patient tolerated procedure well. Output of approximately 200 cc's of dark yellow urine. Sterile specimens collected and sent to lab. Pt tolerated well, successful after one attempt.

## 2022-07-09 LAB
ANION GAP SERPL CALC-SCNC: 12 MMOL/L — SIGNIFICANT CHANGE UP (ref 5–17)
BUN SERPL-MCNC: 16 MG/DL — SIGNIFICANT CHANGE UP (ref 7–23)
CALCIUM SERPL-MCNC: 8.6 MG/DL — SIGNIFICANT CHANGE UP (ref 8.4–10.5)
CHLORIDE SERPL-SCNC: 101 MMOL/L — SIGNIFICANT CHANGE UP (ref 96–108)
CO2 SERPL-SCNC: 27 MMOL/L — SIGNIFICANT CHANGE UP (ref 22–31)
CREAT SERPL-MCNC: 0.64 MG/DL — SIGNIFICANT CHANGE UP (ref 0.5–1.3)
EGFR: 106 ML/MIN/1.73M2 — SIGNIFICANT CHANGE UP
GLUCOSE SERPL-MCNC: 90 MG/DL — SIGNIFICANT CHANGE UP (ref 70–99)
HCT VFR BLD CALC: 40.5 % — SIGNIFICANT CHANGE UP (ref 39–50)
HGB BLD-MCNC: 13.6 G/DL — SIGNIFICANT CHANGE UP (ref 13–17)
MCHC RBC-ENTMCNC: 31.3 PG — SIGNIFICANT CHANGE UP (ref 27–34)
MCHC RBC-ENTMCNC: 33.6 GM/DL — SIGNIFICANT CHANGE UP (ref 32–36)
MCV RBC AUTO: 93.1 FL — SIGNIFICANT CHANGE UP (ref 80–100)
NRBC # BLD: 0 /100 WBCS — SIGNIFICANT CHANGE UP (ref 0–0)
PLATELET # BLD AUTO: 225 K/UL — SIGNIFICANT CHANGE UP (ref 150–400)
POTASSIUM SERPL-MCNC: 3.5 MMOL/L — SIGNIFICANT CHANGE UP (ref 3.5–5.3)
POTASSIUM SERPL-SCNC: 3.5 MMOL/L — SIGNIFICANT CHANGE UP (ref 3.5–5.3)
RBC # BLD: 4.35 M/UL — SIGNIFICANT CHANGE UP (ref 4.2–5.8)
RBC # FLD: 13.8 % — SIGNIFICANT CHANGE UP (ref 10.3–14.5)
SODIUM SERPL-SCNC: 140 MMOL/L — SIGNIFICANT CHANGE UP (ref 135–145)
WBC # BLD: 15.09 K/UL — HIGH (ref 3.8–10.5)
WBC # FLD AUTO: 15.09 K/UL — HIGH (ref 3.8–10.5)

## 2022-07-09 PROCEDURE — 70450 CT HEAD/BRAIN W/O DYE: CPT | Mod: 26

## 2022-07-09 RX ORDER — HALOPERIDOL DECANOATE 100 MG/ML
1 INJECTION INTRAMUSCULAR EVERY 12 HOURS
Refills: 0 | Status: DISCONTINUED | OUTPATIENT
Start: 2022-07-09 | End: 2022-07-09

## 2022-07-09 RX ORDER — POLYETHYLENE GLYCOL 3350 17 G/17G
17 POWDER, FOR SOLUTION ORAL ONCE
Refills: 0 | Status: COMPLETED | OUTPATIENT
Start: 2022-07-09 | End: 2022-07-09

## 2022-07-09 RX ORDER — ACETAMINOPHEN 500 MG
650 TABLET ORAL ONCE
Refills: 0 | Status: COMPLETED | OUTPATIENT
Start: 2022-07-09 | End: 2022-07-09

## 2022-07-09 RX ADMIN — Medication 5 MILLIGRAM(S): at 21:05

## 2022-07-09 RX ADMIN — POLYETHYLENE GLYCOL 3350 17 GRAM(S): 17 POWDER, FOR SOLUTION ORAL at 20:44

## 2022-07-09 RX ADMIN — Medication 5 MILLIGRAM(S): at 06:20

## 2022-07-09 RX ADMIN — Medication 20 MILLIGRAM(S): at 06:20

## 2022-07-09 RX ADMIN — TAMSULOSIN HYDROCHLORIDE 0.4 MILLIGRAM(S): 0.4 CAPSULE ORAL at 21:05

## 2022-07-09 RX ADMIN — SENNA PLUS 2 TABLET(S): 8.6 TABLET ORAL at 21:05

## 2022-07-09 RX ADMIN — Medication 5 MILLIGRAM(S): at 06:21

## 2022-07-09 RX ADMIN — Medication 32.4 MILLIGRAM(S): at 21:05

## 2022-07-09 RX ADMIN — Medication 5 MILLIGRAM(S): at 17:40

## 2022-07-09 RX ADMIN — HEPARIN SODIUM 5000 UNIT(S): 5000 INJECTION INTRAVENOUS; SUBCUTANEOUS at 06:21

## 2022-07-09 RX ADMIN — CEFTRIAXONE 100 MILLIGRAM(S): 500 INJECTION, POWDER, FOR SOLUTION INTRAMUSCULAR; INTRAVENOUS at 16:44

## 2022-07-09 RX ADMIN — Medication 650 MILLIGRAM(S): at 20:44

## 2022-07-09 RX ADMIN — PANTOPRAZOLE SODIUM 40 MILLIGRAM(S): 20 TABLET, DELAYED RELEASE ORAL at 06:20

## 2022-07-09 RX ADMIN — Medication 650 MILLIGRAM(S): at 21:30

## 2022-07-09 RX ADMIN — Medication 5 MILLIGRAM(S): at 13:51

## 2022-07-09 RX ADMIN — SERTRALINE 50 MILLIGRAM(S): 25 TABLET, FILM COATED ORAL at 11:37

## 2022-07-09 RX ADMIN — HEPARIN SODIUM 5000 UNIT(S): 5000 INJECTION INTRAVENOUS; SUBCUTANEOUS at 17:39

## 2022-07-09 NOTE — PATIENT PROFILE ADULT - FALL HARM RISK - RISK INTERVENTIONS

## 2022-07-09 NOTE — PROGRESS NOTE ADULT - SUBJECTIVE AND OBJECTIVE BOX
CARDIOLOGY     PROGRESS  NOTE   ________________________________________________    CHIEF COMPLAINT:Patient is a 62y old  Male who presents with a chief complaint of fevrs (09 Jul 2022 07:50)  events noted.  	  REVIEW OF SYSTEMS:  CONSTITUTIONAL: No fever, weight loss, or fatigue  EYES: No eye pain, visual disturbances, or discharge  ENT:  No difficulty hearing, tinnitus, vertigo; No sinus or throat pain  NECK: No pain or stiffness  RESPIRATORY: No cough, wheezing, chills or hemoptysis; No Shortness of Breath  CARDIOVASCULAR: No chest pain, palpitations, passing out, dizziness, or leg swelling  GASTROINTESTINAL: No abdominal or epigastric pain. No nausea, vomiting, or hematemesis; No diarrhea or constipation. No melena or hematochezia.  GENITOURINARY: No dysuria, frequency, hematuria, or incontinence  NEUROLOGICAL: No headaches, memory loss, loss of strength, numbness, or tremors  SKIN: No itching, burning, rashes, or lesions   LYMPH Nodes: No enlarged glands  ENDOCRINE: No heat or cold intolerance; No hair loss  MUSCULOSKELETAL: No joint pain or swelling; No muscle, back, or extremity pain  PSYCHIATRIC: No depression, anxiety, mood swings, or difficulty sleeping  HEME/LYMPH: No easy bruising, or bleeding gums  ALLERGY AND IMMUNOLOGIC: No hives or eczema	    [ ] All others negative	  [ ] Unable to obtain    PHYSICAL EXAM:  T(C): 36.4 (07-09-22 @ 06:07), Max: 38.6 (07-08-22 @ 15:54)  HR: 80 (07-09-22 @ 06:07) (80 - 96)  BP: 128/80 (07-09-22 @ 06:07) (120/79 - 151/85)  RR: 18 (07-09-22 @ 06:07) (17 - 21)  SpO2: 98% (07-09-22 @ 06:07) (93% - 100%)  Wt(kg): --  I&O's Summary    09 Jul 2022 07:01  -  09 Jul 2022 09:58  --------------------------------------------------------  IN: 120 mL / OUT: 0 mL / NET: 120 mL        Appearance: Normal	  HEENT:   Normal oral mucosa, PERRL, EOMI	  Lymphatic: No lymphadenopathy  Cardiovascular: Normal S1 S2, No JVD, + murmurs, + edema  Respiratory: Lungs clear to auscultation	  Psychiatry: A & O x 3, Mood & affect appropriate  Gastrointestinal:  Soft, Non-tender, + BS	  Skin: No rashes, No ecchymoses, No cyanosis	  Extremities: Normal range of motion, No clubbing, cyanosis + trace edema  Vascular: Peripheral pulses palpable 2+ bilaterally    MEDICATIONS  (STANDING):  baclofen 5 milliGRAM(s) Oral every 8 hours  cefTRIAXone   IVPB 1000 milliGRAM(s) IV Intermittent every 24 hours  furosemide    Tablet 20 milliGRAM(s) Oral daily  heparin   Injectable 5000 Unit(s) SubCutaneous every 12 hours  oxybutynin 5 milliGRAM(s) Oral two times a day  pantoprazole    Tablet 40 milliGRAM(s) Oral before breakfast  PHENobarbital 32.4 milliGRAM(s) Oral at bedtime  senna 2 Tablet(s) Oral at bedtime  sertraline 50 milliGRAM(s) Oral daily  tamsulosin 0.4 milliGRAM(s) Oral at bedtime      TELEMETRY: 	    ECG:  	  RADIOLOGY:  OTHER: 	  	  LABS:	 	    CARDIAC MARKERS:                                13.6   15.09 )-----------( 225      ( 09 Jul 2022 07:13 )             40.5     07-09    140  |  101  |  16  ----------------------------<  90  3.5   |  27  |  0.64    Ca    8.6      09 Jul 2022 07:11    TPro  6.6  /  Alb  3.5  /  TBili  0.5  /  DBili  x   /  AST  26  /  ALT  15  /  AlkPhos  136<H>  07-08    proBNP:   Lipid Profile:   HgA1c:   TSH:   Culture - Urine (10.11.21 @ 03:17)    Specimen Source: Clean Catch Clean Catch (Midstream)    Culture Results:   >100,000 CFU/ml Coag Negative Staphylococcus "Susceptibilities not  performed"  >100,000 CFU/ml Streptococcus agalactiae (Group B) isolated  Group B streptococci are susceptible to ampicillin,  penicillin and cefazolin, but may be resistant to  erythromycin and clindamycin.  Recommendations for intrapartum prophylaxis for Group B  streptococci are penicillin or ampicillin.    Endocardial visualization enhanced with intravenous  injection of Ultrasonic Enhancing Agent (Definity).  Normal left ventricular systolic function. No segmental  wall motion abnormalities.      Assessment and plan  ---------------------------   61 yo man ith MS (bed bound), UC, GERD, BPH, panic disorder, presents to ED with fever and chills since 12pm on 7/8.      Reports he's been "constantly urinating" since earlier today     and has a history of frequent UTIs  pt was found to have le edema with ?hx of hfpef.,  no hx of cad.  pt was admitted last year with possible chf with bl le edema  will review old echo  agree with lasix  check pro bnp  lipid panel  dvt prophylaxis  abx for possible sepsis, fu cultures closely

## 2022-07-09 NOTE — PROGRESS NOTE ADULT - ASSESSMENT
62  yr    h/o  old  DVT, RA, UC, BPH (following PSA yearly), recurrent UTI, acid reflux, and multiple sclerosis     uses  wheelchair when he leaves the house.  has  leg weakness secondary to  MS dx  ini  2013 and follows with Dr. Paredes    , and has been on several disease modifying treatments / ocrelizumab infusions. / dalfampridine in the past but this was discontinued after he had a seizure.    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  His main issues with multiple sclerosis are right-sided weakness and incoordination,   b/l  legweakness,   and increased urinary frequency and urgency.       *  fevers,  from uti   on iv  Rocephin      wbc  was  18,000  on  arrival   *   progressive multiple sclerosis,  with  weakness.  falguni  legs    pt  with  confusion/ ?  dementia  ct  head   hematuria  in [place]    was  agitated overnight/  on   haldol    psych  eval     on dvt ppx       r            62  yr    h/o  old  DVT, RA, UC, BPH (following PSA yearly), recurrent UTI, acid reflux, and multiple sclerosis     uses  wheelchair when he leaves the house.  has  leg weakness secondary to  MS dx  ini  2013 and follows with Dr. Paredes    , and has been on several disease modifying treatments / ocrelizumab infusions. / dalfampridine in the past but this was discontinued after he had a seizure.    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  His main issues with multiple sclerosis are right-sided weakness and incoordination,   b/l  legweakness,   and increased urinary frequency and urgency.       *  fevers,  from uti   on iv  Rocephin      wbc  was  18,000  on  arrival   *   progressive multiple sclerosis,  with  weakness.  falguni  legs    pt  with  confusion/ ?  dementia  ct  head   hematuria  in [place]  on dvt ppx       r

## 2022-07-09 NOTE — PROGRESS NOTE ADULT - SUBJECTIVE AND OBJECTIVE BOX
afberile  REVIEW OF SYSTEMS:  GEN: no fever,    no chills  RESP: no SOB,   no cough  CVS: no chest pain,   no palpitations  GI: no abdominal pain,   no nausea,   no vomiting,   no constipation,   no diarrhea  : no dysuria,   no frequency  NEURO: no headache,   no dizziness  PSYCH: no depression,   not anxious  Derm : no rash    MEDICATIONS  (STANDING):  baclofen 5 milliGRAM(s) Oral every 8 hours  cefTRIAXone   IVPB 1000 milliGRAM(s) IV Intermittent every 24 hours  furosemide    Tablet 20 milliGRAM(s) Oral daily  heparin   Injectable 5000 Unit(s) SubCutaneous every 12 hours  oxybutynin 5 milliGRAM(s) Oral two times a day  pantoprazole    Tablet 40 milliGRAM(s) Oral before breakfast  PHENobarbital 32.4 milliGRAM(s) Oral at bedtime  senna 2 Tablet(s) Oral at bedtime  sertraline 50 milliGRAM(s) Oral daily  tamsulosin 0.4 milliGRAM(s) Oral at bedtime    MEDICATIONS  (PRN):      Vital Signs Last 24 Hrs  T(C): 36.4 (2022 06:07), Max: 38.6 (2022 15:54)  T(F): 97.6 (2022 06:07), Max: 101.5 (2022 15:54)  HR: 80 (2022 06:07) (80 - 96)  BP: 128/80 (2022 06:07) (120/79 - 151/85)  BP(mean): 104 (2022 15:53) (104 - 104)  RR: 18 (2022 06:07) (17 - 21)  SpO2: 98% (2022 06:07) (93% - 100%)    Parameters below as of 2022 06:07  Patient On (Oxygen Delivery Method): nasal cannula  O2 Flow (L/min): 2    CAPILLARY BLOOD GLUCOSE        I&O's Summary      PHYSICAL EXAM:  HEAD:  Atraumatic, Normocephalic  NECK: Supple, No   JVD  CHEST/LUNG:   no     rales,     no,    rhonchi  HEART: Regular rate and rhythm;         murmur  ABDOMEN: Soft, Nontender, ;   EXTREMITIES:    no    edema  NEUROLOGY:  alert    LABS:                        13.6   15.09 )-----------( 225      ( 2022 07:13 )             40.5     -    137  |  102  |  22  ----------------------------<  84  5.0   |  24  |  0.63    Ca    8.9      2022 15:58    TPro  6.6  /  Alb  3.5  /  TBili  0.5  /  DBili  x   /  AST  26  /  ALT  15  /  AlkPhos  136<H>  -08          Urinalysis Basic - ( 2022 15:59 )    Color: Light Yellow / Appearance: Clear / S.021 / pH: x  Gluc: x / Ketone: Negative  / Bili: Negative / Urobili: Negative   Blood: x / Protein: Negative / Nitrite: Positive   Leuk Esterase: Large / RBC: 5 /hpf /  /HPF   Sq Epi: x / Non Sq Epi: 0 /hpf / Bacteria: Many          -08 @ 18:00  4.8  58              Consultant(s) Notes Reviewed:      Care Discussed with Consultants/Other Providers:

## 2022-07-10 LAB
ANION GAP SERPL CALC-SCNC: 11 MMOL/L — SIGNIFICANT CHANGE UP (ref 5–17)
BUN SERPL-MCNC: 17 MG/DL — SIGNIFICANT CHANGE UP (ref 7–23)
CALCIUM SERPL-MCNC: 8.8 MG/DL — SIGNIFICANT CHANGE UP (ref 8.4–10.5)
CHLORIDE SERPL-SCNC: 99 MMOL/L — SIGNIFICANT CHANGE UP (ref 96–108)
CO2 SERPL-SCNC: 30 MMOL/L — SIGNIFICANT CHANGE UP (ref 22–31)
CREAT SERPL-MCNC: 0.74 MG/DL — SIGNIFICANT CHANGE UP (ref 0.5–1.3)
EGFR: 101 ML/MIN/1.73M2 — SIGNIFICANT CHANGE UP
GLUCOSE SERPL-MCNC: 102 MG/DL — HIGH (ref 70–99)
HCT VFR BLD CALC: 44.8 % — SIGNIFICANT CHANGE UP (ref 39–50)
HGB BLD-MCNC: 14.7 G/DL — SIGNIFICANT CHANGE UP (ref 13–17)
MAGNESIUM SERPL-MCNC: 1.8 MG/DL — SIGNIFICANT CHANGE UP (ref 1.6–2.6)
MCHC RBC-ENTMCNC: 30.4 PG — SIGNIFICANT CHANGE UP (ref 27–34)
MCHC RBC-ENTMCNC: 32.8 GM/DL — SIGNIFICANT CHANGE UP (ref 32–36)
MCV RBC AUTO: 92.8 FL — SIGNIFICANT CHANGE UP (ref 80–100)
NRBC # BLD: 0 /100 WBCS — SIGNIFICANT CHANGE UP (ref 0–0)
PHOSPHATE SERPL-MCNC: 2.6 MG/DL — SIGNIFICANT CHANGE UP (ref 2.5–4.5)
PLATELET # BLD AUTO: 218 K/UL — SIGNIFICANT CHANGE UP (ref 150–400)
POTASSIUM SERPL-MCNC: 3.6 MMOL/L — SIGNIFICANT CHANGE UP (ref 3.5–5.3)
POTASSIUM SERPL-SCNC: 3.6 MMOL/L — SIGNIFICANT CHANGE UP (ref 3.5–5.3)
RBC # BLD: 4.83 M/UL — SIGNIFICANT CHANGE UP (ref 4.2–5.8)
RBC # FLD: 13.5 % — SIGNIFICANT CHANGE UP (ref 10.3–14.5)
SODIUM SERPL-SCNC: 140 MMOL/L — SIGNIFICANT CHANGE UP (ref 135–145)
WBC # BLD: 8.6 K/UL — SIGNIFICANT CHANGE UP (ref 3.8–10.5)
WBC # FLD AUTO: 8.6 K/UL — SIGNIFICANT CHANGE UP (ref 3.8–10.5)

## 2022-07-10 RX ORDER — POLYETHYLENE GLYCOL 3350 17 G/17G
17 POWDER, FOR SOLUTION ORAL ONCE
Refills: 0 | Status: COMPLETED | OUTPATIENT
Start: 2022-07-10 | End: 2022-07-10

## 2022-07-10 RX ADMIN — Medication 5 MILLIGRAM(S): at 05:39

## 2022-07-10 RX ADMIN — HEPARIN SODIUM 5000 UNIT(S): 5000 INJECTION INTRAVENOUS; SUBCUTANEOUS at 05:39

## 2022-07-10 RX ADMIN — Medication 32.4 MILLIGRAM(S): at 22:19

## 2022-07-10 RX ADMIN — HEPARIN SODIUM 5000 UNIT(S): 5000 INJECTION INTRAVENOUS; SUBCUTANEOUS at 17:10

## 2022-07-10 RX ADMIN — Medication 5 MILLIGRAM(S): at 13:08

## 2022-07-10 RX ADMIN — TAMSULOSIN HYDROCHLORIDE 0.4 MILLIGRAM(S): 0.4 CAPSULE ORAL at 22:20

## 2022-07-10 RX ADMIN — POLYETHYLENE GLYCOL 3350 17 GRAM(S): 17 POWDER, FOR SOLUTION ORAL at 11:27

## 2022-07-10 RX ADMIN — SENNA PLUS 2 TABLET(S): 8.6 TABLET ORAL at 22:20

## 2022-07-10 RX ADMIN — Medication 5 MILLIGRAM(S): at 17:10

## 2022-07-10 RX ADMIN — CEFTRIAXONE 100 MILLIGRAM(S): 500 INJECTION, POWDER, FOR SOLUTION INTRAMUSCULAR; INTRAVENOUS at 15:58

## 2022-07-10 RX ADMIN — SERTRALINE 50 MILLIGRAM(S): 25 TABLET, FILM COATED ORAL at 11:27

## 2022-07-10 RX ADMIN — Medication 20 MILLIGRAM(S): at 05:39

## 2022-07-10 RX ADMIN — Medication 5 MILLIGRAM(S): at 22:25

## 2022-07-10 RX ADMIN — PANTOPRAZOLE SODIUM 40 MILLIGRAM(S): 20 TABLET, DELAYED RELEASE ORAL at 05:39

## 2022-07-10 NOTE — PROGRESS NOTE ADULT - ASSESSMENT
62  yr    h/o  old  DVT, RA, UC, BPH (following PSA yearly), recurrent UTI, acid reflux, and multiple sclerosis     uses  wheelchair when he leaves the house.  has  leg weakness secondary to  MS dx  ini  2013 and follows with Dr. Paredes    , and has been on several disease modifying treatments / ocrelizumab infusions. / dalfampridine in the past but this was discontinued after he had a seizure.    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  His main issues with multiple sclerosis are right-sided weakness and incoordination,   b/l  legweakness,   and increased urinary frequency and urgency.       *  fevers,  from uti   on iv  Rocephin      wbc  was  18,000  on  arrival   *   progressive multiple sclerosis,  with  weakness.  falguni  legs    pt  with  confusion/ ?  dementia  ct  head,  no acute  pathology// demyelination   hematuria  in [place]  on dvt ppx  bcx , negative/ ucx pending       r

## 2022-07-10 NOTE — PROGRESS NOTE ADULT - SUBJECTIVE AND OBJECTIVE BOX
afberile  REVIEW OF SYSTEMS:  GEN: no fever,    no chills  RESP: no SOB,   no cough  CVS: no chest pain,   no palpitations  GI: no abdominal pain,   no nausea,   no vomiting,   no constipation,   no diarrhea  : no dysuria,   no frequency  NEURO: no headache,   no dizziness  PSYCH: no depression,   not anxious  Derm : no rash    MEDICATIONS  (STANDING):  baclofen 5 milliGRAM(s) Oral every 8 hours  cefTRIAXone   IVPB 1000 milliGRAM(s) IV Intermittent every 24 hours  furosemide    Tablet 20 milliGRAM(s) Oral daily  heparin   Injectable 5000 Unit(s) SubCutaneous every 12 hours  oxybutynin 5 milliGRAM(s) Oral two times a day  pantoprazole    Tablet 40 milliGRAM(s) Oral before breakfast  PHENobarbital 32.4 milliGRAM(s) Oral at bedtime  senna 2 Tablet(s) Oral at bedtime  sertraline 50 milliGRAM(s) Oral daily  tamsulosin 0.4 milliGRAM(s) Oral at bedtime    MEDICATIONS  (PRN):      Vital Signs Last 24 Hrs  T(C): 36.8 (10 Jul 2022 05:20), Max: 38.3 (2022 20:17)  T(F): 98.3 (10 Jul 2022 05:20), Max: 100.9 (2022 20:17)  HR: 79 (10 Jul 2022 05:20) (79 - 86)  BP: 104/72 (10 Jul 2022 05:20) (104/72 - 120/75)  BP(mean): --  RR: 18 (10 Jul 2022 05:20) (18 - 18)  SpO2: 94% (10 Jul 2022 05:20) (94% - 97%)    Parameters below as of 10 Jul 2022 05:20  Patient On (Oxygen Delivery Method): nasal cannula  O2 Flow (L/min): 2    CAPILLARY BLOOD GLUCOSE        I&O's Summary    2022 07:01  -  10 Jul 2022 07:00  --------------------------------------------------------  IN: 1250 mL / OUT: 1800 mL / NET: -550 mL        PHYSICAL EXAM:  HEAD:  Atraumatic, Normocephalic  NECK: Supple, No   JVD  CHEST/LUNG:   no     rales,     no,    rhonchi  HEART: Regular rate and rhythm;         murmur  ABDOMEN: Soft, Nontender, ;   EXTREMITIES:     no   edema  NEUROLOGY:  alert    LABS:                        13.6   15.09 )-----------( 225      ( 2022 07:13 )             40.5     07-    140  |  101  |  16  ----------------------------<  90  3.5   |  27  |  0.64    Ca    8.6      2022 07:11    TPro  6.6  /  Alb  3.5  /  TBili  0.5  /  DBili  x   /  AST  26  /  ALT  15  /  AlkPhos  136<H>            Urinalysis Basic - ( 2022 15:59 )    Color: Light Yellow / Appearance: Clear / S.021 / pH: x  Gluc: x / Ketone: Negative  / Bili: Negative / Urobili: Negative   Blood: x / Protein: Negative / Nitrite: Positive   Leuk Esterase: Large / RBC: 5 /hpf /  /HPF   Sq Epi: x / Non Sq Epi: 0 /hpf / Bacteria: Many          - @ 18:00  4.8  58              Consultant(s) Notes Reviewed:      Care Discussed with Consultants/Other Providers:

## 2022-07-10 NOTE — PROGRESS NOTE ADULT - SUBJECTIVE AND OBJECTIVE BOX
CARDIOLOGY     PROGRESS  NOTE   ________________________________________________    CHIEF COMPLAINT:Patient is a 62y old  Male who presents with a chief complaint of fevrs (10 Jul 2022 08:59)  doing better.  	  REVIEW OF SYSTEMS:  CONSTITUTIONAL: No fever, weight loss, or fatigue  EYES: No eye pain, visual disturbances, or discharge  ENT:  No difficulty hearing, tinnitus, vertigo; No sinus or throat pain  NECK: No pain or stiffness  RESPIRATORY: No cough, wheezing, chills or hemoptysis; No Shortness of Breath  CARDIOVASCULAR: No chest pain, palpitations, passing out, dizziness, or leg swelling  GASTROINTESTINAL: No abdominal or epigastric pain. No nausea, vomiting, or hematemesis; No diarrhea or constipation. No melena or hematochezia.  GENITOURINARY: No dysuria, frequency, hematuria, or incontinence  NEUROLOGICAL: No headaches, memory loss, loss of strength, numbness, or tremors  SKIN: No itching, burning, rashes, or lesions   LYMPH Nodes: No enlarged glands  ENDOCRINE: No heat or cold intolerance; No hair loss  MUSCULOSKELETAL: No joint pain or swelling; No muscle, back, or extremity pain  PSYCHIATRIC: No depression, anxiety, mood swings, or difficulty sleeping  HEME/LYMPH: No easy bruising, or bleeding gums  ALLERGY AND IMMUNOLOGIC: No hives or eczema	    [ ] All others negative	  [ ] Unable to obtain    PHYSICAL EXAM:  T(C): 36.8 (07-10-22 @ 05:20), Max: 38.3 (07-09-22 @ 20:17)  HR: 79 (07-10-22 @ 05:20) (79 - 86)  BP: 104/72 (07-10-22 @ 05:20) (104/72 - 120/75)  RR: 18 (07-10-22 @ 05:20) (18 - 18)  SpO2: 94% (07-10-22 @ 05:20) (94% - 97%)  Wt(kg): --  I&O's Summary    09 Jul 2022 07:01  -  10 Jul 2022 07:00  --------------------------------------------------------  IN: 1250 mL / OUT: 1800 mL / NET: -550 mL        Appearance: Normal	  HEENT:   Normal oral mucosa, PERRL, EOMI	  Lymphatic: No lymphadenopathy  Cardiovascular: Normal S1 S2, No JVD, + murmurs, No edema  Respiratory: rhonchi  Psychiatry: A & O x 3, Mood & affect appropriate  Gastrointestinal:  Soft, Non-tender, + BS	  Skin: No rashes, No ecchymoses, No cyanosis	  Neurologic: Non-focal  Extremities: Normal range of motion, No clubbing, cyanosis or edema  Vascular: Peripheral pulses palpable 2+ bilaterally    MEDICATIONS  (STANDING):  baclofen 5 milliGRAM(s) Oral every 8 hours  cefTRIAXone   IVPB 1000 milliGRAM(s) IV Intermittent every 24 hours  furosemide    Tablet 20 milliGRAM(s) Oral daily  heparin   Injectable 5000 Unit(s) SubCutaneous every 12 hours  oxybutynin 5 milliGRAM(s) Oral two times a day  pantoprazole    Tablet 40 milliGRAM(s) Oral before breakfast  PHENobarbital 32.4 milliGRAM(s) Oral at bedtime  polyethylene glycol 3350 17 Gram(s) Oral once  senna 2 Tablet(s) Oral at bedtime  sertraline 50 milliGRAM(s) Oral daily  tamsulosin 0.4 milliGRAM(s) Oral at bedtime      TELEMETRY: 	    ECG:  	  RADIOLOGY:  OTHER: 	  	  LABS:	 	    CARDIAC MARKERS:                                14.7   8.60  )-----------( 218      ( 10 Jul 2022 09:30 )             44.8     07-10    140  |  99  |  17  ----------------------------<  102<H>  3.6   |  30  |  0.74    Ca    8.8      10 Jul 2022 09:30  Phos  2.6     07-10  Mg     1.8     07-10    TPro  6.6  /  Alb  3.5  /  TBili  0.5  /  DBili  x   /  AST  26  /  ALT  15  /  AlkPhos  136<H>  07-08    proBNP:   Lipid Profile:   HgA1c:   TSH:   Culture - Blood (07.08.22 @ 15:35)    Specimen Source: .Blood Blood-Peripheral    Culture Results:   No growth to date.    Culture - Urine (10.11.21 @ 03:17)    Specimen Source: Clean Catch Clean Catch (Midstream)    Culture Results:   >100,000 CFU/ml Coag Negative Staphylococcus "Susceptibilities not  performed"  >100,000 CFU/ml Streptococcus agalactiae (Group B) isolated  Group B streptococci are susceptible to ampicillin,  penicillin and cefazolin, but may be resistant to  erythromycin and clindamycin.  Recommendations for intrapartum prophylaxis for Group B  streptococci are penicillin or ampicillin.          Assessment and plan  ---------------------------   63 yo man ith MS (bed bound), UC, GERD, BPH, panic disorder, presents to ED with fever and chills since 12pm on 7/8.      Reports he's been "constantly urinating" since earlier today     and has a history of frequent UTIs  pt was found to have le edema with ?hx of hfpef.,  no hx of cad.  pt was admitted last year with possible chf with bl le edema  will review old echo  agree with lasix  check pro bnp  lipid panel  dvt prophylaxis  abx for possible sepsis, fu cultures closely  urine culture noted  pt requesting rehab placement

## 2022-07-11 DIAGNOSIS — R50.9 FEVER, UNSPECIFIED: ICD-10-CM

## 2022-07-11 DIAGNOSIS — A41.9 SEPSIS, UNSPECIFIED ORGANISM: ICD-10-CM

## 2022-07-11 DIAGNOSIS — D72.829 ELEVATED WHITE BLOOD CELL COUNT, UNSPECIFIED: ICD-10-CM

## 2022-07-11 DIAGNOSIS — N39.0 URINARY TRACT INFECTION, SITE NOT SPECIFIED: ICD-10-CM

## 2022-07-11 PROCEDURE — 99223 1ST HOSP IP/OBS HIGH 75: CPT

## 2022-07-11 RX ORDER — POLYETHYLENE GLYCOL 3350 17 G/17G
17 POWDER, FOR SOLUTION ORAL DAILY
Refills: 0 | Status: DISCONTINUED | OUTPATIENT
Start: 2022-07-11 | End: 2022-07-11

## 2022-07-11 RX ORDER — POLYETHYLENE GLYCOL 3350 17 G/17G
17 POWDER, FOR SOLUTION ORAL ONCE
Refills: 0 | Status: COMPLETED | OUTPATIENT
Start: 2022-07-11 | End: 2022-07-11

## 2022-07-11 RX ADMIN — Medication 10 MILLIGRAM(S): at 22:34

## 2022-07-11 RX ADMIN — SERTRALINE 50 MILLIGRAM(S): 25 TABLET, FILM COATED ORAL at 12:12

## 2022-07-11 RX ADMIN — Medication 20 MILLIGRAM(S): at 06:09

## 2022-07-11 RX ADMIN — Medication 5 MILLIGRAM(S): at 22:34

## 2022-07-11 RX ADMIN — SENNA PLUS 2 TABLET(S): 8.6 TABLET ORAL at 22:34

## 2022-07-11 RX ADMIN — POLYETHYLENE GLYCOL 3350 17 GRAM(S): 17 POWDER, FOR SOLUTION ORAL at 13:53

## 2022-07-11 RX ADMIN — HEPARIN SODIUM 5000 UNIT(S): 5000 INJECTION INTRAVENOUS; SUBCUTANEOUS at 06:09

## 2022-07-11 RX ADMIN — TAMSULOSIN HYDROCHLORIDE 0.4 MILLIGRAM(S): 0.4 CAPSULE ORAL at 22:34

## 2022-07-11 RX ADMIN — PANTOPRAZOLE SODIUM 40 MILLIGRAM(S): 20 TABLET, DELAYED RELEASE ORAL at 06:09

## 2022-07-11 RX ADMIN — Medication 5 MILLIGRAM(S): at 06:10

## 2022-07-11 RX ADMIN — HEPARIN SODIUM 5000 UNIT(S): 5000 INJECTION INTRAVENOUS; SUBCUTANEOUS at 17:23

## 2022-07-11 RX ADMIN — CEFTRIAXONE 100 MILLIGRAM(S): 500 INJECTION, POWDER, FOR SOLUTION INTRAMUSCULAR; INTRAVENOUS at 17:23

## 2022-07-11 RX ADMIN — Medication 5 MILLIGRAM(S): at 17:23

## 2022-07-11 RX ADMIN — Medication 5 MILLIGRAM(S): at 13:53

## 2022-07-11 NOTE — PROGRESS NOTE ADULT - SUBJECTIVE AND OBJECTIVE BOX
afberile  REVIEW OF SYSTEMS:  GEN: no fever,    no chills  RESP: no SOB,   no cough  CVS: no chest pain,   no palpitations  GI: no abdominal pain,   no nausea,   no vomiting,   no constipation,   no diarrhea  : no dysuria,   no frequency  NEURO: no headache,   no dizziness  PSYCH: no depression,   not anxious  Derm : no rash    MEDICATIONS  (STANDING):  baclofen 5 milliGRAM(s) Oral every 8 hours  cefTRIAXone   IVPB 1000 milliGRAM(s) IV Intermittent every 24 hours  furosemide    Tablet 20 milliGRAM(s) Oral daily  heparin   Injectable 5000 Unit(s) SubCutaneous every 12 hours  oxybutynin 5 milliGRAM(s) Oral two times a day  pantoprazole    Tablet 40 milliGRAM(s) Oral before breakfast  PHENobarbital 32.4 milliGRAM(s) Oral at bedtime  senna 2 Tablet(s) Oral at bedtime  sertraline 50 milliGRAM(s) Oral daily  tamsulosin 0.4 milliGRAM(s) Oral at bedtime    MEDICATIONS  (PRN):      Vital Signs Last 24 Hrs  T(C): 36.8 (11 Jul 2022 06:21), Max: 37.2 (10 Jul 2022 20:55)  T(F): 98.3 (11 Jul 2022 06:21), Max: 99 (10 Jul 2022 20:55)  HR: 88 (11 Jul 2022 06:21) (74 - 92)  BP: 125/78 (11 Jul 2022 06:21) (121/80 - 126/80)  BP(mean): --  RR: 18 (11 Jul 2022 06:21) (18 - 18)  SpO2: 96% (10 Jul 2022 20:55) (95% - 96%)    Parameters below as of 11 Jul 2022 06:21  Patient On (Oxygen Delivery Method): nasal cannula  O2 Flow (L/min): 2    CAPILLARY BLOOD GLUCOSE        I&O's Summary    09 Jul 2022 07:01  -  10 Jul 2022 07:00  --------------------------------------------------------  IN: 1250 mL / OUT: 1800 mL / NET: -550 mL    10 Jul 2022 07:01  -  11 Jul 2022 06:25  --------------------------------------------------------  IN: 650 mL / OUT: 1100 mL / NET: -450 mL        PHYSICAL EXAM:  HEAD:  Atraumatic, Normocephalic  NECK: Supple, No   JVD  CHEST/LUNG:   nnoo     rales,     no,    rhonchi  HEART: Regular rate and rhythm;         murmur  ABDOMEN: Soft, Nontender, ;   EXTREMITIES:        edema  NEUROLOGY:  alert    LABS:                        14.7   8.60  )-----------( 218      ( 10 Jul 2022 09:30 )             44.8     07-10    140  |  99  |  17  ----------------------------<  102<H>  3.6   |  30  |  0.74    Ca    8.8      10 Jul 2022 09:30  Phos  2.6     07-10  Mg     1.8     07-10                              Consultant(s) Notes Reviewed:      Care Discussed with Consultants/Other Providers:

## 2022-07-11 NOTE — CONSULT NOTE ADULT - ASSESSMENT
61 yo man ith MS (bed bound), UC, GERD, BPH, panic disorder, presents to ED with fever and chills since 12pm on 7/8.      Reports he's been "constantly urinating" since earlier today     and has a history of frequent UTIs  pt was found to have le edema with ?hx of hfpef.,  no hx of cad.  pt was admitted last year with possible chf with bl le edema  will review old echo  agree with lasix  check pro bnp  lipid panel  dvt prophylaxis
 61 yo man with MS (bed bound), UC, GERD, BPH, panic disorder, presents to ED with fever and chills since 12pm on 7/8, E. coli UTI, leukocytosis     Adrien Mari  Attending Physician   Division of Infectious Disease  Office #[phone]  Available on [employer] Teams also  After 5pm/weekend or no response, call #[phone]

## 2022-07-11 NOTE — PROGRESS NOTE ADULT - SUBJECTIVE AND OBJECTIVE BOX
CARDIOLOGY     PROGRESS  NOTE   ________________________________________________    CHIEF COMPLAINT:Patient is a 62y old  Male who presents with a chief complaint of fevrs (11 Jul 2022 06:25)  no complain.  	  REVIEW OF SYSTEMS:  CONSTITUTIONAL: No fever, weight loss, or fatigue  EYES: No eye pain, visual disturbances, or discharge  ENT:  No difficulty hearing, tinnitus, vertigo; No sinus or throat pain  NECK: No pain or stiffness  RESPIRATORY: No cough, wheezing, chills or hemoptysis; No Shortness of Breath  CARDIOVASCULAR: No chest pain, palpitations, passing out, dizziness, or leg swelling  GASTROINTESTINAL: No abdominal or epigastric pain. No nausea, vomiting, or hematemesis; No diarrhea or constipation. No melena or hematochezia.  GENITOURINARY: No dysuria, frequency, hematuria, or incontinence  NEUROLOGICAL: No headaches, memory loss, loss of strength, numbness, or tremors  SKIN: No itching, burning, rashes, or lesions   LYMPH Nodes: No enlarged glands  ENDOCRINE: No heat or cold intolerance; No hair loss  MUSCULOSKELETAL: No joint pain or swelling; No muscle, back, or extremity pain  PSYCHIATRIC: No depression, anxiety, mood swings, or difficulty sleeping  HEME/LYMPH: No easy bruising, or bleeding gums  ALLERGY AND IMMUNOLOGIC: No hives or eczema	    [ ] All others negative	  [ ] Unable to obtain    PHYSICAL EXAM:  T(C): 36.8 (07-11-22 @ 06:21), Max: 37.2 (07-10-22 @ 20:55)  HR: 88 (07-11-22 @ 06:21) (74 - 92)  BP: 125/78 (07-11-22 @ 06:21) (121/80 - 126/80)  RR: 18 (07-11-22 @ 06:21) (18 - 18)  SpO2: 96% (07-10-22 @ 20:55) (95% - 96%)  Wt(kg): --  I&O's Summary    09 Jul 2022 07:01  -  10 Jul 2022 07:00  --------------------------------------------------------  IN: 1250 mL / OUT: 1800 mL / NET: -550 mL    10 Jul 2022 07:01  -  11 Jul 2022 06:57  --------------------------------------------------------  IN: 770 mL / OUT: 1100 mL / NET: -330 mL        Appearance: Normal	  HEENT:   Normal oral mucosa, PERRL, EOMI	  Lymphatic: No lymphadenopathy  Cardiovascular: Normal S1 S2, No JVD, + murmurs, No edema  Respiratory: Lungs clear to auscultation	  Psychiatry: A & O x 3, Mood & affect appropriate  Gastrointestinal:  Soft, Non-tender, + BS	  Skin: No rashes, No ecchymoses, No cyanosis	  Neurologic: Non-focal  Extremities: Normal range of motion, No clubbing, cyanosis or edema  Vascular: Peripheral pulses palpable 2+ bilaterally    MEDICATIONS  (STANDING):  baclofen 5 milliGRAM(s) Oral every 8 hours  cefTRIAXone   IVPB 1000 milliGRAM(s) IV Intermittent every 24 hours  furosemide    Tablet 20 milliGRAM(s) Oral daily  heparin   Injectable 5000 Unit(s) SubCutaneous every 12 hours  oxybutynin 5 milliGRAM(s) Oral two times a day  pantoprazole    Tablet 40 milliGRAM(s) Oral before breakfast  PHENobarbital 32.4 milliGRAM(s) Oral at bedtime  senna 2 Tablet(s) Oral at bedtime  sertraline 50 milliGRAM(s) Oral daily  tamsulosin 0.4 milliGRAM(s) Oral at bedtime      TELEMETRY: 	    ECG:  	  RADIOLOGY:  OTHER: 	  	  LABS:	 	    CARDIAC MARKERS:                                14.7   8.60  )-----------( 218      ( 10 Jul 2022 09:30 )             44.8     07-10    140  |  99  |  17  ----------------------------<  102<H>  3.6   |  30  |  0.74    Ca    8.8      10 Jul 2022 09:30  Phos  2.6     07-10  Mg     1.8     07-10      proBNP:   Lipid Profile:   HgA1c:   TSH:     Culture - Urine (07.08.22 @ 15:59)    Specimen Source: Clean Catch Clean Catch (Midstream)    Culture Results:   >100,000 CFU/ml Escherichia coli    < from: TTE with Doppler (w/Cont) (07.20.21 @ 23:02) >  Mitral Valve:Normal mitral valve.  Aortic Valve/Aorta: Normal aortic valve.  Normal aortic root.  Left Atrium: Normal left atrium.  Left Ventricle: Endocardial visualization enhanced with  intravenous injection of Ultrasonic Enhancing Agent  (Definity).  Normal left ventricular internal dimensions and wall  thicknesses.  Normal left ventricular systolic function. No segmental  wall motion abnormalities.  Right Heart: Normal right atrium. Normal right ventricular  size and systolic function.  Normal tricuspid valve. Normal pulmonic valve.  Pericardium/Pleura: Pericardial fat pad noted.  Hemodynamic: Estimated right atrial pressure is normal.  No evidence of pulmonary hypertension.  ------------------------------------------------------------------------  Conclusions:  Endocardial visualization enhanced with intravenous  injection of Ultrasonic Enhancing Agent (Definity).  Normal left ventricular systolic function. No segmental  wall motion abnormalities.        Assessment and plan  ---------------------------   61 yo man ith MS (bed bound), UC, GERD, BPH, panic disorder, presents to ED with fever and chills since 12pm on 7/8.      Reports he's been "constantly urinating" since earlier today     and has a history of frequent UTIs  pt was found to have le edema with ?hx of hfpef.,  no hx of cad.  pt was admitted last year with possible chf with bl le edema  will review old echo  agree with lasix  check pro bnp  lipid panel  dvt prophylaxis  abx for possible sepsis, fu cultures closely  urine culture noted  pt requesting rehab placement  dvt prophylaxis  pt will eventually needs stress test

## 2022-07-12 PROCEDURE — 99232 SBSQ HOSP IP/OBS MODERATE 35: CPT

## 2022-07-12 RX ORDER — CIPROFLOXACIN LACTATE 400MG/40ML
500 VIAL (ML) INTRAVENOUS EVERY 12 HOURS
Refills: 0 | Status: DISCONTINUED | OUTPATIENT
Start: 2022-07-12 | End: 2022-07-14

## 2022-07-12 RX ADMIN — SENNA PLUS 2 TABLET(S): 8.6 TABLET ORAL at 22:12

## 2022-07-12 RX ADMIN — Medication 5 MILLIGRAM(S): at 17:57

## 2022-07-12 RX ADMIN — Medication 5 MILLIGRAM(S): at 15:22

## 2022-07-12 RX ADMIN — Medication 5 MILLIGRAM(S): at 22:12

## 2022-07-12 RX ADMIN — Medication 5 MILLIGRAM(S): at 09:01

## 2022-07-12 RX ADMIN — Medication 20 MILLIGRAM(S): at 09:01

## 2022-07-12 RX ADMIN — TAMSULOSIN HYDROCHLORIDE 0.4 MILLIGRAM(S): 0.4 CAPSULE ORAL at 22:12

## 2022-07-12 RX ADMIN — SERTRALINE 50 MILLIGRAM(S): 25 TABLET, FILM COATED ORAL at 12:08

## 2022-07-12 RX ADMIN — HEPARIN SODIUM 5000 UNIT(S): 5000 INJECTION INTRAVENOUS; SUBCUTANEOUS at 06:35

## 2022-07-12 RX ADMIN — Medication 32.4 MILLIGRAM(S): at 22:12

## 2022-07-12 RX ADMIN — Medication 500 MILLIGRAM(S): at 17:56

## 2022-07-12 RX ADMIN — PANTOPRAZOLE SODIUM 40 MILLIGRAM(S): 20 TABLET, DELAYED RELEASE ORAL at 09:01

## 2022-07-12 RX ADMIN — HEPARIN SODIUM 5000 UNIT(S): 5000 INJECTION INTRAVENOUS; SUBCUTANEOUS at 17:57

## 2022-07-12 NOTE — PROVIDER CONTACT NOTE (OTHER) - ASSESSMENT
pt is A+Ox4 currently. noted to be somnolent currently. pt awakes easily but noted to fall back asleep. speaking coherently and oriented.    previously on 2L NC O2 but titrated and weaned off during the day yesterday.   spo2 noted to be variable; decreased to 89% while falling asleep and increased to 93% while awake.     currently placed on 2L NC O2 and spo2 increased to 95%.  other VSS: temp: 97.5 oral, HR: 79, BP: 108/73, RR: 20.

## 2022-07-12 NOTE — PROVIDER CONTACT NOTE (OTHER) - REASON
pt noted to have fluctuating spo2; noted to decrease to 89 while falling back asleep and 93% while awake.

## 2022-07-12 NOTE — PROGRESS NOTE ADULT - ASSESSMENT
62  yr    h/o  old  DVT, RA, UC, BPH (following PSA yearly), recurrent UTI, acid reflux, and multiple sclerosis     uses  wheelchair when he leaves the house.  has  leg weakness secondary to  MS dx  ini  2013 and follows with Dr. Paredes    , and has been on several disease modifying treatments / ocrelizumab infusions. / dalfampridine in the past but this was discontinued after he had a seizure.    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  His main issues with multiple sclerosis are right-sided weakness and incoordination,   b/l  legweakness,   and increased urinary frequency and urgency.       *  fevers,  from uti.  sepsis  on arrival pe  ID   on iv  Rocephin      wbc  was  18,000  on  arrival   *   progressive multiple sclerosis,  with  weakness.  falguni  legs    pt  with  confusion  at  times  from   dementia  ct  head,  no acute  pathology// demyelination   hematuria  in [place],  resolving  on dvt ppx  UTI, E  coli, on rocephin, await sensitivity/  duration pe r ID       r

## 2022-07-12 NOTE — PHYSICAL THERAPY INITIAL EVALUATION ADULT - MANUAL MUSCLE TESTING RESULTS, REHAB EVAL
LUE 3+/5 , R shoulder 3-/5 , R elbow 3/5 , R wrist /extnesion fingers 2+/5 to 3-/5 ,  R 3/5 ; L LE hip 3-/5 , hip abd/add 2+ to 3-/5 , knee 2+/5 to 3-/5 , foot /ankle L PF 3+/5 , DF 3- to 3/5 ; R LE : hip 2+/5 , knee 2/5 , PF 3-/5 , DF o-1/5

## 2022-07-12 NOTE — PROGRESS NOTE ADULT - SUBJECTIVE AND OBJECTIVE BOX
jp    REVIEW OF SYSTEMS:  GEN: no fever,    no chills  RESP: no SOB,   no cough  CVS: no chest pain,   no palpitations  GI: no abdominal pain,   no nausea,   no vomiting,   no constipation,   no diarrhea  : no dysuria,   no frequency  NEURO: no headache,   no dizziness  PSYCH: no depression,   not anxious  Derm : no rash    MEDICATIONS  (STANDING):  baclofen 5 milliGRAM(s) Oral every 8 hours  cefTRIAXone   IVPB 1000 milliGRAM(s) IV Intermittent every 24 hours  furosemide    Tablet 20 milliGRAM(s) Oral daily  heparin   Injectable 5000 Unit(s) SubCutaneous every 12 hours  oxybutynin 5 milliGRAM(s) Oral two times a day  pantoprazole    Tablet 40 milliGRAM(s) Oral before breakfast  PHENobarbital 32.4 milliGRAM(s) Oral at bedtime  senna 2 Tablet(s) Oral at bedtime  sertraline 50 milliGRAM(s) Oral daily  tamsulosin 0.4 milliGRAM(s) Oral at bedtime    MEDICATIONS  (PRN):      Vital Signs Last 24 Hrs  T(C): 36.8 (12 Jul 2022 08:01), Max: 37.2 (11 Jul 2022 19:46)  T(F): 98.2 (12 Jul 2022 08:01), Max: 99 (11 Jul 2022 19:46)  HR: 70 (12 Jul 2022 08:01) (70 - 84)  BP: 116/78 (12 Jul 2022 08:01) (108/73 - 131/77)  BP(mean): --  RR: 18 (12 Jul 2022 08:01) (18 - 20)  SpO2: 94% (12 Jul 2022 08:01) (89% - 95%)    Parameters below as of 12 Jul 2022 08:01  Patient On (Oxygen Delivery Method): nasal cannula  O2 Flow (L/min): 2    CAPILLARY BLOOD GLUCOSE        I&O's Summary    11 Jul 2022 07:01  -  12 Jul 2022 07:00  --------------------------------------------------------  IN: 770 mL / OUT: 2900 mL / NET: -2130 mL        PHYSICAL EXAM:  HEAD:  Atraumatic, Normocephalic  NECK: Supple, No   JVD  CHEST/LUNG:   no     rales,     no,    rhonchi  HEART: Regular rate and rhythm;         murmur  ABDOMEN: Soft, Nontender, ;   EXTREMITIES:    no    edema  NEUROLOGY:  alert    LABS:                        14.7   8.60  )-----------( 218      ( 10 Jul 2022 09:30 )             44.8     07-10    140  |  99  |  17  ----------------------------<  102<H>  3.6   |  30  |  0.74    Ca    8.8      10 Jul 2022 09:30  Phos  2.6     07-10  Mg     1.8     07-10                              Consultant(s) Notes Reviewed:      Care Discussed with Consultants/Other Providers:

## 2022-07-12 NOTE — PHYSICAL THERAPY INITIAL EVALUATION ADULT - GENERAL OBSERVATIONS, REHAB EVAL
pt received in bed all siderails up pt preference uses to move himself in bed HOB 30 degrees call bell,phone and table in reach , air tap under pt w/ incontinence pads btw pad and skin PT and PCA used when return pt to bed from seated position to scoot up in bed while pt lying flat ; see below assist supine -sit

## 2022-07-12 NOTE — PROGRESS NOTE ADULT - SUBJECTIVE AND OBJECTIVE BOX
CARDIOLOGY     PROGRESS  NOTE   ________________________________________________    CHIEF COMPLAINT:Patient is a 62y old  Male who presents with a chief complaint of fevrs (11 Jul 2022 16:56)  no complain, doing better.  	  REVIEW OF SYSTEMS:  CONSTITUTIONAL: No fever, weight loss, or fatigue  EYES: No eye pain, visual disturbances, or discharge  ENT:  No difficulty hearing, tinnitus, vertigo; No sinus or throat pain  NECK: No pain or stiffness  RESPIRATORY: No cough, wheezing, chills or hemoptysis; No Shortness of Breath  CARDIOVASCULAR: No chest pain, palpitations, passing out, dizziness, or leg swelling  GASTROINTESTINAL: No abdominal or epigastric pain. No nausea, vomiting, or hematemesis; No diarrhea or constipation. No melena or hematochezia.  GENITOURINARY: No dysuria, frequency, hematuria, or incontinence  NEUROLOGICAL: No headaches, memory loss, loss of strength, numbness, or tremors  SKIN: No itching, burning, rashes, or lesions   LYMPH Nodes: No enlarged glands  ENDOCRINE: No heat or cold intolerance; No hair loss  MUSCULOSKELETAL: No joint pain or swelling; No muscle, back, or extremity pain  PSYCHIATRIC: No depression, anxiety, mood swings, or difficulty sleeping  HEME/LYMPH: No easy bruising, or bleeding gums  ALLERGY AND IMMUNOLOGIC: No hives or eczema	    [ ] All others negative	  [ ] Unable to obtain    PHYSICAL EXAM:  T(C): 36.4 (07-12-22 @ 06:23), Max: 37.2 (07-11-22 @ 19:46)  HR: 79 (07-12-22 @ 06:23) (79 - 84)  BP: 108/73 (07-12-22 @ 06:23) (108/73 - 131/77)  RR: 20 (07-12-22 @ 06:23) (18 - 20)  SpO2: 93% (07-12-22 @ 06:23) (93% - 95%)  Wt(kg): --  I&O's Summary    11 Jul 2022 07:01  -  12 Jul 2022 07:00  --------------------------------------------------------  IN: 770 mL / OUT: 2900 mL / NET: -2130 mL        Appearance: Normal	  HEENT:   Normal oral mucosa, PERRL, EOMI	  Lymphatic: No lymphadenopathy  Cardiovascular: Normal S1 S2, No JVD, +murmurs, No edema  Respiratory: Lungs clear to auscultation	  Psychiatry: A & O x 3, Mood & affect appropriate  Gastrointestinal:  Soft, Non-tender, + BS	  Skin: No rashes, No ecchymoses, No cyanosis	  Neurologic: Non-focal  Extremities: Normal range of motion, No clubbing, cyanosis or edema  Vascular: Peripheral pulses palpable 2+ bilaterally    MEDICATIONS  (STANDING):  baclofen 5 milliGRAM(s) Oral every 8 hours  cefTRIAXone   IVPB 1000 milliGRAM(s) IV Intermittent every 24 hours  furosemide    Tablet 20 milliGRAM(s) Oral daily  heparin   Injectable 5000 Unit(s) SubCutaneous every 12 hours  oxybutynin 5 milliGRAM(s) Oral two times a day  pantoprazole    Tablet 40 milliGRAM(s) Oral before breakfast  PHENobarbital 32.4 milliGRAM(s) Oral at bedtime  senna 2 Tablet(s) Oral at bedtime  sertraline 50 milliGRAM(s) Oral daily  tamsulosin 0.4 milliGRAM(s) Oral at bedtime      TELEMETRY: 	    ECG:  	  RADIOLOGY:  OTHER: 	  	  LABS:	 	    CARDIAC MARKERS:                                14.7   8.60  )-----------( 218      ( 10 Jul 2022 09:30 )             44.8     07-10    140  |  99  |  17  ----------------------------<  102<H>  3.6   |  30  |  0.74    Ca    8.8      10 Jul 2022 09:30  Phos  2.6     07-10  Mg     1.8     07-10      proBNP:   Lipid Profile:   HgA1c:   TSH:     ·  Problem: Sepsis secondary to UTI.   ·  Recommendation: -better  -f/u final cx  -blood cx negative  -cont CTX 1 gm IV q24  -monitor temps, wbc, vitals.    Assessment and plan  ---------------------------   61 yo man ith MS (bed bound), UC, GERD, BPH, panic disorder, presents to ED with fever and chills since 12pm on 7/8.      Reports he's been "constantly urinating" since earlier today     and has a history of frequent UTIs  pt was found to have le edema with ?hx of hfpef.,  no hx of cad.  pt was admitted last year with possible chf with bl le edema  will review old echo noted  agree with lasix  check pro bnp  lipid panel  dvt prophylaxis  abx for possible sepsis, fu cultures closely  urine culture noted  pt requesting rehab placement  dvt prophylaxis  pt will eventually needs stress test as out pt

## 2022-07-12 NOTE — PHYSICAL THERAPY INITIAL EVALUATION ADULT - ADDITIONAL COMMENTS
pt lives in house with spouse Adelina id as caregiver [phone] (and Dtr Nallely also ID as caregiver [phone]; pt need assist with ADLS , personal care ad mobility PTA ; pt has a rolling walker , transport chair , w/c , patt lift , and hospital bed pt lives in house with spouse Sydni id as caregiver [phone] (and Dtr Nallely also ID as caregiver [phone]; pt need assist with ADLS , personal care ad mobility PTA ; pt has a rolling walker , transport chair , w/c , patt lift , and hospital bed, commode , electric recliner ; pt last stood in Sept 2021 and has progress getting weaker since per pt , pt gets OOB w/ patt 1-2 x/wk w/ assist of sons ; pt has 2 sons that live in the home along w/ his spouse , pt was receiving HOME PT /OT working on therex and core strength , just started to work on sitting upright last week

## 2022-07-12 NOTE — PROGRESS NOTE ADULT - PROBLEM SELECTOR PLAN 1
-cont abx  -better  -dc CTX  -cipro 500 mg po bid x 5 days   -potential side effects of FQs explained including GI, Cdiff, tendinitis, resistance issues, development of allergies, etc.

## 2022-07-12 NOTE — PHYSICAL THERAPY INITIAL EVALUATION ADULT - PRECAUTIONS/LIMITATIONS, REHAB EVAL
HCT 7/9/22 No acute ICH, hydrocephalus or extra-axial fluid collection.Mild subcortical and periventricular white matter attenuation nonspecific but favored to reflect combination of sequela of mild chronic microvascular ischemia and in keeping with documented clinical history of demyelination. No CT evidence for acute transcortical infarction;61 yo man with MS (bed bound), UC, GERD, BPH, panic disorder, presents to ED with fever and chills since 12pm on 7/8. Reports he's been "constantly urinating" since earlier today and has a history of frequent UTIs, thus called EMS to take the pt to the ED.Denies sore throat, sick contacts, CP, new SOB, new abd pain./fall precautions/seizure precautions

## 2022-07-12 NOTE — PROGRESS NOTE ADULT - ASSESSMENT
61 yo man with MS (bed bound), UC, GERD, BPH, panic disorder, presents to ED with fever and chills since 12pm on 7/8, E. coli UTI, leukocytosis     Adrien Mari  Attending Physician   Division of Infectious Disease  Office #[phone]  Available on [employer] Teams also  After 5pm/weekend or no response, call #[phone]

## 2022-07-12 NOTE — PHYSICAL THERAPY INITIAL EVALUATION ADULT - IMPAIRMENTS CONTRIBUTING IMPAIRED BED MOBILITY, REHAB EVAL
decreased endurance , Sat EOb intially max to mod of 1 progress to mod of 1 12 min seated , work on sit balance , pull to sit , core strengthening/impaired balance/impaired motor control/impaired postural control/decreased strength

## 2022-07-12 NOTE — PROGRESS NOTE ADULT - SUBJECTIVE AND OBJECTIVE BOX
KARTHIK PATIÑO 62y [id number]    Patient is a 62y old  Male who presents with a chief complaint of fevrs (12 Jul 2022 08:29)      Follow Up/CC:  ID following for    Interval History/ROS:    Allergies    No Known Allergies    Intolerances        ANTIMICROBIALS:  cefTRIAXone   IVPB 1000 every 24 hours      MEDICATIONS  (STANDING):  baclofen 5 milliGRAM(s) Oral every 8 hours  cefTRIAXone   IVPB 1000 milliGRAM(s) IV Intermittent every 24 hours  furosemide    Tablet 20 milliGRAM(s) Oral daily  heparin   Injectable 5000 Unit(s) SubCutaneous every 12 hours  oxybutynin 5 milliGRAM(s) Oral two times a day  pantoprazole    Tablet 40 milliGRAM(s) Oral before breakfast  PHENobarbital 32.4 milliGRAM(s) Oral at bedtime  senna 2 Tablet(s) Oral at bedtime  sertraline 50 milliGRAM(s) Oral daily  tamsulosin 0.4 milliGRAM(s) Oral at bedtime    MEDICATIONS  (PRN):        Vital Signs Last 24 Hrs  T(C): 36.7 (12 Jul 2022 11:20), Max: 37.2 (11 Jul 2022 19:46)  T(F): 98 (12 Jul 2022 11:20), Max: 99 (11 Jul 2022 19:46)  HR: 80 (12 Jul 2022 11:20) (70 - 84)  BP: 121/71 (12 Jul 2022 11:20) (108/73 - 131/77)  BP(mean): --  RR: 18 (12 Jul 2022 11:20) (18 - 20)  SpO2: 94% (12 Jul 2022 11:20) (89% - 95%)    Parameters below as of 12 Jul 2022 11:20  Patient On (Oxygen Delivery Method): nasal cannula  O2 Flow (L/min): 2                    MICROBIOLOGY:  Clean Catch Clean Catch (Midstream)  07-08-22   >100,000 CFU/ml Escherichia coli  --  Escherichia coli      .Blood Blood-Peripheral  07-08-22   No growth to date.  --  --      .Blood Blood-Peripheral  07-08-22   No growth to date.  --  --              v            RADIOLOGY     KARTHIK PATIÑO 62y [id number]    Patient is a 62y old  Male who presents with a chief complaint of fevrs (12 Jul 2022 08:29)      Follow Up/CC:  ID following for uti    Interval History/ROS: no fever, feels ok    Allergies    No Known Allergies    Intolerances        ANTIMICROBIALS:  cefTRIAXone   IVPB 1000 every 24 hours      MEDICATIONS  (STANDING):  baclofen 5 milliGRAM(s) Oral every 8 hours  cefTRIAXone   IVPB 1000 milliGRAM(s) IV Intermittent every 24 hours  furosemide    Tablet 20 milliGRAM(s) Oral daily  heparin   Injectable 5000 Unit(s) SubCutaneous every 12 hours  oxybutynin 5 milliGRAM(s) Oral two times a day  pantoprazole    Tablet 40 milliGRAM(s) Oral before breakfast  PHENobarbital 32.4 milliGRAM(s) Oral at bedtime  senna 2 Tablet(s) Oral at bedtime  sertraline 50 milliGRAM(s) Oral daily  tamsulosin 0.4 milliGRAM(s) Oral at bedtime    MEDICATIONS  (PRN):        Vital Signs Last 24 Hrs  T(C): 36.7 (12 Jul 2022 11:20), Max: 37.2 (11 Jul 2022 19:46)  T(F): 98 (12 Jul 2022 11:20), Max: 99 (11 Jul 2022 19:46)  HR: 80 (12 Jul 2022 11:20) (70 - 84)  BP: 121/71 (12 Jul 2022 11:20) (108/73 - 131/77)  BP(mean): --  RR: 18 (12 Jul 2022 11:20) (18 - 20)  SpO2: 94% (12 Jul 2022 11:20) (89% - 95%)    Parameters below as of 12 Jul 2022 11:20  Patient On (Oxygen Delivery Method): nasal cannula  O2 Flow (L/min): 2                    MICROBIOLOGY:  Clean Catch Clean Catch (Midstream)  07-08-22   >100,000 CFU/ml Escherichia coli  --  Escherichia coli      .Blood Blood-Peripheral  07-08-22   No growth to date.  --  --      .Blood Blood-Peripheral  07-08-22   No growth to date.  --  --              v            RADIOLOGY

## 2022-07-12 NOTE — PHYSICAL THERAPY INITIAL EVALUATION ADULT - DISCHARGE DISPOSITION, PT EVAL
PT recommend Subacute rehab to work on strength, endurance , seated balance , transfers ; if pt and family decide to take pt home pt has all DME and will need asisst ALL fxl activity and adl's ,pt report spouse and 2 sons assist/rehabilitation facility

## 2022-07-12 NOTE — PHYSICAL THERAPY INITIAL EVALUATION ADULT - ACTIVE RANGE OF MOTION EXAMINATION, REHAB EVAL
R hand flexion wfl's arom , extension half the rom full prom ; R elbow wfl's arom , R shoulder aarom 0-95 degrees ;; L LE aarom wfl's , L DF - few degrees of neutral (full aarom ) ; R LE : aarom wfl's hip abd /add, hip/knee flexion aarom wfl's , R foot/ankle PF wfl's arom , PROM DF wfl's/Left UE Active ROM was WFL (within functional limits)/Left LE Active ROM was WFL (within functional limits)

## 2022-07-13 RX ADMIN — PANTOPRAZOLE SODIUM 40 MILLIGRAM(S): 20 TABLET, DELAYED RELEASE ORAL at 10:10

## 2022-07-13 RX ADMIN — HEPARIN SODIUM 5000 UNIT(S): 5000 INJECTION INTRAVENOUS; SUBCUTANEOUS at 05:56

## 2022-07-13 RX ADMIN — HEPARIN SODIUM 5000 UNIT(S): 5000 INJECTION INTRAVENOUS; SUBCUTANEOUS at 18:36

## 2022-07-13 RX ADMIN — Medication 5 MILLIGRAM(S): at 23:25

## 2022-07-13 RX ADMIN — SENNA PLUS 2 TABLET(S): 8.6 TABLET ORAL at 23:26

## 2022-07-13 RX ADMIN — Medication 5 MILLIGRAM(S): at 05:56

## 2022-07-13 RX ADMIN — Medication 5 MILLIGRAM(S): at 18:36

## 2022-07-13 RX ADMIN — TAMSULOSIN HYDROCHLORIDE 0.4 MILLIGRAM(S): 0.4 CAPSULE ORAL at 23:26

## 2022-07-13 RX ADMIN — Medication 5 MILLIGRAM(S): at 14:33

## 2022-07-13 RX ADMIN — Medication 500 MILLIGRAM(S): at 05:56

## 2022-07-13 RX ADMIN — Medication 500 MILLIGRAM(S): at 18:36

## 2022-07-13 RX ADMIN — Medication 32.4 MILLIGRAM(S): at 23:26

## 2022-07-13 RX ADMIN — SERTRALINE 50 MILLIGRAM(S): 25 TABLET, FILM COATED ORAL at 11:44

## 2022-07-13 RX ADMIN — Medication 20 MILLIGRAM(S): at 05:56

## 2022-07-13 NOTE — PROGRESS NOTE ADULT - ASSESSMENT
62  yr    h/o  old  DVT, RA, UC, BPH (following PSA yearly), recurrent UTI, acid reflux, and multiple sclerosis     uses  wheelchair when he leaves the house.  has  leg weakness secondary to  MS dx  ini  2013 and follows with Dr. Paredes    , and has been on several disease modifying treatments / ocrelizumab infusions. / dalfampridine in the past but this was discontinued after he had a seizure.    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  His main issues with multiple sclerosis are right-sided weakness and incoordination,   b/l  legweakness,   and increased urinary frequency and urgency.       *  fevers,  from uti.  sepsis  on arrival pe  ID   on iv  Rocephin      wbc  was  18,000  on  arrival   *   progressive multiple sclerosis,  with  weakness.  falguni  legs    pt  with  confusion  at  times  from   dementia  ct  head,  no acute  pathology// demyelination   hematuria  in [place],  resolving  on dvt ppx  UTI, E  coli, on rocephin,, now  on cipro for 5 days pe r iD   plan.,   d/c  to  rehab       r

## 2022-07-13 NOTE — PROGRESS NOTE ADULT - SUBJECTIVE AND OBJECTIVE BOX
CARDIOLOGY     PROGRESS  NOTE   ________________________________________________    CHIEF COMPLAINT:Patient is a 62y old  Male who presents with a chief complaint of fevrs (12 Jul 2022 12:15)  no complain.  	  REVIEW OF SYSTEMS:  CONSTITUTIONAL: No fever, weight loss, or fatigue  EYES: No eye pain, visual disturbances, or discharge  ENT:  No difficulty hearing, tinnitus, vertigo; No sinus or throat pain  NECK: No pain or stiffness  RESPIRATORY: No cough, wheezing, chills or hemoptysis; No Shortness of Breath  CARDIOVASCULAR: No chest pain, palpitations, passing out, dizziness, or leg swelling  GASTROINTESTINAL: No abdominal or epigastric pain. No nausea, vomiting, or hematemesis; No diarrhea or constipation. No melena or hematochezia.  GENITOURINARY: No dysuria, frequency, hematuria, or incontinence  NEUROLOGICAL: No headaches, memory loss, loss of strength, numbness, or tremors  SKIN: No itching, burning, rashes, or lesions   LYMPH Nodes: No enlarged glands  ENDOCRINE: No heat or cold intolerance; No hair loss  MUSCULOSKELETAL: No joint pain or swelling; No muscle, back, or extremity pain  PSYCHIATRIC: No depression, anxiety, mood swings, or difficulty sleeping  HEME/LYMPH: No easy bruising, or bleeding gums  ALLERGY AND IMMUNOLOGIC: No hives or eczema	    [ ] All others negative	  [ ] Unable to obtain    PHYSICAL EXAM:  T(C): 36.6 (07-13-22 @ 00:41), Max: 37.7 (07-12-22 @ 19:51)  HR: 74 (07-13-22 @ 00:41) (70 - 85)  BP: 137/79 (07-13-22 @ 00:41) (116/78 - 137/79)  RR: 18 (07-13-22 @ 00:41) (18 - 20)  SpO2: 94% (07-13-22 @ 00:41) (90% - 96%)  Wt(kg): --  I&O's Summary    11 Jul 2022 07:01  -  12 Jul 2022 07:00  --------------------------------------------------------  IN: 770 mL / OUT: 2900 mL / NET: -2130 mL    12 Jul 2022 07:01  -  13 Jul 2022 06:59  --------------------------------------------------------  IN: 1260 mL / OUT: 1150 mL / NET: 110 mL        Appearance: Normal	  HEENT:   Normal oral mucosa, PERRL, EOMI	  Lymphatic: No lymphadenopathy  Cardiovascular: Normal S1 S2, No JVD, + murmurs, No edema  Respiratory: Lungs clear to auscultation	  Psychiatry: A & O x 3, Mood & affect appropriate  Gastrointestinal:  Soft, Non-tender, + BS	  Skin: No rashes, No ecchymoses, No cyanosis	  Extremities: Normal range of motion, No clubbing, cyanosis or edema  Vascular: Peripheral pulses palpable 2+ bilaterally    MEDICATIONS  (STANDING):  baclofen 5 milliGRAM(s) Oral every 8 hours  ciprofloxacin     Tablet 500 milliGRAM(s) Oral every 12 hours  furosemide    Tablet 20 milliGRAM(s) Oral daily  heparin   Injectable 5000 Unit(s) SubCutaneous every 12 hours  oxybutynin 5 milliGRAM(s) Oral two times a day  pantoprazole    Tablet 40 milliGRAM(s) Oral before breakfast  PHENobarbital 32.4 milliGRAM(s) Oral at bedtime  senna 2 Tablet(s) Oral at bedtime  sertraline 50 milliGRAM(s) Oral daily  tamsulosin 0.4 milliGRAM(s) Oral at bedtime      TELEMETRY: 	    ECG:  	  RADIOLOGY:  OTHER: 	  	  LABS:	 	    CARDIAC MARKERS:                  proBNP:   Lipid Profile:   HgA1c:   TSH:         Assessment and plan  ---------------------------   63 yo man ith MS (bed bound), UC, GERD, BPH, panic disorder, presents to ED with fever and chills since 12pm on 7/8.      Reports he's been "constantly urinating" since earlier today     and has a history of frequent UTIs  pt was found to have le edema with ?hx of hfpef.,  no hx of cad.  pt was admitted last year with possible chf with bl le edema  will review old echo noted  agree with lasix  check pro bnp  lipid panel  dvt prophylaxis  urine culture noted  pt requesting rehab placement  dvt prophylaxis  pt will eventually needs stress test as out pt  jill stockings bl

## 2022-07-13 NOTE — PROGRESS NOTE ADULT - SUBJECTIVE AND OBJECTIVE BOX
afberile  REVIEW OF SYSTEMS:  GEN: no fever,    no chills  RESP: no SOB,   no cough  CVS: no chest pain,   no palpitations  GI: no abdominal pain,   no nausea,   no vomiting,   no constipation,   no diarrhea  : no dysuria,   no frequency  NEURO: no headache,   no dizziness  PSYCH: no depression,   not anxious  Derm : no rash    MEDICATIONS  (STANDING):  baclofen 5 milliGRAM(s) Oral every 8 hours  ciprofloxacin     Tablet 500 milliGRAM(s) Oral every 12 hours  furosemide    Tablet 20 milliGRAM(s) Oral daily  heparin   Injectable 5000 Unit(s) SubCutaneous every 12 hours  oxybutynin 5 milliGRAM(s) Oral two times a day  pantoprazole    Tablet 40 milliGRAM(s) Oral before breakfast  PHENobarbital 32.4 milliGRAM(s) Oral at bedtime  senna 2 Tablet(s) Oral at bedtime  sertraline 50 milliGRAM(s) Oral daily  tamsulosin 0.4 milliGRAM(s) Oral at bedtime    MEDICATIONS  (PRN):      Vital Signs Last 24 Hrs  T(C): 36.9 (13 Jul 2022 11:30), Max: 37.7 (12 Jul 2022 19:51)  T(F): 98.5 (13 Jul 2022 11:30), Max: 99.9 (12 Jul 2022 19:51)  HR: 67 (13 Jul 2022 11:30) (67 - 85)  BP: 108/74 (13 Jul 2022 11:30) (108/74 - 137/79)  BP(mean): --  RR: 18 (13 Jul 2022 11:30) (18 - 20)  SpO2: 94% (13 Jul 2022 11:30) (90% - 96%)    Parameters below as of 13 Jul 2022 11:30  Patient On (Oxygen Delivery Method): room air      CAPILLARY BLOOD GLUCOSE        I&O's Summary    12 Jul 2022 07:01  -  13 Jul 2022 07:00  --------------------------------------------------------  IN: 1260 mL / OUT: 1150 mL / NET: 110 mL        PHYSICAL EXAM:  HEAD:  Atraumatic, Normocephalic  NECK: Supple, No   JVD  CHEST/LUNG:   no     rales,     no,    rhonchi  HEART: Regular rate and rhythm;         murmur  ABDOMEN: Soft, Nontender, ;   EXTREMITIES:   no     edema  NEUROLOGY:  alert    LABS:                                  Consultant(s) Notes Reviewed:      Care Discussed with Consultants/Other Providers:

## 2022-07-14 ENCOUNTER — TRANSCRIPTION ENCOUNTER (OUTPATIENT)
Age: 62
End: 2022-07-14

## 2022-07-14 VITALS
DIASTOLIC BLOOD PRESSURE: 83 MMHG | SYSTOLIC BLOOD PRESSURE: 122 MMHG | TEMPERATURE: 98 F | RESPIRATION RATE: 18 BRPM | HEART RATE: 72 BPM | OXYGEN SATURATION: 97 %

## 2022-07-14 LAB
CULTURE RESULTS: SIGNIFICANT CHANGE UP
CULTURE RESULTS: SIGNIFICANT CHANGE UP
SARS-COV-2 RNA SPEC QL NAA+PROBE: SIGNIFICANT CHANGE UP
SPECIMEN SOURCE: SIGNIFICANT CHANGE UP
SPECIMEN SOURCE: SIGNIFICANT CHANGE UP

## 2022-07-14 PROCEDURE — 83605 ASSAY OF LACTIC ACID: CPT

## 2022-07-14 PROCEDURE — 84100 ASSAY OF PHOSPHORUS: CPT

## 2022-07-14 PROCEDURE — 81001 URINALYSIS AUTO W/SCOPE: CPT

## 2022-07-14 PROCEDURE — U0005: CPT

## 2022-07-14 PROCEDURE — 87086 URINE CULTURE/COLONY COUNT: CPT

## 2022-07-14 PROCEDURE — 85014 HEMATOCRIT: CPT

## 2022-07-14 PROCEDURE — 85018 HEMOGLOBIN: CPT

## 2022-07-14 PROCEDURE — 82803 BLOOD GASES ANY COMBINATION: CPT

## 2022-07-14 PROCEDURE — 84132 ASSAY OF SERUM POTASSIUM: CPT

## 2022-07-14 PROCEDURE — 96374 THER/PROPH/DIAG INJ IV PUSH: CPT

## 2022-07-14 PROCEDURE — 85027 COMPLETE CBC AUTOMATED: CPT

## 2022-07-14 PROCEDURE — 70450 CT HEAD/BRAIN W/O DYE: CPT

## 2022-07-14 PROCEDURE — 36415 COLL VENOUS BLD VENIPUNCTURE: CPT

## 2022-07-14 PROCEDURE — 96375 TX/PRO/DX INJ NEW DRUG ADDON: CPT

## 2022-07-14 PROCEDURE — 82947 ASSAY GLUCOSE BLOOD QUANT: CPT

## 2022-07-14 PROCEDURE — 87040 BLOOD CULTURE FOR BACTERIA: CPT

## 2022-07-14 PROCEDURE — 82435 ASSAY OF BLOOD CHLORIDE: CPT

## 2022-07-14 PROCEDURE — 87637 SARSCOV2&INF A&B&RSV AMP PRB: CPT

## 2022-07-14 PROCEDURE — 80048 BASIC METABOLIC PNL TOTAL CA: CPT

## 2022-07-14 PROCEDURE — 99285 EMERGENCY DEPT VISIT HI MDM: CPT | Mod: 25

## 2022-07-14 PROCEDURE — 83735 ASSAY OF MAGNESIUM: CPT

## 2022-07-14 PROCEDURE — 71045 X-RAY EXAM CHEST 1 VIEW: CPT

## 2022-07-14 PROCEDURE — 84295 ASSAY OF SERUM SODIUM: CPT

## 2022-07-14 PROCEDURE — U0003: CPT

## 2022-07-14 PROCEDURE — 85025 COMPLETE CBC W/AUTO DIFF WBC: CPT

## 2022-07-14 PROCEDURE — 97163 PT EVAL HIGH COMPLEX 45 MIN: CPT

## 2022-07-14 PROCEDURE — 87186 SC STD MICRODIL/AGAR DIL: CPT

## 2022-07-14 PROCEDURE — 82330 ASSAY OF CALCIUM: CPT

## 2022-07-14 PROCEDURE — 82565 ASSAY OF CREATININE: CPT

## 2022-07-14 PROCEDURE — 80053 COMPREHEN METABOLIC PANEL: CPT

## 2022-07-14 RX ORDER — PHENOBARBITAL 60 MG
1 TABLET ORAL
Qty: 0 | Refills: 0 | DISCHARGE
Start: 2022-07-14

## 2022-07-14 RX ORDER — SENNA PLUS 8.6 MG/1
2 TABLET ORAL
Qty: 0 | Refills: 0 | DISCHARGE
Start: 2022-07-14

## 2022-07-14 RX ORDER — FUROSEMIDE 40 MG
1 TABLET ORAL
Qty: 0 | Refills: 0 | DISCHARGE
Start: 2022-07-14

## 2022-07-14 RX ORDER — PANTOPRAZOLE SODIUM 20 MG/1
1 TABLET, DELAYED RELEASE ORAL
Qty: 0 | Refills: 0 | DISCHARGE
Start: 2022-07-14

## 2022-07-14 RX ORDER — CIPROFLOXACIN LACTATE 400MG/40ML
1 VIAL (ML) INTRAVENOUS
Qty: 0 | Refills: 0 | DISCHARGE
Start: 2022-07-14 | End: 2022-07-17

## 2022-07-14 RX ORDER — BNT162B2 0.23 MG/2.25ML
0.3 INJECTION, SUSPENSION INTRAMUSCULAR ONCE
Refills: 0 | Status: COMPLETED | OUTPATIENT
Start: 2022-07-14 | End: 2022-07-14

## 2022-07-14 RX ORDER — BACLOFEN 100 %
1 POWDER (GRAM) MISCELLANEOUS
Qty: 0 | Refills: 0 | DISCHARGE
Start: 2022-07-14

## 2022-07-14 RX ORDER — OXYBUTYNIN CHLORIDE 5 MG
1 TABLET ORAL
Qty: 0 | Refills: 0 | DISCHARGE
Start: 2022-07-14

## 2022-07-14 RX ORDER — TAMSULOSIN HYDROCHLORIDE 0.4 MG/1
1 CAPSULE ORAL
Qty: 0 | Refills: 0 | DISCHARGE
Start: 2022-07-14

## 2022-07-14 RX ORDER — SERTRALINE 25 MG/1
1 TABLET, FILM COATED ORAL
Qty: 0 | Refills: 0 | DISCHARGE
Start: 2022-07-14

## 2022-07-14 RX ORDER — DICLOFENAC SODIUM 75 MG/1
1 TABLET, DELAYED RELEASE ORAL
Qty: 0 | Refills: 0 | DISCHARGE

## 2022-07-14 RX ADMIN — Medication 20 MILLIGRAM(S): at 06:15

## 2022-07-14 RX ADMIN — SERTRALINE 50 MILLIGRAM(S): 25 TABLET, FILM COATED ORAL at 13:01

## 2022-07-14 RX ADMIN — HEPARIN SODIUM 5000 UNIT(S): 5000 INJECTION INTRAVENOUS; SUBCUTANEOUS at 06:15

## 2022-07-14 RX ADMIN — Medication 500 MILLIGRAM(S): at 06:15

## 2022-07-14 RX ADMIN — BNT162B2 0.3 MILLILITER(S): 0.23 INJECTION, SUSPENSION INTRAMUSCULAR at 12:41

## 2022-07-14 RX ADMIN — PANTOPRAZOLE SODIUM 40 MILLIGRAM(S): 20 TABLET, DELAYED RELEASE ORAL at 10:26

## 2022-07-14 RX ADMIN — Medication 5 MILLIGRAM(S): at 13:00

## 2022-07-14 RX ADMIN — Medication 5 MILLIGRAM(S): at 06:16

## 2022-07-14 RX ADMIN — Medication 5 MILLIGRAM(S): at 06:15

## 2022-07-14 NOTE — DISCHARGE NOTE NURSING/CASE MANAGEMENT/SOCIAL WORK - NSDCPEFALRISK_GEN_ALL_CORE
For information on Fall & Injury Prevention, visit: [email or web address] OR  [email or web address] OR  https://www.cdc.gov/steadi/patient.html

## 2022-07-14 NOTE — PROGRESS NOTE ADULT - PROVIDER SPECIALTY LIST ADULT
Cardiology
Internal Medicine
Cardiology
Infectious Disease
Internal Medicine
Cardiology
Cardiology
Internal Medicine

## 2022-07-14 NOTE — DISCHARGE NOTE PROVIDER - NSDCMRMEDTOKEN_GEN_ALL_CORE_FT
Apriso 0.375 g oral capsule, extended release: 4 cap(s) orally once a day (in the morning)  baclofen 5 mg oral tablet: 1 tab(s) orally every 8 hours  carbamide peroxide 6.5% otic solution: 4 drop(s) to each affected ear every 12 hours until 7/27/2021  ciprofloxacin 500 mg oral tablet: 1 tab(s) orally every 12 hours  furosemide 20 mg oral tablet: 1 tab(s) orally once a day  mesalamine 0.375 g oral capsule, extended release: 4 cap(s) orally once a day (in the morning)  multivitamin: 1 tab(s) orally once a day  oxybutynin 5 mg oral tablet: 1 tab(s) orally 2 times a day  pantoprazole 40 mg oral delayed release tablet: 1 tab(s) orally once a day (before a meal)  PHENobarbital 32.4 mg oral tablet: 1 tab(s) orally once a day (at bedtime)  senna leaf extract oral tablet: 2 tab(s) orally once a day (at bedtime)  sertraline 50 mg oral tablet: 1 tab(s) orally once a day  tamsulosin 0.4 mg oral capsule: 1 cap(s) orally once a day (at bedtime)  Vitamin B12 1000 mcg oral tablet: 1 tab(s) orally once a day  Vitamin C 500 mg oral tablet: 1 tab(s) orally once a day  Vitamin D3 5000 intl units oral capsule: 1 cap(s) orally once a day

## 2022-07-14 NOTE — PROGRESS NOTE ADULT - ASSESSMENT
62  yr    h/o  old  DVT, RA, UC, BPH (following PSA yearly), recurrent UTI, acid reflux, and multiple sclerosis     uses  wheelchair when he leaves the house.  has  leg weakness secondary to  MS dx  ini  2013 and follows with Dr. Paredes    , and has been on several disease modifying treatments / ocrelizumab infusions. / dalfampridine in the past but this was discontinued after he had a seizure.    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  His main issues with multiple sclerosis are right-sided weakness and incoordination,   b/l  legweakness,   and increased urinary frequency and urgency.       *  fevers,  from uti.  sepsis  on arrival pe  ID   on iv  Rocephin      wbc  was  18,000  on  arrival   *   progressive multiple sclerosis,  with  weakness.  falguni  legs    pt  with  confusion  at  times  from   dementia  ct  head,  no acute  pathology// demyelination   hematuria  in [place],  resolving  on dvt ppx  UTI, E  coli, on rocephin,, now  on cipro for 5 days pe r iD   plan.,   d/c  to  rehab/  cleraed  for  d/c       r

## 2022-07-14 NOTE — DISCHARGE NOTE PROVIDER - HOSPITAL COURSE
62  yr    h/o  old  DVT, RA, UC, BPH (following PSA yearly), recurrent UTI, acid reflux, and multiple sclerosis     uses  wheelchair when he leaves the house.  has  leg weakness secondary to  MS dx  ini  2013 and follows with Dr. Paredes    , and has been on several disease modifying treatments / ocrelizumab infusions. / dalfampridine in the past but this was discontinued after he had a seizure.    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  His main issues with multiple sclerosis are right-sided weakness and incoordination,   b/l  leg weakness,   and increased urinary frequency and urgency.       *  fevers and chills,  found to have uti.  sepsis  on arrival pe  ID  s/p IV  Rocephin      wbc  was  18,000  on  arrival, NOW  resolved    *   progressive multiple sclerosis,  with  weakness.  falguni  legs    pt  with  confusion  at  times  from   dementia  ct  head,  no acute  pathology// demyelination   hematuria  in [place],  resolved   on dvt ppx  UTI, E  coli, on rocephin,, now  on cipro for 5 days per ID till 7/17 , fevers have been resolved    plan.,   d/c  to  rehab today.       Patient is medically cleared and stable for discharge. Discussed with .

## 2022-07-14 NOTE — DISCHARGE NOTE NURSING/CASE MANAGEMENT/SOCIAL WORK - NSDCVIVACCINE_GEN_ALL_CORE_FT
COVID-19, mRNA, LNP-S, PF, 30 mcg/0.3 mL dose, keely-sucrose ([employer]); 14-Jul-2022 12:41; Yahaira Branch (RN); [employer]; [id number] (Exp. Date: 29-Aug-2022); IntraMuscular; Deltoid Right.; 0.3 milliLiter(s);   influenza, injectable, quadrivalent, preservative free; 05-Oct-2014 13:54; Hanane Coyle (RN); [employer]; UI [id number]; IntraMuscular; Deltoid Right.; 0.5 milliLiter(s);   influenza, injectable, quadrivalent, preservative free; 26-Sep-2016 17:23; Adali Roe (RN); [employer]; [id number]; IntraMuscular; Deltoid Right.; 0.5 milliLiter(s); VIS (VIS Published: 07-Aug-2015, VIS Presented: 26-Sep-2016);

## 2022-07-14 NOTE — PROGRESS NOTE ADULT - SUBJECTIVE AND OBJECTIVE BOX
febriel  REVIEW OF SYSTEMS:  GEN: no fever,    no chills  RESP: no SOB,   no cough  CVS: no chest pain,   no palpitations  GI: no abdominal pain,   no nausea,   no vomiting,   no constipation,   no diarrhea  : no dysuria,   no frequency  NEURO: no headache,   no dizziness  PSYCH: no depression,   not anxious  Derm : no rash    MEDICATIONS  (STANDING):  baclofen 5 milliGRAM(s) Oral every 8 hours  ciprofloxacin     Tablet 500 milliGRAM(s) Oral every 12 hours  furosemide    Tablet 20 milliGRAM(s) Oral daily  heparin   Injectable 5000 Unit(s) SubCutaneous every 12 hours  oxybutynin 5 milliGRAM(s) Oral two times a day  pantoprazole    Tablet 40 milliGRAM(s) Oral before breakfast  PHENobarbital 32.4 milliGRAM(s) Oral at bedtime  senna 2 Tablet(s) Oral at bedtime  sertraline 50 milliGRAM(s) Oral daily  tamsulosin 0.4 milliGRAM(s) Oral at bedtime    MEDICATIONS  (PRN):      Vital Signs Last 24 Hrs  T(C): 36.7 (14 Jul 2022 08:42), Max: 36.9 (13 Jul 2022 11:30)  T(F): 98 (14 Jul 2022 08:42), Max: 98.5 (13 Jul 2022 11:30)  HR: 87 (14 Jul 2022 08:42) (67 - 87)  BP: 138/91 (14 Jul 2022 08:42) (108/74 - 138/91)  BP(mean): --  RR: 18 (14 Jul 2022 08:42) (18 - 18)  SpO2: 97% (14 Jul 2022 08:42) (94% - 97%)    Parameters below as of 14 Jul 2022 08:42  Patient On (Oxygen Delivery Method): room air      CAPILLARY BLOOD GLUCOSE        I&O's Summary    13 Jul 2022 07:01  -  14 Jul 2022 07:00  --------------------------------------------------------  IN: 600 mL / OUT: 1200 mL / NET: -600 mL        PHYSICAL EXAM:  HEAD:  Atraumatic, Normocephalic  NECK: Supple, No   JVD  CHEST/LUNG:   no     rales,     no,    rhonchi  HEART: Regular rate and rhythm;         murmur  ABDOMEN: Soft, Nontender, ;   EXTREMITIES:  no      edema  NEUROLOGY:  alert    LABS:                                  Consultant(s) Notes Reviewed:      Care Discussed with Consultants/Other Providers:

## 2022-07-14 NOTE — DISCHARGE NOTE PROVIDER - PROVIDER TOKENS
FREE:[LAST:[Blank],FIRST:[Harjinder],PHONE:[(   )    -],FAX:[(   )    -],ADDRESS:[Primary care doctor],FOLLOWUP:[1 week]] FREE:[LAST:[Blank],FIRST:[Harjinder],PHONE:[(   )    -],FAX:[(   )    -],ADDRESS:[Primary care doctor],FOLLOWUP:[1 week]],PROVIDER:[TOKEN:[[id number]],FOLLOWUP:[2 weeks]]

## 2022-07-14 NOTE — DISCHARGE NOTE PROVIDER - CARE PROVIDER_API CALL
Harjinder Paredes  Primary care doctor  Phone: (   )    -  Fax: (   )    -  Follow Up Time: 1 week   Harjinder Paredes  Primary care doctor  Phone: (   )    -  Fax: (   )    -  Follow Up Time: 1 week    Isaac Rogers  CARDIOVASCULAR DISEASE  [address], [address]  [place] [address]  Phone: [phone]  Fax: [phone]  Follow Up Time: 2 weeks

## 2022-07-14 NOTE — DISCHARGE NOTE NURSING/CASE MANAGEMENT/SOCIAL WORK - PATIENT PORTAL LINK FT
You can access the FollowMyHealth Patient Portal offered by [facility] by registering at the following website: [email or web address]. By joining [facility]’s FollowMyHealth portal, you will also be able to view your health information using other applications (apps) compatible with our system.

## 2022-07-14 NOTE — PROGRESS NOTE ADULT - SUBJECTIVE AND OBJECTIVE BOX
CARDIOLOGY     PROGRESS  NOTE   ________________________________________________    CHIEF COMPLAINT:Patient is a 62y old  Male who presents with a chief complaint of fevrs (13 Jul 2022 12:34)  no complain.  	  REVIEW OF SYSTEMS:  CONSTITUTIONAL: No fever, weight loss, or fatigue  EYES: No eye pain, visual disturbances, or discharge  ENT:  No difficulty hearing, tinnitus, vertigo; No sinus or throat pain  NECK: No pain or stiffness  RESPIRATORY: No cough, wheezing, chills or hemoptysis; No Shortness of Breath  CARDIOVASCULAR: No chest pain, palpitations, passing out, dizziness, or leg swelling  GASTROINTESTINAL: No abdominal or epigastric pain. No nausea, vomiting, or hematemesis; No diarrhea or constipation. No melena or hematochezia.  GENITOURINARY: No dysuria, frequency, hematuria, or incontinence  NEUROLOGICAL: No headaches, memory loss, loss of strength, numbness, or tremors  SKIN: No itching, burning, rashes, or lesions   LYMPH Nodes: No enlarged glands  ENDOCRINE: No heat or cold intolerance; No hair loss  MUSCULOSKELETAL: No joint pain or swelling; No muscle, back, or extremity pain  PSYCHIATRIC: No depression, anxiety, mood swings, or difficulty sleeping  HEME/LYMPH: No easy bruising, or bleeding gums  ALLERGY AND IMMUNOLOGIC: No hives or eczema	    [ ] All others negative	  [ ] Unable to obtain    PHYSICAL EXAM:  T(C): 36.6 (07-14-22 @ 00:38), Max: 36.9 (07-13-22 @ 11:30)  HR: 80 (07-14-22 @ 00:38) (67 - 84)  BP: 114/80 (07-14-22 @ 00:38) (108/74 - 120/80)  RR: 18 (07-14-22 @ 00:38) (18 - 18)  SpO2: 97% (07-14-22 @ 00:38) (94% - 97%)  Wt(kg): --  I&O's Summary    12 Jul 2022 07:01  -  13 Jul 2022 07:00  --------------------------------------------------------  IN: 1260 mL / OUT: 1150 mL / NET: 110 mL    13 Jul 2022 07:01  -  14 Jul 2022 06:50  --------------------------------------------------------  IN: 600 mL / OUT: 1200 mL / NET: -600 mL        Appearance: Normal	  HEENT:   Normal oral mucosa, PERRL, EOMI	  Lymphatic: No lymphadenopathy  Cardiovascular: Normal S1 S2, No JVD, + murmurs, No edema  Respiratory: Lungs clear to auscultation	  Psychiatry: A & O x 3, Mood & affect appropriate  Gastrointestinal:  Soft, Non-tender, + BS	  Skin: No rashes, No ecchymoses, No cyanosis	  Extremities: Normal range of motion, No clubbing, cyanosis or edema  Vascular: Peripheral pulses palpable 2+ bilaterally    MEDICATIONS  (STANDING):  baclofen 5 milliGRAM(s) Oral every 8 hours  ciprofloxacin     Tablet 500 milliGRAM(s) Oral every 12 hours  furosemide    Tablet 20 milliGRAM(s) Oral daily  heparin   Injectable 5000 Unit(s) SubCutaneous every 12 hours  oxybutynin 5 milliGRAM(s) Oral two times a day  pantoprazole    Tablet 40 milliGRAM(s) Oral before breakfast  PHENobarbital 32.4 milliGRAM(s) Oral at bedtime  senna 2 Tablet(s) Oral at bedtime  sertraline 50 milliGRAM(s) Oral daily  tamsulosin 0.4 milliGRAM(s) Oral at bedtime      TELEMETRY: 	    ECG:  	  RADIOLOGY:  OTHER: 	  	  LABS:	 	    CARDIAC MARKERS:                  proBNP:   Lipid Profile:   HgA1c:   TSH:     Notes: Chart reviewed and noted.  Patient recommended for subacute rehab, he is  aware and in agreement.  Requesting [facility] and Porfirio with additional choices  pending.  Referral sent in anticipation of pending discharge once medically  cleared.  Awaiting responses at this time.   will continue to  follow up.    Assessment and plan  ---------------------------   63 yo man ith MS (bed bound), UC, GERD, BPH, panic disorder, presents to ED with fever and chills since 12pm on 7/8.      Reports he's been "constantly urinating" since earlier today     and has a history of frequent UTIs  pt was found to have le edema with ?hx of hfpef.,  no hx of cad.  pt was admitted last year with possible chf with bl le edema  will review old echo noted  agree with lasix  check pro bnp  lipid panel  dvt prophylaxis  urine culture noted  pt requesting rehab placement  dvt prophylaxis  pt will eventually needs stress test as out pt  jill stockings bl  awaiting dc

## 2022-07-18 ENCOUNTER — RESULT REVIEW (OUTPATIENT)
Age: 62
End: 2022-07-18

## 2022-10-08 NOTE — H&P ADULT - ASSESSMENT
No
       62  yr    h/o  DVT, RA, UC, BPH (following PSA yearly), recurrent UTI, acid reflux, and multiple sclerosis     uses  wheelchair when he leaves the house.  has  leg weakness secondary to  MS dx  ini  2013 and follows with Dr. Paredes    , and has been on several disease modifying treatments / ocrelizumab infusions. / dalfampridine in the past but this was discontinued after he had a seizure.    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  His main issues with multiple sclerosis are right-sided weakness and incoordination,  left leg weakness,   and increased urinary frequency and urgency.       fevers,  from uti/ on iv  Rocephin      wbc  is  18,000     progressive multiple sclerosis  doubt edema has neurologic basis  on dvt ppx       r

## 2022-12-20 RX ORDER — OXYBUTYNIN CHLORIDE 10 MG/1
10 TABLET, EXTENDED RELEASE ORAL
Qty: 90 | Refills: 3 | Status: ACTIVE | COMMUNITY
Start: 2022-03-28 | End: 1900-01-01

## 2022-12-21 ENCOUNTER — NON-APPOINTMENT (OUTPATIENT)
Age: 62
End: 2022-12-21

## 2023-01-01 NOTE — BEHAVIORAL HEALTH ASSESSMENT NOTE - RELATEDNESS
CLINICAL NUTRITION SERVICES - REASSESSMENT NOTE    ANTHROPOMETRICS  Weight: 3766 gm, up 20 gm. (21.3%tile, z score -0.80, stable)   Length: 52 cm, 27.3%tile & z score -0.60 (decreased)  Head Circumference: 34 cm, 2.8%tile & z score -1.91 (decreased)  Weight/Length: ~39%%tile & z score -0.28; increased  Comments: Anthropometrics plotted on WHO Growth Chart.    NUTRITION ORDERS   Diet: Similac Total Care 360 Sensitive = 20 kcal/oz On Demand    Intake/Tolerance:    Baby appears to be tolerating full formula feedings orally on demand, decreased back to 20 kcal/oz on 5/15. She is voiding and stooling, no noted emesis. Average intake over past week provided 148 mL/kg/day, 103 Kcals/kg/day, & 2.1 gm/kg/day protein; meeting % of assessed energy needs & 70-95% of assessed protein needs.    Current factors affecting nutrition intake include: BHAVANI, Medical Course    NEW FINDINGS:  -Feedings decreased back to 20 kcal/oz on 5/15.    LABS: Reviewed   MEDICATIONS: Reviewed & Includes: 5 mcg/d Vitamin D    ASSESSED NUTRITION NEEDS:    -Energy: 100-110 Kcals/kg/day from Feeds alone    -Protein: 2.2-3 gm/kg/day     -Fluid: Per Medical Team     -Micronutrients: 10-15 mcg/day & 2 mg/kg/day of Iron - with full feeds     NUTRITION STATUS VALIDATION   Patient does not meet criteria for malnutrition.    EVALUATION OF PREVIOUS PLAN OF CARE:   Monitoring from previous assessment:    Macronutrient Intakes: Appropriate energy, slightly low in protein.    Micronutrient Intakes: Appropriate.    Anthropometric Measurements: Baby gained 31 gm/d over the past week, goal was 25-30 gm/d. Weight/age z score down 0.67 from birth score. Length measurement unchanged over the past week, now down 2.67 from birth measurement, which may be an outlier as previous 3 measurements are trending. OFC measurement decreased. Will continue to monitor for trends.    Previous Goals:    1). Meet 100% assessed energy & protein needs via nutrition  support/oral feedings. -Partially met   2). Weight gain of 25-30 gm/d with linear growth of 0.8-0.9 cm/week. -Partially met   3). Receive appropriate Vitamin D & Iron intakes. -Met    Previous Nutrition Diagnosis:    Predicted suboptimal nutrient intake related to reliance on PO as evidenced by potential to meet <100% of assessed needs with oral feedings.   Evaluation: No change    NUTRITION DIAGNOSIS:   Predicted suboptimal nutrient intake related to reliance on PO as evidenced by potential to meet <100% of assessed needs with oral feedings.    INTERVENTIONS  Nutrition Prescription    Meet 100% assessed energy & protein needs via feedings with age-appropriate growth.     Implementation:  Meals/ Snack (encourage oral intakes) and Collaboration and Referral of Nutrition care (RD present for medical rounds 5/16, d/w team nutritional POC)    Goals   1). Meet 100% assessed energy & protein needs via nutrition support/oral feedings.   2). Weight gain of 25-30 gm/d with linear growth of 0.7-0.8 cm/week.    3). Receive appropriate Vitamin D & Iron intakes.    FOLLOW UP/MONITORING  Macronutrient intakes, Micronutrient intakes, Anthropometric measurements    RECOMMENDATIONS  1). Continue feedings of Similac 360 Sensitive = 20 kcal/oz at volumes On Demand, aim for 8-12 feedings/day.      2). Maintain 5 mcg/day of Vit D and continue at discharge.    Katy Montana, MPH, RD, LD  Pager [phone]     Good

## 2023-01-01 NOTE — ED PROVIDER NOTE - PSH
37.6wk LGA male born via repeat scheduled CS for cholestasis to a 38 y/o  blood type O+ mother. Maternal history of transaminitis; prenatal history of cholestasis. on Ursodiol and ASA. PNL -/-/NR/I, GBS - on . AROM at delivery with clear fluids. Baby emerged vigorous, crying, was w/d/s/s with APGARS of 9/9. No rupture, no labor. Mom plans to initiate breastfeeding, declines Hep B vaccine and consents to circ. Highest maternal temp 36.8.    Since admission to the NBN, baby has been feeding well, stooling and making wet diapers. Vitals have remained stable. Baby received routine NBN care. The baby lost an acceptable amount of weight during the nursery stay, down ____ % from birth weight.  Bilirubin was ____  at ___ hours of life, which is below phototherapy threshold.     See below for CCHD, auditory screening, and Hepatitis B vaccine status.    Patient is stable for discharge to home after receiving routine  care education and instructions to follow up with pediatrician appointment in 1-2 days.   37.6wk LGA male born via repeat scheduled CS for cholestasis to a 38 y/o  blood type O+ mother. Maternal history of transaminitis; prenatal history of cholestasis. on Ursodiol and ASA. PNL -/-/NR/I, GBS - on . AROM at delivery with clear fluids. Baby emerged vigorous, crying, was w/d/s/s with APGARS of 9/9. No rupture, no labor. Mom plans to initiate breastfeeding, declines Hep B vaccine and consents to circ. Highest maternal temp 36.8.    Since admission to the NBN, baby has been feeding well, stooling and making wet diapers. Vitals have remained stable. Baby received routine NBN care. The baby lost an acceptable amount of weight during the nursery stay, down 6.6 % from birth weight.  Bilirubin was 5.8  at 32 hours of life, which is below phototherapy threshold.     See below for CCHD, auditory screening, and Hepatitis B vaccine status.    Patient is stable for discharge to home after receiving routine  care education and instructions to follow up with pediatrician appointment in 1-2 days.  Outpatient circumcision  ATTENDING ATTESTATION:    I have read and agree with this PGY1 Discharge Note.      I was physically present for the evaluation and management services provided.  I agree with the included history, physical and plan which I reviewed and edited where appropriate.  I spent > 30 minutes with the patient and the patient's family on direct patient care and discharge planning with more than 50% of the visit spent on counseling and/or coordination of care.    ATTENDING EXAM at : 0800am 23  Gen: awake, alert, active  HEENT: anterior fontanel open soft and flat. no cleft lip/palate, ears normal set, no ear pits or tags, no lesions in mouth/throat,  red reflex positive bilaterally, nares clinically patent  Resp: good air entry and clear to auscultation bilaterally  Cardiac: Normal S1/S2, regular rate and rhythm, no murmurs, rubs or gallops, 2+ femoral pulses bilaterally  Abd: soft, non tender, non distended, normal bowel sounds, no organomegaly,  umbilicus clean/dry/intact  Neuro: +grasp/suck/willy, normal tone  Extremities: negative ruiz and ortolani, full range of motion x 4, no clavicular crepitus  Skin: pink  Genital Exam: testes palpable bilaterally, normal male anatomy, maia 1, anus visually patent        Ezequiel Anderson MD  Pediatric Hospitalist     No significant past surgical history

## 2023-01-21 ENCOUNTER — TRANSCRIPTION ENCOUNTER (OUTPATIENT)
Age: 63
End: 2023-01-21

## 2023-01-22 ENCOUNTER — INPATIENT (INPATIENT)
Facility: HOSPITAL | Age: 63
LOS: 7 days | Discharge: HOME | DRG: 189 | End: 2023-01-30
Attending: STUDENT IN AN ORGANIZED HEALTH CARE EDUCATION/TRAINING PROGRAM | Admitting: STUDENT IN AN ORGANIZED HEALTH CARE EDUCATION/TRAINING PROGRAM
Payer: MEDICARE

## 2023-01-22 VITALS
RESPIRATION RATE: 20 BRPM | HEIGHT: 76 IN | HEART RATE: 94 BPM | WEIGHT: 285.06 LBS | OXYGEN SATURATION: 98 % | DIASTOLIC BLOOD PRESSURE: 85 MMHG | SYSTOLIC BLOOD PRESSURE: 154 MMHG | TEMPERATURE: 100 F

## 2023-01-22 DIAGNOSIS — G35 MULTIPLE SCLEROSIS: ICD-10-CM

## 2023-01-22 DIAGNOSIS — K51.90 ULCERATIVE COLITIS, UNSPECIFIED, WITHOUT COMPLICATIONS: ICD-10-CM

## 2023-01-22 DIAGNOSIS — N40.0 BENIGN PROSTATIC HYPERPLASIA WITHOUT LOWER URINARY TRACT SYMPTOMS: ICD-10-CM

## 2023-01-22 DIAGNOSIS — J96.01 ACUTE RESPIRATORY FAILURE WITH HYPOXIA: ICD-10-CM

## 2023-01-22 DIAGNOSIS — R11.2 NAUSEA WITH VOMITING, UNSPECIFIED: ICD-10-CM

## 2023-01-22 DIAGNOSIS — F41.0 PANIC DISORDER [EPISODIC PAROXYSMAL ANXIETY]: ICD-10-CM

## 2023-01-22 LAB
ALBUMIN SERPL ELPH-MCNC: 3.3 G/DL — SIGNIFICANT CHANGE UP (ref 3.3–5)
ALP SERPL-CCNC: 139 U/L — HIGH (ref 40–120)
ALT FLD-CCNC: 19 U/L — SIGNIFICANT CHANGE UP (ref 10–45)
ANION GAP SERPL CALC-SCNC: 13 MMOL/L — SIGNIFICANT CHANGE UP (ref 5–17)
APPEARANCE UR: CLEAR — SIGNIFICANT CHANGE UP
APTT BLD: 29.7 SEC — SIGNIFICANT CHANGE UP (ref 27.5–35.5)
AST SERPL-CCNC: 21 U/L — SIGNIFICANT CHANGE UP (ref 10–40)
BACTERIA # UR AUTO: NEGATIVE — SIGNIFICANT CHANGE UP
BASOPHILS # BLD AUTO: 0 K/UL — SIGNIFICANT CHANGE UP (ref 0–0.2)
BASOPHILS NFR BLD AUTO: 0 % — SIGNIFICANT CHANGE UP (ref 0–2)
BILIRUB SERPL-MCNC: 0.4 MG/DL — SIGNIFICANT CHANGE UP (ref 0.2–1.2)
BILIRUB UR-MCNC: NEGATIVE — SIGNIFICANT CHANGE UP
BLD GP AB SCN SERPL QL: NEGATIVE — SIGNIFICANT CHANGE UP
BUN SERPL-MCNC: 21 MG/DL — SIGNIFICANT CHANGE UP (ref 7–23)
CALCIUM SERPL-MCNC: 8.8 MG/DL — SIGNIFICANT CHANGE UP (ref 8.4–10.5)
CHLORIDE SERPL-SCNC: 103 MMOL/L — SIGNIFICANT CHANGE UP (ref 96–108)
CO2 SERPL-SCNC: 22 MMOL/L — SIGNIFICANT CHANGE UP (ref 22–31)
COLOR SPEC: YELLOW — SIGNIFICANT CHANGE UP
CREAT SERPL-MCNC: 0.61 MG/DL — SIGNIFICANT CHANGE UP (ref 0.5–1.3)
D DIMER BLD IA.RAPID-MCNC: 235 NG/ML DDU — HIGH
DACRYOCYTES BLD QL SMEAR: SLIGHT — SIGNIFICANT CHANGE UP
DIFF PNL FLD: NEGATIVE — SIGNIFICANT CHANGE UP
EGFR: 107 ML/MIN/1.73M2 — SIGNIFICANT CHANGE UP
EOSINOPHIL # BLD AUTO: 0.06 K/UL — SIGNIFICANT CHANGE UP (ref 0–0.5)
EOSINOPHIL NFR BLD AUTO: 0.9 % — SIGNIFICANT CHANGE UP (ref 0–6)
EPI CELLS # UR: 1 /HPF — SIGNIFICANT CHANGE UP
FLUAV AG NPH QL: SIGNIFICANT CHANGE UP
FLUBV AG NPH QL: SIGNIFICANT CHANGE UP
GLUCOSE SERPL-MCNC: 102 MG/DL — HIGH (ref 70–99)
GLUCOSE UR QL: NEGATIVE — SIGNIFICANT CHANGE UP
HCT VFR BLD CALC: 45.2 % — SIGNIFICANT CHANGE UP (ref 39–50)
HCT VFR BLD CALC: 49.9 % — SIGNIFICANT CHANGE UP (ref 39–50)
HGB BLD-MCNC: 14.2 G/DL — SIGNIFICANT CHANGE UP (ref 13–17)
HGB BLD-MCNC: 15.3 G/DL — SIGNIFICANT CHANGE UP (ref 13–17)
HYALINE CASTS # UR AUTO: 1 /LPF — SIGNIFICANT CHANGE UP (ref 0–2)
INR BLD: 1.14 RATIO — SIGNIFICANT CHANGE UP (ref 0.88–1.16)
KETONES UR-MCNC: NEGATIVE — SIGNIFICANT CHANGE UP
LEUKOCYTE ESTERASE UR-ACNC: ABNORMAL
LIDOCAIN IGE QN: 9 U/L — SIGNIFICANT CHANGE UP (ref 7–60)
LYMPHOCYTES # BLD AUTO: 0.42 K/UL — LOW (ref 1–3.3)
LYMPHOCYTES # BLD AUTO: 6.1 % — LOW (ref 13–44)
MAGNESIUM SERPL-MCNC: 1.8 MG/DL — SIGNIFICANT CHANGE UP (ref 1.6–2.6)
MANUAL SMEAR VERIFICATION: SIGNIFICANT CHANGE UP
MCHC RBC-ENTMCNC: 28.3 PG — SIGNIFICANT CHANGE UP (ref 27–34)
MCHC RBC-ENTMCNC: 28.7 PG — SIGNIFICANT CHANGE UP (ref 27–34)
MCHC RBC-ENTMCNC: 30.7 GM/DL — LOW (ref 32–36)
MCHC RBC-ENTMCNC: 31.4 GM/DL — LOW (ref 32–36)
MCV RBC AUTO: 91.3 FL — SIGNIFICANT CHANGE UP (ref 80–100)
MCV RBC AUTO: 92.4 FL — SIGNIFICANT CHANGE UP (ref 80–100)
MONOCYTES # BLD AUTO: 0.54 K/UL — SIGNIFICANT CHANGE UP (ref 0–0.9)
MONOCYTES NFR BLD AUTO: 7.9 % — SIGNIFICANT CHANGE UP (ref 2–14)
NEUTROPHILS # BLD AUTO: 5.81 K/UL — SIGNIFICANT CHANGE UP (ref 1.8–7.4)
NEUTROPHILS NFR BLD AUTO: 85.1 % — HIGH (ref 43–77)
NITRITE UR-MCNC: NEGATIVE — SIGNIFICANT CHANGE UP
NRBC # BLD: 0 /100 WBCS — SIGNIFICANT CHANGE UP (ref 0–0)
OB PNL STL: NEGATIVE — SIGNIFICANT CHANGE UP
PH UR: 5.5 — SIGNIFICANT CHANGE UP (ref 5–8)
PLAT MORPH BLD: NORMAL — SIGNIFICANT CHANGE UP
PLATELET # BLD AUTO: 207 K/UL — SIGNIFICANT CHANGE UP (ref 150–400)
PLATELET # BLD AUTO: 224 K/UL — SIGNIFICANT CHANGE UP (ref 150–400)
POIKILOCYTOSIS BLD QL AUTO: SLIGHT — SIGNIFICANT CHANGE UP
POTASSIUM SERPL-MCNC: 4.5 MMOL/L — SIGNIFICANT CHANGE UP (ref 3.5–5.3)
POTASSIUM SERPL-SCNC: 4.5 MMOL/L — SIGNIFICANT CHANGE UP (ref 3.5–5.3)
PROT SERPL-MCNC: 6.2 G/DL — SIGNIFICANT CHANGE UP (ref 6–8.3)
PROT UR-MCNC: NEGATIVE — SIGNIFICANT CHANGE UP
PROTHROM AB SERPL-ACNC: 13.3 SEC — SIGNIFICANT CHANGE UP (ref 10.5–13.4)
RBC # BLD: 4.95 M/UL — SIGNIFICANT CHANGE UP (ref 4.2–5.8)
RBC # BLD: 5.4 M/UL — SIGNIFICANT CHANGE UP (ref 4.2–5.8)
RBC # FLD: 14.3 % — SIGNIFICANT CHANGE UP (ref 10.3–14.5)
RBC # FLD: 14.7 % — HIGH (ref 10.3–14.5)
RBC BLD AUTO: SIGNIFICANT CHANGE UP
RBC CASTS # UR COMP ASSIST: 1 /HPF — SIGNIFICANT CHANGE UP (ref 0–4)
RH IG SCN BLD-IMP: NEGATIVE — SIGNIFICANT CHANGE UP
RH IG SCN BLD-IMP: NEGATIVE — SIGNIFICANT CHANGE UP
RSV RNA NPH QL NAA+NON-PROBE: SIGNIFICANT CHANGE UP
SARS-COV-2 RNA SPEC QL NAA+PROBE: SIGNIFICANT CHANGE UP
SARS-COV-2 RNA SPEC QL NAA+PROBE: SIGNIFICANT CHANGE UP
SODIUM SERPL-SCNC: 138 MMOL/L — SIGNIFICANT CHANGE UP (ref 135–145)
SP GR SPEC: 1.03 — HIGH (ref 1.01–1.02)
TARGETS BLD QL SMEAR: SLIGHT — SIGNIFICANT CHANGE UP
UROBILINOGEN FLD QL: ABNORMAL
WBC # BLD: 6.83 K/UL — SIGNIFICANT CHANGE UP (ref 3.8–10.5)
WBC # BLD: 6.97 K/UL — SIGNIFICANT CHANGE UP (ref 3.8–10.5)
WBC # FLD AUTO: 6.83 K/UL — SIGNIFICANT CHANGE UP (ref 3.8–10.5)
WBC # FLD AUTO: 6.97 K/UL — SIGNIFICANT CHANGE UP (ref 3.8–10.5)
WBC UR QL: 5 /HPF — SIGNIFICANT CHANGE UP (ref 0–5)

## 2023-01-22 PROCEDURE — 99223 1ST HOSP IP/OBS HIGH 75: CPT

## 2023-01-22 PROCEDURE — 99285 EMERGENCY DEPT VISIT HI MDM: CPT | Mod: CS

## 2023-01-22 PROCEDURE — 71045 X-RAY EXAM CHEST 1 VIEW: CPT | Mod: 26

## 2023-01-22 RX ORDER — SENNA PLUS 8.6 MG/1
2 TABLET ORAL AT BEDTIME
Refills: 0 | Status: DISCONTINUED | OUTPATIENT
Start: 2023-01-22 | End: 2023-01-30

## 2023-01-22 RX ORDER — METOCLOPRAMIDE HCL 10 MG
10 TABLET ORAL ONCE
Refills: 0 | Status: COMPLETED | OUTPATIENT
Start: 2023-01-22 | End: 2023-01-22

## 2023-01-22 RX ORDER — ONDANSETRON 8 MG/1
4 TABLET, FILM COATED ORAL EVERY 8 HOURS
Refills: 0 | Status: DISCONTINUED | OUTPATIENT
Start: 2023-01-22 | End: 2023-01-30

## 2023-01-22 RX ORDER — MESALAMINE 400 MG
1200 TABLET, DELAYED RELEASE (ENTERIC COATED) ORAL DAILY
Refills: 0 | Status: DISCONTINUED | OUTPATIENT
Start: 2023-01-22 | End: 2023-01-22

## 2023-01-22 RX ORDER — SODIUM CHLORIDE 9 MG/ML
1000 INJECTION, SOLUTION INTRAVENOUS ONCE
Refills: 0 | Status: COMPLETED | OUTPATIENT
Start: 2023-01-22 | End: 2023-01-22

## 2023-01-22 RX ORDER — INFLUENZA VIRUS VACCINE 15; 15; 15; 15 UG/.5ML; UG/.5ML; UG/.5ML; UG/.5ML
0.7 SUSPENSION INTRAMUSCULAR ONCE
Refills: 0 | Status: COMPLETED | OUTPATIENT
Start: 2023-01-22 | End: 2023-01-22

## 2023-01-22 RX ORDER — TAMSULOSIN HYDROCHLORIDE 0.4 MG/1
0.4 CAPSULE ORAL AT BEDTIME
Refills: 0 | Status: DISCONTINUED | OUTPATIENT
Start: 2023-01-22 | End: 2023-01-30

## 2023-01-22 RX ORDER — MESALAMINE 400 MG
1.5 TABLET, DELAYED RELEASE (ENTERIC COATED) ORAL DAILY
Refills: 0 | Status: DISCONTINUED | OUTPATIENT
Start: 2023-01-22 | End: 2023-01-30

## 2023-01-22 RX ORDER — BACLOFEN 100 %
5 POWDER (GRAM) MISCELLANEOUS EVERY 8 HOURS
Refills: 0 | Status: DISCONTINUED | OUTPATIENT
Start: 2023-01-22 | End: 2023-01-30

## 2023-01-22 RX ORDER — ACETAMINOPHEN 500 MG
1000 TABLET ORAL ONCE
Refills: 0 | Status: COMPLETED | OUTPATIENT
Start: 2023-01-22 | End: 2023-01-22

## 2023-01-22 RX ORDER — ENOXAPARIN SODIUM 100 MG/ML
40 INJECTION SUBCUTANEOUS EVERY 24 HOURS
Refills: 0 | Status: DISCONTINUED | OUTPATIENT
Start: 2023-01-22 | End: 2023-01-30

## 2023-01-22 RX ORDER — SERTRALINE 25 MG/1
50 TABLET, FILM COATED ORAL DAILY
Refills: 0 | Status: DISCONTINUED | OUTPATIENT
Start: 2023-01-22 | End: 2023-01-30

## 2023-01-22 RX ORDER — OXYBUTYNIN CHLORIDE 5 MG
5 TABLET ORAL
Refills: 0 | Status: DISCONTINUED | OUTPATIENT
Start: 2023-01-22 | End: 2023-01-30

## 2023-01-22 RX ORDER — ACETAMINOPHEN 500 MG
650 TABLET ORAL EVERY 6 HOURS
Refills: 0 | Status: DISCONTINUED | OUTPATIENT
Start: 2023-01-22 | End: 2023-01-25

## 2023-01-22 RX ORDER — PANTOPRAZOLE SODIUM 20 MG/1
80 TABLET, DELAYED RELEASE ORAL ONCE
Refills: 0 | Status: COMPLETED | OUTPATIENT
Start: 2023-01-22 | End: 2023-01-22

## 2023-01-22 RX ORDER — PANTOPRAZOLE SODIUM 20 MG/1
40 TABLET, DELAYED RELEASE ORAL EVERY 12 HOURS
Refills: 0 | Status: DISCONTINUED | OUTPATIENT
Start: 2023-01-22 | End: 2023-01-23

## 2023-01-22 RX ORDER — LANOLIN ALCOHOL/MO/W.PET/CERES
3 CREAM (GRAM) TOPICAL AT BEDTIME
Refills: 0 | Status: DISCONTINUED | OUTPATIENT
Start: 2023-01-22 | End: 2023-01-25

## 2023-01-22 RX ADMIN — Medication 10 MILLIGRAM(S): at 03:02

## 2023-01-22 RX ADMIN — TAMSULOSIN HYDROCHLORIDE 0.4 MILLIGRAM(S): 0.4 CAPSULE ORAL at 22:19

## 2023-01-22 RX ADMIN — ONDANSETRON 4 MILLIGRAM(S): 8 TABLET, FILM COATED ORAL at 20:28

## 2023-01-22 RX ADMIN — ENOXAPARIN SODIUM 40 MILLIGRAM(S): 100 INJECTION SUBCUTANEOUS at 17:24

## 2023-01-22 RX ADMIN — SERTRALINE 50 MILLIGRAM(S): 25 TABLET, FILM COATED ORAL at 11:36

## 2023-01-22 RX ADMIN — PANTOPRAZOLE SODIUM 40 MILLIGRAM(S): 20 TABLET, DELAYED RELEASE ORAL at 22:29

## 2023-01-22 RX ADMIN — Medication 5 MILLIGRAM(S): at 17:24

## 2023-01-22 RX ADMIN — Medication 10 MILLIGRAM(S): at 22:19

## 2023-01-22 RX ADMIN — SODIUM CHLORIDE 1000 MILLILITER(S): 9 INJECTION, SOLUTION INTRAVENOUS at 02:56

## 2023-01-22 RX ADMIN — PANTOPRAZOLE SODIUM 80 MILLIGRAM(S): 20 TABLET, DELAYED RELEASE ORAL at 02:56

## 2023-01-22 RX ADMIN — Medication 400 MILLIGRAM(S): at 03:38

## 2023-01-22 RX ADMIN — SENNA PLUS 2 TABLET(S): 8.6 TABLET ORAL at 22:19

## 2023-01-22 RX ADMIN — Medication 650 MILLIGRAM(S): at 11:34

## 2023-01-22 RX ADMIN — Medication 1 TABLET(S): at 11:34

## 2023-01-22 NOTE — ED PROVIDER NOTE - PHYSICAL EXAMINATION
Gen: NAD, AAOx3, non-toxic appearing  HEENT: NCAT, normal conjunctiva, oral mucosa moist  Lung: speaking in full sentences, good aeration bilaterally, lungs CTA b/l  CV: regular rate and rhythm. cap refill <2x. peripheral pulses 2+bilaterally   Abd: obese, soft, ND, NT  MSK: no visible deformities  Neuro: No focal deficits  Skin: Intact  Psych: normal affect   Rectal: Soft brown stool in vault, no blood

## 2023-01-22 NOTE — ED ADULT NURSE NOTE - OBJECTIVE STATEMENT
63y male BIBEMS c/o abd pain and multiple episodes of coffee ground emesis since 3am last night. PMH MS resulting in right sided weakness, UC, GERD, BPH. Patient at baseline is bed bound and a&ox4. Upon assessment pt breathing unlabored and spontaneous on 2L NC after being placed on O2 by EMS for oxygen sat 90-93%, skin warm and dry. Denies abd pain, SOB, chest pain, palpitations. Pt states he has had constipation x2days. Patient received zofran in route by EMS with no relief. 20G L hand IV placed by RN, 20G L AC IV placed by EMS prior to arrival. Patient placed on cardiac monitor, labs sent. Daughter at bedside, bed locked in lowest position.

## 2023-01-22 NOTE — H&P ADULT - NSHPLABSRESULTS_GEN_ALL_CORE
I have personally reviewed the EKG and found NSR with no ischemic changes  I have personally reviewed the CXR and found clear lungs

## 2023-01-22 NOTE — ED PROVIDER NOTE - OBJECTIVE STATEMENT
62 yo M with hx of MS (bed bound), UC, GERD, BPH, panic disorder presenting with vomiting. Patient reports several episodes of emesis since yesterday, became concerned tonight when the vomitus became darker in appearance. Denies bright red blood. No dark or bloody stools. Denies fevers, chills, abdominal pain. Has had endoscopy in the past showing acid reflux. Zofran given en route by EMS. Placed on 3 L NC by EMS for hypoxia to 90-93% on RA. Patient denies shortness of breath, palpitations, chest pain.

## 2023-01-22 NOTE — H&P ADULT - HISTORY OF PRESENT ILLNESS
63M PMH MS, UC,  GERD, BPH panic disorder, presents with nausea and vomiting and now with acute hypoxic respiratory failure. Patient reports he was in his normal state of health until yesterday, when he began experiencing episodes of NBNB vomiting. Patient called EMS when vomit became darker. Denies coffee ground emesis or jyotsna blood. Denies diarrhea or melanic stool. Denies fevers, or chills. Denies abdominal pain. ROS otherwise unremarkable. EMS found patient to be hypoxic and started O2 via LFNC at 3 LPM.\    ED course: VSS. CBC stable. CMP with mild elevation in AM. Coags normal. EKG with NSR. RVP negative. CXR with no focal consolidations. Patient given metoclopramide 10 mg IV 1x and admitted to medicine for further evaluation and treatment.

## 2023-01-22 NOTE — ED PROVIDER NOTE - ATTENDING CONTRIBUTION TO CARE
Attending (Surjit Turner D.O.):  I have personally seen and examined this patient. I have performed a substantive portion of the visit including all aspects of the medical decision making. Resident, fellow, student, and/or ACP note reviewed. I agree on the plan of care except where noted.    63-year-old male with a history of MS, ulcerative colitis, reflux here for nonbloody nonbilious emesis however question of "coffee-ground".  Per family multiple family members at home with "stomach bug".  Patient denies fever, chills, bright red blood per rectum, shortness of breath.  Per EMS patient was put on 3 L nasal cannula for intermittent hypoxia 90–93% on room air.  Patient is hemodynamically stable though on 1 L nasal cannula with desat to 88 to 92%.  Clear lungs auscultation benign abdomen.  Clear oropharynx without signs of bright red blood in the oropharynx.  Benign abdomen without any peritoneal signs, no distention.  No lower extremity swelling, conjunctivae pink.  Patient without clinical signs of brisk upper GI or lower GI bleed at this time.  No signs of clinical anemia.  With hypoxemia clear lungs unclear viral syndrome possibly flu resulting in patient's intermittent hypoxia.  Mucous membranes not profoundly dry however likely element of insensible losses resulting in mild hypovolemia.  No history or exam to suggest acute cardiopulmonary process ongoing at this time.  Benign abdomen doubt significant colitis requiring advanced imaging.  Will obtain labs, flu/COVID swab, screening chest x-ray to eval lung parenchyma EKG begin hydration dose 80 mg of Protonix empirically pending lab results for possible suspected upper GI bleed though doubt. ->  Labs without leukocytosis, hemoglobin 14.2, BUN 21 creatinine 0.61, lipase 9.  Chest x-ray clear, COVID swab negative.  Patient unable to wean off of nasal cannula, not tolerating p.o.  Likely require more supportive care inpatient.  Will admit.

## 2023-01-22 NOTE — ED ADULT NURSE REASSESSMENT NOTE - NS ED NURSE REASSESS COMMENT FT1
Report received from EARL Maritns. Patient sleeping at this time. On bedside cardiac monitor. Admitted to medicine, pending bed assignment. Bed locked and lowered. Comfort and safety measures maintained.

## 2023-01-22 NOTE — ED PROVIDER NOTE - NS ED ROS FT
Constitutional:  (-) fever, (-) chills, (-) fatigue  Eyes:  (-) eye pain (-) visual changes  ENMT: (-) nasal discharge, (-) sore throat. (-) neck pain or stiffness  Cardiac: (-) chest pain (-) palpitations  Respiratory:  (-) cough (-) shortness of breath  GI:  (+) nausea (+) vomiting (-) diarrhea (-) abdominal pain  :  (-) dysuria (-) frequency (-) burning  MS:  (-) back pain (-) joint pain  Neuro:  (-) headache (-) numbness (-) tingling (-) focal weakness  Skin:  (-) rash  Except as documented in the HPI,  all other systems are negative

## 2023-01-22 NOTE — CHART NOTE - NSCHARTNOTEFT_GEN_A_CORE
Patient is a 63y old  Male who presents with a chief complaint of AHRF (22 Jan 2023 10:35)      Vital Signs Last 24 Hrs  T(C): 36.8 (22 Jan 2023 17:41), Max: 38 (22 Jan 2023 02:44)  T(F): 98.3 (22 Jan 2023 17:41), Max: 100.4 (22 Jan 2023 02:44)  HR: 76 (22 Jan 2023 17:41) (70 - 96)  BP: 153/97 (22 Jan 2023 17:41) (122/87 - 154/85)  BP(mean): 84 (22 Jan 2023 06:14) (84 - 96)  RR: 18 (22 Jan 2023 17:41) (16 - 21)  SpO2: 96% (22 Jan 2023 17:41) (96% - 98%)    Parameters below as of 22 Jan 2023 16:15    O2 Flow (L/min): 1        Labs:                          15.3   6.97  )-----------( 207      ( 22 Jan 2023 21:34 )             49.9     01-22    138  |  103  |  21  ----------------------------<  102<H>  4.5   |  22  |  0.61    Ca    8.8      22 Jan 2023 02:11  Mg     1.8     01-22    TPro  6.2  /  Alb  3.3  /  TBili  0.4  /  DBili  x   /  AST  21  /  ALT  19  /  AlkPhos  139<H>  01-22        Radiology:    Physical Exam:  General: WN/WD NAD  Neurology: A&Ox3, nonfocal, AGUSTIN x 4  Head:  Normocephalic, atraumatic  Respiratory: CTA B/L  CV: RRR, S1S2, no murmur  Abdominal: Soft, NT, ND no palpable mass  MSK: No edema, + peripheral pulses, FROM all 4 extremity    Assessment & Plan:  HPI:  63M PMH MS, UC,  GERD, BPH panic disorder, presents with nausea and vomiting and now with acute hypoxic respiratory failure. Patient reports he was in his normal state of health until yesterday, when he began experiencing episodes of NBNB vomiting. Patient called EMS when vomit became darker. Denies coffee ground emesis or jyotsna blood. Denies diarrhea or melanic stool. Denies fevers, or chills. Denies abdominal pain. ROS otherwise unremarkable. EMS found patient to be hypoxic and started O2 via LFNC at 3 LPM.\    ED course: VSS. CBC stable. CMP with mild elevation in AM. Coags normal. EKG with NSR. RVP negative. CXR with no focal consolidations. Patient given metoclopramide 10 mg IV 1x and admitted to medicine for further evaluation and treatment.  (22 Jan 2023 10:35)    >  >  >  >        Follow up with Attending in AM. Patient is a 63y old  Male who presents with a chief complaint of AHRF and N/V.    Event:   Notified by RN, patient with episode of coffee ground emesis.  Patient seen at bedside.  Endorsing one episode of dark emesis, unchanged from prior to admission, not nauseous after administration of antiemetics. Patient denies CP, palpations, abd pain.       Vital Signs Last 24 Hrs  T(C): 36.8 (22 Jan 2023 17:41), Max: 38 (22 Jan 2023 02:44)  T(F): 98.3 (22 Jan 2023 17:41), Max: 100.4 (22 Jan 2023 02:44)  HR: 76 (22 Jan 2023 17:41) (70 - 96)  BP: 153/97 (22 Jan 2023 17:41) (122/87 - 154/85)  BP(mean): 84 (22 Jan 2023 06:14) (84 - 96)  RR: 18 (22 Jan 2023 17:41) (16 - 21)  SpO2: 96% (22 Jan 2023 17:41) (96% - 98%)    Parameters below as of 22 Jan 2023 16:15    O2 Flow (L/min): 1        Labs:                          15.3   6.97  )-----------( 207      ( 22 Jan 2023 21:34 )             49.9     01-22    138  |  103  |  21  ----------------------------<  102<H>  4.5   |  22  |  0.61    Ca    8.8      22 Jan 2023 02:11  Mg     1.8     01-22    TPro  6.2  /  Alb  3.3  /  TBili  0.4  /  DBili  x   /  AST  21  /  ALT  19  /  AlkPhos  139<H>  01-22    MEDICATIONS  (STANDING):  enoxaparin Injectable 40 milliGRAM(s) SubCutaneous every 24 hours  influenza  Vaccine (HIGH DOSE) 0.7 milliLiter(s) IntraMuscular once  mesalamine ER (24-Hour) Capsule 1.5 Gram(s) Oral daily  multivitamin 1 Tablet(s) Oral daily  oxybutynin 5 milliGRAM(s) Oral two times a day  pantoprazole  Injectable 40 milliGRAM(s) IV Push daily  senna 2 Tablet(s) Oral at bedtime  sertraline 50 milliGRAM(s) Oral daily  tamsulosin 0.4 milliGRAM(s) Oral at bedtime    MEDICATIONS  (PRN):  acetaminophen     Tablet .. 650 milliGRAM(s) Oral every 6 hours PRN Temp greater or equal to 38C (100.4F), Mild Pain (1 - 3)  aluminum hydroxide/magnesium hydroxide/simethicone Suspension 30 milliLiter(s) Oral every 4 hours PRN Dyspepsia  baclofen 5 milliGRAM(s) Oral every 8 hours PRN Muscle Spasm  melatonin 3 milliGRAM(s) Oral at bedtime PRN Insomnia  ondansetron Injectable 4 milliGRAM(s) IV Push every 8 hours PRN Nausea and/or Vomiting      Radiology:    Physical Exam:  General: WN/WD NAD  Neurology: A&Ox3  Head:  Normocephalic, atraumatic  Respiratory: CTA B/L  CV: RRR, S1S2, no murmur  Abdominal: Soft, NT, ND no palpable mass  MSK: b/l mild LE edema, + peripheral pulses    Assessment & Plan:  HPI:  63M PMH MS, UC,  GERD, BPH panic disorder, presents with nausea and vomiting and now with acute hypoxic respiratory failure. Patient reports he was in his normal state of health until yesterday, when he began experiencing episodes of NBNB vomiting. Patient called EMS when vomit became darker. Denies coffee ground emesis or jyotsna blood. Denies diarrhea or melanic stool. Denies fevers, or chills. Denies abdominal pain. ROS otherwise unremarkable. EMS found patient to be hypoxic and started O2 via LFNC at 3 LPM.  Episode of dark emesis   Patient VSS, no active clinical signs of bleeding  >Emesis looked dark, does not appear bloody  >STAT CBC with hgb 15.3/49.9, continue to monitor  >Keep patent 2 large bore IV  >Maintained active T&S  >PPI IVP daily  >C/w antiemetics     Will follow up with attending and ACP team in the am     Emelin Reye Monegro, NP  Medicine Department   Spectralink [id number]

## 2023-01-22 NOTE — H&P ADULT - NSHPPHYSICALEXAM_GEN_ALL_CORE
VITAL SIGNS:  T(C): 37 (01-22-23 @ 04:21), Max: 38 (01-22-23 @ 02:44)  T(F): 98.6 (01-22-23 @ 04:21), Max: 100.4 (01-22-23 @ 02:44)  HR: 71 (01-22-23 @ 07:11) (71 - 96)  BP: 135/69 (01-22-23 @ 07:11) (130/73 - 154/85)  BP(mean): 84 (01-22-23 @ 06:14) (84 - 96)  RR: 17 (01-22-23 @ 07:11) (16 - 21)  SpO2: 96% (01-22-23 @ 07:11) (96% - 98%)    PHYSICAL EXAM:  Constitutional: WDWN, NAD  Head: NC/AT  Eyes: PERRL, EOMI, anicteric sclera  ENT: no nasal discharge; uvula midline, no oropharyngeal erythema or exudates; MMM  Neck: supple; no JVD or thyromegaly  Respiratory: CTA B/L; no W/R/R, no retractions  Cardiac: +S1/S2; RRR; no M/R/G; +2 edema of the legs pitting   Gastrointestinal: abdomen soft, NT/ND; no rebound or guarding; +BSx4  Back: spine midline, no bony tenderness or step-offs; no CVAT B/L  Extremities: WWP, no clubbing or cyanosis; no peripheral edema  Musculoskeletal: NROM x4; no joint swelling, tenderness or erythema  Dermatologic: skin warm, dry and intact; no rashes, wounds, or scars  Lymphatic: no submandibular or cervical LAD  Neurologic: AAOx3; CNII-XII grossly intact; no focal deficits  Psychiatric: affect and characteristics of appearance, verbalizations, behaviors are appropriate

## 2023-01-22 NOTE — PATIENT PROFILE ADULT - FALL HARM RISK - RISK INTERVENTIONS

## 2023-01-22 NOTE — CHART NOTE - NSCHARTNOTESELECT_GEN_ALL_CORE
[facility]  Pre Procedure History & Physical    Chief Complaint:   Time for my colonoscopy    Subjective     HPI:   44 y.o. male presenting to endoscopy unit today for screening colonoscopy.    Past Medical History:   Past Medical History:   Diagnosis Date   • Arthritis    • ED (erectile dysfunction)    • DIONTE (generalized anxiety disorder)    • H/O complete eye exam 12/2017   • HLD (hyperlipidemia)    • IFG (impaired fasting glucose)    • Insomnia    • Panic disorder    • Testosterone deficiency        Family History:  Family History   Problem Relation Age of Onset   • Stroke Mother    • Cancer Mother    • Hypertension Mother    • Heart disease Brother    • Hypertension Brother    • Heart disease Paternal Grandfather    • Malig Hyperthermia Neg Hx        Social History:   reports that he has been smoking cigarettes. He has never used smokeless tobacco. He reports current alcohol use of about 3.0 standard drinks per week. He reports that he does not use drugs.    Medications:   Medications Prior to Admission   Medication Sig Dispense Refill Last Dose   • ALPRAZolam (XANAX) 0.25 MG tablet Take 1 tablet by mouth 2 (Two) Times a Day. 60 tablet 2 10/11/2022   • atorvastatin (LIPITOR) 40 MG tablet Take 1 tablet by mouth Daily. 90 tablet 1 Past Week   • Caffeine 100 MG tablet Take 2 tablets by mouth Daily.   Past Week   • cyclobenzaprine (FLEXERIL) 10 MG tablet Take 1 tablet by mouth 2 (Two) Times a Day As Needed for Muscle Spasms. 180 tablet 1 Past Week   • diclofenac (VOLTAREN) 50 MG EC tablet Take 1 tablet by mouth 3 (Three) Times a Day. 270 tablet 1 Past Week   • DULoxetine (CYMBALTA) 60 MG capsule Take 1 capsule by mouth Daily. 90 capsule 1 10/12/2022   • ezetimibe (ZETIA) 10 MG tablet Take 1 tablet by mouth Daily. 90 tablet 1 Past Week   • fenofibrate 160 MG tablet Take 1 tablet by mouth Daily. 90 tablet 1 Past Week   • lansoprazole (PREVACID) 30 MG capsule Take 1 capsule by mouth Daily. 90  "capsule 1 Past Week   • losartan (Cozaar) 50 MG tablet Take 1 tablet by mouth Daily. 90 tablet 1 Past Week   • multivitamin with minerals tablet tablet Take 1 tablet by mouth Daily.   Past Week   • omega-3 acid ethyl esters (LOVAZA) 1 g capsule Take 2 capsules by mouth 2 (Two) Times a Day. 360 capsule 1 Past Week   • QUEtiapine (SEROquel) 100 MG tablet Take 1 tablet by mouth Every Night. 90 tablet 1 Past Week   • SUPER B COMPLEX/C PO Take 1 tablet by mouth Daily.   Past Week   • triamcinolone (KENALOG) 0.1 % cream Apply  topically to the appropriate area as directed 3 (Three) Times a Day. 15 g 1        Allergies:  Codeine, Flagyl [metronidazole], and Vancomycin    ROS:    Pertinent items are noted in HPI     Objective     Blood pressure 130/86, pulse 90, resp. rate 18, height 180.3 cm (71\"), weight 103 kg (227 lb 1.6 oz), SpO2 98 %.    Physical Exam   Constitutional: Pt is oriented to person, place, and time and well-developed, well-nourished, and in no distress.   Abdominal: Soft.   Psychiatric: Mood, memory, affect and judgment normal.     Assessment & Plan     Diagnosis:  Encounter for screening for colon cancer    Anticipated Surgical Procedure:  Colonoscopy    The risks, benefits, and alternatives of this procedure have been discussed with the patient or the responsible party- the patient understands and agrees to proceed.                                                                " Emesis/Event Note Dark emesis/Event Note

## 2023-01-22 NOTE — H&P ADULT - PROBLEM SELECTOR PLAN 1
Patient presents to hospital with new onset of respiratory failure with hypoxia. Unclear etiology. Infection seems less likely as CXR without consolidation and patient is afebrile and without leukocytosis. Viral workup negative. Denies chest pain, however has risk factors for PE (bedbound due to MS). Alternatively may be secondary to aspiration which has not yet manifest on imaging or labs  - Will obtain d-dimer given low overall suspicion for PE. Consider further imaging if elevated  - Wean off O2  - Monitor off antibiotics for now

## 2023-01-22 NOTE — H&P ADULT - ASSESSMENT
63M PMH MS, UC, GERD, BPH, panic disorder presents to hospital with nausea and found to have new AHRF

## 2023-01-22 NOTE — H&P ADULT - PROBLEM SELECTOR PLAN 2
Patient presents from home with nausea and vomiting of unclear etiology. Symptoms improved with Zofran. Patient denies abdominal pain, cramps, fevers or chills, suggesting infections etiology less likely. QTc 408 on admission.  - Continue with Zofran  - Given no leukocytosis, diarrhea, melena or abdominal pain, UC flair seems unlikely at this time  - Diet as tolerated

## 2023-01-22 NOTE — ED PROVIDER NOTE - CLINICAL SUMMARY MEDICAL DECISION MAKING FREE TEXT BOX
63-year-old male with history of MS, ulcerative colitis, GERD presenting with vomiting.  Multiple episodes since yesterday now appears more dark.  Patient bedbound at baseline, soft nontender abdomen, rectal normal. HD stable. Suspect GERD vs gastritis. Low suspicion for lower gi bleeding/colitis. New hypoxia without clear etiology, patient breathing even and unlabored with clear lungs. Will get labs, CXR, protonix IV and reassess.

## 2023-01-22 NOTE — H&P ADULT - PROBLEM SELECTOR PLAN 3
Currently at baseline. Patient is bedbound and has no acute complaints  - Continue to monitor  - Continue with home baclofen and oxybutynin

## 2023-01-22 NOTE — ED PROVIDER NOTE - CARE PLAN
1 Principal Discharge DX:	Acute respiratory failure with hypoxia  Secondary Diagnosis:	Nausea & vomiting  Secondary Diagnosis:	Fever

## 2023-01-23 LAB
ANION GAP SERPL CALC-SCNC: 17 MMOL/L — SIGNIFICANT CHANGE UP (ref 5–17)
BUN SERPL-MCNC: 18 MG/DL — SIGNIFICANT CHANGE UP (ref 7–23)
CALCIUM SERPL-MCNC: 8.6 MG/DL — SIGNIFICANT CHANGE UP (ref 8.4–10.5)
CHLORIDE SERPL-SCNC: 98 MMOL/L — SIGNIFICANT CHANGE UP (ref 96–108)
CO2 SERPL-SCNC: 22 MMOL/L — SIGNIFICANT CHANGE UP (ref 22–31)
CREAT SERPL-MCNC: 0.68 MG/DL — SIGNIFICANT CHANGE UP (ref 0.5–1.3)
EGFR: 103 ML/MIN/1.73M2 — SIGNIFICANT CHANGE UP
GLUCOSE SERPL-MCNC: 75 MG/DL — SIGNIFICANT CHANGE UP (ref 70–99)
HCT VFR BLD CALC: 43.5 % — SIGNIFICANT CHANGE UP (ref 39–50)
HGB BLD-MCNC: 13.5 G/DL — SIGNIFICANT CHANGE UP (ref 13–17)
MCHC RBC-ENTMCNC: 28.6 PG — SIGNIFICANT CHANGE UP (ref 27–34)
MCHC RBC-ENTMCNC: 31 GM/DL — LOW (ref 32–36)
MCV RBC AUTO: 92.2 FL — SIGNIFICANT CHANGE UP (ref 80–100)
NRBC # BLD: 0 /100 WBCS — SIGNIFICANT CHANGE UP (ref 0–0)
PLATELET # BLD AUTO: 180 K/UL — SIGNIFICANT CHANGE UP (ref 150–400)
POTASSIUM SERPL-MCNC: 4.1 MMOL/L — SIGNIFICANT CHANGE UP (ref 3.5–5.3)
POTASSIUM SERPL-SCNC: 4.1 MMOL/L — SIGNIFICANT CHANGE UP (ref 3.5–5.3)
RBC # BLD: 4.72 M/UL — SIGNIFICANT CHANGE UP (ref 4.2–5.8)
RBC # FLD: 14.1 % — SIGNIFICANT CHANGE UP (ref 10.3–14.5)
SODIUM SERPL-SCNC: 137 MMOL/L — SIGNIFICANT CHANGE UP (ref 135–145)
WBC # BLD: 9.84 K/UL — SIGNIFICANT CHANGE UP (ref 3.8–10.5)
WBC # FLD AUTO: 9.84 K/UL — SIGNIFICANT CHANGE UP (ref 3.8–10.5)

## 2023-01-23 PROCEDURE — 99222 1ST HOSP IP/OBS MODERATE 55: CPT | Mod: GC

## 2023-01-23 PROCEDURE — 99233 SBSQ HOSP IP/OBS HIGH 50: CPT

## 2023-01-23 PROCEDURE — 74018 RADEX ABDOMEN 1 VIEW: CPT | Mod: 26

## 2023-01-23 RX ORDER — PANTOPRAZOLE SODIUM 20 MG/1
40 TABLET, DELAYED RELEASE ORAL
Refills: 0 | Status: DISCONTINUED | OUTPATIENT
Start: 2023-01-23 | End: 2023-01-25

## 2023-01-23 RX ORDER — POLYETHYLENE GLYCOL 3350 17 G/17G
17 POWDER, FOR SOLUTION ORAL DAILY
Refills: 0 | Status: DISCONTINUED | OUTPATIENT
Start: 2023-01-23 | End: 2023-01-30

## 2023-01-23 RX ORDER — PANTOPRAZOLE SODIUM 20 MG/1
40 TABLET, DELAYED RELEASE ORAL DAILY
Refills: 0 | Status: DISCONTINUED | OUTPATIENT
Start: 2023-01-23 | End: 2023-01-23

## 2023-01-23 RX ADMIN — ENOXAPARIN SODIUM 40 MILLIGRAM(S): 100 INJECTION SUBCUTANEOUS at 17:32

## 2023-01-23 RX ADMIN — ONDANSETRON 4 MILLIGRAM(S): 8 TABLET, FILM COATED ORAL at 20:20

## 2023-01-23 RX ADMIN — Medication 650 MILLIGRAM(S): at 10:52

## 2023-01-23 RX ADMIN — Medication 650 MILLIGRAM(S): at 00:35

## 2023-01-23 RX ADMIN — ONDANSETRON 4 MILLIGRAM(S): 8 TABLET, FILM COATED ORAL at 08:42

## 2023-01-23 RX ADMIN — SERTRALINE 50 MILLIGRAM(S): 25 TABLET, FILM COATED ORAL at 12:29

## 2023-01-23 RX ADMIN — Medication 5 MILLIGRAM(S): at 17:32

## 2023-01-23 RX ADMIN — Medication 650 MILLIGRAM(S): at 04:00

## 2023-01-23 RX ADMIN — PANTOPRAZOLE SODIUM 40 MILLIGRAM(S): 20 TABLET, DELAYED RELEASE ORAL at 17:48

## 2023-01-23 RX ADMIN — PANTOPRAZOLE SODIUM 40 MILLIGRAM(S): 20 TABLET, DELAYED RELEASE ORAL at 12:28

## 2023-01-23 RX ADMIN — Medication 650 MILLIGRAM(S): at 08:42

## 2023-01-23 RX ADMIN — Medication 1.5 GRAM(S): at 12:29

## 2023-01-23 RX ADMIN — Medication 1 TABLET(S): at 12:29

## 2023-01-23 NOTE — PROGRESS NOTE ADULT - PROBLEM SELECTOR PLAN 1
Patient presents to hospital with new onset of respiratory failure with hypoxia. Unclear etiology. Infection seems less likely as CXR without consolidation and patient is afebrile and without leukocytosis. Viral workup negative. Denies chest pain, however has risk factors for PE (bedbound due to MS). Alternatively may be secondary to aspiration which has not yet manifest on imaging or labs  - Will obtain d-dimer given low overall suspicion for PE. Consider further imaging if elevated - mildly elevated.   - Wean off O2 as tolerated.   - Monitor off antibiotics for now  -F/u CTA chest to r/o PE and also eval lung parenchyma better. - patient agrees for scan.

## 2023-01-23 NOTE — PROGRESS NOTE ADULT - PROBLEM SELECTOR PLAN 6
- Continue with home Sertraline 50 mg PO Qd    DVT ppx - Lovenox - at risk of clots given bed bound from MS. -Hold if has more dark emesis and/or H/H drops.

## 2023-01-23 NOTE — CONSULT NOTE ADULT - ATTENDING COMMENTS
Agree with above. Patient reported had dark emesis, but has normal H/H on serial CBC and currently no active signs of bleeding. He reports severe weakness to the point where he cannot raise his hands to feed himself and was reportedly hypoxic in the ambulance, concerning given history of progressive MS. He has a [place] score now of 0, no immediate plans for emergent endoscopic evaluation. Would check at least abdominal x-ray to r/o ileus given nausea/vomiting and constipation. Consider neurology evaluation.

## 2023-01-23 NOTE — PROGRESS NOTE ADULT - SUBJECTIVE AND OBJECTIVE BOX
Patient is a 63y old  Male who presents with a chief complaint of AHRF (2023 13:59)        SUBJECTIVE / OVERNIGHT EVENTS: reports some cough. denies CP. Overnight had dark/coffee-ground emesis. Had a BM. Denies abd pain.       MEDICATIONS  (STANDING):  enoxaparin Injectable 40 milliGRAM(s) SubCutaneous every 24 hours  influenza  Vaccine (HIGH DOSE) 0.7 milliLiter(s) IntraMuscular once  mesalamine ER (24-Hour) Capsule 1.5 Gram(s) Oral daily  multivitamin 1 Tablet(s) Oral daily  oxybutynin 5 milliGRAM(s) Oral two times a day  pantoprazole  Injectable 40 milliGRAM(s) IV Push daily  senna 2 Tablet(s) Oral at bedtime  sertraline 50 milliGRAM(s) Oral daily  tamsulosin 0.4 milliGRAM(s) Oral at bedtime    MEDICATIONS  (PRN):  acetaminophen     Tablet .. 650 milliGRAM(s) Oral every 6 hours PRN Temp greater or equal to 38C (100.4F), Mild Pain (1 - 3)  aluminum hydroxide/magnesium hydroxide/simethicone Suspension 30 milliLiter(s) Oral every 4 hours PRN Dyspepsia  baclofen 5 milliGRAM(s) Oral every 8 hours PRN Muscle Spasm  melatonin 3 milliGRAM(s) Oral at bedtime PRN Insomnia  ondansetron Injectable 4 milliGRAM(s) IV Push every 8 hours PRN Nausea and/or Vomiting  polyethylene glycol 3350 17 Gram(s) Oral daily PRN Constipation      Vital Signs Last 24 Hrs  T(C): 37 (2023 14:37), Max: 37 (2023 14:37)  T(F): 98.6 (2023 14:37), Max: 98.6 (2023 14:37)  HR: 92 (2023 14:37) (76 - 100)  BP: 134/85 (2023 14:37) (113/77 - 153/97)  BP(mean): --  RR: 20 (2023 14:37) (18 - 20)  SpO2: 94% (2023 14:37) (90% - 98%)    Parameters below as of 2023 14:37  Patient On (Oxygen Delivery Method): room air      CAPILLARY BLOOD GLUCOSE        I&O's Summary    2023 07:01  -  2023 07:00  --------------------------------------------------------  IN: 0 mL / OUT: 450 mL / NET: -450 mL          PHYSICAL EXAM:   GENERAL: NAD, well-developed  HEAD:  Atraumatic, Normocephalic  EYES: EOMI, PERRLA, conjunctiva and sclera clear  NECK: Supple,    CHEST/LUNG: distant bs  HEART: S1S2 normal. Regular rate and rhythm; No murmurs, rubs, or gallops  ABDOMEN: Soft, obese, Nontender, Nondistended; Bowel sounds present  EXTREMITIES: trace LE edema  PSYCH/Neuro: AAOx3. Non-focal.   SKIN: No rashes or lesions      LABS:                        13.5   9.84  )-----------( 180      ( 2023 07:16 )             43.5         137  |  98  |  18  ----------------------------<  75  4.1   |  22  |  0.68    Ca    8.6      2023 07:16  Mg     1.8         TPro  6.2  /  Alb  3.3  /  TBili  0.4  /  DBili  x   /  AST  21  /  ALT  19  /  AlkPhos  139<H>      PT/INR - ( 2023 03:15 )   PT: 13.3 sec;   INR: 1.14 ratio         PTT - ( 2023 03:15 )  PTT:29.7 sec      Urinalysis Basic - ( 2023 19:55 )    Color: Yellow / Appearance: Clear / S.026 / pH: x  Gluc: x / Ketone: Negative  / Bili: Negative / Urobili: 2 mg/dL   Blood: x / Protein: Negative / Nitrite: Negative   Leuk Esterase: Small / RBC: 1 /hpf / WBC 5 /HPF   Sq Epi: x / Non Sq Epi: 1 /hpf / Bacteria: Negative      < from: Xray Chest 1 View- PORTABLE-Urgent (Xray Chest 1 View- PORTABLE-Urgent .) (23 @ 02:43) >  IMPRESSION:  No focal consolidation, pneumothorax, or pleural effusion.    --- End of Report ---    < end of copied text >      RADIOLOGY & ADDITIONAL TESTS:    Imaging Personally Reviewed:  Consultant(s) Notes Reviewed:  xray chest  Care Discussed with Consultants/Other Providers: Dr. Martin

## 2023-01-23 NOTE — CONSULT NOTE ADULT - SUBJECTIVE AND OBJECTIVE BOX
Chief Complaint:  Patient is a 63y old  Male who presents with a chief complaint of AHRF (2023 17:07)      HPI: 63M PMH MS, UC on mesalamine, GERD, BPH, panic disorder, presents with nausea and vomiting and now with acute hypoxic respiratory failure. Patient reports he was in his normal state of health until Saturday night when he began experiencing episodes of NBNB vomiting. Patient called EMS when vomit became darker. Patient states it started suddenly Saturday night and started as the food he ate. It then progressed to black in color. He reports he is constipated at baseline.  Denies coffee ground emesis or jyotsna blood. Denies diarrhea or melanic stool. Denies fevers, or chills. Denies abdominal pain. ROS otherwise unremarkable. EMS found patient to be hypoxic and started O2 via LFNC at 3 LPM.\    ED course: VSS. CBC stable. CMP with mild elevation in AM. Coags normal. EKG with NSR. RVP negative. CXR with no focal consolidations. Patient given metoclopramide 10 mg IV 1x and admitted to medicine for further evaluation and treatment.     Allergies:  No Known Allergies      Home Medications:    Hospital Medications:  acetaminophen     Tablet .. 650 milliGRAM(s) Oral every 6 hours PRN  aluminum hydroxide/magnesium hydroxide/simethicone Suspension 30 milliLiter(s) Oral every 4 hours PRN  baclofen 5 milliGRAM(s) Oral every 8 hours PRN  enoxaparin Injectable 40 milliGRAM(s) SubCutaneous every 24 hours  influenza  Vaccine (HIGH DOSE) 0.7 milliLiter(s) IntraMuscular once  melatonin 3 milliGRAM(s) Oral at bedtime PRN  mesalamine ER (24-Hour) Capsule 1.5 Gram(s) Oral daily  multivitamin 1 Tablet(s) Oral daily  ondansetron Injectable 4 milliGRAM(s) IV Push every 8 hours PRN  oxybutynin 5 milliGRAM(s) Oral two times a day  pantoprazole  Injectable 40 milliGRAM(s) IV Push two times a day  polyethylene glycol 3350 17 Gram(s) Oral daily PRN  senna 2 Tablet(s) Oral at bedtime  sertraline 50 milliGRAM(s) Oral daily  tamsulosin 0.4 milliGRAM(s) Oral at bedtime      PMHX/PSHX:  Colitis    BPH (benign prostatic hypertrophy)    GERD (gastroesophageal reflux disease)    Seizures    Multiple sclerosis    No significant past surgical history        Family history:  No pertinent family history in first degree relatives    No pertinent family history in first degree relatives        Social History:     ROS:     General:  No weight loss, fevers, chills, night sweats, fatigue  Eyes:  No vision changes, no yellowing of eyes   ENT:  No throat pain, runny nose  CV:  No chest pain, palpitations  Resp:  No SOB, cough, wheezing  GI:  See HPI  :  No burning with urination, no hematuria   Muscle:  No muscle pain, weakness  Neuro:  No numbness/tingling, memory problems  Psych:  No fatigue, insomnia, mood problems  Heme:  No easy bruisability  Skin:  No rash, itching       PHYSICAL EXAM:     GENERAL:  Appears stated age, well-groomed, well-nourished, no distress  HEENT:  NC/AT,  conjunctivae clear and pink,  no JVD  CHEST:  Full & symmetric excursion, no increased effort, breath sounds clear  HEART:  Regular rhythm, S1, S2, no murmur/rub/S3/S4, no abdominal bruit, no edema  ABDOMEN:  Soft, non-tender, non-distended, normoactive bowel sounds,  no masses ,  EXTREMITIES:  no cyanosis,clubbing or edema  SKIN:  No rash/erythema/ecchymoses/petechiae/wounds/abscess/warm/dry  NEURO:  Alert, oriented    Vital Signs:  Vital Signs Last 24 Hrs  T(C): 37 (2023 14:37), Max: 37 (2023 14:37)  T(F): 98.6 (2023 14:37), Max: 98.6 (2023 14:37)  HR: 92 (2023 14:37) (76 - 100)  BP: 134/85 (2023 14:37) (113/77 - 153/97)  BP(mean): --  RR: 20 (2023 14:37) (18 - 20)  SpO2: 94% (2023 14:37) (90% - 98%)    Parameters below as of 2023 14:37  Patient On (Oxygen Delivery Method): room air      Daily     Daily     LABS:                        13.5   9.84  )-----------( 180      ( 2023 07:16 )             43.5         137  |  98  |  18  ----------------------------<  75  4.1   |  22  |  0.68    Ca    8.6      2023 07:16  Mg     1.8         TPro  6.2  /  Alb  3.3  /  TBili  0.4  /  DBili  x   /  AST  21  /  ALT  19  /  AlkPhos  139<H>      LIVER FUNCTIONS - ( 2023 02:11 )  Alb: 3.3 g/dL / Pro: 6.2 g/dL / ALK PHOS: 139 U/L / ALT: 19 U/L / AST: 21 U/L / GGT: x           PT/INR - ( 2023 03:15 )   PT: 13.3 sec;   INR: 1.14 ratio         PTT - ( 2023 03:15 )  PTT:29.7 sec  Urinalysis Basic - ( 2023 19:55 )    Color: Yellow / Appearance: Clear / S.026 / pH: x  Gluc: x / Ketone: Negative  / Bili: Negative / Urobili: 2 mg/dL   Blood: x / Protein: Negative / Nitrite: Negative   Leuk Esterase: Small / RBC: 1 /hpf / WBC 5 /HPF   Sq Epi: x / Non Sq Epi: 1 /hpf / Bacteria: Negative          Imaging:             Chief Complaint:  Patient is a 63y old  Male who presents with a chief complaint of AHRF (2023 17:07)      HPI: 63M PMH MS, UC on mesalamine, GERD, BPH, panic disorder, presents with nausea and vomiting and now with acute hypoxic respiratory failure. Patient reports he was in his normal state of health until Saturday night when he began experiencing episodes of NBNB vomiting. Patient called EMS when vomit became darker. Patient states it started suddenly Saturday night and started as the food he ate. It then progressed to black in color. He reports he is constipated at baseline and had 1 BM this AM after receiving senna.  Denies hematemesis however reports he was told there was some red in his vomit. Denies melena or hematochezia. Does report increased weakness in b/l UE, especially left. Feels he cannot lift a cup of water. Denies fevers, or chills. Denies abdominal pain. EMS found patient to be hypoxic and started O2 via LFNC at 3 LPM. Patient reports sick contacts with son having vomiting illness this past week.    ED course: VSS. CBC stable. CMP with mild elevation in AM. Coags normal. EKG with NSR. RVP negative. CXR with no focal consolidations. Patient given metoclopramide 10 mg IV 1x and admitted to medicine for further evaluation and treatment.     Allergies:  No Known Allergies      Home Medications:    Hospital Medications:  acetaminophen     Tablet .. 650 milliGRAM(s) Oral every 6 hours PRN  aluminum hydroxide/magnesium hydroxide/simethicone Suspension 30 milliLiter(s) Oral every 4 hours PRN  baclofen 5 milliGRAM(s) Oral every 8 hours PRN  enoxaparin Injectable 40 milliGRAM(s) SubCutaneous every 24 hours  influenza  Vaccine (HIGH DOSE) 0.7 milliLiter(s) IntraMuscular once  melatonin 3 milliGRAM(s) Oral at bedtime PRN  mesalamine ER (24-Hour) Capsule 1.5 Gram(s) Oral daily  multivitamin 1 Tablet(s) Oral daily  ondansetron Injectable 4 milliGRAM(s) IV Push every 8 hours PRN  oxybutynin 5 milliGRAM(s) Oral two times a day  pantoprazole  Injectable 40 milliGRAM(s) IV Push two times a day  polyethylene glycol 3350 17 Gram(s) Oral daily PRN  senna 2 Tablet(s) Oral at bedtime  sertraline 50 milliGRAM(s) Oral daily  tamsulosin 0.4 milliGRAM(s) Oral at bedtime      PMHX/PSHX:  Colitis    BPH (benign prostatic hypertrophy)    GERD (gastroesophageal reflux disease)    Seizures    Multiple sclerosis    No significant past surgical history        Family history:  No pertinent family history in first degree relatives    No pertinent family history in first degree relatives        Social History:     ROS:     General:  No weight loss, fevers, chills, night sweats, fatigue  Eyes:  No vision changes, no yellowing of eyes   ENT:  No throat pain, runny nose  CV:  No chest pain, palpitations  Resp:  No SOB, cough, wheezing  GI:  See HPI  :  No burning with urination, no hematuria   Muscle:  No muscle pain, weakness  Neuro:  No numbness/tingling, memory problems  Psych:  No fatigue, insomnia, mood problems  Heme:  No easy bruisability  Skin:  No rash, itching       PHYSICAL EXAM:     GENERAL:  Appears stated age, well-groomed, well-nourished, no distress  HEENT:  NC/AT,  conjunctivae clear and pink,  no JVD  CHEST:  Full & symmetric excursion, no increased effort, breath sounds clear  HEART:  Regular rhythm, S1, S2, no murmur/rub/S3/S4, no abdominal bruit, no edema  ABDOMEN:  Soft, non-tender, non-distended, normoactive bowel sounds,  no masses ,  EXTREMITIES: diffuse weakness, worse in b/l LE  SKIN:  No rash/erythema/ecchymoses/petechiae/wounds/abscess/warm/dry  NEURO:  Alert, oriented    Vital Signs:  Vital Signs Last 24 Hrs  T(C): 37 (2023 14:37), Max: 37 (2023 14:37)  T(F): 98.6 (2023 14:37), Max: 98.6 (2023 14:37)  HR: 92 (2023 14:37) (76 - 100)  BP: 134/85 (2023 14:37) (113/77 - 153/97)  BP(mean): --  RR: 20 (2023 14:37) (18 - 20)  SpO2: 94% (2023 14:37) (90% - 98%)    Parameters below as of 2023 14:37  Patient On (Oxygen Delivery Method): room air      Daily     Daily     LABS:                        13.5   9.84  )-----------( 180      ( 2023 07:16 )             43.5         137  |  98  |  18  ----------------------------<  75  4.1   |  22  |  0.68    Ca    8.6      2023 07:16  Mg     1.8         TPro  6.2  /  Alb  3.3  /  TBili  0.4  /  DBili  x   /  AST  21  /  ALT  19  /  AlkPhos  139<H>      LIVER FUNCTIONS - ( 2023 02:11 )  Alb: 3.3 g/dL / Pro: 6.2 g/dL / ALK PHOS: 139 U/L / ALT: 19 U/L / AST: 21 U/L / GGT: x           PT/INR - ( 2023 03:15 )   PT: 13.3 sec;   INR: 1.14 ratio         PTT - ( 2023 03:15 )  PTT:29.7 sec  Urinalysis Basic - ( 2023 19:55 )    Color: Yellow / Appearance: Clear / S.026 / pH: x  Gluc: x / Ketone: Negative  / Bili: Negative / Urobili: 2 mg/dL   Blood: x / Protein: Negative / Nitrite: Negative   Leuk Esterase: Small / RBC: 1 /hpf / WBC 5 /HPF   Sq Epi: x / Non Sq Epi: 1 /hpf / Bacteria: Negative          Imaging:             Chief Complaint:  Patient is a 63y old  Male who presents with a chief complaint of AHRF (2023 17:07)      HPI: 63M PMH MS, UC on mesalamine, GERD, BPH, panic disorder, presents with nausea and vomiting and now with acute hypoxic respiratory failure. Patient reports he was in his normal state of health until Saturday night when he began experiencing episodes of NBNB vomiting. Patient called EMS when vomit became darker. Patient states it started suddenly Saturday night and started as the food he ate. It then progressed to black in color. He reports he is constipated at baseline and had 1 BM this AM after receiving senna. Usually has a Bm once very 4-5 days. Denies hematemesis however reports he was told there was some red in his vomit. Denies melena or hematochezia. Does report increased weakness in b/l UE, especially left. Feels he cannot lift a cup of water. Denies fevers, or chills. Denies abdominal pain. EMS found patient to be hypoxic and started O2 via LFNC at 3 LPM. Patient reports sick contacts with son having vomiting illness this past week.    ED course: VSS. CBC stable. CMP with mild elevation in AM. Coags normal. EKG with NSR. RVP negative. CXR with no focal consolidations. Patient given metoclopramide 10 mg IV 1x and admitted to medicine for further evaluation and treatment.     Allergies:  No Known Allergies      Home Medications:    Hospital Medications:  acetaminophen     Tablet .. 650 milliGRAM(s) Oral every 6 hours PRN  aluminum hydroxide/magnesium hydroxide/simethicone Suspension 30 milliLiter(s) Oral every 4 hours PRN  baclofen 5 milliGRAM(s) Oral every 8 hours PRN  enoxaparin Injectable 40 milliGRAM(s) SubCutaneous every 24 hours  influenza  Vaccine (HIGH DOSE) 0.7 milliLiter(s) IntraMuscular once  melatonin 3 milliGRAM(s) Oral at bedtime PRN  mesalamine ER (24-Hour) Capsule 1.5 Gram(s) Oral daily  multivitamin 1 Tablet(s) Oral daily  ondansetron Injectable 4 milliGRAM(s) IV Push every 8 hours PRN  oxybutynin 5 milliGRAM(s) Oral two times a day  pantoprazole  Injectable 40 milliGRAM(s) IV Push two times a day  polyethylene glycol 3350 17 Gram(s) Oral daily PRN  senna 2 Tablet(s) Oral at bedtime  sertraline 50 milliGRAM(s) Oral daily  tamsulosin 0.4 milliGRAM(s) Oral at bedtime      PMHX/PSHX:  Colitis    BPH (benign prostatic hypertrophy)    GERD (gastroesophageal reflux disease)    Seizures    Multiple sclerosis    No significant past surgical history        Family history:  No pertinent family history in first degree relatives    No pertinent family history in first degree relatives        Social History:     ROS:     General:  No weight loss, fevers, chills, night sweats, fatigue  Eyes:  No vision changes, no yellowing of eyes   ENT:  No throat pain, runny nose  CV:  No chest pain, palpitations  Resp:  No SOB, cough, wheezing  GI:  See HPI  :  No burning with urination, no hematuria   Muscle:  No muscle pain, weakness  Neuro:  No numbness/tingling, memory problems  Psych:  No fatigue, insomnia, mood problems  Heme:  No easy bruisability  Skin:  No rash, itching       PHYSICAL EXAM:     GENERAL:  Appears stated age, well-groomed, well-nourished, no distress  HEENT:  NC/AT,  conjunctivae clear and pink,  no JVD  CHEST:  Full & symmetric excursion, no increased effort, breath sounds clear  HEART:  Regular rhythm, S1, S2   ABDOMEN:  Soft, non-tender, non-distended, normoactive bowel sounds,  no masses , obese  EXTREMITIES: diffuse weakness, worse in b/l LE  SKIN:  No rash/erythema/ecchymoses/petechiae/wounds/abscess/warm/dry  NEURO:  Alert, oriented x 3  PSYCH: normal affect    Vital Signs:  Vital Signs Last 24 Hrs  T(C): 37 (2023 14:37), Max: 37 (2023 14:37)  T(F): 98.6 (2023 14:37), Max: 98.6 (2023 14:37)  HR: 92 (2023 14:37) (76 - 100)  BP: 134/85 (2023 14:37) (113/77 - 153/97)  BP(mean): --  RR: 20 (2023 14:37) (18 - 20)  SpO2: 94% (2023 14:37) (90% - 98%)    Parameters below as of 2023 14:37  Patient On (Oxygen Delivery Method): room air      Daily     Daily     LABS:                        13.5   9.84  )-----------( 180      ( 2023 07:16 )             43.5         137  |  98  |  18  ----------------------------<  75  4.1   |  22  |  0.68    Ca    8.6      2023 07:16  Mg     1.8         TPro  6.2  /  Alb  3.3  /  TBili  0.4  /  DBili  x   /  AST  21  /  ALT  19  /  AlkPhos  139<H>      LIVER FUNCTIONS - ( 2023 02:11 )  Alb: 3.3 g/dL / Pro: 6.2 g/dL / ALK PHOS: 139 U/L / ALT: 19 U/L / AST: 21 U/L / GGT: x           PT/INR - ( 2023 03:15 )   PT: 13.3 sec;   INR: 1.14 ratio         PTT - ( 2023 03:15 )  PTT:29.7 sec  Urinalysis Basic - ( 2023 19:55 )    Color: Yellow / Appearance: Clear / S.026 / pH: x  Gluc: x / Ketone: Negative  / Bili: Negative / Urobili: 2 mg/dL   Blood: x / Protein: Negative / Nitrite: Negative   Leuk Esterase: Small / RBC: 1 /hpf / WBC 5 /HPF   Sq Epi: x / Non Sq Epi: 1 /hpf / Bacteria: Negative

## 2023-01-23 NOTE — CONSULT NOTE ADULT - ASSESSMENT
63M PMH MS, UC on mesalamine, GERD, BPH, panic disorder, presents with nausea and vomiting and now with acute hypoxic respiratory failure.    Impression:  #N/V - likely acute infectious gastroenteritis given sick contacts and acute onset. R/o obstruction although passing gas and had BM this AM. Given black/red after multiple episodes likely 2/2 esophagitis vs MW tear. Hgb normal. BUN normal. Marvel blatchford score 0.  #MS - increased worsening weakness with respiratory failure  #UC on mesalamine - controlled. No abd pain, diarrhea or hematochezia.    Recommendation:  - c/w PPI BID PO for possible esophagitis in setting of repeated vomiting  - abd xray to r/o obstruction  - c/w home dose mesalamine  - consider Neuro for worsening MS symptoms  - clears for now  - trend CBC    Note not finalized until signed by attending.    Stephanie Bauer PGY-6  Gastroenterology/Hepatology Fellow  Pager #[phone] (JANIE) or [phone] (NS)  Available on [employer] Teams.  Please contact on-call GI fellow via answering service ([phone]) after 5pm and before 8am, and on weekends.

## 2023-01-24 LAB
ANION GAP SERPL CALC-SCNC: 10 MMOL/L — SIGNIFICANT CHANGE UP (ref 5–17)
BUN SERPL-MCNC: 16 MG/DL — SIGNIFICANT CHANGE UP (ref 7–23)
CALCIUM SERPL-MCNC: 8.1 MG/DL — LOW (ref 8.4–10.5)
CHLORIDE SERPL-SCNC: 98 MMOL/L — SIGNIFICANT CHANGE UP (ref 96–108)
CO2 SERPL-SCNC: 32 MMOL/L — HIGH (ref 22–31)
CREAT SERPL-MCNC: 0.72 MG/DL — SIGNIFICANT CHANGE UP (ref 0.5–1.3)
CRP SERPL-MCNC: 65 MG/L — HIGH (ref 0–4)
EGFR: 101 ML/MIN/1.73M2 — SIGNIFICANT CHANGE UP
ERYTHROCYTE [SEDIMENTATION RATE] IN BLOOD: 37 MM/HR — HIGH (ref 0–20)
GLUCOSE SERPL-MCNC: 99 MG/DL — SIGNIFICANT CHANGE UP (ref 70–99)
HCT VFR BLD CALC: 39.5 % — SIGNIFICANT CHANGE UP (ref 39–50)
HGB BLD-MCNC: 12.5 G/DL — LOW (ref 13–17)
MCHC RBC-ENTMCNC: 28.8 PG — SIGNIFICANT CHANGE UP (ref 27–34)
MCHC RBC-ENTMCNC: 31.6 GM/DL — LOW (ref 32–36)
MCV RBC AUTO: 91 FL — SIGNIFICANT CHANGE UP (ref 80–100)
NRBC # BLD: 0 /100 WBCS — SIGNIFICANT CHANGE UP (ref 0–0)
PLATELET # BLD AUTO: 196 K/UL — SIGNIFICANT CHANGE UP (ref 150–400)
POTASSIUM SERPL-MCNC: 3.5 MMOL/L — SIGNIFICANT CHANGE UP (ref 3.5–5.3)
POTASSIUM SERPL-SCNC: 3.5 MMOL/L — SIGNIFICANT CHANGE UP (ref 3.5–5.3)
RBC # BLD: 4.34 M/UL — SIGNIFICANT CHANGE UP (ref 4.2–5.8)
RBC # FLD: 14 % — SIGNIFICANT CHANGE UP (ref 10.3–14.5)
SODIUM SERPL-SCNC: 140 MMOL/L — SIGNIFICANT CHANGE UP (ref 135–145)
WBC # BLD: 6.83 K/UL — SIGNIFICANT CHANGE UP (ref 3.8–10.5)
WBC # FLD AUTO: 6.83 K/UL — SIGNIFICANT CHANGE UP (ref 3.8–10.5)

## 2023-01-24 PROCEDURE — 71275 CT ANGIOGRAPHY CHEST: CPT | Mod: 26

## 2023-01-24 PROCEDURE — 99233 SBSQ HOSP IP/OBS HIGH 50: CPT

## 2023-01-24 PROCEDURE — 74177 CT ABD & PELVIS W/CONTRAST: CPT | Mod: 26

## 2023-01-24 PROCEDURE — 99232 SBSQ HOSP IP/OBS MODERATE 35: CPT | Mod: GC

## 2023-01-24 RX ORDER — SODIUM CHLORIDE 9 MG/ML
1000 INJECTION, SOLUTION INTRAVENOUS
Refills: 0 | Status: DISCONTINUED | OUTPATIENT
Start: 2023-01-24 | End: 2023-01-25

## 2023-01-24 RX ORDER — IBUPROFEN 200 MG
400 TABLET ORAL EVERY 8 HOURS
Refills: 0 | Status: DISCONTINUED | OUTPATIENT
Start: 2023-01-24 | End: 2023-01-25

## 2023-01-24 RX ORDER — ONDANSETRON 8 MG/1
4 TABLET, FILM COATED ORAL ONCE
Refills: 0 | Status: COMPLETED | OUTPATIENT
Start: 2023-01-24 | End: 2023-01-24

## 2023-01-24 RX ADMIN — Medication 650 MILLIGRAM(S): at 21:21

## 2023-01-24 RX ADMIN — Medication 650 MILLIGRAM(S): at 07:02

## 2023-01-24 RX ADMIN — PANTOPRAZOLE SODIUM 40 MILLIGRAM(S): 20 TABLET, DELAYED RELEASE ORAL at 06:26

## 2023-01-24 RX ADMIN — Medication 650 MILLIGRAM(S): at 22:15

## 2023-01-24 RX ADMIN — Medication 5 MILLIGRAM(S): at 06:26

## 2023-01-24 RX ADMIN — Medication 400 MILLIGRAM(S): at 11:20

## 2023-01-24 RX ADMIN — Medication 1.5 GRAM(S): at 09:24

## 2023-01-24 RX ADMIN — Medication 1 TABLET(S): at 11:20

## 2023-01-24 RX ADMIN — SENNA PLUS 2 TABLET(S): 8.6 TABLET ORAL at 21:21

## 2023-01-24 RX ADMIN — SERTRALINE 50 MILLIGRAM(S): 25 TABLET, FILM COATED ORAL at 11:20

## 2023-01-24 RX ADMIN — Medication 400 MILLIGRAM(S): at 12:20

## 2023-01-24 RX ADMIN — ENOXAPARIN SODIUM 40 MILLIGRAM(S): 100 INJECTION SUBCUTANEOUS at 17:53

## 2023-01-24 RX ADMIN — ONDANSETRON 4 MILLIGRAM(S): 8 TABLET, FILM COATED ORAL at 04:07

## 2023-01-24 RX ADMIN — Medication 650 MILLIGRAM(S): at 06:30

## 2023-01-24 RX ADMIN — SODIUM CHLORIDE 100 MILLILITER(S): 9 INJECTION, SOLUTION INTRAVENOUS at 18:17

## 2023-01-24 RX ADMIN — SODIUM CHLORIDE 100 MILLILITER(S): 9 INJECTION, SOLUTION INTRAVENOUS at 21:26

## 2023-01-24 RX ADMIN — ONDANSETRON 4 MILLIGRAM(S): 8 TABLET, FILM COATED ORAL at 10:35

## 2023-01-24 RX ADMIN — ONDANSETRON 4 MILLIGRAM(S): 8 TABLET, FILM COATED ORAL at 21:21

## 2023-01-24 RX ADMIN — Medication 5 MILLIGRAM(S): at 17:53

## 2023-01-24 RX ADMIN — TAMSULOSIN HYDROCHLORIDE 0.4 MILLIGRAM(S): 0.4 CAPSULE ORAL at 21:21

## 2023-01-24 RX ADMIN — PANTOPRAZOLE SODIUM 40 MILLIGRAM(S): 20 TABLET, DELAYED RELEASE ORAL at 17:53

## 2023-01-24 NOTE — PROGRESS NOTE ADULT - PROBLEM SELECTOR PLAN 1
Patient presents to hospital with new onset of respiratory failure with hypoxia. Unclear etiology. Infection seems less likely as CXR without consolidation and patient is afebrile and without leukocytosis. Viral workup negative. Denies chest pain, however has risk factors for PE (bedbound due to MS). Alternatively may be secondary to aspiration which has not yet manifest on imaging or labs  - Will obtain d-dimer given low overall suspicion for PE. Consider further imaging if elevated - mildly elevated.   - Wean off O2 as tolerated.   - Monitor off antibiotics for now  -F/u CTA chest to r/o PE and also eval lung parenchyma better. - patient agrees for scan. - negative for PE.   -Outpatient follow up for thyroid lesion incidentally seen on CTA chest. -TSH in am.

## 2023-01-24 NOTE — OCCUPATIONAL THERAPY INITIAL EVALUATION ADULT - ADL RETRAINING, OT EVAL
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

## 2023-01-24 NOTE — PROGRESS NOTE ADULT - SUBJECTIVE AND OBJECTIVE BOX
Chief Complaint:  Patient is a 63y old  Male who presents with a chief complaint of AHRF (2023 17:07)      Interval Events: patient reports vomiting all night, remains dark brown/black in color, no red  - no further BMs  - states he is passing gas  - abd xray with dilated loops of small bowel  - denies abd pain      Hospital Medications:  acetaminophen     Tablet .. 650 milliGRAM(s) Oral every 6 hours PRN  aluminum hydroxide/magnesium hydroxide/simethicone Suspension 30 milliLiter(s) Oral every 4 hours PRN  baclofen 5 milliGRAM(s) Oral every 8 hours PRN  enoxaparin Injectable 40 milliGRAM(s) SubCutaneous every 24 hours  influenza  Vaccine (HIGH DOSE) 0.7 milliLiter(s) IntraMuscular once  melatonin 3 milliGRAM(s) Oral at bedtime PRN  mesalamine ER (24-Hour) Capsule 1.5 Gram(s) Oral daily  multivitamin 1 Tablet(s) Oral daily  ondansetron Injectable 4 milliGRAM(s) IV Push every 8 hours PRN  oxybutynin 5 milliGRAM(s) Oral two times a day  pantoprazole  Injectable 40 milliGRAM(s) IV Push two times a day  polyethylene glycol 3350 17 Gram(s) Oral daily PRN  senna 2 Tablet(s) Oral at bedtime  sertraline 50 milliGRAM(s) Oral daily  tamsulosin 0.4 milliGRAM(s) Oral at bedtime      PMHX/PSHX:  Colitis    BPH (benign prostatic hypertrophy)    GERD (gastroesophageal reflux disease)    Seizures    Multiple sclerosis    No significant past surgical history            ROS:     General:  No weight loss, fevers, chills, night sweats, fatigue   Eyes:  No vision changes  ENT:  No sore throat, pain, runny nose  CV:  No chest pain, palpitations, dizziness   Resp:  No SOB, cough, wheezing  GI:  See HPI  :  No burning with urination, hematuria  Muscle:  No pain, weakness  Neuro:  No weakness/tingling, memory problems  Psych:  No fatigue, insomnia, mood problems, depression  Heme:  No easy bruisability  Skin:  No rash, edema      PHYSICAL EXAM:     GENERAL:  Well developed, no distress  HEENT:  NC/AT,  conjunctivae clear, sclera anicteric  CHEST:  Full & symmetric excursion, no increased effort w/ respirations  HEART:  Regular rhythm & rate  ABDOMEN:  Soft, non-tender, non-distended  EXTREMITIES:  no LE  edema  SKIN:  No rash/erythema/ecchymoses/petechiae/wounds/jaundice  NEURO:  Alert, oriented    Vital Signs:  Vital Signs Last 24 Hrs  T(C): 36.7 (2023 06:13), Max: 37.7 (2023 20:40)  T(F): 98.1 (2023 06:13), Max: 99.9 (2023 20:40)  HR: 80 (2023 06:13) (80 - 94)  BP: 133/83 (2023 06:13) (113/77 - 134/85)  BP(mean): --  RR: 18 (2023 06:13) (18 - 20)  SpO2: 96% (2023 06:13) (90% - 97%)    Parameters below as of 2023 06:13  Patient On (Oxygen Delivery Method): nasal cannula  O2 Flow (L/min): 2    Daily     Daily     LABS:                        13.5   9.84  )-----------( 180      ( 2023 07:16 )             43.5     01-23    137  |  98  |  18  ----------------------------<  75  4.1   |  22  |  0.68    Ca    8.6      2023 07:16          Urinalysis Basic - ( 2023 19:55 )    Color: Yellow / Appearance: Clear / S.026 / pH: x  Gluc: x / Ketone: Negative  / Bili: Negative / Urobili: 2 mg/dL   Blood: x / Protein: Negative / Nitrite: Negative   Leuk Esterase: Small / RBC: 1 /hpf / WBC 5 /HPF   Sq Epi: x / Non Sq Epi: 1 /hpf / Bacteria: Negative          Imaging:             Chief Complaint:  Patient is a 63y old  Male who presents with a chief complaint of AHRF (2023 17:07)      Interval Events: patient reports vomiting all night, remains dark brown/black in color, no red  - no further BMs  - states he is passing gas  - abd xray with dilated loops of small bowel  - denies abd pain      Hospital Medications:  acetaminophen     Tablet .. 650 milliGRAM(s) Oral every 6 hours PRN  aluminum hydroxide/magnesium hydroxide/simethicone Suspension 30 milliLiter(s) Oral every 4 hours PRN  baclofen 5 milliGRAM(s) Oral every 8 hours PRN  enoxaparin Injectable 40 milliGRAM(s) SubCutaneous every 24 hours  influenza  Vaccine (HIGH DOSE) 0.7 milliLiter(s) IntraMuscular once  melatonin 3 milliGRAM(s) Oral at bedtime PRN  mesalamine ER (24-Hour) Capsule 1.5 Gram(s) Oral daily  multivitamin 1 Tablet(s) Oral daily  ondansetron Injectable 4 milliGRAM(s) IV Push every 8 hours PRN  oxybutynin 5 milliGRAM(s) Oral two times a day  pantoprazole  Injectable 40 milliGRAM(s) IV Push two times a day  polyethylene glycol 3350 17 Gram(s) Oral daily PRN  senna 2 Tablet(s) Oral at bedtime  sertraline 50 milliGRAM(s) Oral daily  tamsulosin 0.4 milliGRAM(s) Oral at bedtime      PMHX/PSHX:  Colitis    BPH (benign prostatic hypertrophy)    GERD (gastroesophageal reflux disease)    Seizures    Multiple sclerosis    No significant past surgical history            ROS:     General:  No weight loss, fevers, chills, night sweats, fatigue   Eyes:  No vision changes  ENT:  No sore throat, pain, runny nose  CV:  No chest pain, palpitations, dizziness   Resp:  No SOB, cough, wheezing  GI:  See HPI  :  No burning with urination, hematuria  Muscle:  No pain, weakness  Neuro:  No weakness/tingling, memory problems  Psych:  No fatigue, insomnia, mood problems, depression  Heme:  No easy bruisability  Skin:  No rash, edema      PHYSICAL EXAM:     GENERAL:  Well developed, no distress  HEENT:  NC/AT,  conjunctivae clear, sclera anicteric  CHEST:  Full & symmetric excursion, no increased effort w/ respirations  HEART:  Regular rhythm & rate  ABDOMEN:  Soft, non-tender, non-distended  EXTREMITIES:  no LE  edema  SKIN:  No rash/erythema/ecchymoses/petechiae/wounds/jaundice  NEURO:  Alert, oriented    Vital Signs:  Vital Signs Last 24 Hrs  T(C): 36.7 (2023 06:13), Max: 37.7 (2023 20:40)  T(F): 98.1 (2023 06:13), Max: 99.9 (2023 20:40)  HR: 80 (2023 06:13) (80 - 94)  BP: 133/83 (2023 06:13) (113/77 - 134/85)  BP(mean): --  RR: 18 (2023 06:13) (18 - 20)  SpO2: 96% (2023 06:13) (90% - 97%)    Parameters below as of 2023 06:13  Patient On (Oxygen Delivery Method): nasal cannula  O2 Flow (L/min): 2    Daily     Daily     LABS:                        13.5   9.84  )-----------( 180      ( 2023 07:16 )             43.5     Hemoglobin: 12.5 g/dL (23 @ 12:44)  Hemoglobin: 13.5 g/dL (23 @ 07:16)  Hemoglobin: 15.3 g/dL (23 @ 21:34)  Hemoglobin: 14.2 g/dL (23 @ 02:11)          137  |  98  |  18  ----------------------------<  75  4.1   |  22  |  0.68    Ca    8.6      2023 07:16          Urinalysis Basic - ( 2023 19:55 )    Color: Yellow / Appearance: Clear / S.026 / pH: x  Gluc: x / Ketone: Negative  / Bili: Negative / Urobili: 2 mg/dL   Blood: x / Protein: Negative / Nitrite: Negative   Leuk Esterase: Small / RBC: 1 /hpf / WBC 5 /HPF   Sq Epi: x / Non Sq Epi: 1 /hpf / Bacteria: Negative          Imaging:

## 2023-01-24 NOTE — PROGRESS NOTE ADULT - PROBLEM SELECTOR PLAN 6
[address]  Dept: [phone]      Patient:  Alcides Harvey  :      1987  MRN:      [id number]    Referring Provider: Sp Fine 500-125 MG-UNIT Oral Tab, Take by mouth., Disp: , Rfl:   •  Multiple Vitamins-Minerals (MULTIVITAMIN ADULT OR), Take by mouth 2 (two) times daily. , Disp: , Rfl:   •  Biotin 95395 MCG Oral Tab, Take by mouth., Disp: , Rfl:     Allergies:  Penicillin G; Sulf negative  Respiratory: negative  Cardiovascular: negative  Gastrointestinal: positive for constipation  Musculoskeletal:negative  Neurological: negative  Behavioral/Psych: negative  Endocrine: negative  All other systems were reviewed and are negative    A - Continue with home Sertraline 50 mg PO Qd    DVT ppx - Lovenox - at risk of clots given bed bound from MS. -Will hold if has more dark emesis and/or H/H drops.    7. Discussed plan with PRINCESS Tesfaye and RN and OT.

## 2023-01-24 NOTE — OCCUPATIONAL THERAPY INITIAL EVALUATION ADULT - PERTINENT HX OF CURRENT PROBLEM, REHAB EVAL
63M PMH MS, UC,  GERD, BPH panic disorder, presents with nausea and vomiting and now with acute hypoxic respiratory failure. Patient reports he was in his normal state of health until yesterday, when he began experiencing episodes of NBNB vomiting. Patient called EMS when vomit became darker.  EMS found patient to be hypoxic and started O2 via LFNC at 3 LPM.

## 2023-01-24 NOTE — OCCUPATIONAL THERAPY INITIAL EVALUATION ADULT - LIVES WITH, PROFILE
Lives in house with spouse and son with 1 small step to enter, Hospital bed located on main floor, primarily in bed, will patt OOB to standard wheelchair- if patient has to leave the house has assistance with 1 step in wheelchair, requires assistance with all ADLs, however pt able to feed self and complete grooming tasks utilizing LUE/spouse

## 2023-01-24 NOTE — PROGRESS NOTE ADULT - ASSESSMENT
63M PMH MS, UC on mesalamine, GERD, BPH, panic disorder, presents with nausea and vomiting and now with acute hypoxic respiratory failure.    Impression:  #N/V - likely acute infectious gastroenteritis given sick contacts and acute onset. R/o obstruction although passing gas and had BM this AM -  Given black/red after multiple episodes likely 2/2 esophagitis vs MW tear. Hgb normal. BUN normal. [place] blatchford score 0.  #MS - increased worsening weakness with respiratory failure  #UC on mesalamine - controlled. No abd pain, diarrhea or hematochezia.    Recommendation:  - obtain CT a/p to eval for SBO vs ileus given dilated loops on xray and continued vomiting  - c/w PPI BID PO for possible esophagitis in setting of repeated vomiting  - c/w home dose mesalamine  - NPO for now until CT is done  - trend CBC    Note not finalized until signed by attending.    Stephanie Bauer PGY-6  Gastroenterology/Hepatology Fellow  Pager #[phone] (JANIE) or [phone] (NS)  Available on [employer] Teams.  Please contact on-call GI fellow via answering service ([phone]) after 5pm and before 8am, and on weekends.             63M PMH MS, UC on mesalamine, GERD, BPH, panic disorder, presents with nausea and vomiting and now with acute hypoxic respiratory failure.    Impression:  #N/V - likely acute infectious gastroenteritis given sick contacts and acute onset. R/o obstruction although passing gas and had BM this AM -  Given black/red after multiple episodes likely 2/2 esophagitis vs MW tear. Hgb normal. BUN normal. [place] blatchford score 0.  #MS - increased worsening weakness with respiratory failure  #UC on mesalamine - controlled. No abd pain, diarrhea or hematochezia.    Recommendation:  - obtain CT a/p with oral contrast to eval for SBO vs ileus given dilated loops on xray and continued vomiting  - c/w PPI BID PO for possible esophagitis in setting of repeated vomiting  - c/w home dose mesalamine  - NPO for now until CT is done  - trend CBC    Note not finalized until signed by attending.    Stephanie Bauer PGY-6  Gastroenterology/Hepatology Fellow  Pager #[phone] (JANIE) or [phone] (NS)  Available on [employer] Teams.  Please contact on-call GI fellow via answering service ([phone]) after 5pm and before 8am, and on weekends.

## 2023-01-24 NOTE — OCCUPATIONAL THERAPY INITIAL EVALUATION ADULT - PHYSICAL ASSIST/NONPHYSICAL ASSIST: GROOMING, OT EVAL
[FreeTextEntry1] : 82 y/o  Male with h/o CAD s/p PCI LAD/OM1 '18 with repeat cardiac catheterization in 5/20 demonstrating patent stents with mild, non-obstructive CAD, normal LV fxn, ILR placed 5/20 for SVT, DM, HTN, Dyslipidemia and PAD presents for initial eval \par \par 1) Bilateral lower extremity claudication, Anderson class 3\par - worsening claudication over the past 5-6 months \par - on ASA and Plavix and statin therapy \par - recommend to obtain CHARLES/PVR and US arterial duplex to assess for any significant PAD \par - if there is moderate disease of the lower extremities will then consider Pletal, would then stop either ASA and Plavix \par \par 2) CAD s/p LAD/OM1 '18\par - no active chest pain or shortness of breath \par - EKG reviewed 7/29/2022: Sinus Rhythm @ 62 bpm WITHIN NORMAL LIMITS, with no acute ST-T segment changes\par - on ASA Plavix and statin \par - discussed with daughter will obtain records from previous cardiologist \par  \par 3) SVT and possible syncope s/p ILR \par - ILR interrogated during this office visit \par - no evidence of recent SVT \par - on Lopressor 50mg po q 12hrs \par \par 4) HTN \par - blood pressure controlled on Amlodipine, Clonidine, Lopressor and HCTZ \par - discussed dietary and lifestyle modification \par \par \par Johanny Breaux D.O. FAC\par Cardiology/Vascular Cardiology -[facility]\par Telephone # [phone]\par  verbal cues/1 person assist

## 2023-01-24 NOTE — OCCUPATIONAL THERAPY INITIAL EVALUATION ADULT - DIAGNOSIS, OT EVAL
Patient presents with decreased coordination, strength, endurance impacting ability to perform ADLs and functional mobility

## 2023-01-24 NOTE — OCCUPATIONAL THERAPY INITIAL EVALUATION ADULT - LEVEL OF INDEPENDENCE: GROOMING, OT EVAL
brushing teeth supine in bed, assistanc required to remove cap and put toothpaste ob brush-difficulty maintaining grasp on toothbrush/minimum assist (75% patients effort)

## 2023-01-24 NOTE — PROGRESS NOTE ADULT - SUBJECTIVE AND OBJECTIVE BOX
Patient is a 63y old  Male who presents with a chief complaint of AHRF (2023 12:12)        SUBJECTIVE / OVERNIGHT EVENTS: reports still vomiting overnight and today. passing gas. reports having a BM yesterday. Reports some gas. Threw up some clears in am.       MEDICATIONS  (STANDING):  enoxaparin Injectable 40 milliGRAM(s) SubCutaneous every 24 hours  influenza  Vaccine (HIGH DOSE) 0.7 milliLiter(s) IntraMuscular once  lactated ringers. 1000 milliLiter(s) (100 mL/Hr) IV Continuous <Continuous>  mesalamine ER (24-Hour) Capsule 1.5 Gram(s) Oral daily  multivitamin 1 Tablet(s) Oral daily  oxybutynin 5 milliGRAM(s) Oral two times a day  pantoprazole  Injectable 40 milliGRAM(s) IV Push two times a day  senna 2 Tablet(s) Oral at bedtime  sertraline 50 milliGRAM(s) Oral daily  tamsulosin 0.4 milliGRAM(s) Oral at bedtime    MEDICATIONS  (PRN):  acetaminophen     Tablet .. 650 milliGRAM(s) Oral every 6 hours PRN Temp greater or equal to 38C (100.4F), Mild Pain (1 - 3)  aluminum hydroxide/magnesium hydroxide/simethicone Suspension 30 milliLiter(s) Oral every 4 hours PRN Dyspepsia  baclofen 5 milliGRAM(s) Oral every 8 hours PRN Muscle Spasm  ibuprofen  Tablet. 400 milliGRAM(s) Oral every 8 hours PRN Moderate Pain (4 - 6)  melatonin 3 milliGRAM(s) Oral at bedtime PRN Insomnia  ondansetron Injectable 4 milliGRAM(s) IV Push every 8 hours PRN Nausea and/or Vomiting  polyethylene glycol 3350 17 Gram(s) Oral daily PRN Constipation      Vital Signs Last 24 Hrs  T(C): 37.1 (2023 12:04), Max: 37.7 (2023 20:40)  T(F): 98.7 (2023 12:04), Max: 99.9 (2023 20:40)  HR: 83 (2023 12:35) (80 - 90)  BP: 134/88 (2023 12:35) (133/83 - 134/88)  BP(mean): --  RR: 18 (2023 12:04) (18 - 20)  SpO2: 93% (2023 12:35) (93% - 97%)    Parameters below as of 2023 12:35  Patient On (Oxygen Delivery Method): room air      CAPILLARY BLOOD GLUCOSE        I&O's Summary    2023 07:01  -  2023 07:00  --------------------------------------------------------  IN: 125 mL / OUT: 0 mL / NET: 125 mL    2023 07:01  -  2023 16:40  --------------------------------------------------------  IN: 125 mL / OUT: 1100 mL / NET: -975 mL          PHYSICAL EXAM:   GENERAL: NAD, well-developed  HEAD:  Atraumatic, Normocephalic  EYES:  conjunctiva and sclera clear  NECK: Supple,    CHEST/LUNG: Clear to auscultation bilaterally; No wheeze  HEART: S1S2 normal. Regular rate and rhythm; No murmurs, rubs, or gallops  ABDOMEN: Soft, obese, Nontender, ?mildly distended; Bowel sounds present  EXTREMITIES:  trace LE edema  PSYCH/Neuro: AAOx3. RUE weakness. Reports some LE and LUE weakness too.   SKIN: No rashes or lesions      LABS:                        12.5   6.83  )-----------( 196      ( 2023 12:44 )             39.5     -    140  |  98  |  16  ----------------------------<  99  3.5   |  32<H>  |  0.72    Ca    8.1<L>      2023 12:44            Urinalysis Basic - ( 2023 19:55 )    Color: Yellow / Appearance: Clear / S.026 / pH: x  Gluc: x / Ketone: Negative  / Bili: Negative / Urobili: 2 mg/dL   Blood: x / Protein: Negative / Nitrite: Negative   Leuk Esterase: Small / RBC: 1 /hpf / WBC 5 /HPF   Sq Epi: x / Non Sq Epi: 1 /hpf / Bacteria: Negative      < from: CT Angio Chest PE Protocol w/ IV Cont (23 @ 08:21) >  IMPRESSION:    No main, lobar, segmental, or proximal subsegmental pulmonary embolism.   Evaluation many distal subsegmental pulmonary arteries is limited due to   poor contrast opacification.    Redemonstrated asymmetric enlargement of the left thyroid gland with   hypoattenuating lesion measuring up to 4.1 cm, previously characterized   on thyroid ultrasound 10/2/2014.    --- End of Report ---    < end of copied text >      RADIOLOGY & ADDITIONAL TESTS:    Imaging Personally Reviewed: cta chest  Consultant(s) Notes Reviewed:  GI, OT  Care Discussed with Consultants/Other Providers: OT

## 2023-01-25 LAB
ANION GAP SERPL CALC-SCNC: 12 MMOL/L — SIGNIFICANT CHANGE UP (ref 5–17)
APPEARANCE UR: CLEAR — SIGNIFICANT CHANGE UP
BILIRUB UR-MCNC: NEGATIVE — SIGNIFICANT CHANGE UP
BUN SERPL-MCNC: 12 MG/DL — SIGNIFICANT CHANGE UP (ref 7–23)
CALCIUM SERPL-MCNC: 8.5 MG/DL — SIGNIFICANT CHANGE UP (ref 8.4–10.5)
CHLORIDE SERPL-SCNC: 96 MMOL/L — SIGNIFICANT CHANGE UP (ref 96–108)
CO2 SERPL-SCNC: 28 MMOL/L — SIGNIFICANT CHANGE UP (ref 22–31)
COLOR SPEC: SIGNIFICANT CHANGE UP
CREAT SERPL-MCNC: 0.64 MG/DL — SIGNIFICANT CHANGE UP (ref 0.5–1.3)
DIFF PNL FLD: NEGATIVE — SIGNIFICANT CHANGE UP
EGFR: 105 ML/MIN/1.73M2 — SIGNIFICANT CHANGE UP
GLUCOSE SERPL-MCNC: 61 MG/DL — LOW (ref 70–99)
GLUCOSE UR QL: NEGATIVE — SIGNIFICANT CHANGE UP
HCT VFR BLD CALC: 40.2 % — SIGNIFICANT CHANGE UP (ref 39–50)
HGB BLD-MCNC: 12.8 G/DL — LOW (ref 13–17)
KETONES UR-MCNC: ABNORMAL
LEUKOCYTE ESTERASE UR-ACNC: ABNORMAL
MCHC RBC-ENTMCNC: 28.5 PG — SIGNIFICANT CHANGE UP (ref 27–34)
MCHC RBC-ENTMCNC: 31.8 GM/DL — LOW (ref 32–36)
MCV RBC AUTO: 89.5 FL — SIGNIFICANT CHANGE UP (ref 80–100)
NITRITE UR-MCNC: POSITIVE
NRBC # BLD: 0 /100 WBCS — SIGNIFICANT CHANGE UP (ref 0–0)
PH UR: 8 — SIGNIFICANT CHANGE UP (ref 5–8)
PLATELET # BLD AUTO: 187 K/UL — SIGNIFICANT CHANGE UP (ref 150–400)
POTASSIUM SERPL-MCNC: 3.2 MMOL/L — LOW (ref 3.5–5.3)
POTASSIUM SERPL-SCNC: 3.2 MMOL/L — LOW (ref 3.5–5.3)
PROT UR-MCNC: NEGATIVE — SIGNIFICANT CHANGE UP
RBC # BLD: 4.49 M/UL — SIGNIFICANT CHANGE UP (ref 4.2–5.8)
RBC # FLD: 13.7 % — SIGNIFICANT CHANGE UP (ref 10.3–14.5)
SODIUM SERPL-SCNC: 136 MMOL/L — SIGNIFICANT CHANGE UP (ref 135–145)
SP GR SPEC: 1.01 — SIGNIFICANT CHANGE UP (ref 1.01–1.02)
TSH SERPL-MCNC: 1.47 UIU/ML — SIGNIFICANT CHANGE UP (ref 0.27–4.2)
UROBILINOGEN FLD QL: NEGATIVE — SIGNIFICANT CHANGE UP
WBC # BLD: 7.86 K/UL — SIGNIFICANT CHANGE UP (ref 3.8–10.5)
WBC # FLD AUTO: 7.86 K/UL — SIGNIFICANT CHANGE UP (ref 3.8–10.5)

## 2023-01-25 PROCEDURE — 99232 SBSQ HOSP IP/OBS MODERATE 35: CPT

## 2023-01-25 PROCEDURE — 99231 SBSQ HOSP IP/OBS SF/LOW 25: CPT | Mod: GC

## 2023-01-25 RX ORDER — ACETAMINOPHEN 500 MG
1000 TABLET ORAL ONCE
Refills: 0 | Status: COMPLETED | OUTPATIENT
Start: 2023-01-25 | End: 2023-01-25

## 2023-01-25 RX ORDER — POTASSIUM CHLORIDE 20 MEQ
40 PACKET (EA) ORAL ONCE
Refills: 0 | Status: COMPLETED | OUTPATIENT
Start: 2023-01-25 | End: 2023-01-25

## 2023-01-25 RX ORDER — ACETAMINOPHEN 500 MG
650 TABLET ORAL EVERY 6 HOURS
Refills: 0 | Status: DISCONTINUED | OUTPATIENT
Start: 2023-01-25 | End: 2023-01-30

## 2023-01-25 RX ORDER — LANOLIN ALCOHOL/MO/W.PET/CERES
5 CREAM (GRAM) TOPICAL AT BEDTIME
Refills: 0 | Status: DISCONTINUED | OUTPATIENT
Start: 2023-01-25 | End: 2023-01-30

## 2023-01-25 RX ORDER — PANTOPRAZOLE SODIUM 20 MG/1
40 TABLET, DELAYED RELEASE ORAL
Refills: 0 | Status: DISCONTINUED | OUTPATIENT
Start: 2023-01-25 | End: 2023-01-30

## 2023-01-25 RX ADMIN — PANTOPRAZOLE SODIUM 40 MILLIGRAM(S): 20 TABLET, DELAYED RELEASE ORAL at 05:34

## 2023-01-25 RX ADMIN — Medication 650 MILLIGRAM(S): at 06:24

## 2023-01-25 RX ADMIN — TAMSULOSIN HYDROCHLORIDE 0.4 MILLIGRAM(S): 0.4 CAPSULE ORAL at 22:12

## 2023-01-25 RX ADMIN — Medication 400 MILLIGRAM(S): at 01:24

## 2023-01-25 RX ADMIN — ONDANSETRON 4 MILLIGRAM(S): 8 TABLET, FILM COATED ORAL at 20:00

## 2023-01-25 RX ADMIN — Medication 400 MILLIGRAM(S): at 20:29

## 2023-01-25 RX ADMIN — Medication 40 MILLIEQUIVALENT(S): at 13:02

## 2023-01-25 RX ADMIN — Medication 400 MILLIGRAM(S): at 00:39

## 2023-01-25 RX ADMIN — ENOXAPARIN SODIUM 40 MILLIGRAM(S): 100 INJECTION SUBCUTANEOUS at 17:34

## 2023-01-25 RX ADMIN — Medication 3 MILLIGRAM(S): at 00:50

## 2023-01-25 RX ADMIN — PANTOPRAZOLE SODIUM 40 MILLIGRAM(S): 20 TABLET, DELAYED RELEASE ORAL at 17:33

## 2023-01-25 RX ADMIN — Medication 5 MILLIGRAM(S): at 05:32

## 2023-01-25 RX ADMIN — Medication 650 MILLIGRAM(S): at 05:32

## 2023-01-25 RX ADMIN — Medication 1.5 GRAM(S): at 13:03

## 2023-01-25 RX ADMIN — Medication 5 MILLIGRAM(S): at 22:11

## 2023-01-25 RX ADMIN — Medication 1000 MILLIGRAM(S): at 21:00

## 2023-01-25 RX ADMIN — SERTRALINE 50 MILLIGRAM(S): 25 TABLET, FILM COATED ORAL at 13:02

## 2023-01-25 RX ADMIN — Medication 1 TABLET(S): at 13:02

## 2023-01-25 RX ADMIN — SENNA PLUS 2 TABLET(S): 8.6 TABLET ORAL at 22:12

## 2023-01-25 RX ADMIN — Medication 5 MILLIGRAM(S): at 17:33

## 2023-01-25 NOTE — PROGRESS NOTE ADULT - PROBLEM SELECTOR PLAN 6
- Continue with home Sertraline 50 mg PO Qd    DVT ppx - Lovenox - at risk of clots given bed bound from MS.     7. Discussed plan with ACP Mily.

## 2023-01-25 NOTE — PROGRESS NOTE ADULT - SUBJECTIVE AND OBJECTIVE BOX
Chief Complaint:  Patient is a 63y old  Male who presents with a chief complaint of AHRF (24 Jan 2023 12:12)      Interval Events: no further vomiting  - feels better today  - no abd pain, diarrhea, f/c      Hospital Medications:  acetaminophen     Tablet .. 650 milliGRAM(s) Oral every 6 hours PRN  aluminum hydroxide/magnesium hydroxide/simethicone Suspension 30 milliLiter(s) Oral every 4 hours PRN  baclofen 5 milliGRAM(s) Oral every 8 hours PRN  enoxaparin Injectable 40 milliGRAM(s) SubCutaneous every 24 hours  ibuprofen  Tablet. 400 milliGRAM(s) Oral every 8 hours PRN  influenza  Vaccine (HIGH DOSE) 0.7 milliLiter(s) IntraMuscular once  lactated ringers. 1000 milliLiter(s) IV Continuous <Continuous>  melatonin 3 milliGRAM(s) Oral at bedtime PRN  mesalamine ER (24-Hour) Capsule 1.5 Gram(s) Oral daily  multivitamin 1 Tablet(s) Oral daily  ondansetron Injectable 4 milliGRAM(s) IV Push every 8 hours PRN  oxybutynin 5 milliGRAM(s) Oral two times a day  pantoprazole  Injectable 40 milliGRAM(s) IV Push two times a day  polyethylene glycol 3350 17 Gram(s) Oral daily PRN  senna 2 Tablet(s) Oral at bedtime  sertraline 50 milliGRAM(s) Oral daily  tamsulosin 0.4 milliGRAM(s) Oral at bedtime      PMHX/PSHX:  Colitis    BPH (benign prostatic hypertrophy)    GERD (gastroesophageal reflux disease)    Seizures    Multiple sclerosis    No significant past surgical history            ROS:     General:  No weight loss, fevers, chills, night sweats, fatigue   Eyes:  No vision changes  ENT:  No sore throat, pain, runny nose  CV:  No chest pain, palpitations, dizziness   Resp:  No SOB, cough, wheezing  GI:  See HPI  :  No burning with urination, hematuria  Muscle:  No pain, weakness  Neuro:  No weakness/tingling, memory problems  Psych:  No fatigue, insomnia, mood problems, depression  Heme:  No easy bruisability  Skin:  No rash, edema      PHYSICAL EXAM:     GENERAL:  Well developed, no distress  HEENT:  NC/AT,  conjunctivae clear, sclera anicteric  CHEST:  Full & symmetric excursion, no increased effort w/ respirations  HEART:  Regular rhythm & rate  ABDOMEN:  Soft, non-tender, non-distended  EXTREMITIES:  no LE  edema  SKIN:  No rash/erythema/ecchymoses/petechiae/wounds/jaundice  NEURO:  Alert, oriented    Vital Signs:  Vital Signs Last 24 Hrs  T(C): 36.4 (25 Jan 2023 04:39), Max: 37.1 (24 Jan 2023 12:04)  T(F): 97.6 (25 Jan 2023 04:39), Max: 98.7 (24 Jan 2023 12:04)  HR: 78 (25 Jan 2023 04:39) (78 - 83)  BP: 143/84 (25 Jan 2023 04:39) (134/88 - 149/89)  BP(mean): --  RR: 18 (25 Jan 2023 04:39) (18 - 18)  SpO2: 92% (25 Jan 2023 04:39) (92% - 93%)    Parameters below as of 25 Jan 2023 04:39  Patient On (Oxygen Delivery Method): room air      Daily     Daily     LABS:                        12.8   7.86  )-----------( 187      ( 25 Jan 2023 07:19 )             40.2     01-25    136  |  96  |  12  ----------------------------<  61<L>  3.2<L>   |  28  |  0.64    Ca    8.5      25 Jan 2023 07:19                Imaging:    < from: CT Abdomen and Pelvis w/ IV Cont (01.24.23 @ 22:47) >    FINDINGS:  LOWER CHEST: Basilar dependent atelectasis.    LIVER: Within normal limits.  BILE DUCTS: Normal caliber.  GALLBLADDER: Within normal limits.  SPLEEN: Within normal limits.  PANCREAS: Within normal limits.  ADRENALS: Within normal limits.  KIDNEYS/URETERS: Parapelvic and cortical left renal cyst. Symmetric renal   enhancement without hydronephrosis..    BLADDER: Within normal limits.  REPRODUCTIVE ORGANS: Prostate within normal limits.    BOWEL: No bowel obstruction. Appendix is normal.  PERITONEUM: No ascites.  VESSELS: Within normal limits.  RETROPERITONEUM/LYMPH NODES: No lymphadenopathy.  ABDOMINAL WALL: Small fat-containing umbilical hernia and small bilateral   fat-containing inguinal hernias. Subcutaneous foci of gas in the right   ventral abdominal wall likely related to recent subcutaneous injection.   Left lower thoracic posterior subcutaneous probable sebaceous cyst is   again noted, measuring 6.5 cm  BONES: Degenerativechanges.    IMPRESSION:  No bowel obstruction.        --- End of Report ---    < end of copied text >

## 2023-01-25 NOTE — PROGRESS NOTE ADULT - ASSESSMENT
63M PMH MS, UC on mesalamine, GERD, BPH, panic disorder, presents with nausea and vomiting and now with acute hypoxic respiratory failure.    Impression:  #N/V - likely acute infectious gastroenteritis given sick contacts and acute onset. CT without obstruction. Likely ileus on xray in setting of infectious gastroenteritis. Hgb normal. BUN normal. [place] blatchford score 0. Symptoms resolved.  #MS  #UC on mesalamine - controlled. No abd pain, diarrhea or hematochezia.    Recommendation:  - advance diet as tolerated  - can c/w PPI PO BID for 4 weeks for esophagitis in setting of repeated vomiting  - c/w home dose mesalamine    No further GI w/u needed at this time. Please call back with questions.    Note not finalized until signed by attending.    Stephanie Bauer PGY-6  Gastroenterology/Hepatology Fellow  Pager #[phone] (JANIE) or [phone] (NS)  Available on [employer] Teams.  Please contact on-call GI fellow via answering service ([phone]) after 5pm and before 8am, and on weekends.             63M PMH MS, UC on mesalamine, GERD, BPH, panic disorder, presents with nausea and vomiting and now with acute hypoxic respiratory failure.    Impression:  #N/V - likely acute infectious gastroenteritis given sick contacts and acute onset. CT without obstruction. Likely ileus on xray in setting of infectious gastroenteritis. Hgb normal. BUN normal. [place] blatchford score 0. Symptoms resolved.  #MS  #UC on mesalamine - controlled. No abd pain, diarrhea or hematochezia.    Recommendation:  - advance diet as tolerated  - can c/w PPI PO BID for 4 weeks for esophagitis in setting of repeated vomiting  - c/w home dose mesalamine    No further GI w/u needed at this time. Please call back with questions. Can f/u as outpatient for EGD if symptoms recur.    Note not finalized until signed by attending.    Stephanie Bauer PGY-6  Gastroenterology/Hepatology Fellow  Pager #[phone] (JANIE) or [phone] (NS)  Available on [employer] Teams.  Please contact on-call GI fellow via answering service ([phone]) after 5pm and before 8am, and on weekends.

## 2023-01-25 NOTE — PROGRESS NOTE ADULT - ATTENDING COMMENTS
Agree with above. CT shows no ileus/obstruction, nausea/vomiting resolved. ?due to acute viral gastroenteritis given sick contacts - no signs of bleeding, no plans for endoscopic evaluation at this time.
Agree with above. H/H is stable, no signs of bleeding. Abdominal x-ray suggests prominent small bowel loops - would obtain cross sectional imaging to r/o ileus/partial obstruction as cause of nausea/vomiting.

## 2023-01-25 NOTE — PROGRESS NOTE ADULT - SUBJECTIVE AND OBJECTIVE BOX
Patient is a 63y old  Male who presents with a chief complaint of AHRF (25 Jan 2023 15:05)        SUBJECTIVE / OVERNIGHT EVENTS: patient reports peeing a lot. trouble sleeping. N/V better. no diarrhea. mild cough. says he felt chills.       MEDICATIONS  (STANDING):  enoxaparin Injectable 40 milliGRAM(s) SubCutaneous every 24 hours  influenza  Vaccine (HIGH DOSE) 0.7 milliLiter(s) IntraMuscular once  melatonin 5 milliGRAM(s) Oral at bedtime  mesalamine ER (24-Hour) Capsule 1.5 Gram(s) Oral daily  multivitamin 1 Tablet(s) Oral daily  oxybutynin 5 milliGRAM(s) Oral two times a day  pantoprazole    Tablet 40 milliGRAM(s) Oral two times a day  senna 2 Tablet(s) Oral at bedtime  sertraline 50 milliGRAM(s) Oral daily  tamsulosin 0.4 milliGRAM(s) Oral at bedtime    MEDICATIONS  (PRN):  acetaminophen     Tablet .. 650 milliGRAM(s) Oral every 6 hours PRN Temp greater or equal to 38C (100.4F), Mild Pain (1 - 3), Moderate Pain (4 - 6)  aluminum hydroxide/magnesium hydroxide/simethicone Suspension 30 milliLiter(s) Oral every 4 hours PRN Dyspepsia  baclofen 5 milliGRAM(s) Oral every 8 hours PRN Muscle Spasm  ondansetron Injectable 4 milliGRAM(s) IV Push every 8 hours PRN Nausea and/or Vomiting  polyethylene glycol 3350 17 Gram(s) Oral daily PRN Constipation      Vital Signs Last 24 Hrs  T(C): 37 (25 Jan 2023 11:15), Max: 37.1 (24 Jan 2023 20:49)  T(F): 98.6 (25 Jan 2023 11:15), Max: 98.7 (24 Jan 2023 20:49)  HR: 79 (25 Jan 2023 11:15) (78 - 81)  BP: 121/57 (25 Jan 2023 11:15) (121/57 - 149/89)  BP(mean): --  RR: 18 (25 Jan 2023 11:15) (18 - 18)  SpO2: 92% (25 Jan 2023 11:15) (92% - 93%)    Parameters below as of 25 Jan 2023 11:15  Patient On (Oxygen Delivery Method): room air      CAPILLARY BLOOD GLUCOSE        I&O's Summary    24 Jan 2023 07:01  -  25 Jan 2023 07:00  --------------------------------------------------------  IN: 125 mL / OUT: 1700 mL / NET: -1575 mL    25 Jan 2023 07:01  -  25 Jan 2023 17:34  --------------------------------------------------------  IN: 240 mL / OUT: 1800 mL / NET: -1560 mL          PHYSICAL EXAM:   GENERAL: NAD,   HEAD:  Atraumatic, Normocephalic  EYES: EOMI, PERRLA, conjunctiva and sclera clear  NECK: Supple,    CHEST/LUNG: Clear to auscultation bilaterally; No wheeze  HEART: S1S2 normal. Regular rate and rhythm; No murmurs, rubs, or gallops  ABDOMEN: Soft, obese, Nontender, Nondistended; Bowel sounds present  EXTREMITIES:  trace LE edema  PSYCH/Neuro: AAOx3. Right side weakness >> lle and LUE weakness.   SKIN: No rashes or lesions      LABS:                        12.8   7.86  )-----------( 187      ( 25 Jan 2023 07:19 )             40.2     01-25    136  |  96  |  12  ----------------------------<  61<L>  3.2<L>   |  28  |  0.64    Ca    8.5      25 Jan 2023 07:19        < from: CT Abdomen and Pelvis w/ IV Cont (01.24.23 @ 22:47) >  IMPRESSION:  No bowel obstruction.        --- End of Report ---    < end of copied text >      RADIOLOGY & ADDITIONAL TESTS:    Imaging Personally Reviewed: ct abd pelvis report.   Consultant(s) Notes Reviewed:  GI  Care Discussed with Consultants/Other Providers:

## 2023-01-25 NOTE — PROGRESS NOTE ADULT - PROBLEM SELECTOR PLAN 1
Patient presents to hospital with new onset of respiratory failure with hypoxia. Unclear etiology. Infection seems less likely as CXR without consolidation and patient is afebrile and without leukocytosis. Viral workup negative. Denies chest pain, however has risk factors for PE (bedbound due to MS). Alternatively may be secondary to aspiration which has not yet manifest on imaging or labs  - Will obtain d-dimer given low overall suspicion for PE. Consider further imaging if elevated - mildly elevated.   - Wean off O2 as tolerated. -now on RA.  - Monitor off antibiotics for now  -F/u CTA chest to r/o PE and also eval lung parenchyma better. - patient agrees for scan. - negative for PE.   -Outpatient follow up for thyroid lesion incidentally seen on CTA chest. -TSH normal. -outpatient follow up recommended.

## 2023-01-26 LAB
ANION GAP SERPL CALC-SCNC: 14 MMOL/L — SIGNIFICANT CHANGE UP (ref 5–17)
BUN SERPL-MCNC: 9 MG/DL — SIGNIFICANT CHANGE UP (ref 7–23)
CALCIUM SERPL-MCNC: 8.7 MG/DL — SIGNIFICANT CHANGE UP (ref 8.4–10.5)
CHLORIDE SERPL-SCNC: 98 MMOL/L — SIGNIFICANT CHANGE UP (ref 96–108)
CO2 SERPL-SCNC: 28 MMOL/L — SIGNIFICANT CHANGE UP (ref 22–31)
CREAT SERPL-MCNC: 0.67 MG/DL — SIGNIFICANT CHANGE UP (ref 0.5–1.3)
EGFR: 104 ML/MIN/1.73M2 — SIGNIFICANT CHANGE UP
GLUCOSE SERPL-MCNC: 80 MG/DL — SIGNIFICANT CHANGE UP (ref 70–99)
HCT VFR BLD CALC: 38.4 % — LOW (ref 39–50)
HGB BLD-MCNC: 12 G/DL — LOW (ref 13–17)
MCHC RBC-ENTMCNC: 27.9 PG — SIGNIFICANT CHANGE UP (ref 27–34)
MCHC RBC-ENTMCNC: 31.3 GM/DL — LOW (ref 32–36)
MCV RBC AUTO: 89.3 FL — SIGNIFICANT CHANGE UP (ref 80–100)
NRBC # BLD: 0 /100 WBCS — SIGNIFICANT CHANGE UP (ref 0–0)
PLATELET # BLD AUTO: 203 K/UL — SIGNIFICANT CHANGE UP (ref 150–400)
POTASSIUM SERPL-MCNC: 3.2 MMOL/L — LOW (ref 3.5–5.3)
POTASSIUM SERPL-SCNC: 3.2 MMOL/L — LOW (ref 3.5–5.3)
PROCALCITONIN SERPL-MCNC: 0.05 NG/ML — SIGNIFICANT CHANGE UP (ref 0.02–0.1)
RBC # BLD: 4.3 M/UL — SIGNIFICANT CHANGE UP (ref 4.2–5.8)
RBC # FLD: 13.8 % — SIGNIFICANT CHANGE UP (ref 10.3–14.5)
SODIUM SERPL-SCNC: 140 MMOL/L — SIGNIFICANT CHANGE UP (ref 135–145)
WBC # BLD: 6.19 K/UL — SIGNIFICANT CHANGE UP (ref 3.8–10.5)
WBC # FLD AUTO: 6.19 K/UL — SIGNIFICANT CHANGE UP (ref 3.8–10.5)

## 2023-01-26 PROCEDURE — 71045 X-RAY EXAM CHEST 1 VIEW: CPT | Mod: 26

## 2023-01-26 PROCEDURE — 99233 SBSQ HOSP IP/OBS HIGH 50: CPT

## 2023-01-26 RX ORDER — POTASSIUM CHLORIDE 20 MEQ
40 PACKET (EA) ORAL ONCE
Refills: 0 | Status: COMPLETED | OUTPATIENT
Start: 2023-01-26 | End: 2023-01-26

## 2023-01-26 RX ORDER — SALICYLIC ACID 0.5 %
1 CLEANSER (GRAM) TOPICAL
Refills: 0 | Status: DISCONTINUED | OUTPATIENT
Start: 2023-01-26 | End: 2023-01-30

## 2023-01-26 RX ORDER — ONDANSETRON 8 MG/1
4 TABLET, FILM COATED ORAL ONCE
Refills: 0 | Status: COMPLETED | OUTPATIENT
Start: 2023-01-26 | End: 2023-01-26

## 2023-01-26 RX ORDER — POTASSIUM CHLORIDE 20 MEQ
40 PACKET (EA) ORAL EVERY 4 HOURS
Refills: 0 | Status: DISCONTINUED | OUTPATIENT
Start: 2023-01-26 | End: 2023-01-26

## 2023-01-26 RX ADMIN — SERTRALINE 50 MILLIGRAM(S): 25 TABLET, FILM COATED ORAL at 11:24

## 2023-01-26 RX ADMIN — TAMSULOSIN HYDROCHLORIDE 0.4 MILLIGRAM(S): 0.4 CAPSULE ORAL at 21:11

## 2023-01-26 RX ADMIN — Medication 40 MILLIEQUIVALENT(S): at 21:29

## 2023-01-26 RX ADMIN — ONDANSETRON 4 MILLIGRAM(S): 8 TABLET, FILM COATED ORAL at 21:10

## 2023-01-26 RX ADMIN — ONDANSETRON 4 MILLIGRAM(S): 8 TABLET, FILM COATED ORAL at 23:20

## 2023-01-26 RX ADMIN — SENNA PLUS 2 TABLET(S): 8.6 TABLET ORAL at 21:11

## 2023-01-26 RX ADMIN — Medication 5 MILLIGRAM(S): at 21:11

## 2023-01-26 RX ADMIN — Medication 650 MILLIGRAM(S): at 02:30

## 2023-01-26 RX ADMIN — Medication 650 MILLIGRAM(S): at 01:55

## 2023-01-26 RX ADMIN — Medication 5 MILLIGRAM(S): at 05:46

## 2023-01-26 RX ADMIN — PANTOPRAZOLE SODIUM 40 MILLIGRAM(S): 20 TABLET, DELAYED RELEASE ORAL at 17:13

## 2023-01-26 RX ADMIN — Medication 1 APPLICATION(S): at 17:13

## 2023-01-26 RX ADMIN — Medication 1 TABLET(S): at 11:24

## 2023-01-26 RX ADMIN — Medication 40 MILLIEQUIVALENT(S): at 12:12

## 2023-01-26 RX ADMIN — Medication 1.5 GRAM(S): at 08:40

## 2023-01-26 RX ADMIN — Medication 650 MILLIGRAM(S): at 09:29

## 2023-01-26 RX ADMIN — PANTOPRAZOLE SODIUM 40 MILLIGRAM(S): 20 TABLET, DELAYED RELEASE ORAL at 05:46

## 2023-01-26 RX ADMIN — Medication 650 MILLIGRAM(S): at 10:30

## 2023-01-26 RX ADMIN — ENOXAPARIN SODIUM 40 MILLIGRAM(S): 100 INJECTION SUBCUTANEOUS at 17:13

## 2023-01-26 RX ADMIN — Medication 5 MILLIGRAM(S): at 17:14

## 2023-01-26 NOTE — SWALLOW BEDSIDE ASSESSMENT ADULT - SWALLOW EVAL: CURRENT DIET
Full liquids. Advanced from clear liquids 1/25 Full liquids. Advanced from clear liquids 1/25. Pt unknown to this service.

## 2023-01-26 NOTE — PROVIDER CONTACT NOTE (OTHER) - SITUATION
Patient had episode of vomiting with dark red/black output. States he has "odd tasting burps. "
Patient oral temperature 100.8

## 2023-01-26 NOTE — SWALLOW BEDSIDE ASSESSMENT ADULT - COMMENTS
ED course: VSS. CBC stable. CMP with mild elevation in AM. Coags normal. EKG with NSR. RVP negative. CXR with no focal consolidations. Patient given metoclopramide 10 mg IV 1x and admitted to medicine for further evaluation and treatment.     -1/22: episode of dark emesis then coffee ground emesis  -1/23: GI consult -->Impression: N/V - likely acute infectious gastroenteritis given sick contacts and acute onset. R/o obstruction although passing gas and had BM this AM. Given black/red after multiple episodes likely 2/2 esophagitis vs MW tear. Hgb normal. BUN normal. Power blatchford score 0. MS - increased worsening weakness with respiratory failure. UC on mesalamine - controlled. No abd pain, diarrhea or hematochezia. Recommended clear liquids for now.  -1/24: GI - obtain CT a/p with oral contrast to eval for SBO vs ileus given dilated loops on xray and continued vomiting  -1/25: CT without obstruction. Likely ileus on xray in setting of infectious gastroenteritis. Hgb normal. BUN normal. Power blatchford score 0. Symptoms resolved. Advance diet as tolerated. No further GI w/u needed at this time.   -1/26: RN Notified last night pt with Oral Tmax 100.8. Pt's first fever he was being monitored off Abx. Pt with no leukocytosis WBC 7 had been previously afebrile. Speech and swallow eval ordered to check pts swallowing hx of MS make sure no silent aspiration. MRI Head/T/L spine are pending as well.    1/24 CT abdomen IMPRESSION: No bowel obstruction.  CT angio chest IMPRESSION: No main, lobar, segmental, or proximal subsegmental pulmonary embolism. Evaluation many distal subsegmental pulmonary arteries is limited due to poor contrast opacification. Redemonstrated asymmetric enlargement of the left thyroid gland with   hypoattenuating lesion measuring up to 4.1 cm, previously characterized on thyroid ultrasound 10/2/2014.    SWALLOW HISTORY: No reports in [facility] or in PACS prior to this admission. ED course: VSS. CBC stable. CMP with mild elevation in AM. Coags normal. EKG with NSR. RVP negative. CXR with no focal consolidations. Patient given metoclopramide 10 mg IV 1x and admitted to medicine for further evaluation and treatment.     -1/22: episode of dark emesis then coffee ground emesis  -1/23: GI consult -->Impression: N/V - likely acute infectious gastroenteritis given sick contacts and acute onset. R/o obstruction although passing gas and had BM this AM. Given black/red after multiple episodes likely 2/2 esophagitis vs MW tear. Hgb normal. BUN normal. [place] blatchford score 0. MS - increased worsening weakness with respiratory failure. UC on mesalamine - controlled. No abd pain, diarrhea or hematochezia. Recommended clear liquids for now.  -1/24: GI - obtain CT a/p with oral contrast to eval for SBO vs ileus given dilated loops on xray and continued vomiting  -1/25: CT without obstruction. Likely ileus on xray in setting of infectious gastroenteritis. Hgb normal. BUN normal. [place] blatchford score 0. Symptoms resolved. Advance diet as tolerated. No further GI w/u needed at this time.   -1/26: RN Notified last night pt with Oral Tmax 100.8. Pt's first fever he was being monitored off Abx. Pt with no leukocytosis WBC 7 had been previously afebrile. Speech and swallow eval ordered to check pts swallowing hx of MS make sure no silent aspiration. MRI Head/T/L spine are pending as well.    1/24 CT abdomen IMPRESSION: No bowel obstruction.  CT angio chest IMPRESSION: No main, lobar, segmental, or proximal subsegmental pulmonary embolism. Evaluation many distal subsegmental pulmonary arteries is limited due to poor contrast opacification. Redemonstrated asymmetric enlargement of the left thyroid gland with   hypoattenuating lesion measuring up to 4.1 cm, previously characterized on thyroid ultrasound 10/2/2014.    *D/w KAL Minor, pt cleared for all PO trials from GI standpoint.     SWALLOW HISTORY: No reports in SCM or in PACS prior to this admission. -1/22: episode of dark emesis then coffee ground emesis  -1/23: GI consult -->Impression: N/V - likely acute infectious gastroenteritis given sick contacts and acute onset. R/o obstruction although passing gas and had BM this AM. Given black/red after multiple episodes likely 2/2 esophagitis vs MW tear. Hgb normal. BUN normal. Marvel blatchford score 0. MS - increased worsening weakness with respiratory failure. UC on mesalamine - controlled. No abd pain, diarrhea or hematochezia. Recommended clear liquids for now.  -1/24: GI - obtain CT a/p with oral contrast to eval for SBO vs ileus given dilated loops on xray and continued vomiting  -1/25: CT without obstruction. Likely ileus on xray in setting of infectious gastroenteritis. Hgb normal. BUN normal. Marvel blatchford score 0. Symptoms resolved. Advance diet as tolerated. No further GI w/u needed at this time.   -1/26: RN Notified last night pt with Oral Tmax 100.8. Pt's first fever he was being monitored off Abx. Pt with no leukocytosis WBC 7 had been previously afebrile. Speech and swallow eval ordered to check pts swallowing hx of MS make sure no silent aspiration. MRI Head/T/L spine are pending as well.    1/24 CT abdomen IMPRESSION: No bowel obstruction.  CT angio chest IMPRESSION: No main, lobar, segmental, or proximal subsegmental pulmonary embolism. Evaluation many distal subsegmental pulmonary arteries is limited due to poor contrast opacification. Redemonstrated asymmetric enlargement of the left thyroid gland with   hypoattenuating lesion measuring up to 4.1 cm, previously characterized on thyroid ultrasound 10/2/2014.    D/W KAL Minor, as per chart review, 1/25 "CT w/o obstruction, likely ileus on xray in setting of infectious gastroenteritis". GI signed off at this time, however KAL Minor to d/w Attending clearance for swallow evaluation given findings above.   - follow up discussion, KAL barriga/w Attending and no concerns for ileus at this time and pt cleared for PO trials/solids for swallow evaluation.

## 2023-01-26 NOTE — SWALLOW BEDSIDE ASSESSMENT ADULT - SLP PERTINENT HISTORY OF CURRENT PROBLEM
63M PMH MS, UC on mesalamine, GERD, BPH, panic disorder, presents with nausea and vomiting and now with acute hypoxic respiratory failure. Patient reports he was in his normal state of health until Saturday night when he began experiencing episodes of NBNB vomiting. Patient called EMS when vomit became darker. Patient states it started suddenly Saturday night and started as the food he ate. It then progressed to black in color. He reports he is constipated at baseline and had 1 BM this AM after receiving senna. Usually has a Bm once very 4-5 days. Denies hematemesis however reports he was told there was some red in his vomit. Denies melena or hematochezia. Does report increased weakness in b/l UE, especially left. Feels he cannot lift a cup of water. Denies fevers, or chills. Denies abdominal pain. EMS found patient to be hypoxic and started O2 via LFNC at 3 LPM. Patient reports sick contacts with son having vomiting illness this past week.

## 2023-01-26 NOTE — SWALLOW BEDSIDE ASSESSMENT ADULT - SLP GENERAL OBSERVATIONS
Pt encountered upright in bed, on 3LNC, awake/alert, Aox4. Pt able to follow multistep directives and answer open ended questions. Vocal quality WFL. Motor speech slightly dysarthric at times throughout conversational speech.

## 2023-01-26 NOTE — SWALLOW BEDSIDE ASSESSMENT ADULT - SPECIFY REASON(S)
to subjectively assess swallow safety/function; r/o dysphagia. As per consult "64 yo Male  Hx MS, UC, p/w recurrent N/V. CT Chest abd/ pelvis was negative for any PNA or colitis"

## 2023-01-26 NOTE — SWALLOW BEDSIDE ASSESSMENT ADULT - ASR SWALLOW ASPIRATION MONITOR
Monitor for s/s aspiration/laryngeal penetration. If noted:  D/C p.o. intake, provide non-oral nutrition/hydration/meds, and contact this service @ [phone]/change of breathing pattern/cough/gurgly voice/fever/pneumonia/throat clearing/upper respiratory infection

## 2023-01-26 NOTE — PROGRESS NOTE ADULT - PROBLEM SELECTOR PLAN 1
Patient presents to hospital with new onset of respiratory failure with hypoxia. Unclear etiology. Infection seems less likely as CXR without consolidation and patient is afebrile and without leukocytosis. Viral workup negative. Denies chest pain, however has risk factors for PE (bedbound due to MS). Alternatively may be secondary to aspiration which has not yet manifest on imaging or labs  - Will obtain d-dimer given low overall suspicion for PE. Consider further imaging if elevated - mildly elevated.   - Wean off O2 as tolerated. -now on RA.  - Monitor off antibiotics for now  -F/u CTA chest to r/o PE and also eval lung parenchyma better. - patient agrees for scan. - negative for PE.   -Outpatient follow up for thyroid lesion incidentally seen on CTA chest. -TSH normal. -outpatient follow up recommended.  -Had fever 1/25. -F/u cultures. -CXR clear. -F/u extremity duplex. -UA positive, f/u UCx.  -Echo for LE edema. ?ALONDRA. -F/u BNP.

## 2023-01-26 NOTE — PROGRESS NOTE ADULT - SUBJECTIVE AND OBJECTIVE BOX
Patient is a 63y old  Male who presents with a chief complaint of AHRF (2023 13:26)        SUBJECTIVE / OVERNIGHT EVENTS: Reports N/V better. Having some HA's. Increased urinary frequency. Cough present. Occasional SOB. Had fever yesterday evening.       MEDICATIONS  (STANDING):  enoxaparin Injectable 40 milliGRAM(s) SubCutaneous every 24 hours  influenza  Vaccine (HIGH DOSE) 0.7 milliLiter(s) IntraMuscular once  melatonin 5 milliGRAM(s) Oral at bedtime  mesalamine ER (24-Hour) Capsule 1.5 Gram(s) Oral daily  multivitamin 1 Tablet(s) Oral daily  oxybutynin 5 milliGRAM(s) Oral two times a day  pantoprazole    Tablet 40 milliGRAM(s) Oral two times a day  potassium chloride    Tablet ER 40 milliEquivalent(s) Oral every 4 hours  senna 2 Tablet(s) Oral at bedtime  sertraline 50 milliGRAM(s) Oral daily  tamsulosin 0.4 milliGRAM(s) Oral at bedtime  vitamin A &amp; D Ointment 1 Application(s) Topical two times a day    MEDICATIONS  (PRN):  acetaminophen     Tablet .. 650 milliGRAM(s) Oral every 6 hours PRN Temp greater or equal to 38C (100.4F), Mild Pain (1 - 3), Moderate Pain (4 - 6)  aluminum hydroxide/magnesium hydroxide/simethicone Suspension 30 milliLiter(s) Oral every 4 hours PRN Dyspepsia  baclofen 5 milliGRAM(s) Oral every 8 hours PRN Muscle Spasm  ondansetron Injectable 4 milliGRAM(s) IV Push every 8 hours PRN Nausea and/or Vomiting  polyethylene glycol 3350 17 Gram(s) Oral daily PRN Constipation      Vital Signs Last 24 Hrs  T(C): 37.1 (2023 20:47), Max: 37.2 (2023 21:09)  T(F): 98.8 (2023 20:47), Max: 99 (2023 21:09)  HR: 83 (2023 20:47) (83 - 85)  BP: 144/91 (2023 20:47) (129/82 - 144/91)  BP(mean): --  RR: 17 (2023 20:47) (17 - 18)  SpO2: 93% (2023 20:47) (93% - 96%)    Parameters below as of 2023 20:47  Patient On (Oxygen Delivery Method): room air      CAPILLARY BLOOD GLUCOSE        I&O's Summary    2023 07:01  -  2023 07:00  --------------------------------------------------------  IN: 380 mL / OUT: 3900 mL / NET: -3520 mL    2023 07:01  -  2023 21:02  --------------------------------------------------------  IN: 600 mL / OUT: 500 mL / NET: 100 mL          PHYSICAL EXAM:   GENERAL: NAD, well-developed  HEAD:  Atraumatic, Normocephalic  EYES: EOMI, PERRLA, conjunctiva and sclera clear  NECK: Supple,   CHEST/LUNG: distant BS  HEART: S1S2 normal. Regular rate and rhythm; No murmurs, rubs, or gallops  ABDOMEN: Soft, Nontender, Nondistended; Bowel sounds present, obese  EXTREMITIES:  1+ LE edema. UE edema.   PSYCH/Neuro: AAOx3. Right side weakness > left side weakness.   SKIN: Left forearm erythematous rash.       LABS:                        12.0   6.19  )-----------( 203      ( 2023 07:22 )             38.4         140  |  98  |  9   ----------------------------<  80  3.2<L>   |  28  |  0.67    Ca    8.7      2023 07:22            Urinalysis Basic - ( 2023 18:31 )    Color: Light Yellow / Appearance: Clear / S.010 / pH: x  Gluc: x / Ketone: Small  / Bili: Negative / Urobili: Negative   Blood: x / Protein: Negative / Nitrite: Positive   Leuk Esterase: Moderate / RBC: 4 /hpf / WBC 3 /HPF   Sq Epi: x / Non Sq Epi: 1 /hpf / Bacteria: Few      < from: Xray Chest 1 View- PORTABLE-Routine (Xray Chest 1 View- PORTABLE-Routine .) (23 @ 11:37) >  Impression:    No acute pulmonary disease.    --- End of Report ---    < end of copied text >      RADIOLOGY & ADDITIONAL TESTS:    Imaging Personally Reviewed: CXR report.   Consultant(s) Notes Reviewed:  GI   Care Discussed with Consultants/Other Providers:

## 2023-01-26 NOTE — PROVIDER CONTACT NOTE (OTHER) - ACTION/TREATMENT ORDERED:
ACP Benny Stover notified. IV tylenol ordered and administered. Blood cultures drawn, pending results.
Provider notified, will come to examine output, stat CBC ordered.

## 2023-01-26 NOTE — SWALLOW BEDSIDE ASSESSMENT ADULT - SWALLOW EVAL: DIAGNOSIS
63M PMH MS, UC on mesalamine, GERD, BPH, panic disorder, presents with nausea and vomiting and now with acute hypoxic respiratory failure. GI previously following and CT without obstruction and pt now cleared for solids. Pt p/w grossly functional oropharyngeal swallow with no overt s/s aspiration at time of evaluation, however given dx of MS and h/o coughing with PO intake, instrumental assessment recommended to rule out aspiration.

## 2023-01-27 ENCOUNTER — TRANSCRIPTION ENCOUNTER (OUTPATIENT)
Age: 63
End: 2023-01-27

## 2023-01-27 LAB
ANION GAP SERPL CALC-SCNC: 10 MMOL/L — SIGNIFICANT CHANGE UP (ref 5–17)
BUN SERPL-MCNC: 16 MG/DL — SIGNIFICANT CHANGE UP (ref 7–23)
CALCIUM SERPL-MCNC: 8.7 MG/DL — SIGNIFICANT CHANGE UP (ref 8.4–10.5)
CHLORIDE SERPL-SCNC: 99 MMOL/L — SIGNIFICANT CHANGE UP (ref 96–108)
CO2 SERPL-SCNC: 30 MMOL/L — SIGNIFICANT CHANGE UP (ref 22–31)
CREAT SERPL-MCNC: 0.68 MG/DL — SIGNIFICANT CHANGE UP (ref 0.5–1.3)
EGFR: 103 ML/MIN/1.73M2 — SIGNIFICANT CHANGE UP
GLUCOSE SERPL-MCNC: 91 MG/DL — SIGNIFICANT CHANGE UP (ref 70–99)
HCT VFR BLD CALC: 42 % — SIGNIFICANT CHANGE UP (ref 39–50)
HGB BLD-MCNC: 13.4 G/DL — SIGNIFICANT CHANGE UP (ref 13–17)
MCHC RBC-ENTMCNC: 28.6 PG — SIGNIFICANT CHANGE UP (ref 27–34)
MCHC RBC-ENTMCNC: 31.9 GM/DL — LOW (ref 32–36)
MCV RBC AUTO: 89.6 FL — SIGNIFICANT CHANGE UP (ref 80–100)
NRBC # BLD: 0 /100 WBCS — SIGNIFICANT CHANGE UP (ref 0–0)
NT-PROBNP SERPL-SCNC: 68 PG/ML — SIGNIFICANT CHANGE UP (ref 0–300)
PLATELET # BLD AUTO: 209 K/UL — SIGNIFICANT CHANGE UP (ref 150–400)
POTASSIUM SERPL-MCNC: 3.9 MMOL/L — SIGNIFICANT CHANGE UP (ref 3.5–5.3)
POTASSIUM SERPL-SCNC: 3.9 MMOL/L — SIGNIFICANT CHANGE UP (ref 3.5–5.3)
RBC # BLD: 4.69 M/UL — SIGNIFICANT CHANGE UP (ref 4.2–5.8)
RBC # FLD: 13.8 % — SIGNIFICANT CHANGE UP (ref 10.3–14.5)
SODIUM SERPL-SCNC: 139 MMOL/L — SIGNIFICANT CHANGE UP (ref 135–145)
WBC # BLD: 8.44 K/UL — SIGNIFICANT CHANGE UP (ref 3.8–10.5)
WBC # FLD AUTO: 8.44 K/UL — SIGNIFICANT CHANGE UP (ref 3.8–10.5)

## 2023-01-27 PROCEDURE — 93306 TTE W/DOPPLER COMPLETE: CPT | Mod: 26

## 2023-01-27 PROCEDURE — 74230 X-RAY XM SWLNG FUNCJ C+: CPT | Mod: 26

## 2023-01-27 PROCEDURE — 93970 EXTREMITY STUDY: CPT | Mod: 26

## 2023-01-27 PROCEDURE — 99233 SBSQ HOSP IP/OBS HIGH 50: CPT

## 2023-01-27 RX ORDER — METOCLOPRAMIDE HCL 10 MG
10 TABLET ORAL ONCE
Refills: 0 | Status: COMPLETED | OUTPATIENT
Start: 2023-01-27 | End: 2023-01-27

## 2023-01-27 RX ADMIN — SERTRALINE 50 MILLIGRAM(S): 25 TABLET, FILM COATED ORAL at 12:29

## 2023-01-27 RX ADMIN — ONDANSETRON 4 MILLIGRAM(S): 8 TABLET, FILM COATED ORAL at 13:02

## 2023-01-27 RX ADMIN — SENNA PLUS 2 TABLET(S): 8.6 TABLET ORAL at 21:10

## 2023-01-27 RX ADMIN — Medication 1 APPLICATION(S): at 17:35

## 2023-01-27 RX ADMIN — Medication 1 TABLET(S): at 12:29

## 2023-01-27 RX ADMIN — Medication 5 MILLIGRAM(S): at 06:11

## 2023-01-27 RX ADMIN — PANTOPRAZOLE SODIUM 40 MILLIGRAM(S): 20 TABLET, DELAYED RELEASE ORAL at 06:11

## 2023-01-27 RX ADMIN — ENOXAPARIN SODIUM 40 MILLIGRAM(S): 100 INJECTION SUBCUTANEOUS at 17:35

## 2023-01-27 RX ADMIN — Medication 1.5 GRAM(S): at 10:33

## 2023-01-27 RX ADMIN — Medication 1 APPLICATION(S): at 06:11

## 2023-01-27 RX ADMIN — Medication 5 MILLIGRAM(S): at 21:10

## 2023-01-27 RX ADMIN — TAMSULOSIN HYDROCHLORIDE 0.4 MILLIGRAM(S): 0.4 CAPSULE ORAL at 21:10

## 2023-01-27 RX ADMIN — Medication 10 MILLIGRAM(S): at 04:48

## 2023-01-27 RX ADMIN — Medication 5 MILLIGRAM(S): at 17:35

## 2023-01-27 RX ADMIN — PANTOPRAZOLE SODIUM 40 MILLIGRAM(S): 20 TABLET, DELAYED RELEASE ORAL at 17:35

## 2023-01-27 NOTE — SWALLOW VFSS/MBS ASSESSMENT ADULT - COMMENTS
-1/22: episode of dark emesis then coffee ground emesis  -1/23: GI consult -->Impression: N/V - likely acute infectious gastroenteritis given sick contacts and acute onset. R/o obstruction although passing gas and had BM this AM. Given black/red after multiple episodes likely 2/2 esophagitis vs MW tear. Hgb normal. BUN normal. [place] blatchford score 0. MS - increased worsening weakness with respiratory failure. UC on mesalamine - controlled. No abd pain, diarrhea or hematochezia. Recommended clear liquids for now.  -1/24: GI - obtain CT a/p with oral contrast to eval for SBO vs ileus given dilated loops on xray and continued vomiting  -1/25: CT without obstruction. Likely ileus on xray in setting of infectious gastroenteritis. Hgb normal. BUN normal. Marvel blatchford score 0. Symptoms resolved. Advance diet as tolerated. No further GI w/u needed at this time.   -1/26: RN Notified last night pt with Oral Tmax 100.8. Pt's first fever he was being monitored off Abx. Pt with no leukocytosis WBC 7 had been previously afebrile. Speech and swallow eval ordered to check pts swallowing hx of MS make sure no silent aspiration.    1/24 CT abdomen IMPRESSION: No bowel obstruction.  CT angio chest IMPRESSION: No main, lobar, segmental, or proximal subsegmental pulmonary embolism. Evaluation many distal subsegmental pulmonary arteries is limited due to poor contrast opacification. Redemonstrated asymmetric enlargement of the left thyroid gland with   hypoattenuating lesion measuring up to 4.1 cm, previously characterized on thyroid ultrasound 10/2/2014.    D/W KAL Minor, as per chart review, 1/25 "CT w/o obstruction, likely ileus on xray in setting of infectious gastroenteritis". GI signed off at this time, however KAL Minor to d/w Attending clearance for swallow evaluation given findings above.   - follow up discussion, KAL barriga/w Attending and no concerns for ileus at this time and pt cleared for PO trials/solids for swallow evaluation.

## 2023-01-27 NOTE — PROGRESS NOTE ADULT - SUBJECTIVE AND OBJECTIVE BOX
Patient is a 63y old  Male who presents with a chief complaint of AHRF (27 Jan 2023 17:00)        SUBJECTIVE / OVERNIGHT EVENTS: Patient had N/V overnight. Some cough too.       MEDICATIONS  (STANDING):  enoxaparin Injectable 40 milliGRAM(s) SubCutaneous every 24 hours  melatonin 5 milliGRAM(s) Oral at bedtime  mesalamine ER (24-Hour) Capsule 1.5 Gram(s) Oral daily  multivitamin 1 Tablet(s) Oral daily  oxybutynin 5 milliGRAM(s) Oral two times a day  pantoprazole    Tablet 40 milliGRAM(s) Oral two times a day  senna 2 Tablet(s) Oral at bedtime  sertraline 50 milliGRAM(s) Oral daily  tamsulosin 0.4 milliGRAM(s) Oral at bedtime  vitamin A &amp; D Ointment 1 Application(s) Topical two times a day    MEDICATIONS  (PRN):  acetaminophen     Tablet .. 650 milliGRAM(s) Oral every 6 hours PRN Temp greater or equal to 38C (100.4F), Mild Pain (1 - 3), Moderate Pain (4 - 6)  aluminum hydroxide/magnesium hydroxide/simethicone Suspension 30 milliLiter(s) Oral every 4 hours PRN Dyspepsia  baclofen 5 milliGRAM(s) Oral every 8 hours PRN Muscle Spasm  ondansetron Injectable 4 milliGRAM(s) IV Push every 8 hours PRN Nausea and/or Vomiting  polyethylene glycol 3350 17 Gram(s) Oral daily PRN Constipation      Vital Signs Last 24 Hrs  T(C): 36.6 (27 Jan 2023 13:16), Max: 37.1 (26 Jan 2023 20:47)  T(F): 97.8 (27 Jan 2023 13:16), Max: 98.8 (26 Jan 2023 20:47)  HR: 92 (27 Jan 2023 13:16) (83 - 97)  BP: 121/77 (27 Jan 2023 13:16) (114/74 - 144/91)  BP(mean): --  RR: 18 (27 Jan 2023 13:16) (17 - 18)  SpO2: 93% (27 Jan 2023 13:16) (93% - 94%)    Parameters below as of 27 Jan 2023 13:16  Patient On (Oxygen Delivery Method): room air      CAPILLARY BLOOD GLUCOSE        I&O's Summary    26 Jan 2023 07:01  -  27 Jan 2023 07:00  --------------------------------------------------------  IN: 600 mL / OUT: 500 mL / NET: 100 mL    27 Jan 2023 07:01  -  27 Jan 2023 18:38  --------------------------------------------------------  IN: 400 mL / OUT: 400 mL / NET: 0 mL          PHYSICAL EXAM:   GENERAL: NAD, well-developed  HEAD:  Atraumatic, Normocephalic  EYES: EOMI, PERRLA, conjunctiva and sclera clear  NECK: Supple, No JVD  CHEST/LUNG: distant bs; No wheeze  HEART: S1S2 normal. Regular rate and rhythm; No murmurs, rubs, or gallops  ABDOMEN: Soft, obese, Nontender, Nondistended; Bowel sounds present  EXTREMITIES:  1+ LE edema (R>L) and mild RUE edema  PSYCH/Neuro: AAOx3. Weakness R side > L side  SKIN: Left forearm erythematous rash      LABS:                        13.4   8.44  )-----------( 209      ( 27 Jan 2023 10:36 )             42.0     01-27    139  |  99  |  16  ----------------------------<  91  3.9   |  30  |  0.68    Ca    8.7      27 Jan 2023 10:36        < from: TTE with Doppler (w/Cont) (01.27.23 @ 11:17) >  Conclusions:  1. Increased relative wall thickness with normal left  ventricular mass index, consistent with concentric left  ventricular remodeling.  2. Hyperdynamic left ventricular systolic function.  Endocardial visualization enhanced with intravenous  injection of Ultrasonic Enhancing Agent (Definity).  3. Mild diastolic dysfunction (Stage I).  4. The right ventricle is not well visualized; grossly  normal right ventricular systolic function.  *** Compared with echocardiogram of 7/20/2021, no  significant changes noted.    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    No duplex evidence of acute DVT in either lower extremity.    < end of copied text >  < from: VA Duplex Upper Ext Vein Scan, Bilat (01.27.23 @ 10:01) >  IMPRESSION:  No evidence of deep venous thrombosis in either upper extremity.    Superficial thrombosis of the right cephalic vein.    --- End of Report ---    < end of copied text >        RADIOLOGY & ADDITIONAL TESTS:    Imaging Personally Reviewed: ECHO report and VA duplex LE's and UE's.   Consultant(s) Notes Reviewed:  SLP and PT  Care Discussed with Consultants/Other Providers:

## 2023-01-27 NOTE — PROGRESS NOTE ADULT - PROBLEM SELECTOR PLAN 1
Patient presents to hospital with new onset of respiratory failure with hypoxia. Unclear etiology. Infection seems less likely as CXR without consolidation and patient is afebrile and without leukocytosis. Viral workup negative. Denies chest pain, however has risk factors for PE (bedbound due to MS). Alternatively may be secondary to aspiration which has not yet manifest on imaging or labs  - Will obtain d-dimer given low overall suspicion for PE. Consider further imaging if elevated - mildly elevated.   - Wean off O2 as tolerated. -now on RA.  - Monitor off antibiotics for now  -F/u CTA chest to r/o PE and also eval lung parenchyma better. - patient agrees for scan. - negative for PE.   -Outpatient follow up for thyroid lesion incidentally seen on CTA chest. -TSH normal. -outpatient follow up recommended.  -Had fever 1/25. -F/u blood cultures - ngtd. -CXR clear. -F/u extremity duplex - superficial RUE thrombosis and chronic post-thrombotic changes RLE. -UA positive was having frequency but no dysuria, f/u UCx - testing.  -Echo for LE edema. ?ALONDRA. -F/u BNP -normal. -Echo shows LVH with mild diastolic dysfunction, grossly normal RV systolic function.

## 2023-01-27 NOTE — CHART NOTE - NSCHARTNOTEFT_GEN_A_CORE
MEDICINE PA EPISODIC NOTE    Notified by RN pt with vomiting. Pt seen and examined at bedside. Reports increase of diet to regular today from previously tolerated liquid diet. States had a yogurt for breakfast then pork chops, mashed potatoes and cheesecake for his second meal. Admits nausea with vomiting started within a few hours after his last meal. Describes vomit as watery with chunks of food. Last bowel movement was at 2pm and was normal and solid, per pt. Pt able to pass gas. Able to tolerate small sips of water. Denies abdominal pain, constipation, chest pain, SOB, diaphoresis, chills.  Of note: Pt presented with nausea on admission along with admission dx of ADRF. Seen by GI this admission for the N/V and assessed symptoms were likely 2/2 acute infectious gastroenteritis given sick contacts and acute onset. CT without obstruction. Likely ileus on xray in setting of infectious gastroenteritis.       Vital Signs Last 24 Hrs  T(C): 37.1 (26 Jan 2023 20:47), Max: 37.1 (26 Jan 2023 20:47)  T(F): 98.8 (26 Jan 2023 20:47), Max: 98.8 (26 Jan 2023 20:47)  HR: 83 (26 Jan 2023 20:47) (83 - 85)  BP: 144/91 (26 Jan 2023 20:47) (129/82 - 144/91)  BP(mean): --  RR: 17 (26 Jan 2023 20:47) (17 - 18)  SpO2: 93% (26 Jan 2023 20:47) (93% - 96%)    Parameters below as of 26 Jan 2023 20:47  Patient On (Oxygen Delivery Method): room air      Labs:                          12.0   6.19  )-----------( 203      ( 26 Jan 2023 07:22 )             38.4     01-26    140  |  98  |  9   ----------------------------<  80  3.2<L>   |  28  |  0.67    Ca    8.7      26 Jan 2023 07:22          Radiology:  < from: CT Abdomen and Pelvis w/ IV Cont (01.24.23 @ 22:47) >      ACC: [id number] EXAM:  CT ABDOMEN AND PELVIS IC   ORDERED BY: GREGG GARCIA     PROCEDURE DATE:  01/24/2023      INTERPRETATION:  CLINICAL INFORMATION: Persistent nausea and vomiting.   Diverticulosis.    COMPARISON: CT abdomen pelvis 6/9/2021    CONTRAST/COMPLICATIONS:  IV Contrast: Omnipaque 350  90 cc administered   10 cc discarded  Oral Contrast: Smoothie Readi-Cat 2  Complications: None reported at time of study completion    PROCEDURE:  CT of the Abdomen and Pelvis was performed.  Sagittal and coronal reformats were performed.    FINDINGS:  LOWER CHEST: Basilar dependent atelectasis.    LIVER: Within normal limits.  BILE DUCTS: Normal caliber.  GALLBLADDER: Within normal limits.  SPLEEN: Within normal limits.  PANCREAS: Within normal limits.  ADRENALS: Within normal limits.  KIDNEYS/URETERS: Parapelvic and cortical left renal cyst. Symmetric renal   enhancement without hydronephrosis..  BLADDER: Within normal limits.  REPRODUCTIVE ORGANS: Prostate within normal limits.  BOWEL: No bowel obstruction. Appendix is normal.  PERITONEUM: No ascites.  VESSELS: Within normal limits.  RETROPERITONEUM/LYMPH NODES: No lymphadenopathy.  ABDOMINAL WALL: Small fat-containing umbilical hernia and small bilateral   fat-containing inguinal hernias. Subcutaneous foci of gas in the right   ventral abdominal wall likely related to recent subcutaneous injection.   Left lower thoracic posterior subcutaneous probable sebaceous cyst is   again noted, measuring 6.5 cm  BONES: Degenerative changes.    IMPRESSION:  No bowel obstruction.    < end of copied text >      Physical Exam:  General: NAD  Neurology: A&Ox4, nonfocal, AGUSTIN x 4  Head:  Normocephalic, atraumatic  Respiratory: CTA B/L  CV: RRR, S1S2, no murmur  Abdominal: Soft, non-tender, non distended, + BS in 4 quadrants    Assessment & Plan:  HPI:  63M PMH MS, UC,  GERD, BPH panic disorder, presents with nausea and vomiting and now with acute hypoxic respiratory failure. Patient reports he was in his normal state of health until yesterday, when he began experiencing episodes of NBNB vomiting. Patient called EMS when vomit became darker. Denies coffee ground emesis or jyotsna blood. Denies diarrhea or melanic stool. Denies fevers, or chills. Denies abdominal pain. ROS otherwise unremarkable. EMS found patient to be hypoxic and started O2 via LFNC at 3 LPM. Now, pt with return of nausea/vomiting after advancing diet today. Vomiting not improved with prn Zofran 4mg IV x 2.    #Nausea/vomiting likely 2/2 acute infectious gastroenteritis  - education given to limit consumption of milk/dairy products and fatty foods x 24-48 hours as this may have exacerbated his nausea episodes  - encouraged small sips of water for hydration s/p vomiting  - N/V not improved with prn Zofran 4mg x 2, ordered metoclopramide 10 mg IV x1   - continue to monitor  - if condition worsens or does not improve, consider GI re-eval  - c/w PPI BID PO for possible esophagitis in setting of repeated vomiting    Endorse to day team in AM.     Bailey Parks PA-C  Department of Medicine  Spectra [phone]

## 2023-01-27 NOTE — DISCHARGE NOTE PROVIDER - PROVIDER TOKENS
PROVIDER:[TOKEN:[[id number]],FOLLOWUP:[1-3 days]] PROVIDER:[TOKEN:[[id number]],FOLLOWUP:[1-3 days]],PROVIDER:[TOKEN:[[id number]],FOLLOWUP:[1 week],ESTABLISHEDPATIENT:[T]]

## 2023-01-27 NOTE — PROGRESS NOTE ADULT - ASSESSMENT
63M PMH MS, UC, GERD, BPH, panic disorder presents to hospital with nausea and found to have new AHRF.

## 2023-01-27 NOTE — PROGRESS NOTE ADULT - PROBLEM SELECTOR PLAN 6
- Continue with home Sertraline 50 mg PO Qd    DVT ppx - Lovenox - at risk of clots given bed bound from MS.     7. Discussed plan with ACP Mily.  -Tentative plan for DC this weekend if N/V stable and overall improved/stable. -Patient in agreement.

## 2023-01-27 NOTE — SWALLOW VFSS/MBS ASSESSMENT ADULT - SPECIFY REASON(S)
to objectively assess swallow safety/function; r/o dysphagia. As per consult "62 yo Male Hx MS, UC, p/w recurrent N/V. CT Chest abd/ pelvis was negative for any PNA or colitis"

## 2023-01-27 NOTE — SWALLOW VFSS/MBS ASSESSMENT ADULT - LARYNGEAL PENETRATION DURING THE SWALLOW - SILENT
Single cup sips-->trace flash penetration over the laryngeal surface of the epiglottis w/ retrieval. Consecutive cup sips-->deep penetration over the laryngeal surface of the epiglottis and arytenoids w/o complete retrieval. Trace remains in laryngeal vestibule that is cleared with cued cough/reswallow. flash penetration across tsp administration over the laryngeal surface of the epiglottis with retrieval.

## 2023-01-27 NOTE — SWALLOW VFSS/MBS ASSESSMENT ADULT - DIAGNOSTIC IMPRESSIONS
63M PMH MS, UC on mesalamine, GERD, BPH, panic disorder, presents with nausea and vomiting and now with acute hypoxic respiratory failure. GI previously following and CT without obstruction and pt now cleared for solids. Pt seen for bedside swallow evaluation 1/26 with recommendations for regular solids/thin liquids and MBS to rule out silent aspiration given pt report of chronic coughing. Pt p/w mild oropharyngeal dysphagia, likely chronic due to MS, however swallow functional for a regular texture diet and thin liquids via single cup sips. Oral phase remarkable for adequate mastication of solids and inconsistent spillover to valleculae and pyriform sinuses with liquids. Flash silent laryngeal penetration w/ complete retrieval noted across mildly thick liquids and thin liquids via single cup sips. Deep silent laryngeal penetration noted w/ sequential cup sips of thin liquids. No aspiration visualized with all consistencies administered. Pharyngeal residue minimal. Diet modification not warranted at this time, however recommend single sips of thin liquids.    Disorders: reduced lingual strength/control, reduced epiglottis inversion, reduced anterior hyo-laryngeal excursion, reduced supraglottic sensation.

## 2023-01-27 NOTE — DISCHARGE NOTE PROVIDER - HOSPITAL COURSE
63M PMH MS, UC, GERD, BPH, panic disorder presents to hospital with nausea and found to have new AHRF       Problem/Plan - 1:  ·  Problem: Acute respiratory failure with hypoxia.   ·  Plan: Patient presents to hospital with new onset of respiratory failure with hypoxia. Unclear etiology. Infection seems less likely as CXR without consolidation and patient is afebrile and without leukocytosis. Viral workup negative. Denies chest pain, however has risk factors for PE (bedbound due to MS). Alternatively may be secondary to aspiration which has not yet manifest on imaging or labs  - Will obtain d-dimer given low overall suspicion for PE. Consider further imaging if elevated - mildly elevated.   - Wean off O2 as tolerated. -now on RA.  - Monitor off antibiotics for now  -F/u CTA chest to r/o PE and also eval lung parenchyma better. - patient agrees for scan. - negative for PE.   -Outpatient follow up for thyroid lesion incidentally seen on CTA chest. -TSH normal. -outpatient follow up recommended.  -Had fever 1/25. -F/u cultures. -CXR clear. -F/u extremity duplex. -UA positive, f/u UCx.  -Echo for LE edema. ?ALONDRA. -F/u BNP.     Problem/Plan - 2:  ·  Problem: Nausea & vomiting.   ·  Plan: Patient presents from home with nausea and vomiting of unclear etiology. Symptoms improved with Zofran. Patient denies abdominal pain, cramps, fevers or chills, suggesting infections etiology less likely. QTc 408 on admission.  - Continue with Zofran  - Given no leukocytosis, diarrhea, melena or abdominal pain, UC flair seems unlikely at this time  - Diet as tolerated - -will advance as tolerated since no obstruction on CT. -advanced to regular diet.   -PPI IV BID. -will change to oral BID per GI.  -Another episode of ?coffee grounds emesis on 1/22 pm. H/H relatively stable but slight downtrend. -House GI consulted (emailed) for possible EGD - holding off for now. -Reports h/o GERD. -Outpatient GI Dr. Rich Martin does not come here.   -GI recs appreciated.  -CT abd/pelvis with contrast to further eval - negative for any obstruction.    -IVF's completed.  -SLP eval for ?aspiration. -F/u MBS. -Aspiration precautions.     Problem/Plan - 3:  ·  Problem: Multiple sclerosis.   ·  Plan: Currently at baseline. Patient is bedbound .  - Continue to monitor  - Continue with home baclofen and oxybutynin  -OT eval for worsening UE mobility/dexterity per patient making it hard for him to eat himself and do other ADL's, etc. - recs appreciated. outpatient f/u.  -Says he's due for an MRI with Dr. Harjinder Paredes outpatient. Last one reportedly ~April 2021. Says his last treatment was in September. His neurologist is applying for a new drug for him.  -will get MRI brain/spine with/without contrast in setting of worsening of weakness in left side now, has mostly been worse in right side. also, hasn't had an MRI to eval in > 1 year.  -Patient unfortunately unable to fit in our MRI machine. -Should follow up outpatient for open MRI.     Problem/Plan - 4:  ·  Problem: UC (ulcerative colitis).   ·  Plan: - Continue with home medications.     Problem/Plan - 5:  ·  Problem: BPH (benign prostatic hyperplasia).   ·  Plan: - Continue with home tamsulosin 0.4 mg PO at bedtime.     Problem/Plan - 6:  ·  Problem: Panic disorder.   ·  Plan: - Continue with home Sertraline 50 mg PO Qd    DVT ppx - Lovenox - at risk of clots given bed bound from MS. HPI:  63M PMH MS, UC,  GERD, BPH panic disorder, presents with nausea and vomiting and now with acute hypoxic respiratory failure. Patient reports he was in his normal state of health until yesterday, when he began experiencing episodes of NBNB vomiting. Patient called EMS when vomit became darker. Denies coffee ground emesis or jyotsna blood. Denies diarrhea or melanic stool. Denies fevers, or chills. Denies abdominal pain. ROS otherwise unremarkable. EMS found patient to be hypoxic and started O2 via LFNC at 3 LPM.\par   ED course: VSS. CBC stable. CMP with mild elevation in AM. Coags normal. EKG with NSR. RVP negative. CXR with no focal consolidations. Patient given metoclopramide 10 mg IV 1x and admitted to medicine for further evaluation and treatment.  (22 Jan 2023 10:35)      63M PMH MS, UC, GERD, BPH, panic disorder presents to hospital with nausea and found to have new AHRF. CTA was performed and was negative for PE. Hypoxemia resolved. TTE with diastolic dysfunction. CT incidentally found thyroid lesion incidentally seen on CTA chest, with normal TSH. Pt's nausea/emesis improved with zofran. GI saw the patient, recommended PPI BID, with outpatient follow-up. Pt medically ready for discharge with OT/PT at home and outpatient GI/PCP follow-up.

## 2023-01-27 NOTE — DISCHARGE NOTE PROVIDER - NSDCMRMEDTOKEN_GEN_ALL_CORE_FT
Apriso 0.375 g oral capsule, extended release: 4 cap(s) orally once a day (in the morning)  baclofen 5 mg oral tablet: 1 tab(s) orally every 8 hours  mesalamine 0.375 g oral capsule, extended release: 4 cap(s) orally once a day (in the morning)  multivitamin: 1 tab(s) orally once a day  oxybutynin 5 mg oral tablet: 1 tab(s) orally 2 times a day  senna leaf extract oral tablet: 2 tab(s) orally once a day (at bedtime)  sertraline 50 mg oral tablet: 1 tab(s) orally once a day  tamsulosin 0.4 mg oral capsule: 1 cap(s) orally once a day (at bedtime)  Vitamin B12 1000 mcg oral tablet: 1 tab(s) orally once a day  Vitamin C 500 mg oral tablet: 1 tab(s) orally once a day  Vitamin D3 5000 intl units oral capsule: 1 cap(s) orally once a day   Apriso 0.375 g oral capsule, extended release: 4 cap(s) orally once a day (in the morning)  baclofen 5 mg oral tablet: 1 tab(s) orally every 8 hours  mesalamine 0.375 g oral capsule, extended release: 4 cap(s) orally once a day (in the morning)  multivitamin: 1 tab(s) orally once a day  ondansetron 4 mg oral disintegrating strip: 1 each orally 3 times a day, As Needed -for nausea   oxybutynin 5 mg oral tablet: 1 tab(s) orally 2 times a day  pantoprazole 40 mg oral delayed release tablet: 1 tab(s) orally 2 times a day  senna leaf extract oral tablet: 2 tab(s) orally once a day (at bedtime)  sertraline 50 mg oral tablet: 1 tab(s) orally once a day  tamsulosin 0.4 mg oral capsule: 1 cap(s) orally once a day (at bedtime)  Vitamin B12 1000 mcg oral tablet: 1 tab(s) orally once a day  Vitamin C 500 mg oral tablet: 1 tab(s) orally once a day  Vitamin D3 5000 intl units oral capsule: 1 cap(s) orally once a day

## 2023-01-27 NOTE — SWALLOW VFSS/MBS ASSESSMENT ADULT - SLP GENERAL OBSERVATIONS
Pt encountered upright in LUCIANO chair, on room air, awake/alert, Aox4. Pt able to follow multistep directives and answer open ended questions. Vocal quality WFL. Motor speech slightly dysarthric at times throughout conversational speech.

## 2023-01-27 NOTE — SWALLOW VFSS/MBS ASSESSMENT ADULT - ORAL PHASE
Uncontrolled bolus / spillover in marlo-pharynx/Uncontrolled bolus / spillover in hypopharynx Uncontrolled bolus / spillover in marlo-pharynx

## 2023-01-27 NOTE — DISCHARGE NOTE PROVIDER - CARE PROVIDER_API CALL
Harjinder Davis  INTERNAL MEDICINE  [address], [place] [address]  Phone: [phone]  Fax: [phone]  Follow Up Time: 1-3 days   Harjinder Davis  INTERNAL MEDICINE  [address] [facility] [facility], [address]  [place] [address]  Phone: [phone]  Fax: [phone]  Follow Up Time: 1-3 days    Rich Martin  INTERNAL MEDICINE  [address], [address]  [place] [address]  Phone: [phone]  Fax: [phone]  Established Patient  Follow Up Time: 1 week

## 2023-01-27 NOTE — PHYSICAL THERAPY INITIAL EVALUATION ADULT - PERTINENT HX OF CURRENT PROBLEM, REHAB EVAL
64 yo M with MS p/w nausea and vomiting and now with acute hypoxic respiratory failure. Patient reports he was in his normal state of health until yesterday, when he began experiencing episodes of NBNB vomiting. Patient called EMS when vomit became darker. Denies coffee ground emesis or jyotsna blood. Denies diarrhea or melanic stool. Denies fevers, or chills. Denies abdominal pain. ROS otherwise unremarkable. EMS found patient to be hypoxic and started O2 via LFNC at 3 LPM. Admitted with acute hypoxic respiratory failure. CT Abd 1/24: No bowel obstruction. CT Chest 1/24: No main, lobar, segmental, or proximal subsegmental pulmonary embolism. Evaluation many distal subsegmental pulmonary arteries is limited due to poor contrast opacification.Redemonstrated asymmetric enlargement of the left thyroid gland with hypoattenuating lesion measuring up to 4.1 cm, previously characterized on thyroid ultrasound 10/2/2014. XRay Chest 1/26: No acute pulmonary disease.

## 2023-01-27 NOTE — SWALLOW VFSS/MBS ASSESSMENT ADULT - NS SWALLOW VFSS REC ASPIR MON
Monitor for s/s aspiration/laryngeal penetration. If noted:  D/C p.o. intake, provide non-oral nutrition/hydration/meds, and contact this service @ [phone]/change of breathing pattern/cough/gurgly voice/fever/pneumonia/throat clearing/upper respiratory infection

## 2023-01-27 NOTE — DISCHARGE NOTE PROVIDER - NSDCCPCAREPLAN_GEN_ALL_CORE_FT
PRINCIPAL DISCHARGE DIAGNOSIS  Diagnosis: Acute respiratory failure with hypoxia  Assessment and Plan of Treatment: Resolved. Follow up with your PCP.      SECONDARY DISCHARGE DIAGNOSES  Diagnosis: Multiple sclerosis  Assessment and Plan of Treatment: Continue taking your medications as directed.    Diagnosis: Nausea & vomiting  Assessment and Plan of Treatment: HOME CARE INSTRUCTIONS  Get plenty of rest.  Drink enough water and fluids to keep your urine clear or pale yellow.  Eat a well-balanced diet.  Call your caregiver for follow-up as recommended.  SEEK IMMEDIATE MEDICAL CARE IF:  You develop chills.  You have an oral temperature above _____________________, not controlled by medicine.  You have extreme weakness, fainting, or dehydration.  You have repeated vomiting.  You develop severe belly (abdominal) pain or are passing bloody or tarry stools     PRINCIPAL DISCHARGE DIAGNOSIS  Diagnosis: Acute respiratory failure with hypoxia  Assessment and Plan of Treatment: Resolved. A CT scan showed that there was no blood clot in your lungs or infection in your lungs. Follow up with your PCP.      SECONDARY DISCHARGE DIAGNOSES  Diagnosis: Nausea & vomiting  Assessment and Plan of Treatment: Please follow-up with your GI doctor for further investigation why you have this nausea. It may be related to your history of MS. You can take zofran as needed for nausea.    Diagnosis: Multiple sclerosis  Assessment and Plan of Treatment: Continue taking your medications as directed. Please follow-up with your neurologist for further testing.    Diagnosis: Thyroid lesion  Assessment and Plan of Treatment: A thyroid nodule was incidentally seen on CT scan. Your thyroid function was normal on our lab testing. Please follow-up with your PCP to obtain a thyroid ultrasound for better imaging of the nodule; you may need additional testing depending on the size and characteristics of the nodule.

## 2023-01-28 LAB
A1C WITH ESTIMATED AVERAGE GLUCOSE RESULT: 5.7 % — HIGH (ref 4–5.6)
ESTIMATED AVERAGE GLUCOSE: 117 MG/DL — HIGH (ref 68–114)

## 2023-01-28 PROCEDURE — 99233 SBSQ HOSP IP/OBS HIGH 50: CPT

## 2023-01-28 RX ADMIN — Medication 1.5 GRAM(S): at 09:28

## 2023-01-28 RX ADMIN — Medication 5 MILLIGRAM(S): at 05:22

## 2023-01-28 RX ADMIN — Medication 1 TABLET(S): at 13:30

## 2023-01-28 RX ADMIN — PANTOPRAZOLE SODIUM 40 MILLIGRAM(S): 20 TABLET, DELAYED RELEASE ORAL at 05:22

## 2023-01-28 RX ADMIN — Medication 5 MILLIGRAM(S): at 21:36

## 2023-01-28 RX ADMIN — TAMSULOSIN HYDROCHLORIDE 0.4 MILLIGRAM(S): 0.4 CAPSULE ORAL at 21:36

## 2023-01-28 RX ADMIN — SERTRALINE 50 MILLIGRAM(S): 25 TABLET, FILM COATED ORAL at 13:30

## 2023-01-28 RX ADMIN — SENNA PLUS 2 TABLET(S): 8.6 TABLET ORAL at 21:36

## 2023-01-28 RX ADMIN — Medication 100 MILLIGRAM(S): at 23:35

## 2023-01-28 RX ADMIN — ENOXAPARIN SODIUM 40 MILLIGRAM(S): 100 INJECTION SUBCUTANEOUS at 17:48

## 2023-01-28 RX ADMIN — Medication 1 APPLICATION(S): at 05:22

## 2023-01-28 RX ADMIN — ONDANSETRON 4 MILLIGRAM(S): 8 TABLET, FILM COATED ORAL at 13:32

## 2023-01-28 RX ADMIN — Medication 1 APPLICATION(S): at 17:49

## 2023-01-28 RX ADMIN — PANTOPRAZOLE SODIUM 40 MILLIGRAM(S): 20 TABLET, DELAYED RELEASE ORAL at 17:49

## 2023-01-28 RX ADMIN — Medication 5 MILLIGRAM(S): at 17:49

## 2023-01-28 NOTE — PROGRESS NOTE ADULT - TIME BILLING
chart reviewing, history taking, physical exam, assessment and documentation, including speaking to specialist/SW/CM regarding the management.

## 2023-01-28 NOTE — PROGRESS NOTE ADULT - SUBJECTIVE AND OBJECTIVE BOX
[facility] Division of Hospital Medicine  Omid Barr MD  Available via MS Teams  Pager: [phone]    SUBJECTIVE / OVERNIGHT EVENTS: Patient still continues to have nausea right after eating, states he needs anti-emetics. No other symptoms at this time.     ADDITIONAL REVIEW OF SYSTEMS: +nausea     MEDICATIONS  (STANDING):  enoxaparin Injectable 40 milliGRAM(s) SubCutaneous every 24 hours  melatonin 5 milliGRAM(s) Oral at bedtime  mesalamine ER (24-Hour) Capsule 1.5 Gram(s) Oral daily  multivitamin 1 Tablet(s) Oral daily  oxybutynin 5 milliGRAM(s) Oral two times a day  pantoprazole    Tablet 40 milliGRAM(s) Oral two times a day  senna 2 Tablet(s) Oral at bedtime  sertraline 50 milliGRAM(s) Oral daily  tamsulosin 0.4 milliGRAM(s) Oral at bedtime  vitamin A &amp; D Ointment 1 Application(s) Topical two times a day    MEDICATIONS  (PRN):  acetaminophen     Tablet .. 650 milliGRAM(s) Oral every 6 hours PRN Temp greater or equal to 38C (100.4F), Mild Pain (1 - 3), Moderate Pain (4 - 6)  aluminum hydroxide/magnesium hydroxide/simethicone Suspension 30 milliLiter(s) Oral every 4 hours PRN Dyspepsia  baclofen 5 milliGRAM(s) Oral every 8 hours PRN Muscle Spasm  ondansetron Injectable 4 milliGRAM(s) IV Push every 8 hours PRN Nausea and/or Vomiting  polyethylene glycol 3350 17 Gram(s) Oral daily PRN Constipation      I&O's Summary    27 Jan 2023 07:01  -  28 Jan 2023 07:00  --------------------------------------------------------  IN: 400 mL / OUT: 400 mL / NET: 0 mL        PHYSICAL EXAM:  Vital Signs Last 24 Hrs  T(C): 36.7 (28 Jan 2023 13:00), Max: 36.8 (27 Jan 2023 20:27)  T(F): 98 (28 Jan 2023 13:00), Max: 98.2 (27 Jan 2023 20:27)  HR: 80 (28 Jan 2023 13:00) (80 - 85)  BP: 114/72 (28 Jan 2023 13:00) (114/72 - 128/79)  BP(mean): --  RR: 18 (28 Jan 2023 13:00) (18 - 18)  SpO2: 93% (28 Jan 2023 13:00) (91% - 93%)    Parameters below as of 28 Jan 2023 13:00  Patient On (Oxygen Delivery Method): room air      CONSTITUTIONAL: NAD, well-developed, well-groomed  EYES: PERRLA; conjunctiva and sclera clear  ENMT: Moist oral mucosa, no pharyngeal injection or exudates; normal dentition  NECK: Supple, no palpable masses; no thyromegaly  RESPIRATORY: Normal respiratory effort; lungs are clear to auscultation bilaterally  CARDIOVASCULAR: Regular rate and rhythm, normal S1 and S2, no murmur/rub/gallop; No lower extremity edema; Peripheral pulses are 2+ bilaterally  ABDOMEN: Nontender to palpation, normoactive bowel sounds, no rebound/guarding; No hepatosplenomegaly  MUSCULOSKELETAL:   no clubbing or cyanosis of digits; no joint swelling or tenderness to palpation  PSYCH: A+O to person, place, and time; affect appropriate  NEUROLOGY: CN 2-12 are intact and symmetric; no gross sensory deficits   SKIN: No rashes; no palpable lesions    LABS:                        13.4   8.44  )-----------( 209      ( 27 Jan 2023 10:36 )             42.0     01-27    139  |  99  |  16  ----------------------------<  91  3.9   |  30  |  0.68    Ca    8.7      27 Jan 2023 10:36                Culture - Blood (collected 25 Jan 2023 20:25)  Source: .Blood Blood-Peripheral  Preliminary Report (27 Jan 2023 01:02):    No growth to date.    Culture - Blood (collected 25 Jan 2023 20:24)  Source: .Blood Blood-Peripheral  Preliminary Report (27 Jan 2023 01:02):    No growth to date.      COVID-19 PCR: NotDetec (22 Jan 2023 02:11)      RADIOLOGY & ADDITIONAL TESTS:  New Results Reviewed Today:   New Imaging Personally Reviewed Today:  New Electrocardiogram Personally Reviewed Today:  Prior or Outpatient Records Reviewed Today:    COMMUNICATION:  Care Discussed with Consultants/Other Providers and Details of Discussion:  Discussions with Patient/Family:  PCP Communication:

## 2023-01-29 PROCEDURE — 99232 SBSQ HOSP IP/OBS MODERATE 35: CPT

## 2023-01-29 RX ADMIN — PANTOPRAZOLE SODIUM 40 MILLIGRAM(S): 20 TABLET, DELAYED RELEASE ORAL at 05:15

## 2023-01-29 RX ADMIN — Medication 1 APPLICATION(S): at 17:38

## 2023-01-29 RX ADMIN — Medication 1 TABLET(S): at 12:38

## 2023-01-29 RX ADMIN — Medication 5 MILLIGRAM(S): at 22:03

## 2023-01-29 RX ADMIN — SERTRALINE 50 MILLIGRAM(S): 25 TABLET, FILM COATED ORAL at 12:38

## 2023-01-29 RX ADMIN — Medication 1.5 GRAM(S): at 12:38

## 2023-01-29 RX ADMIN — PANTOPRAZOLE SODIUM 40 MILLIGRAM(S): 20 TABLET, DELAYED RELEASE ORAL at 17:38

## 2023-01-29 RX ADMIN — TAMSULOSIN HYDROCHLORIDE 0.4 MILLIGRAM(S): 0.4 CAPSULE ORAL at 22:02

## 2023-01-29 RX ADMIN — Medication 5 MILLIGRAM(S): at 05:15

## 2023-01-29 RX ADMIN — ENOXAPARIN SODIUM 40 MILLIGRAM(S): 100 INJECTION SUBCUTANEOUS at 17:46

## 2023-01-29 RX ADMIN — Medication 5 MILLIGRAM(S): at 17:38

## 2023-01-29 RX ADMIN — SENNA PLUS 2 TABLET(S): 8.6 TABLET ORAL at 22:02

## 2023-01-29 RX ADMIN — Medication 1 APPLICATION(S): at 05:15

## 2023-01-29 NOTE — PROGRESS NOTE ADULT - PROBLEM SELECTOR PLAN 6
Awake - Continue with home Sertraline 50 mg PO Qd    DVT ppx - Lovenox - at risk of clots given bed bound from MS.     Patient stable for discharge, d/c planning in procgress

## 2023-01-29 NOTE — PROGRESS NOTE ADULT - SUBJECTIVE AND OBJECTIVE BOX
[facility] Division of Hospital Medicine  Los Diehl DO  Available via MS Teams    SUBJECTIVE / OVERNIGHT EVENTS: Patient seen and evalutaed. Still mild nausea, but tolerating diet. Asked again if can get EGD inpatient. No other complaints.     ADDITIONAL REVIEW OF SYSTEMS: +nausea     MEDICATIONS  (STANDING):  enoxaparin Injectable 40 milliGRAM(s) SubCutaneous every 24 hours  melatonin 5 milliGRAM(s) Oral at bedtime  mesalamine ER (24-Hour) Capsule 1.5 Gram(s) Oral daily  multivitamin 1 Tablet(s) Oral daily  oxybutynin 5 milliGRAM(s) Oral two times a day  pantoprazole    Tablet 40 milliGRAM(s) Oral two times a day  senna 2 Tablet(s) Oral at bedtime  sertraline 50 milliGRAM(s) Oral daily  tamsulosin 0.4 milliGRAM(s) Oral at bedtime  vitamin A &amp; D Ointment 1 Application(s) Topical two times a day    MEDICATIONS  (PRN):  acetaminophen     Tablet .. 650 milliGRAM(s) Oral every 6 hours PRN Temp greater or equal to 38C (100.4F), Mild Pain (1 - 3), Moderate Pain (4 - 6)  aluminum hydroxide/magnesium hydroxide/simethicone Suspension 30 milliLiter(s) Oral every 4 hours PRN Dyspepsia  baclofen 5 milliGRAM(s) Oral every 8 hours PRN Muscle Spasm  ondansetron Injectable 4 milliGRAM(s) IV Push every 8 hours PRN Nausea and/or Vomiting  polyethylene glycol 3350 17 Gram(s) Oral daily PRN Constipation      I&O's Summary    27 Jan 2023 07:01  -  28 Jan 2023 07:00  --------------------------------------------------------  IN: 400 mL / OUT: 400 mL / NET: 0 mL        PHYSICAL EXAM:  Vital Signs Last 24 Hrs  T(C): 36.7 (29 Jan 2023 12:18), Max: 37 (28 Jan 2023 20:29)  T(F): 98.1 (29 Jan 2023 12:18), Max: 98.6 (28 Jan 2023 20:29)  HR: 72 (29 Jan 2023 12:18) (72 - 90)  BP: 111/74 (29 Jan 2023 12:18) (111/74 - 120/84)  BP(mean): --  RR: 18 (29 Jan 2023 12:18) (18 - 18)  SpO2: 94% (29 Jan 2023 12:18) (92% - 100%)    Parameters below as of 29 Jan 2023 12:18  Patient On (Oxygen Delivery Method): room air        CONSTITUTIONAL: NAD, well-developed, well-groomed  EYES conjunctiva and sclera clear  RESPIRATORY: Normal respiratory effort; lungs are clear to auscultation bilaterally  CARDIOVASCULAR: Regular rate and rhythm, normal S1 and S2, no murmur/rub/gallop; +1 pitting edema b/l. Peripheral pulses are 2+ bilaterally  ABDOMEN: Nontender to palpation, normoactive bowel sounds, no rebound/guarding; No hepatosplenomegaly  MUSCULOSKELETAL:   mild erythema lateral aspect of right knee, no warmth or effusions palpated.   PSYCH: A+O to person, place, and time; affect appropriate  NEUROLOGY: Limited assessment of LE strength, approximately 3/5 b/l.   : condom catheter with clear yellow urine.     LABS:                        13.4   8.44  )-----------( 209      ( 27 Jan 2023 10:36 )             42.0     01-27    139  |  99  |  16  ----------------------------<  91  3.9   |  30  |  0.68    Ca    8.7      27 Jan 2023 10:36                Culture - Blood (collected 25 Jan 2023 20:25)  Source: .Blood Blood-Peripheral  Preliminary Report (27 Jan 2023 01:02):    No growth to date.    Culture - Blood (collected 25 Jan 2023 20:24)  Source: .Blood Blood-Peripheral  Preliminary Report (27 Jan 2023 01:02):    No growth to date.      COVID-19 PCR: NotDetec (22 Jan 2023 02:11)      RADIOLOGY & ADDITIONAL TESTS:  New Results Reviewed Today:   New Imaging Personally Reviewed Today:  New Electrocardiogram Personally Reviewed Today:  Prior or Outpatient Records Reviewed Today:    COMMUNICATION:  Care Discussed with Consultants/Other Providers and Details of Discussion:  Discussions with Patient/Family:  PCP Communication:

## 2023-01-29 NOTE — PROGRESS NOTE ADULT - PROBLEM SELECTOR PLAN 1
Unclear etiology, CT negative for PE, TTE with stage I diastolic dysfunction, hyperdyanmic LV.   -currently on RA, continue to moniter  -Outpatient follow up for thyroid lesion incidentally seen on CTA chest. -TSH normal. -outpatient follow up recommended.

## 2023-01-30 ENCOUNTER — TRANSCRIPTION ENCOUNTER (OUTPATIENT)
Age: 63
End: 2023-01-30

## 2023-01-30 VITALS
RESPIRATION RATE: 18 BRPM | HEART RATE: 90 BPM | TEMPERATURE: 98 F | DIASTOLIC BLOOD PRESSURE: 75 MMHG | OXYGEN SATURATION: 94 % | SYSTOLIC BLOOD PRESSURE: 118 MMHG

## 2023-01-30 LAB
-  AMIKACIN: SIGNIFICANT CHANGE UP
-  AMOXICILLIN/CLAVULANIC ACID: SIGNIFICANT CHANGE UP
-  AMPICILLIN/SULBACTAM: SIGNIFICANT CHANGE UP
-  AMPICILLIN: SIGNIFICANT CHANGE UP
-  AZTREONAM: SIGNIFICANT CHANGE UP
-  CEFAZOLIN: SIGNIFICANT CHANGE UP
-  CEFEPIME: SIGNIFICANT CHANGE UP
-  CEFOXITIN: SIGNIFICANT CHANGE UP
-  CEFTRIAXONE: SIGNIFICANT CHANGE UP
-  CEFUROXIME: SIGNIFICANT CHANGE UP
-  CIPROFLOXACIN: SIGNIFICANT CHANGE UP
-  ERTAPENEM: SIGNIFICANT CHANGE UP
-  GENTAMICIN: SIGNIFICANT CHANGE UP
-  LEVOFLOXACIN: SIGNIFICANT CHANGE UP
-  MEROPENEM: SIGNIFICANT CHANGE UP
-  NITROFURANTOIN: SIGNIFICANT CHANGE UP
-  PIPERACILLIN/TAZOBACTAM: SIGNIFICANT CHANGE UP
-  TOBRAMYCIN: SIGNIFICANT CHANGE UP
-  TRIMETHOPRIM/SULFAMETHOXAZOLE: SIGNIFICANT CHANGE UP
CULTURE RESULTS: SIGNIFICANT CHANGE UP
METHOD TYPE: SIGNIFICANT CHANGE UP
ORGANISM # SPEC MICROSCOPIC CNT: SIGNIFICANT CHANGE UP
ORGANISM # SPEC MICROSCOPIC CNT: SIGNIFICANT CHANGE UP
SPECIMEN SOURCE: SIGNIFICANT CHANGE UP

## 2023-01-30 PROCEDURE — 71275 CT ANGIOGRAPHY CHEST: CPT

## 2023-01-30 PROCEDURE — 87186 SC STD MICRODIL/AGAR DIL: CPT

## 2023-01-30 PROCEDURE — C8929: CPT

## 2023-01-30 PROCEDURE — 97166 OT EVAL MOD COMPLEX 45 MIN: CPT

## 2023-01-30 PROCEDURE — 83735 ASSAY OF MAGNESIUM: CPT

## 2023-01-30 PROCEDURE — 74018 RADEX ABDOMEN 1 VIEW: CPT

## 2023-01-30 PROCEDURE — 92611 MOTION FLUOROSCOPY/SWALLOW: CPT

## 2023-01-30 PROCEDURE — 85025 COMPLETE CBC W/AUTO DIFF WBC: CPT

## 2023-01-30 PROCEDURE — 87040 BLOOD CULTURE FOR BACTERIA: CPT

## 2023-01-30 PROCEDURE — 93970 EXTREMITY STUDY: CPT

## 2023-01-30 PROCEDURE — 85027 COMPLETE CBC AUTOMATED: CPT

## 2023-01-30 PROCEDURE — 81001 URINALYSIS AUTO W/SCOPE: CPT

## 2023-01-30 PROCEDURE — 87086 URINE CULTURE/COLONY COUNT: CPT

## 2023-01-30 PROCEDURE — 86140 C-REACTIVE PROTEIN: CPT

## 2023-01-30 PROCEDURE — 99239 HOSP IP/OBS DSCHRG MGMT >30: CPT

## 2023-01-30 PROCEDURE — 99285 EMERGENCY DEPT VISIT HI MDM: CPT | Mod: 25

## 2023-01-30 PROCEDURE — 80053 COMPREHEN METABOLIC PANEL: CPT

## 2023-01-30 PROCEDURE — 74177 CT ABD & PELVIS W/CONTRAST: CPT

## 2023-01-30 PROCEDURE — 83036 HEMOGLOBIN GLYCOSYLATED A1C: CPT

## 2023-01-30 PROCEDURE — 96374 THER/PROPH/DIAG INJ IV PUSH: CPT

## 2023-01-30 PROCEDURE — 92610 EVALUATE SWALLOWING FUNCTION: CPT

## 2023-01-30 PROCEDURE — 87637 SARSCOV2&INF A&B&RSV AMP PRB: CPT

## 2023-01-30 PROCEDURE — 36415 COLL VENOUS BLD VENIPUNCTURE: CPT

## 2023-01-30 PROCEDURE — 85379 FIBRIN DEGRADATION QUANT: CPT

## 2023-01-30 PROCEDURE — 83880 ASSAY OF NATRIURETIC PEPTIDE: CPT

## 2023-01-30 PROCEDURE — 84443 ASSAY THYROID STIM HORMONE: CPT

## 2023-01-30 PROCEDURE — 85652 RBC SED RATE AUTOMATED: CPT

## 2023-01-30 PROCEDURE — 80048 BASIC METABOLIC PNL TOTAL CA: CPT

## 2023-01-30 PROCEDURE — 86850 RBC ANTIBODY SCREEN: CPT

## 2023-01-30 PROCEDURE — 84145 PROCALCITONIN (PCT): CPT

## 2023-01-30 PROCEDURE — 87077 CULTURE AEROBIC IDENTIFY: CPT

## 2023-01-30 PROCEDURE — 96375 TX/PRO/DX INJ NEW DRUG ADDON: CPT

## 2023-01-30 PROCEDURE — 74230 X-RAY XM SWLNG FUNCJ C+: CPT

## 2023-01-30 PROCEDURE — 71045 X-RAY EXAM CHEST 1 VIEW: CPT

## 2023-01-30 PROCEDURE — 82272 OCCULT BLD FECES 1-3 TESTS: CPT

## 2023-01-30 PROCEDURE — 86901 BLOOD TYPING SEROLOGIC RH(D): CPT

## 2023-01-30 PROCEDURE — 85610 PROTHROMBIN TIME: CPT

## 2023-01-30 PROCEDURE — 87635 SARS-COV-2 COVID-19 AMP PRB: CPT

## 2023-01-30 PROCEDURE — 83690 ASSAY OF LIPASE: CPT

## 2023-01-30 PROCEDURE — 97161 PT EVAL LOW COMPLEX 20 MIN: CPT

## 2023-01-30 PROCEDURE — 85730 THROMBOPLASTIN TIME PARTIAL: CPT

## 2023-01-30 PROCEDURE — 86900 BLOOD TYPING SEROLOGIC ABO: CPT

## 2023-01-30 RX ORDER — ONDANSETRON 8 MG/1
1 TABLET, FILM COATED ORAL
Qty: 15 | Refills: 0
Start: 2023-01-30 | End: 2023-02-03

## 2023-01-30 RX ORDER — PANTOPRAZOLE SODIUM 20 MG/1
1 TABLET, DELAYED RELEASE ORAL
Qty: 42 | Refills: 0
Start: 2023-01-30 | End: 2023-02-19

## 2023-01-30 RX ADMIN — Medication 1 TABLET(S): at 11:17

## 2023-01-30 RX ADMIN — Medication 1.5 GRAM(S): at 11:17

## 2023-01-30 RX ADMIN — Medication 1 APPLICATION(S): at 06:49

## 2023-01-30 RX ADMIN — PANTOPRAZOLE SODIUM 40 MILLIGRAM(S): 20 TABLET, DELAYED RELEASE ORAL at 06:47

## 2023-01-30 RX ADMIN — SERTRALINE 50 MILLIGRAM(S): 25 TABLET, FILM COATED ORAL at 11:17

## 2023-01-30 RX ADMIN — Medication 5 MILLIGRAM(S): at 06:47

## 2023-01-30 NOTE — PROGRESS NOTE ADULT - PROVIDER SPECIALTY LIST ADULT
Hospitalist
Gastroenterology
Hospitalist
Gastroenterology
Internal Medicine

## 2023-01-30 NOTE — PROGRESS NOTE ADULT - PROBLEM SELECTOR PLAN 3
Currently at baseline. Patient is bedbound .  - Continue to monitor  - Continue with home baclofen and oxybutynin  -OT eval for worsening UE mobility/dexterity per patient making it hard for him to eat himself and do other ADL's, etc. - recs appreciated. outpatient f/u.  -Says he's due for an MRI with Dr. Harjinder Paredes outpatient. Last one reportedly ~April 2021. Says his last treatment was in September. His neurologist is applying for a new drug for him.  -will get MRI brain/spine with/without contrast in setting of worsening of weakness in left side now, has mostly been worse in right side. also, hasn't had an MRI to eval in > 1 year.  -Patient unfortunately unable to fit in our MRI machine. -Should follow up outpatient for open MRI.
Currently at baseline. Patient is bedbound .  - Continue to monitor  - Continue with home baclofen and oxybutynin  -OT eval for worsening UE mobility/dexterity per patient making it hard for him to eat himself and do other ADL's, etc.
Currently at baseline. Patient is bedbound .  - Continue to monitor  - Continue with home baclofen and oxybutynin  -OT eval for worsening UE mobility/dexterity per patient making it hard for him to eat himself and do other ADL's, etc. - recs appreciated. outpatient f/u.  -Says he's due for an MRI with Dr. Harjinder Paredes outpatient. Last one reportedly ~April 2021. Says his last treatment was in September. His neurologist is applying for a new drug for him.  -will get MRI brain/spine with/without contrast in setting of worsening of weakness in left side now, has mostly been worse in right side. also, hasn't had an MRI to eval in > 1 year.  -Patient unfortunately unable to fit in our MRI machine. -Should follow up outpatient for open MRI.
Currently at baseline. Patient is unable to stand/bear weight but can sit in chair.  - Continue to monitor  - Continue with home baclofen and oxybutynin  -OT eval for worsening UE mobility/dexterity per patient making it hard for him to eat himself and do other ADL's, etc. - recs appreciated. outpatient f/u.  -Says he's due for an MRI with Dr. Harjinder Paredes outpatient. Last one reportedly ~April 2021. Says his last treatment was in September. His neurologist is applying for a new drug for him.  -will get MRI brain/spine with/without contrast in setting of worsening of weakness in left side now, has mostly been worse in right side. also, hasn't had an MRI to [facility] in > 1 year.  -Patient unfortunately unable to fit in our MRI machine. -Should follow up outpatient for open MRI.
Currently at baseline. Patient is bedbound .  - Continue to monitor  - Continue with home baclofen and oxybutynin  -OT eval for worsening UE mobility/dexterity per patient making it hard for him to eat himself and do other ADL's, etc. - recs appreciated. outpatient f/u.  -Says he's due for an MRI with Dr. Harjinder Paredes outpatient. Last one reportedly ~April 2021. Says his last treatment was in September. His neurologist is applying for a new drug for him.  -will get MRI brain/spine with/without contrast in setting of worsening of weakness in left side now, has mostly been worse in right side. also, hasn't had an MRI to eval in > 1 year.
Currently at baseline. Patient is bedbound .  - Continue to monitor  - Continue with home baclofen and oxybutynin  -OT eval for worsening UE mobility/dexterity per patient making it hard for him to eat himself and do other ADL's, etc.  -Says he's due for an MRI with Dr. Harjinder Paredes outpatient. Last one reportedly ~April 2021. Says his last treatment was in September. His neurologist is applying for a new drug for him.
Currently at baseline. Patient is bedbound .  - Continue to monitor  - Continue with home baclofen and oxybutynin  -OT eval for worsening UE mobility/dexterity per patient making it hard for him to eat himself and do other ADL's, etc. - recs appreciated. outpatient f/u.  -Says he's due for an MRI with Dr. Harjinder Paredes outpatient. Last one reportedly ~April 2021. Says his last treatment was in September. His neurologist is applying for a new drug for him.  -will get MRI brain/spine with/without contrast in setting of worsening of weakness in left side now, has mostly been worse in right side. also, hasn't had an MRI to eval in > 1 year.  -Patient unfortunately unable to fit in our MRI machine. -Should follow up outpatient for open MRI.
Currently at baseline. Patient is bedbound .  - Continue to monitor  - Continue with home baclofen and oxybutynin  -OT eval for worsening UE mobility/dexterity per patient making it hard for him to eat himself and do other ADL's, etc. - recs appreciated. outpatient f/u.  -Says he's due for an MRI with Dr. Harjinder Paredes outpatient. Last one reportedly ~April 2021. Says his last treatment was in September. His neurologist is applying for a new drug for him.  -will get MRI brain/spine with/without contrast in setting of worsening of weakness in left side now, has mostly been worse in right side. also, hasn't had an MRI to eval in > 1 year.  -Patient unfortunately unable to fit in our MRI machine. -Should follow up outpatient for open MRI.
negative

## 2023-01-30 NOTE — PROGRESS NOTE ADULT - PROBLEM SELECTOR PLAN 5
- Continue with home tamsulosin 0.4 mg PO at bedtime - Continue with home tamsulosin 0.4 mg PO at bedtime    # UCx with low CFU count, denies dysuria. No abx needed at this time.

## 2023-01-30 NOTE — PROGRESS NOTE ADULT - PROBLEM SELECTOR PLAN 1
Unclear etiology, CT negative for PE, TTE with stage I diastolic dysfunction, hyperdyanmic LV.   -currently on RA, continue to moniter  -Outpatient follow up for thyroid lesion incidentally seen on CTA chest. -TSH normal. -outpatient follow up recommended. Unclear etiology, CT negative for PE, TTE with stage I diastolic dysfunction, hyperdynamic LV.   -currently on RA, continue to moniter  -Outpatient follow up for thyroid lesion incidentally seen on CTA chest.   -TSH normal.   -outpatient follow up recommended.

## 2023-01-30 NOTE — PROGRESS NOTE ADULT - PROBLEM SELECTOR PLAN 2
Patient presents from home with nausea and vomiting of unclear etiology. Symptoms improved with Zofran. Patient denies abdominal pain, cramps, fevers or chills, suggesting infections etiology less likely. QTc 408 on admission.  - Continue with Zofran prn  - Given no leukocytosis, diarrhea, melena or abdominal pain, UC flair seems unlikely at this time  - Diet as tolerated - -will advance as tolerated since no obstruction on CT. -advanced to regular diet.   -PPI IV BID. -changed to oral BID per GI.  -Another episode of ?coffee grounds emesis on 1/22 pm. H/H relatively stable but slight downtrend. -House GI consulted (emailed) for possible EGD - holding off for now. -Reports h/o GERD. -Outpatient GI Dr. Rich Martin does not come here.   -GI recs appreciated.  -CT abd/pelvis with contrast to further eval - negative for any obstruction.    -IVF's completed.  -SLP eval for ?aspiration. -F/u MBS - passed for regular diet/thin liquids. -Aspiration precautions.  -?MS related gastroparesis? However, patient says this N/V not chronic. -Would need GI f/u. Hold off on standing reglan for now.
Patient presents from home with nausea and vomiting of unclear etiology. Symptoms improved with Zofran. Patient denies abdominal pain, cramps, fevers or chills, suggesting infections etiology less likely. QTc 408 on admission.  - Continue with Zofran  - Given no leukocytosis, diarrhea, melena or abdominal pain, UC flair seems unlikely at this time  - Diet as tolerated - -will advance as tolerated since no obstruction on CT. patient prefers clears today and reassess tomorrow.   -PPI IV BID. -will change to oral BID per GI.  -Another episode of ?coffee grounds emesis on 1/22 pm. H/H relatively stable but slight downtrend. -House GI consulted (emailed) for possible EGD - holding off for now. -Reports h/o GERD. -Outpatient GI Dr. Rich Martin does not come here.   -GI recs appreciated.  -CT abd/pelvis with contrast to further eval - negative for any obstruction.    -IVF's completed.
Patient presents from home with nausea and vomiting of unclear etiology. Symptoms improved with Zofran. Patient denies abdominal pain, cramps, fevers or chills, suggesting infections etiology less likely. QTc 408 on admission.  - Continue with Zofran  - Given no leukocytosis, diarrhea, melena or abdominal pain, UC flair seems unlikely at this time  - Diet as tolerated  -PPI IV BID.   -Another episode of ?coffee grounds emesis overnight. H/H relatively stable. -House GI consulted (emailed) for possible EGD. -Reports h/o GERD. -Outpatient GI Dr. Rich Martin does not come here.
Patient presents from home with nausea and vomiting of unclear etiology. Symptoms improved with Zofran. Patient denies abdominal pain, cramps, fevers or chills, suggesting infections etiology less likely. QTc 408 on admission.  CT abd/pelvis with contrast - negative for any obstruction or cause of symptoms.   -IVF's completed.  -SLP eval for ?aspiration. -F/u MBS - passed for regular diet/thin liquids. -Aspiration precautions.  -no plan for GI intervention inpatient, can continue anti-emetics and follow up as outpatient for further evaluation and possible endoscopy
Patient presents from home with nausea and vomiting of unclear etiology. Symptoms improved with Zofran. Patient denies abdominal pain, cramps, fevers or chills, suggesting infections etiology less likely. QTc 408 on admission.  - Continue with Zofran  - Given no leukocytosis, diarrhea, melena or abdominal pain, UC flair seems unlikely at this time  - Diet as tolerated - now NPO per GI  -PPI IV BID.   -Another episode of ?coffee grounds emesis on 1/22 pm. H/H relatively stable but slight downtrend. -House GI consulted (emailed) for possible EGD - hold off for now. -Reports h/o GERD. -Outpatient GI Dr. Rich Martin does not come here.   -GI recs appreciated.  -CT abd/pelvis with contrast to further eval.   -IVF's while NPO.
Patient presents from home with nausea and vomiting of unclear etiology. Symptoms improved with Zofran. Patient denies abdominal pain, cramps, fevers or chills, suggesting infections etiology less likely. QTc 408 on admission.  - Continue with Zofran prn  - Given no leukocytosis, diarrhea, melena or abdominal pain, UC flair seems unlikely at this time  - Diet as tolerated - -will advance as tolerated since no obstruction on CT. -advanced to regular diet.   -PPI IV BID. -changed to oral BID per GI.  -Another episode of ?coffee grounds emesis on 1/22 pm. H/H relatively stable but slight downtrend. -House GI consulted (emailed) for possible EGD - holding off for now. -Reports h/o GERD. -Outpatient GI Dr. Rich Martin does not come here.   -GI recs appreciated.  -CT abd/pelvis with contrast to further eval - negative for any obstruction.    -IVF's completed.  -SLP eval for ?aspiration. -F/u MBS - passed for regular diet/thin liquids. -Aspiration precautions.  -?MS related gastroparesis? However, patient says this N/V not chronic. -Would need GI f/u. Hold off on standing reglan for now.
Patient presents from home with nausea and vomiting of unclear etiology. Symptoms improved with Zofran. Patient denies abdominal pain, cramps, fevers or chills, suggesting infections etiology less likely. QTc 408 on admission.  CT abd/pelvis with contrast - negative for any obstruction or cause of symptoms.   -IVF's completed.  -SLP eval for ?aspiration. -F/u MBS - passed for regular diet/thin liquids. -Aspiration precautions.  -no plan for GI intervention inpatient, can continue anti-emetics and follow up as outpatient for further evaluation and possible endoscopy
Patient presents from home with nausea and vomiting of unclear etiology. Symptoms improved with Zofran. Patient denies abdominal pain, cramps, fevers or chills, suggesting infections etiology less likely. QTc 408 on admission.  - Continue with Zofran  - Given no leukocytosis, diarrhea, melena or abdominal pain, UC flair seems unlikely at this time  - Diet as tolerated - -will advance as tolerated since no obstruction on CT. -advanced to regular diet.   -PPI IV BID. -will change to oral BID per GI.  -Another episode of ?coffee grounds emesis on 1/22 pm. H/H relatively stable but slight downtrend. -House GI consulted (emailed) for possible EGD - holding off for now. -Reports h/o GERD. -Outpatient GI Dr. Rich Martin does not come here.   -GI recs appreciated.  -CT abd/pelvis with contrast to further eval - negative for any obstruction.    -IVF's completed.  -SLP eval for ?aspiration. -F/u MBS. -Aspiration precautions.

## 2023-01-30 NOTE — PROGRESS NOTE ADULT - PROBLEM SELECTOR PLAN 6
- Continue with home Sertraline 50 mg PO Qd    DVT ppx - Lovenox - at risk of clots given bed bound from MS.     Patient stable for discharge, d/c planning in procgress - Continue with home Sertraline 50 mg PO Qd    DVT ppx - Lovenox - at risk of clots given bed bound from MS.     Patient stable for discharge, d/c planning in progress. needs home OT/PT

## 2023-01-30 NOTE — PROGRESS NOTE ADULT - PROBLEM SELECTOR PROBLEM 6
Panic disorder

## 2023-01-30 NOTE — PROGRESS NOTE ADULT - PROBLEM SELECTOR PLAN 4
- Continue with home medications

## 2023-01-30 NOTE — PROGRESS NOTE ADULT - PROBLEM SELECTOR PROBLEM 2
Nausea & vomiting

## 2023-01-30 NOTE — PROGRESS NOTE ADULT - SUBJECTIVE AND OBJECTIVE BOX
[facility] Division of Hospital Medicine  Mic Chowdary MD  Available via MS Teams    SUBJECTIVE / OVERNIGHT EVENTS: No acute events overnight. Pt seen and examined at bedside. N/v improved. Agreeable for discharge today. Updated wife by phone.     MEDICATIONS  (STANDING):  enoxaparin Injectable 40 milliGRAM(s) SubCutaneous every 24 hours  melatonin 5 milliGRAM(s) Oral at bedtime  mesalamine ER (24-Hour) Capsule 1.5 Gram(s) Oral daily  multivitamin 1 Tablet(s) Oral daily  oxybutynin 5 milliGRAM(s) Oral two times a day  pantoprazole    Tablet 40 milliGRAM(s) Oral two times a day  senna 2 Tablet(s) Oral at bedtime  sertraline 50 milliGRAM(s) Oral daily  tamsulosin 0.4 milliGRAM(s) Oral at bedtime  vitamin A &amp; D Ointment 1 Application(s) Topical two times a day    MEDICATIONS  (PRN):  acetaminophen     Tablet .. 650 milliGRAM(s) Oral every 6 hours PRN Temp greater or equal to 38C (100.4F), Mild Pain (1 - 3), Moderate Pain (4 - 6)  aluminum hydroxide/magnesium hydroxide/simethicone Suspension 30 milliLiter(s) Oral every 4 hours PRN Dyspepsia  baclofen 5 milliGRAM(s) Oral every 8 hours PRN Muscle Spasm  benzonatate 100 milliGRAM(s) Oral three times a day PRN Cough  ondansetron Injectable 4 milliGRAM(s) IV Push every 8 hours PRN Nausea and/or Vomiting  polyethylene glycol 3350 17 Gram(s) Oral daily PRN Constipation      I&O's Summary    29 Jan 2023 07:01  -  30 Jan 2023 07:00  --------------------------------------------------------  IN: 0 mL / OUT: 1000 mL / NET: -1000 mL      PHYSICAL EXAM:  Vital Signs Last 24 Hrs  T(C): 37.3 (30 Jan 2023 05:03), Max: 37.3 (30 Jan 2023 05:03)  T(F): 99.1 (30 Jan 2023 05:03), Max: 99.1 (30 Jan 2023 05:03)  HR: 76 (30 Jan 2023 05:03) (76 - 86)  BP: 136/81 (30 Jan 2023 05:03) (124/78 - 136/81)  RR: 18 (30 Jan 2023 05:03) (18 - 18)  SpO2: 93% (30 Jan 2023 05:03) (93% - 93%)    Parameters below as of 30 Jan 2023 05:03  Patient On (Oxygen Delivery Method): room air    CONSTITUTIONAL: NAD, comfortable appearing  NECK: Supple, no palpable masses; no thyromegaly  RESPIRATORY: CTABL; no wheezing  CARDIOVASCULAR: RRR, s1, s2  ABDOMEN: soft, nt, nd  PSYCH: A+O to person, place, and time; affect appropriate  SKIN: No rashes; no palpable lesions    LABS:        COVID-19 PCR: NotDetec (22 Jan 2023 02:11)      RADIOLOGY & ADDITIONAL TESTS:  New Results Reviewed Today:   New Imaging Personally Reviewed Today:  New Electrocardiogram Personally Reviewed Today:  Prior or Outpatient Records Reviewed Today:    COMMUNICATION:  Care Discussed with Consultants/Other Providers and Details of Discussion: Discussed with ACP  Discussions with Patient/Family:  PCP Communication: [facility] Division of Hospital Medicine  Mic Chowdary MD  Available via MS Teams    SUBJECTIVE / OVERNIGHT EVENTS: No acute events overnight. Pt seen and examined at bedside. N/v improved. Agreeable for discharge today. Updated wife by phone. Denies dysuria.     MEDICATIONS  (STANDING):  enoxaparin Injectable 40 milliGRAM(s) SubCutaneous every 24 hours  melatonin 5 milliGRAM(s) Oral at bedtime  mesalamine ER (24-Hour) Capsule 1.5 Gram(s) Oral daily  multivitamin 1 Tablet(s) Oral daily  oxybutynin 5 milliGRAM(s) Oral two times a day  pantoprazole    Tablet 40 milliGRAM(s) Oral two times a day  senna 2 Tablet(s) Oral at bedtime  sertraline 50 milliGRAM(s) Oral daily  tamsulosin 0.4 milliGRAM(s) Oral at bedtime  vitamin A &amp; D Ointment 1 Application(s) Topical two times a day    MEDICATIONS  (PRN):  acetaminophen     Tablet .. 650 milliGRAM(s) Oral every 6 hours PRN Temp greater or equal to 38C (100.4F), Mild Pain (1 - 3), Moderate Pain (4 - 6)  aluminum hydroxide/magnesium hydroxide/simethicone Suspension 30 milliLiter(s) Oral every 4 hours PRN Dyspepsia  baclofen 5 milliGRAM(s) Oral every 8 hours PRN Muscle Spasm  benzonatate 100 milliGRAM(s) Oral three times a day PRN Cough  ondansetron Injectable 4 milliGRAM(s) IV Push every 8 hours PRN Nausea and/or Vomiting  polyethylene glycol 3350 17 Gram(s) Oral daily PRN Constipation      I&O's Summary    29 Jan 2023 07:01  -  30 Jan 2023 07:00  --------------------------------------------------------  IN: 0 mL / OUT: 1000 mL / NET: -1000 mL      PHYSICAL EXAM:  Vital Signs Last 24 Hrs  T(C): 37.3 (30 Jan 2023 05:03), Max: 37.3 (30 Jan 2023 05:03)  T(F): 99.1 (30 Jan 2023 05:03), Max: 99.1 (30 Jan 2023 05:03)  HR: 76 (30 Jan 2023 05:03) (76 - 86)  BP: 136/81 (30 Jan 2023 05:03) (124/78 - 136/81)  RR: 18 (30 Jan 2023 05:03) (18 - 18)  SpO2: 93% (30 Jan 2023 05:03) (93% - 93%)    Parameters below as of 30 Jan 2023 05:03  Patient On (Oxygen Delivery Method): room air    CONSTITUTIONAL: NAD, comfortable appearing  NECK: Supple, no palpable masses; no thyromegaly  RESPIRATORY: CTABL; no wheezing  CARDIOVASCULAR: RRR, s1, s2  ABDOMEN: soft, nt, nd  PSYCH: A+O to person, place, and time; affect appropriate  SKIN: No rashes; no palpable lesions    LABS:        COVID-19 PCR: NotDetec (22 Jan 2023 02:11)      RADIOLOGY & ADDITIONAL TESTS:  New Results Reviewed Today:   New Imaging Personally Reviewed Today:  New Electrocardiogram Personally Reviewed Today:  Prior or Outpatient Records Reviewed Today:    COMMUNICATION:  Care Discussed with Consultants/Other Providers and Details of Discussion: Discussed with ACP  Discussions with Patient/Family:  PCP Communication:

## 2023-01-30 NOTE — DISCHARGE NOTE NURSING/CASE MANAGEMENT/SOCIAL WORK - NSDCVIVACCINE_GEN_ALL_CORE_FT
COVID-19, mRNA, LNP-S, PF, 30 mcg/0.3 mL dose, keely-sucrose (Pf[employer]); 14-Jul-2022 12:41; Yahaira Branch (RN); [employer]; RO1126 (Exp. Date: 29-Aug-2022); IntraMuscular; Deltoid Right.; 0.3 milliLiter(s);   influenza, injectable, quadrivalent, preservative free; 05-Oct-2014 13:54; Hanane Coyle (RN); [employer]; UI [id number]; IntraMuscular; Deltoid Right.; 0.5 milliLiter(s);   influenza, injectable, quadrivalent, preservative free; 26-Sep-2016 17:23; Adali Roe (RN); [employer]; [id number]; IntraMuscular; Deltoid Right.; 0.5 milliLiter(s); VIS (VIS Published: 07-Aug-2015, VIS Presented: 26-Sep-2016);

## 2023-01-30 NOTE — DISCHARGE NOTE NURSING/CASE MANAGEMENT/SOCIAL WORK - NSDCPEFALRISK_GEN_ALL_CORE
For information on Fall & Injury Prevention, visit: [email or web address] OR  [email or web address] OR  https://www.cdc.gov/steadi/patient.html

## 2023-01-30 NOTE — DISCHARGE NOTE NURSING/CASE MANAGEMENT/SOCIAL WORK - PATIENT PORTAL LINK FT
You can access the FollowMyHealth Patient Portal offered by [facility] by registering at the following website: [email or web address]. By joining [employer]’s FollowMyHealth portal, you will also be able to view your health information using other applications (apps) compatible with our system.

## 2023-01-30 NOTE — PROGRESS NOTE ADULT - PROBLEM SELECTOR PROBLEM 4
UC (ulcerative colitis)

## 2023-01-30 NOTE — PROGRESS NOTE ADULT - PROBLEM SELECTOR PROBLEM 3
Multiple sclerosis

## 2023-02-01 NOTE — CONSULT NOTE ADULT - SUBJECTIVE AND OBJECTIVE BOX
KARTHIK PATIÑO 62y Male  MRN-[id number]    Patient is a 62y old  Male who presents with a chief complaint of fevrs (11 Jul 2022 06:57)      HPI:   63 yo man with MS (bed bound), UC, GERD, BPH, panic disorder, presents to ED with fever and chills since 12pm on 7/8.      Reports he's been "constantly urinating" since earlier today     and has a history of frequent UTIs    , thus called EMS to take the pt to the ED.     Denies sore throat, sick contacts, CP, new SOB, new abd pain. (08 Jul 2022 20:53)      PAST MEDICAL & SURGICAL HISTORY:  Colitis      BPH (benign prostatic hypertrophy)      GERD (gastroesophageal reflux disease)      Seizures      Multiple sclerosis      No significant past surgical history          Allergies    No Known Allergies    Intolerances        ANTIMICROBIALS:  cefTRIAXone   IVPB 1000 every 24 hours      MEDICATIONS  (STANDING):  baclofen 5 milliGRAM(s) Oral every 8 hours  cefTRIAXone   IVPB 1000 milliGRAM(s) IV Intermittent every 24 hours  furosemide    Tablet 20 milliGRAM(s) Oral daily  heparin   Injectable 5000 Unit(s) SubCutaneous every 12 hours  oxybutynin 5 milliGRAM(s) Oral two times a day  pantoprazole    Tablet 40 milliGRAM(s) Oral before breakfast  PHENobarbital 32.4 milliGRAM(s) Oral at bedtime  senna 2 Tablet(s) Oral at bedtime  sertraline 50 milliGRAM(s) Oral daily  tamsulosin 0.4 milliGRAM(s) Oral at bedtime      Social History  Smoking:  Etoh:  Drug use:      FAMILY HISTORY:      Vital Signs Last 24 Hrs  T(C): 36.9 (11 Jul 2022 16:04), Max: 37.2 (10 Jul 2022 20:55)  T(F): 98.5 (11 Jul 2022 16:04), Max: 99 (10 Jul 2022 20:55)  HR: 84 (11 Jul 2022 16:04) (80 - 92)  BP: 116/77 (11 Jul 2022 16:04) (116/77 - 126/80)  BP(mean): --  RR: 20 (11 Jul 2022 16:04) (18 - 20)  SpO2: 94% (11 Jul 2022 16:04) (94% - 96%)    Parameters below as of 11 Jul 2022 16:04  Patient On (Oxygen Delivery Method): room air        CBC Full  -  ( 10 Jul 2022 09:30 )  WBC Count : 8.60 K/uL  RBC Count : 4.83 M/uL  Hemoglobin : 14.7 g/dL  Hematocrit : 44.8 %  Platelet Count - Automated : 218 K/uL  Mean Cell Volume : 92.8 fl  Mean Cell Hemoglobin : 30.4 pg  Mean Cell Hemoglobin Concentration : 32.8 gm/dL  Auto Neutrophil # : x  Auto Lymphocyte # : x  Auto Monocyte # : x  Auto Eosinophil # : x  Auto Basophil # : x  Auto Neutrophil % : x  Auto Lymphocyte % : x  Auto Monocyte % : x  Auto Eosinophil % : x  Auto Basophil % : x    07-10    140  |  99  |  17  ----------------------------<  102<H>  3.6   |  30  |  0.74    Ca    8.8      10 Jul 2022 09:30  Phos  2.6     07-10  Mg     1.8     07-10            MICROBIOLOGY:  Clean Catch Clean Catch (Midstream)  07-08-22   >100,000 CFU/ml Escherichia coli  --  --      .Blood Blood-Peripheral  07-08-22   No growth to date.  --  --      .Blood Blood-Peripheral  07-08-22   No growth to date.  --  --        RADIOLOGY  < from: CT Head No Cont (07.09.22 @ 16:25) >    No acute intracranial hemorrhage, hydrocephalus or extra-axial fluid   collection.  Mild subcortical and periventricular white matter attenuation nonspecific   but favored to reflect combination of sequela of mild chronic   microvascular ischemia and in keeping with documented clinical history of   demyelination. No CT evidence for acute transcortical infarction. Please   note that MR imaging is a more sensitive imaging modality for detection   of acute infarction and may be obtained as clinically warranted.    < end of copied text >  < from: Xray Chest 1 View- PORTABLE-Urgent (07.08.22 @ 16:19) >  No acute pulmonary disease    < end of copied text >   KARTHIK PATIÑO 62y Male  MRN-[id number]    Patient is a 62y old  Male who presents with a chief complaint of fevrs (11 Jul 2022 06:57)      HPI:   61 yo man with MS (bed bound), UC, GERD, BPH, panic disorder, presents to ED with fever and chills since 12pm on 7/8.      Reports he's been "constantly urinating" since earlier today     and has a history of frequent UTIs    , thus called EMS to take the pt to the ED.     Denies sore throat, sick contacts, CP, new SOB, new abd pain. (08 Jul 2022 20:53)      PAST MEDICAL & SURGICAL HISTORY:  Colitis      BPH (benign prostatic hypertrophy)      GERD (gastroesophageal reflux disease)      Seizures      Multiple sclerosis      No significant past surgical history          Allergies    No Known Allergies    Intolerances        ANTIMICROBIALS:  cefTRIAXone   IVPB 1000 every 24 hours      MEDICATIONS  (STANDING):  baclofen 5 milliGRAM(s) Oral every 8 hours  cefTRIAXone   IVPB 1000 milliGRAM(s) IV Intermittent every 24 hours  furosemide    Tablet 20 milliGRAM(s) Oral daily  heparin   Injectable 5000 Unit(s) SubCutaneous every 12 hours  oxybutynin 5 milliGRAM(s) Oral two times a day  pantoprazole    Tablet 40 milliGRAM(s) Oral before breakfast  PHENobarbital 32.4 milliGRAM(s) Oral at bedtime  senna 2 Tablet(s) Oral at bedtime  sertraline 50 milliGRAM(s) Oral daily  tamsulosin 0.4 milliGRAM(s) Oral at bedtime      Social History  Smoking: no  Etoh: no  Drug use: no      FAMILY HISTORY: Brother - bladder cancer       Vital Signs Last 24 Hrs  T(C): 36.9 (11 Jul 2022 16:04), Max: 37.2 (10 Jul 2022 20:55)  T(F): 98.5 (11 Jul 2022 16:04), Max: 99 (10 Jul 2022 20:55)  HR: 84 (11 Jul 2022 16:04) (80 - 92)  BP: 116/77 (11 Jul 2022 16:04) (116/77 - 126/80)  BP(mean): --  RR: 20 (11 Jul 2022 16:04) (18 - 20)  SpO2: 94% (11 Jul 2022 16:04) (94% - 96%)    Parameters below as of 11 Jul 2022 16:04  Patient On (Oxygen Delivery Method): room air        CBC Full  -  ( 10 Jul 2022 09:30 )  WBC Count : 8.60 K/uL  RBC Count : 4.83 M/uL  Hemoglobin : 14.7 g/dL  Hematocrit : 44.8 %  Platelet Count - Automated : 218 K/uL  Mean Cell Volume : 92.8 fl  Mean Cell Hemoglobin : 30.4 pg  Mean Cell Hemoglobin Concentration : 32.8 gm/dL  Auto Neutrophil # : x  Auto Lymphocyte # : x  Auto Monocyte # : x  Auto Eosinophil # : x  Auto Basophil # : x  Auto Neutrophil % : x  Auto Lymphocyte % : x  Auto Monocyte % : x  Auto Eosinophil % : x  Auto Basophil % : x    07-10    140  |  99  |  17  ----------------------------<  102<H>  3.6   |  30  |  0.74    Ca    8.8      10 Jul 2022 09:30  Phos  2.6     07-10  Mg     1.8     07-10            MICROBIOLOGY:  Clean Catch Clean Catch (Midstream)  07-08-22   >100,000 CFU/ml Escherichia coli  --  --      .Blood Blood-Peripheral  07-08-22   No growth to date.  --  --      .Blood Blood-Peripheral  07-08-22   No growth to date.  --  --        RADIOLOGY  < from: CT Head No Cont (07.09.22 @ 16:25) >    No acute intracranial hemorrhage, hydrocephalus or extra-axial fluid   collection.  Mild subcortical and periventricular white matter attenuation nonspecific   but favored to reflect combination of sequela of mild chronic   microvascular ischemia and in keeping with documented clinical history of   demyelination. No CT evidence for acute transcortical infarction. Please   note that MR imaging is a more sensitive imaging modality for detection   of acute infarction and may be obtained as clinically warranted.    < end of copied text >  < from: Xray Chest 1 View- PORTABLE-Urgent (07.08.22 @ 16:19) >  No acute pulmonary disease    < end of copied text >   Closure 3 Information: This tab is for additional flaps and grafts above and beyond our usual structured repairs.  Please note if you enter information here it will not currently bill and you will need to add the billing information manually.

## 2023-02-27 NOTE — ED ADULT NURSE NOTE - EXTENSIONS OF SELF_ADULT
SUBJECTIVE:   CC: Darshan is an 35 year old who presents for preventative health visit.     Patient has been advised of split billing requirements and indicates understanding: Yes  Healthy Habits:     Getting at least 3 servings of Calcium per day:  Yes    Bi-annual eye exam:  Yes    Dental care twice a year:  NO    Sleep apnea or symptoms of sleep apnea:  Sleep apnea    Diet:  Regular (no restrictions)    Frequency of exercise:  1 day/week    Duration of exercise:  Less than 15 minutes    Taking medications regularly:  Yes    Medication side effects:  None    PHQ-2 Total Score: 0    Additional concerns today:  Yes    Gout  - Sites: great toe  - Per chart review, pt previously saw [facility], last note 9/23/21:   Start allopurinol 100 mg daily for 1 week then increase to 200 mg daily  Start colchicine 1 tablet twice daily  Prednisone: 40 mg daily x 5 days, 30 mg daily x 5 days, 20 mg daily x 5 days, 10 mg daily x 5 days  Labs this week and again in 3 weeks  Xray of the feet this week  Schedule follow up in 2 months    - Today, pt reports on left toe, injured himself after fall. Pain was worst last week. Believes he got gout from this. Has been taking naproxen, usually does a round of prednisone. Could improve his diet, but eats a lot of red meat. Tries to stay hydrated.     Weight Loss  - on Ozempic, but reports that [employer] does not cover Ozempic, reports that Wegovy would be cover. Last took Ozempic in September.   - Would like to see our weight loss clinic.     Today's PHQ-2 Score:   PHQ-2 ( 1999 [employer]) 2/27/2023   Q1: Little interest or pleasure in doing things 0   Q2: Feeling down, depressed or hopeless 0   PHQ-2 Score 0   Q1: Little interest or pleasure in doing things Not at all   Q2: Feeling down, depressed or hopeless Not at all   PHQ-2 Score 0       Have you ever done Advance Care Planning? (For example, a Health Directive, POLST, or a discussion with a medical provider or your loved ones about your wishes): No,  advance care planning information given to patient to review.  Patient declined advance care planning discussion at this time.    Social History     Tobacco Use     Smoking status: Former     Types: Cigars     Quit date: 2021     Years since quittin.0     Smokeless tobacco: Never   Substance Use Topics     Alcohol use: Yes     Comment: 1 drink weekly     If you drink alcohol do you typically have >3 drinks per day or >7 drinks per week? No    Alcohol Use 2023   Prescreen: >3 drinks/day or >7 drinks/week? No       Last PSA: No results found for: PSA    Reviewed orders with patient. Reviewed health maintenance and updated orders accordingly - Yes  Lab work is in process  Labs reviewed in EPIC    Reviewed and updated as needed this visit by clinical staff    Allergies  Meds              Reviewed and updated as needed this visit by Provider                   Lab Results   Component Value Date    CR 1.20 2013    ALT 47 2012    GFRESTIMATED 72 2013       Health Maintenance:    Depression: negative    STD Screening: declined    Immunizations: covid today    HIV screening ages 15-65 (USPSTF guidelines, CDC guidelines are 13-64): none on file. declined    Advance Directive:  not on file. Information provided 2023.    Colorectal Cancer Screening -  No family hx. Routine screening.     Hep C screening 18-79: none on file. Declined.         Review of Systems   Constitutional: Negative for chills and fever.   HENT: Negative for congestion, ear pain, hearing loss and sore throat.    Eyes: Negative for pain and visual disturbance.   Respiratory: Negative for cough and shortness of breath.    Cardiovascular: Negative for chest pain and palpitations.   Gastrointestinal: Negative for abdominal pain, constipation, diarrhea, heartburn, hematochezia and nausea.   Genitourinary: Negative for dysuria, frequency, genital sores and urgency.   Musculoskeletal: Positive for arthralgias and joint swelling.  "Negative for myalgias.   Skin: Negative for rash.   Neurological: Negative for dizziness, weakness, headaches and paresthesias.   Psychiatric/Behavioral: Negative for mood changes. The patient is not nervous/anxious.      OBJECTIVE:   /88 (BP Location: Left arm, Patient Position: Sitting, Cuff Size: Thigh)   Pulse 80   Temp 97.5  F (36.4  C) (Temporal)   Resp 18   Ht 1.815 m (5' 11.46\")   Wt (!) 180.5 kg (398 lb)   SpO2 100%   BMI 54.80 kg/m      Physical Exam  General Appearance:  Alert, cooperative, no distress, appears stated age.  Head:  Normocephalic, without obvious abnormality, atraumatic.  Eyes:  Conjunctivae/corneas clear, extraocular movements intact both eyes.  Lungs:  Clear to auscultation bilaterally, respirations unlabored.  Heart:  Regular rate and rhythm, S1 and S2 normal, no murmur, rub or gallop.  Abdomen:  Soft, non-tender, bowel sounds active all four quadrants.   Extremities:  Atraumatic, no cyanosis or edema.  Left foot with mild tenderness to palpation of the fourth and fifth metatarsal bones, otherwise no erythema, swelling, warmth.  Skin:  Skin color, texture, turgor normal, no rashes or lesions.  Neurologic: No focal deficits.      ASSESSMENT/PLAN:   Bigg was seen today for physical.    Diagnoses and all orders for this visit:    Routine general medical examination at a health care facility  -     REVIEW OF HEALTH MAINTENANCE PROTOCOL ORDERS  -     Comprehensive metabolic panel; Future  -     Comprehensive metabolic panel    Vitamin D deficiency  Vitamin D deficient on labs today.  We will start supplementation weekly for 3 months, plan to recheck in 3 months.  -     Vitamin D Deficiency; Future  -     Vitamin D Deficiency  -     vitamin D2 (ERGOCALCIFEROL) 30713 units (1250 mcg) capsule; Take 1 capsule (50,000 Units) by mouth once a week  -     Vitamin D Deficiency; Future    Essential hypertension  Chronic, well controlled.  Continue current management.  -     losartan " Eyeglasses (COZAAR) 100 MG tablet; Take 1 tablet (100 mg) by mouth daily    TRACEE (obstructive sleep apnea)  Chronic, patient reports that he needs new supplies for CPAP and would like a whole new CPAP machine if possible.  Does not know his exact settings.  CPAP ordered.  -     CPAP Order for DME - ONLY FOR DME    Pre-diabetes  Class 3 severe obesity due to excess calories with serious comorbidity and body mass index (BMI) of 50.0 to 59.9 in adult (H)  Patient reports that he was previously on semaglutide for weight loss.  Reports that he does not think his insurance will cover it, however it might cover Wegovy.  We will try prescribing semaglutide.  Will refer to weight loss clinic for further management.  -     TSH with free T4 reflex; Future  -     Comprehensive Weight Management; Future  -     TSH with free T4 reflex   -     semaglutide (OZEMPIC) 2 MG/1.5ML SOPN pen; Inject 0.25 mg subcutaneously once weekly for 4 weeks then 0.5 mg once weekly.  - Discussed Lifestyle modifications, including: Increasing intake of vegetables and fruits, decreasing intake of processed foods/carbohydrates/sugars, having regular physical activity, and losing weight.  -Follow-up with weight loss clinic      Chronic gout of multiple sites, unspecified cause  Chronic, having current flareup per patient, however physical exam does not seem consistent with a gout flare.  Given the patient reports flares in midfoot before, will order x-ray, dose of prednisone given due to patient's distress secondary to pain.  -     allopurinol (ZYLOPRIM) 300 MG tablet; Take 1 tablet (300 mg) by mouth daily  -     colchicine (COLCYRS) 0.6 MG tablet; Take 1 tablet (0.6 mg) by mouth daily  -     Uric acid; Future  -     predniSONE (DELTASONE) 20 MG tablet; Take 3 tabs by mouth daily x 3 days, then 2 tabs daily x 3 days, then 1 tab daily x 3 days, then 1/2 tab daily x 3 days.  -     XR Foot Left G/E 3 Views; Future  -     Adult Rheumatology  Referral; Future  -  "    Uric acid    Screening for HIV (human immunodeficiency virus)  -     Cancel: HIV Antigen Antibody Combo; Future    Need for hepatitis C screening test  -     Cancel: Hepatitis C Screen Reflex to HCV RNA Quant and Genotype; Future    Screening for hyperlipidemia  -     Lipid Profile; Future  -     Lipid Profile    Screening for diabetes mellitus  -     Hemoglobin A1c; Future  -     Hemoglobin A1c    Need for COVID-19 vaccine  -     COVID-19,PF,MODERNA BIVALENT (18+YRS)        Patient has been advised of split billing requirements and indicates understanding: Yes      COUNSELING:   Reviewed preventive health counseling, as reflected in patient instructions       Regular exercise       Healthy diet/nutrition       Vision screening       Hearing screening       Safe sex practices/STD prevention       Consider Hep C screening for all patients one time for ages 18-79 years       Syphilis screening for high risk patients        HIV screeninx in teen years, 1x in adult years, and at intervals if high risk       Advance Care Planning      BMI:   Estimated body mass index is 54.8 kg/m  as calculated from the following:    Height as of this encounter: 1.815 m (5' 11.46\").    Weight as of this encounter: 180.5 kg (398 lb).   Weight management plan: Discussed healthy diet and exercise guidelines      He reports that he quit smoking about 2 years ago. His smoking use included cigars. He has never used smokeless tobacco.            Ruth Dinh MD  [facility]  "

## 2023-05-16 NOTE — PATIENT PROFILE ADULT - NSPROPASSIVESMOKEEXPOSURE_GEN_A_NUR
This was a shared visit with the KADEN. I reviewed and verified the documentation and independently performed the documented: No

## 2023-10-24 NOTE — ED PROVIDER NOTE - ATTENDING WITH...
Paula Rueda MD;  Location: [facility] ENDOSCOPY;  Service: Gastroenterology;  Laterality: Left;    EGD, WITH BANDING OF VARICES N/A 6/28/2023    Procedure: EGD, WITH BANDING OF VARICES;  Surgeon: Paula Rueda MD;  Location: [facility] ENDOSCOPY;  Service: Gastroenterology;  Laterality: N/A;    EGD, WITH BANDING OF VARICES N/A 7/26/2023    Procedure: EGD, WITH BANDING OF VARICES;  Surgeon: Paula Rueda MD;  Location: [facility] ENDOSCOPY;  Service: Gastroenterology;  Laterality: N/A;    EGD, WITH BANDING OF VARICES  8/5/2023    Procedure: EGD, WITH BANDING OF VARICES;  Surgeon: Devon Boateng MD;  Location: [facility] OR;  Service: Gastroenterology;;    esophageal varices surgery       ESOPHAGOGASTRODUODENOSCOPY N/A 8/5/2023    Procedure: EGD (ESOPHAGOGASTRODUODENOSCOPY);  Surgeon: Devon Boateng MD;  Location: [facility];  Service: Gastroenterology;  Laterality: N/A;    INSERTION OF TRANSJUGULAR INTRAHEPATIC PORTOSYSTEMIC SHUNT (TIPS) N/A 8/5/2023    Procedure: INSERTION, SHUNT, TRANSJUGULAR, INTRAHEPATIC PORTOSYSTEMIC;  Surgeon: Carri Barrientos;  Location: [facility];  [id number]]                             Mat Antonio, DO        Medical Decision Making  46 [facility] [address]  [phone]      [facility] - Emergency Dept Emergency Medicine  If symptoms worsen Counts include 234 beds at the [facility] [id number] [phone]               Mat Antonio DO  10/25/23 2221     Resident

## 2023-11-16 NOTE — PROGRESS NOTE ADULT - ASSESSMENT
62  yr    h/o  old  DVT, RA, UC, BPH (following PSA yearly), recurrent UTI, acid reflux, and multiple sclerosis     uses  wheelchair when he leaves the house.  has  leg weakness secondary to  MS dx  ini  2013 and follows with Dr. Paredes    , and has been on several disease modifying treatments / ocrelizumab infusions. / dalfampridine in the past but this was discontinued after he had a seizure.    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  His main issues with multiple sclerosis are right-sided weakness and incoordination,   b/l  legweakness,   and increased urinary frequency and urgency.       *  fevers,  from uti   on iv  Rocephin      wbc  was  18,000  on  arrival   *   progressive multiple sclerosis,  with  weakness.  falguni  legs    pt  with  confusion  at  times  from   dementia  ct  head,  no acute  pathology// demyelination   hematuria  in ferrari,  resolving  on dvt ppx  UTI, E  coli, on rocephin, await sensitivity       r     Discontinue Regimen: OTC hydrocortisone Plan: Advised we can try ILK if no improvement. Rash flared with stress (son going through a bad divorce) Detail Level: Zone Initiate Treatment: Fluocinonide 0.05% topical solution

## 2023-11-30 NOTE — PHYSICAL THERAPY INITIAL EVALUATION ADULT - REFERRAL TO ANOTHER SERVICE NEEDED, PT EVAL
PATIENT IS NOT FEELING WELL, CANCELED TODAY  
occupational therapy/Pt requesting OT consult for hands

## 2024-05-17 NOTE — DISCHARGE NOTE PROVIDER - NSDCCPGOAL_GEN_ALL_CORE_FT
Patient given discharge paperwork and verbalized understanding of discharge instructions, medications, and follow ups from today's visit.    
Pt assisted with bedpan.   
To get better and follow your care plan as instructed.

## 2024-08-22 NOTE — PATIENT PROFILE ADULT - PACKS YRS CALCULATION
08/22/24                            Jessica T Sr  [address] [facility] [address] [place] [id number]    To Whom It May Concern:    This is to certify Jessica Sr was evaluated with Christelle Lake CNP on 08/22/24 and can return to regular work on 8/24 or 8/25 when feeling better.     RESTRICTIONS: none            Electronically signed by:  Christelle Lake CNP  [facility] at [address] [address]  Dept Phone: [phone]   
0

## 2024-09-27 ENCOUNTER — EMERGENCY (EMERGENCY)
Facility: HOSPITAL | Age: 64
LOS: 1 days | Discharge: HOME | End: 2024-09-27
Attending: EMERGENCY MEDICINE
Payer: MEDICARE

## 2024-09-27 VITALS
SYSTOLIC BLOOD PRESSURE: 158 MMHG | DIASTOLIC BLOOD PRESSURE: 79 MMHG | OXYGEN SATURATION: 94 % | HEART RATE: 65 BPM | RESPIRATION RATE: 20 BRPM | TEMPERATURE: 98 F

## 2024-09-27 LAB
ALBUMIN SERPL ELPH-MCNC: 3.1 G/DL — LOW (ref 3.3–5)
ALP SERPL-CCNC: 110 U/L — SIGNIFICANT CHANGE UP (ref 40–120)
ALT FLD-CCNC: 15 U/L — SIGNIFICANT CHANGE UP (ref 10–45)
ANION GAP SERPL CALC-SCNC: 9 MMOL/L — SIGNIFICANT CHANGE UP (ref 5–17)
APPEARANCE UR: ABNORMAL
AST SERPL-CCNC: 20 U/L — SIGNIFICANT CHANGE UP (ref 10–40)
BACTERIA # UR AUTO: ABNORMAL /HPF
BASOPHILS # BLD AUTO: 0.07 K/UL — SIGNIFICANT CHANGE UP (ref 0–0.2)
BASOPHILS NFR BLD AUTO: 0.9 % — SIGNIFICANT CHANGE UP (ref 0–2)
BILIRUB SERPL-MCNC: 0.4 MG/DL — SIGNIFICANT CHANGE UP (ref 0.2–1.2)
BILIRUB UR-MCNC: NEGATIVE — SIGNIFICANT CHANGE UP
BUN SERPL-MCNC: 23 MG/DL — SIGNIFICANT CHANGE UP (ref 7–23)
CALCIUM SERPL-MCNC: 8.5 MG/DL — SIGNIFICANT CHANGE UP (ref 8.4–10.5)
CAST: 4 /LPF — SIGNIFICANT CHANGE UP (ref 0–4)
CHLORIDE SERPL-SCNC: 105 MMOL/L — SIGNIFICANT CHANGE UP (ref 96–108)
CO2 SERPL-SCNC: 25 MMOL/L — SIGNIFICANT CHANGE UP (ref 22–31)
COLOR SPEC: SIGNIFICANT CHANGE UP
CREAT SERPL-MCNC: 0.77 MG/DL — SIGNIFICANT CHANGE UP (ref 0.5–1.3)
DIFF PNL FLD: ABNORMAL
EGFR: 99 ML/MIN/1.73M2 — SIGNIFICANT CHANGE UP
EOSINOPHIL # BLD AUTO: 0.68 K/UL — HIGH (ref 0–0.5)
EOSINOPHIL NFR BLD AUTO: 9.1 % — HIGH (ref 0–6)
GLUCOSE SERPL-MCNC: 89 MG/DL — SIGNIFICANT CHANGE UP (ref 70–99)
GLUCOSE UR QL: NEGATIVE MG/DL — SIGNIFICANT CHANGE UP
HCT VFR BLD CALC: 42.2 % — SIGNIFICANT CHANGE UP (ref 39–50)
HGB BLD-MCNC: 13.8 G/DL — SIGNIFICANT CHANGE UP (ref 13–17)
IMM GRANULOCYTES NFR BLD AUTO: 0.5 % — SIGNIFICANT CHANGE UP (ref 0–0.9)
KETONES UR-MCNC: ABNORMAL MG/DL
LEUKOCYTE ESTERASE UR-ACNC: ABNORMAL
LYMPHOCYTES # BLD AUTO: 1.24 K/UL — SIGNIFICANT CHANGE UP (ref 1–3.3)
LYMPHOCYTES # BLD AUTO: 16.6 % — SIGNIFICANT CHANGE UP (ref 13–44)
MCHC RBC-ENTMCNC: 30.8 PG — SIGNIFICANT CHANGE UP (ref 27–34)
MCHC RBC-ENTMCNC: 32.7 GM/DL — SIGNIFICANT CHANGE UP (ref 32–36)
MCV RBC AUTO: 94.2 FL — SIGNIFICANT CHANGE UP (ref 80–100)
MONOCYTES # BLD AUTO: 0.72 K/UL — SIGNIFICANT CHANGE UP (ref 0–0.9)
MONOCYTES NFR BLD AUTO: 9.6 % — SIGNIFICANT CHANGE UP (ref 2–14)
NEUTROPHILS # BLD AUTO: 4.74 K/UL — SIGNIFICANT CHANGE UP (ref 1.8–7.4)
NEUTROPHILS NFR BLD AUTO: 63.3 % — SIGNIFICANT CHANGE UP (ref 43–77)
NITRITE UR-MCNC: POSITIVE
NRBC # BLD: 0 /100 WBCS — SIGNIFICANT CHANGE UP (ref 0–0)
PH UR: 6 — SIGNIFICANT CHANGE UP (ref 5–8)
PLATELET # BLD AUTO: 184 K/UL — SIGNIFICANT CHANGE UP (ref 150–400)
POTASSIUM SERPL-MCNC: 4.1 MMOL/L — SIGNIFICANT CHANGE UP (ref 3.5–5.3)
POTASSIUM SERPL-SCNC: 4.1 MMOL/L — SIGNIFICANT CHANGE UP (ref 3.5–5.3)
PROT SERPL-MCNC: 6 G/DL — SIGNIFICANT CHANGE UP (ref 6–8.3)
PROT UR-MCNC: 100 MG/DL
RBC # BLD: 4.48 M/UL — SIGNIFICANT CHANGE UP (ref 4.2–5.8)
RBC # FLD: 13.4 % — SIGNIFICANT CHANGE UP (ref 10.3–14.5)
RBC CASTS # UR COMP ASSIST: 154 /HPF — HIGH (ref 0–4)
SODIUM SERPL-SCNC: 139 MMOL/L — SIGNIFICANT CHANGE UP (ref 135–145)
SP GR SPEC: 1.02 — SIGNIFICANT CHANGE UP (ref 1–1.03)
SQUAMOUS # UR AUTO: 0 /HPF — SIGNIFICANT CHANGE UP (ref 0–5)
UROBILINOGEN FLD QL: 1 MG/DL — SIGNIFICANT CHANGE UP (ref 0.2–1)
WBC # BLD: 7.49 K/UL — SIGNIFICANT CHANGE UP (ref 3.8–10.5)
WBC # FLD AUTO: 7.49 K/UL — SIGNIFICANT CHANGE UP (ref 3.8–10.5)
WBC UR QL: >998 /HPF — HIGH (ref 0–5)

## 2024-09-27 PROCEDURE — 93971 EXTREMITY STUDY: CPT | Mod: 26,RT

## 2024-09-27 PROCEDURE — 99284 EMERGENCY DEPT VISIT MOD MDM: CPT | Mod: GC

## 2024-09-27 NOTE — ED ADULT NURSE NOTE - OBJECTIVE STATEMENT
64Y M AXO4 PMH of MS and BPH presented to the ED from home via EMS c/o R arm, bilateral lower extremity swelling x 2 weeks. Pt reports being non-compliant with Lasix for "a while." Upon arrival to the ED, the pt is well appearing, has bilateral even and unlabored chest rise, airway is patent, and pt is bed-bound non-ambulatory at baseline. Upon assessment, pt has even and bilateral peripheral pulses, ROM at baseline, PERRLA, soft, non-tender, non-distended abdomen. Stage 1 pressure ulcer noted to sacrum, barrier cream and allovan pad placed. Pitting edema noted to RUE and bilateral lower extremities. Pt reports being incontinent pt placed on condom catheter for comfort.   Pt denies fevers, chills, chest pain, SOB, n/v/d, headache, dizziness, urinary symptoms, and black or bloody stools. Comfort and safety provided, bed in lowest position, side rails up. Pt's family at bedside.

## 2024-09-27 NOTE — ED ADULT NURSE NOTE - NSFALLHARMRISKINTERV_ED_ALL_ED

## 2024-09-28 VITALS
DIASTOLIC BLOOD PRESSURE: 85 MMHG | SYSTOLIC BLOOD PRESSURE: 140 MMHG | OXYGEN SATURATION: 95 % | HEART RATE: 70 BPM | RESPIRATION RATE: 20 BRPM | TEMPERATURE: 98 F

## 2024-09-28 LAB
ALBUMIN SERPL ELPH-MCNC: 3.3 G/DL — SIGNIFICANT CHANGE UP (ref 3.3–5)
ALP SERPL-CCNC: 118 U/L — SIGNIFICANT CHANGE UP (ref 40–120)
ALT FLD-CCNC: 11 U/L — SIGNIFICANT CHANGE UP (ref 10–45)
ANION GAP SERPL CALC-SCNC: 9 MMOL/L — SIGNIFICANT CHANGE UP (ref 5–17)
APTT BLD: 31.5 SEC — SIGNIFICANT CHANGE UP (ref 24.5–35.6)
AST SERPL-CCNC: 11 U/L — SIGNIFICANT CHANGE UP (ref 10–40)
BILIRUB SERPL-MCNC: 0.4 MG/DL — SIGNIFICANT CHANGE UP (ref 0.2–1.2)
BUN SERPL-MCNC: 23 MG/DL — SIGNIFICANT CHANGE UP (ref 7–23)
CALCIUM SERPL-MCNC: 8.5 MG/DL — SIGNIFICANT CHANGE UP (ref 8.4–10.5)
CHLORIDE SERPL-SCNC: 106 MMOL/L — SIGNIFICANT CHANGE UP (ref 96–108)
CO2 SERPL-SCNC: 28 MMOL/L — SIGNIFICANT CHANGE UP (ref 22–31)
CREAT SERPL-MCNC: 0.92 MG/DL — SIGNIFICANT CHANGE UP (ref 0.5–1.3)
EGFR: 92 ML/MIN/1.73M2 — SIGNIFICANT CHANGE UP
GLUCOSE SERPL-MCNC: 91 MG/DL — SIGNIFICANT CHANGE UP (ref 70–99)
INR BLD: 1.03 RATIO — SIGNIFICANT CHANGE UP (ref 0.85–1.16)
POTASSIUM SERPL-MCNC: 3.4 MMOL/L — LOW (ref 3.5–5.3)
POTASSIUM SERPL-SCNC: 3.4 MMOL/L — LOW (ref 3.5–5.3)
PROT SERPL-MCNC: 5.9 G/DL — LOW (ref 6–8.3)
PROTHROM AB SERPL-ACNC: 11.8 SEC — SIGNIFICANT CHANGE UP (ref 9.9–13.4)
SODIUM SERPL-SCNC: 143 MMOL/L — SIGNIFICANT CHANGE UP (ref 135–145)

## 2024-09-28 PROCEDURE — 85025 COMPLETE CBC W/AUTO DIFF WBC: CPT

## 2024-09-28 PROCEDURE — 36415 COLL VENOUS BLD VENIPUNCTURE: CPT

## 2024-09-28 PROCEDURE — 93005 ELECTROCARDIOGRAM TRACING: CPT

## 2024-09-28 PROCEDURE — 96374 THER/PROPH/DIAG INJ IV PUSH: CPT

## 2024-09-28 PROCEDURE — 81001 URINALYSIS AUTO W/SCOPE: CPT

## 2024-09-28 PROCEDURE — 87186 SC STD MICRODIL/AGAR DIL: CPT

## 2024-09-28 PROCEDURE — 93971 EXTREMITY STUDY: CPT

## 2024-09-28 PROCEDURE — 99285 EMERGENCY DEPT VISIT HI MDM: CPT | Mod: 25

## 2024-09-28 PROCEDURE — 87086 URINE CULTURE/COLONY COUNT: CPT

## 2024-09-28 PROCEDURE — 85730 THROMBOPLASTIN TIME PARTIAL: CPT

## 2024-09-28 PROCEDURE — 80053 COMPREHEN METABOLIC PANEL: CPT

## 2024-09-28 PROCEDURE — 85610 PROTHROMBIN TIME: CPT

## 2024-09-28 RX ORDER — CEFPODOXIME PROXETIL 100 MG/5ML
1 GRANULE, FOR SUSPENSION ORAL
Qty: 10 | Refills: 0
Start: 2024-09-28 | End: 2024-10-02

## 2024-09-28 RX ADMIN — Medication 100 MILLIGRAM(S): at 00:35

## 2024-09-28 NOTE — ED PROVIDER NOTE - OBJECTIVE STATEMENT
The patient is a 64-year-old male with a history of MS who presents for right extremity swelling that he noticed today.  Denies any anticoagulant use or trauma to the extremity, endorses that he has weakness in the body that is intermittent consistent with MS, bedbound at home has a bit visits weekly and familial support at home.  Denies any chest pain, shortness of breath, abdominal pain, nausea, vomiting.  Endorses that he does have a feeling of burning after urination and feeling of incomplete emptying.

## 2024-09-28 NOTE — ED PROVIDER NOTE - ATTENDING CONTRIBUTION TO CARE
This is a 64-year-old gentleman who has a history of multiple sclerosis and has 2 complaints.  His right arm is swollen and he feels like he has trouble to urinate completely and urinates more after an urination.  No fevers chills shortness of breath or chest pain.  No leg swelling.  No recent IVs placed in that arm or surgery in that arm.  Patient has a prescription for Lasix and he stopped taking it though it is not clear when or why.  Awake alert talking no acute distress.  2+ radial pulse on the right.  Sensory intact though motor is significantly decreased and he is barely able to move the fingers let alone hold it against gravity.  Abdomen is soft nontender and there is no suprapubic tenderness  Suspect cystitis versus urinary retention.  Postvoid residual bladder scan.  CBC CMP urinalysis and culture.  Upper extremity duplex.  I suspect there is a component of obesity hypoventilation syndrome as well given his o2 sat though it may be MS related I do not suspect a pulmonary embolism unless there is an upper extremity DVT.  No findings concerning for lower extremity DVT.. This is a 64-year-old gentleman who has a history of multiple sclerosis and has 2 complaints.  His right arm is swollen and he feels like he has trouble to urinate completely and urinates more after an urination.  No fevers chills shortness of breath or chest pain.  No leg swelling.  No recent IVs placed in that arm or surgery in that arm.  Patient has a prescription for Lasix and he stopped taking it though it is not clear when or why.  Awake alert talking no acute distress.  2+ radial pulse on the right.  Sensory intact though motor is significantly decreased and he is barely able to move the fingers let alone hold it against gravity.  Abdomen is soft nontender and there is no suprapubic tenderness  Suspect cystitis versus urinary retention.  Postvoid residual bladder scan.  CBC CMP urinalysis and culture.  Upper extremity duplex.  I suspect there is a component of obesity hypoventilation syndrome as well given his o2 sat though it may be MS related I do not suspect a pulmonary embolism unless there is an upper extremity DVT.  No findings concerning for lower extremity DVT..  Endorsed to overnight MD at change of shift.

## 2024-09-28 NOTE — ED PROVIDER NOTE - PHYSICAL EXAMINATION
Physical Exam:  Gen: NAD, non-toxic appearing  Head: NCAT  HEENT: Normal conjunctiva, trachea midline, moist mucous membranes  Lung: CTAB, no respiratory distress, no wheezes/rhonchi/rales B/L, speaking in full sentences  CV: RRR, no murmurs, rubs or gallops  Abd: Soft, NT, ND, no guarding, rigidity, rebound tenderness, or CVA tenderness   MSK: Right extremity greater than left extremity, 2+ DP pulses bilaterally, compartments soft  Skin: Warm, well perfused, no visible rashes, no leg swelling  Psych: appropriate affect and mood

## 2024-09-28 NOTE — ED PROVIDER NOTE - CLINICAL SUMMARY MEDICAL DECISION MAKING FREE TEXT BOX
The patient is a 64-year-old male with a history of MS who presents for right extremity swelling that he noticed today. Concern for DVT as patient is bedbound and has right lower extremity swelling.  Plan for DVT study.  Plan for CBC, CMP to rule out anemia or electrolyte abnormalities particularly LINSEY in the setting of BPH and feeling of incomplete emptying.  Plan for bladder scan following urination.  Plan for UA UC.  Will most likely discharge home pending results of ultrasound.

## 2024-09-28 NOTE — ED PROVIDER NOTE - PATIENT PORTAL LINK FT
You can access the FollowMyHealth Patient Portal offered by [facility] by registering at the following website: [email or web address]. By joining [facility]’s FollowMyHealth portal, you will also be able to view your health information using other applications (apps) compatible with our system.

## 2024-10-01 LAB
-  AMOXICILLIN/CLAVULANIC ACID: SIGNIFICANT CHANGE UP
-  AMPICILLIN/SULBACTAM: SIGNIFICANT CHANGE UP
-  AMPICILLIN: SIGNIFICANT CHANGE UP
-  AZTREONAM: SIGNIFICANT CHANGE UP
-  CEFAZOLIN: SIGNIFICANT CHANGE UP
-  CEFEPIME: SIGNIFICANT CHANGE UP
-  CEFOXITIN: SIGNIFICANT CHANGE UP
-  CEFTRIAXONE: SIGNIFICANT CHANGE UP
-  CEFUROXIME: SIGNIFICANT CHANGE UP
-  CIPROFLOXACIN: SIGNIFICANT CHANGE UP
-  ERTAPENEM: SIGNIFICANT CHANGE UP
-  GENTAMICIN: SIGNIFICANT CHANGE UP
-  IMIPENEM: SIGNIFICANT CHANGE UP
-  LEVOFLOXACIN: SIGNIFICANT CHANGE UP
-  MEROPENEM: SIGNIFICANT CHANGE UP
-  NITROFURANTOIN: SIGNIFICANT CHANGE UP
-  PIPERACILLIN/TAZOBACTAM: SIGNIFICANT CHANGE UP
-  TOBRAMYCIN: SIGNIFICANT CHANGE UP
-  TRIMETHOPRIM/SULFAMETHOXAZOLE: SIGNIFICANT CHANGE UP
CULTURE RESULTS: ABNORMAL
METHOD TYPE: SIGNIFICANT CHANGE UP
ORGANISM # SPEC MICROSCOPIC CNT: ABNORMAL
ORGANISM # SPEC MICROSCOPIC CNT: ABNORMAL
SPECIMEN SOURCE: SIGNIFICANT CHANGE UP

## 2024-11-15 NOTE — PATIENT PROFILE ADULT - DO YOU FEEL THREATENED BY OTHERS?
[FreeTextEntry3] : I was physically present for the key portions of the evaluation and management service provided.  I agree with the history and physical, and plan which I have reviewed and edited where appropriate.  Guero is here for follow-up he is on adjuvant capecitabine for stage II colon cancer he is doing well his postop Signatera was negative we will continue to monitor blood work will check CEA every 3 months he is to complete 8 cycles and is due for cycle 4 soon no

## 2024-12-20 NOTE — ED ADULT NURSE NOTE - CHPI ED NUR SYMPTOMS POS
Price (Do Not Change): 0.00 Detail Level: Simple Instructions: This plan will send the code FBSE to the PM system.  DO NOT or CHANGE the price. CONSTIPATION/NAUSEA/VOMITING

## 2025-03-04 NOTE — ED PROVIDER NOTE - WR ORDER DATE AND TIME 1
Abdomen , soft, nontender, nondistended , no guarding or rigidity , no masses palpable , normal bowel sounds , Liver and Spleen,  no hepatosplenomegaly , liver nontender
08-Jul-2022 15:34

## 2025-06-12 ENCOUNTER — INPATIENT (INPATIENT)
Facility: HOSPITAL | Age: 65
LOS: 7 days | Discharge: HOME | DRG: 872 | End: 2025-06-20
Attending: INTERNAL MEDICINE | Admitting: INTERNAL MEDICINE
Payer: MEDICARE

## 2025-06-12 VITALS
HEIGHT: 76 IN | DIASTOLIC BLOOD PRESSURE: 82 MMHG | RESPIRATION RATE: 22 BRPM | OXYGEN SATURATION: 93 % | TEMPERATURE: 100 F | HEART RATE: 108 BPM | WEIGHT: 315 LBS | SYSTOLIC BLOOD PRESSURE: 158 MMHG

## 2025-06-12 DIAGNOSIS — A41.9 SEPSIS, UNSPECIFIED ORGANISM: ICD-10-CM

## 2025-06-12 LAB
ALBUMIN SERPL ELPH-MCNC: 3.5 G/DL — SIGNIFICANT CHANGE UP (ref 3.3–5)
ALP SERPL-CCNC: 127 U/L — HIGH (ref 40–120)
ALT FLD-CCNC: 14 U/L — SIGNIFICANT CHANGE UP (ref 10–45)
ANION GAP SERPL CALC-SCNC: 12 MMOL/L — SIGNIFICANT CHANGE UP (ref 5–17)
APPEARANCE UR: CLEAR — SIGNIFICANT CHANGE UP
APTT BLD: 29.3 SEC — SIGNIFICANT CHANGE UP (ref 26.1–36.8)
AST SERPL-CCNC: 20 U/L — SIGNIFICANT CHANGE UP (ref 10–40)
BACTERIA # UR AUTO: NEGATIVE /HPF — SIGNIFICANT CHANGE UP
BASOPHILS # BLD AUTO: 0.06 K/UL — SIGNIFICANT CHANGE UP (ref 0–0.2)
BASOPHILS NFR BLD AUTO: 0.5 % — SIGNIFICANT CHANGE UP (ref 0–2)
BILIRUB SERPL-MCNC: 0.5 MG/DL — SIGNIFICANT CHANGE UP (ref 0.2–1.2)
BILIRUB UR-MCNC: NEGATIVE — SIGNIFICANT CHANGE UP
BUN SERPL-MCNC: 19 MG/DL — SIGNIFICANT CHANGE UP (ref 7–23)
CALCIUM SERPL-MCNC: 8.4 MG/DL — SIGNIFICANT CHANGE UP (ref 8.4–10.5)
CAST: 1 /LPF — SIGNIFICANT CHANGE UP (ref 0–4)
CHLORIDE SERPL-SCNC: 102 MMOL/L — SIGNIFICANT CHANGE UP (ref 96–108)
CO2 SERPL-SCNC: 23 MMOL/L — SIGNIFICANT CHANGE UP (ref 22–31)
COLOR SPEC: ABNORMAL
CREAT SERPL-MCNC: 0.82 MG/DL — SIGNIFICANT CHANGE UP (ref 0.5–1.3)
DIFF PNL FLD: ABNORMAL
EGFR: 96 ML/MIN/1.73M2 — SIGNIFICANT CHANGE UP
EGFR: 96 ML/MIN/1.73M2 — SIGNIFICANT CHANGE UP
EOSINOPHIL # BLD AUTO: 0.35 K/UL — SIGNIFICANT CHANGE UP (ref 0–0.5)
EOSINOPHIL NFR BLD AUTO: 2.7 % — SIGNIFICANT CHANGE UP (ref 0–6)
FLUAV AG NPH QL: SIGNIFICANT CHANGE UP
FLUBV AG NPH QL: SIGNIFICANT CHANGE UP
GAS PNL BLDV: SIGNIFICANT CHANGE UP
GLUCOSE SERPL-MCNC: 93 MG/DL — SIGNIFICANT CHANGE UP (ref 70–99)
GLUCOSE UR QL: NEGATIVE MG/DL — SIGNIFICANT CHANGE UP
HCT VFR BLD CALC: 47 % — SIGNIFICANT CHANGE UP (ref 39–50)
HGB BLD-MCNC: 15.7 G/DL — SIGNIFICANT CHANGE UP (ref 13–17)
IMM GRANULOCYTES NFR BLD AUTO: 0.5 % — SIGNIFICANT CHANGE UP (ref 0–0.9)
INR BLD: 1.07 RATIO — SIGNIFICANT CHANGE UP (ref 0.85–1.16)
KETONES UR QL: NEGATIVE MG/DL — SIGNIFICANT CHANGE UP
LEUKOCYTE ESTERASE UR-ACNC: ABNORMAL
LYMPHOCYTES # BLD AUTO: 0.43 K/UL — LOW (ref 1–3.3)
LYMPHOCYTES # BLD AUTO: 3.3 % — LOW (ref 13–44)
MCHC RBC-ENTMCNC: 31.3 PG — SIGNIFICANT CHANGE UP (ref 27–34)
MCHC RBC-ENTMCNC: 33.4 G/DL — SIGNIFICANT CHANGE UP (ref 32–36)
MCV RBC AUTO: 93.6 FL — SIGNIFICANT CHANGE UP (ref 80–100)
MONOCYTES # BLD AUTO: 0.76 K/UL — SIGNIFICANT CHANGE UP (ref 0–0.9)
MONOCYTES NFR BLD AUTO: 5.9 % — SIGNIFICANT CHANGE UP (ref 2–14)
NEUTROPHILS # BLD AUTO: 11.24 K/UL — HIGH (ref 1.8–7.4)
NEUTROPHILS NFR BLD AUTO: 87.1 % — HIGH (ref 43–77)
NITRITE UR-MCNC: NEGATIVE — SIGNIFICANT CHANGE UP
NRBC BLD AUTO-RTO: 0 /100 WBCS — SIGNIFICANT CHANGE UP (ref 0–0)
PH UR: 6 — SIGNIFICANT CHANGE UP (ref 5–8)
PLATELET # BLD AUTO: 156 K/UL — SIGNIFICANT CHANGE UP (ref 150–400)
POTASSIUM SERPL-MCNC: 4.3 MMOL/L — SIGNIFICANT CHANGE UP (ref 3.5–5.3)
POTASSIUM SERPL-SCNC: 4.3 MMOL/L — SIGNIFICANT CHANGE UP (ref 3.5–5.3)
PROT SERPL-MCNC: 6.4 G/DL — SIGNIFICANT CHANGE UP (ref 6–8.3)
PROT UR-MCNC: 30 MG/DL
PROTHROM AB SERPL-ACNC: 12.2 SEC — SIGNIFICANT CHANGE UP (ref 9.9–13.4)
RBC # BLD: 5.02 M/UL — SIGNIFICANT CHANGE UP (ref 4.2–5.8)
RBC # FLD: 13.9 % — SIGNIFICANT CHANGE UP (ref 10.3–14.5)
RBC CASTS # UR COMP ASSIST: 32 /HPF — HIGH (ref 0–4)
RSV RNA NPH QL NAA+NON-PROBE: SIGNIFICANT CHANGE UP
SARS-COV-2 RNA SPEC QL NAA+PROBE: SIGNIFICANT CHANGE UP
SODIUM SERPL-SCNC: 137 MMOL/L — SIGNIFICANT CHANGE UP (ref 135–145)
SOURCE RESPIRATORY: SIGNIFICANT CHANGE UP
SP GR SPEC: 1.01 — SIGNIFICANT CHANGE UP (ref 1–1.03)
SQUAMOUS # UR AUTO: 1 /HPF — SIGNIFICANT CHANGE UP (ref 0–5)
UROBILINOGEN FLD QL: 1 MG/DL — SIGNIFICANT CHANGE UP (ref 0.2–1)
WBC # BLD: 12.91 K/UL — HIGH (ref 3.8–10.5)
WBC # FLD AUTO: 12.91 K/UL — HIGH (ref 3.8–10.5)
WBC UR QL: 5 /HPF — SIGNIFICANT CHANGE UP (ref 0–5)

## 2025-06-12 PROCEDURE — 99285 EMERGENCY DEPT VISIT HI MDM: CPT | Mod: FS

## 2025-06-12 PROCEDURE — 74177 CT ABD & PELVIS W/CONTRAST: CPT | Mod: 26

## 2025-06-12 PROCEDURE — 93010 ELECTROCARDIOGRAM REPORT: CPT

## 2025-06-12 PROCEDURE — 71045 X-RAY EXAM CHEST 1 VIEW: CPT | Mod: 26

## 2025-06-12 RX ORDER — OXYBUTYNIN CHLORIDE 5 MG/1
10 TABLET, FILM COATED, EXTENDED RELEASE ORAL DAILY
Refills: 0 | Status: DISCONTINUED | OUTPATIENT
Start: 2025-06-12 | End: 2025-06-12

## 2025-06-12 RX ORDER — SERTRALINE 100 MG/1
50 TABLET, FILM COATED ORAL DAILY
Refills: 0 | Status: DISCONTINUED | OUTPATIENT
Start: 2025-06-12 | End: 2025-06-20

## 2025-06-12 RX ORDER — B1/B2/B3/B5/B6/B12/VIT C/FOLIC 500-0.5 MG
1 TABLET ORAL DAILY
Refills: 0 | Status: DISCONTINUED | OUTPATIENT
Start: 2025-06-12 | End: 2025-06-20

## 2025-06-12 RX ORDER — HEPARIN SODIUM 1000 [USP'U]/ML
5000 INJECTION INTRAVENOUS; SUBCUTANEOUS EVERY 8 HOURS
Refills: 0 | Status: DISCONTINUED | OUTPATIENT
Start: 2025-06-12 | End: 2025-06-20

## 2025-06-12 RX ORDER — PIPERACILLIN-TAZO-DEXTROSE,ISO 3.375G/5
3.38 IV SOLUTION, PIGGYBACK PREMIX FROZEN(ML) INTRAVENOUS ONCE
Refills: 0 | Status: COMPLETED | OUTPATIENT
Start: 2025-06-12 | End: 2025-06-12

## 2025-06-12 RX ORDER — TAMSULOSIN HYDROCHLORIDE 0.4 MG/1
0.8 CAPSULE ORAL AT BEDTIME
Refills: 0 | Status: DISCONTINUED | OUTPATIENT
Start: 2025-06-12 | End: 2025-06-20

## 2025-06-12 RX ORDER — TERIFLUNOMIDE 7 MG/1
1 TABLET, FILM COATED ORAL
Refills: 0 | DISCHARGE

## 2025-06-12 RX ORDER — BACLOFEN 10 MG/20ML
5 INJECTION INTRATHECAL EVERY 8 HOURS
Refills: 0 | Status: DISCONTINUED | OUTPATIENT
Start: 2025-06-12 | End: 2025-06-20

## 2025-06-12 RX ORDER — DICLOFENAC SODIUM 75 MG/1
75 TABLET, DELAYED RELEASE ORAL
Refills: 0 | Status: DISCONTINUED | OUTPATIENT
Start: 2025-06-12 | End: 2025-06-20

## 2025-06-12 RX ORDER — PIPERACILLIN-TAZO-DEXTROSE,ISO 3.375G/5
3.38 IV SOLUTION, PIGGYBACK PREMIX FROZEN(ML) INTRAVENOUS EVERY 8 HOURS
Refills: 0 | Status: DISCONTINUED | OUTPATIENT
Start: 2025-06-12 | End: 2025-06-15

## 2025-06-12 RX ORDER — OXYBUTYNIN CHLORIDE 5 MG/1
1 TABLET, FILM COATED, EXTENDED RELEASE ORAL
Refills: 0 | DISCHARGE

## 2025-06-12 RX ORDER — DICLOFENAC SODIUM 75 MG/1
1 TABLET, DELAYED RELEASE ORAL
Refills: 0 | DISCHARGE

## 2025-06-12 RX ORDER — OXYBUTYNIN CHLORIDE 5 MG/1
10 TABLET, FILM COATED, EXTENDED RELEASE ORAL DAILY
Refills: 0 | Status: DISCONTINUED | OUTPATIENT
Start: 2025-06-12 | End: 2025-06-20

## 2025-06-12 RX ORDER — ACETAMINOPHEN 500 MG/5ML
650 LIQUID (ML) ORAL ONCE
Refills: 0 | Status: COMPLETED | OUTPATIENT
Start: 2025-06-12 | End: 2025-06-12

## 2025-06-12 RX ORDER — KETOROLAC TROMETHAMINE 30 MG/ML
15 INJECTION, SOLUTION INTRAMUSCULAR; INTRAVENOUS ONCE
Refills: 0 | Status: DISCONTINUED | OUTPATIENT
Start: 2025-06-12 | End: 2025-06-12

## 2025-06-12 RX ADMIN — Medication 500 MILLILITER(S): at 14:00

## 2025-06-12 RX ADMIN — TAMSULOSIN HYDROCHLORIDE 0.8 MILLIGRAM(S): 0.4 CAPSULE ORAL at 22:07

## 2025-06-12 RX ADMIN — Medication 500 MILLILITER(S): at 18:02

## 2025-06-12 RX ADMIN — BACLOFEN 5 MILLIGRAM(S): 10 INJECTION INTRATHECAL at 22:57

## 2025-06-12 RX ADMIN — Medication 25 GRAM(S): at 22:06

## 2025-06-12 RX ADMIN — Medication 200 GRAM(S): at 14:02

## 2025-06-12 RX ADMIN — Medication 650 MILLIGRAM(S): at 13:59

## 2025-06-12 RX ADMIN — KETOROLAC TROMETHAMINE 15 MILLIGRAM(S): 30 INJECTION, SOLUTION INTRAMUSCULAR; INTRAVENOUS at 18:02

## 2025-06-12 RX ADMIN — HEPARIN SODIUM 5000 UNIT(S): 1000 INJECTION INTRAVENOUS; SUBCUTANEOUS at 22:07

## 2025-06-12 NOTE — ED PROVIDER NOTE - PHYSICAL EXAMINATION
On Physical Exam:  General: well appearing, in NAD, speaking clearly in full sentences and without difficulty; cooperative with exam  HEENT: PERRL, MMM  Neck: no neck tenderness, no nuchal rigidity  Cardiac: tachy s1, s2; no MGR  Lungs: CTABL  Abdomen: soft nontender/nondistended  : no bladder tenderness or distension  Skin: warm, intact, no rash  Extremities: no peripheral edema, no gross deformities  Neuro: no gross neurologic deficits

## 2025-06-12 NOTE — ED PROVIDER NOTE - CLINICAL SUMMARY MEDICAL DECISION MAKING FREE TEXT BOX
ap- 65M w/ pmh of Multiple Sclerosis (MS), seizures, colitis, GERD, BPH presents to the emergency department for fever and generalized weakness. pt is bed bound at baseline. Pt here w weakness and fever this AM wife sick w similar sx. pt w no cp no sob he reports his whole body hurts no dizziness no headache. on exam pt is awake and alert oriented x3 has limited rom of b/l arms and legs unable to move them independently, pt w ctab but diminished b/l pt w n o lower leg edema. plan for labs imaging and reassessment likely tba for iv antibiotics for fever

## 2025-06-12 NOTE — H&P ADULT - HISTORY OF PRESENT ILLNESS
65 year old male patient with a past medical history of Multiple Sclerosis (MS), seizures, colitis, GERD, BPH presents to the emergency department for fever and generalized weakness. The patient reports feeling weak for the past couple of days. He developed a fever this morning and has been experiencing chills. The patient denies any recent cough. He mentions intermittent burning sensation during urination which is not acute. The patient reports wife at home also found to have fever yesterday with unknown source. The patient took extra strength Tylenol at 10:30 AM for symptom relief. The patient lives with his wife, son, and daughter-in-law. patient denies chest pain, Shortness of Breath, abdominal pain, Nausea/Vomiting/Diarrhea, dizziness, confusion, vision changes, urinary symptoms, syncope, falls, trauma, discharge, bleeding,

## 2025-06-12 NOTE — ED PROVIDER NOTE - SHIFT CHANGE DETAILS
I have received sign out on this patient. I am aware of the plan of care and what studies are pending from the previous provider. I have been available for supervision of the predetermined care and intervention if the plan should change.     Patient p/w fever, pending CT and admission

## 2025-06-12 NOTE — ED ADULT NURSE NOTE - OBJECTIVE STATEMENT
64 y/o male with PMH MS, seizures, colitis, GERD, BPH presenting to ED for fevers/ chills that started this morning. Pt took 500mg tylenol at 1030am. He mentions intermittent burning sensation during urination which is not acute. The patient reports wife at home also found to have fever yesterday with unknown source. Upon exam pt aox4 breathing even   spontaneously, pt is sleepy but easily arousable, difficulty moving all extremities 2/2 to MS & generalized weakness, 2+ pitting edema to RUE, b/l lower extremities. pt straight cath'd under sterile technique 200mL clear yellow urine drained. pt denies chest pain, sob, ha, n/v/d, abdominal pain, hematuria

## 2025-06-12 NOTE — PATIENT PROFILE ADULT - FALL HARM RISK - HARM RISK INTERVENTIONS

## 2025-06-12 NOTE — H&P ADULT - NSHPPHYSICALEXAM_GEN_ALL_CORE
Vital Signs Last 24 Hrs  T(C): 37 (12 Jun 2025 18:05), Max: 37.9 (12 Jun 2025 12:02)  T(F): 98.6 (12 Jun 2025 18:05), Max: 100.2 (12 Jun 2025 12:02)  HR: 99 (12 Jun 2025 18:05) (99 - 108)  BP: 158/75 (12 Jun 2025 18:05) (135/78 - 178/79)  BP(mean): --  RR: 20 (12 Jun 2025 18:05) (15 - 22)  SpO2: 95% (12 Jun 2025 18:05) (93% - 96%)    Parameters below as of 12 Jun 2025 18:05  Patient On (Oxygen Delivery Method): room air        PHYSICAL EXAM:  GENERAL: NAD, well-developed  HEAD:  Atraumatic, Normocephalic  EYES: EOMI, PERRLA, conjunctiva and sclera clear  NECK: Supple, No JVD  CHEST/LUNG: Clear to auscultation bilaterally; No wheeze  HEART: Regular rate and rhythm; No murmurs, rubs, or gallops  ABDOMEN: Soft, Nontender, Nondistended; Bowel sounds present  EXTREMITIES:  2+ Peripheral Pulses, No clubbing, cyanosis, or edema  PSYCH: AAOx3  NEUROLOGY: b/l upper and lower ext weakness   SKIN: No rashes or lesions

## 2025-06-12 NOTE — ED PROVIDER NOTE - OBJECTIVE STATEMENT
- 65 year old male patient with a past medical history of Multiple Sclerosis (MS), seizures, colitis, GERD, BPH presents to the emergency department for fever and generalized weakness. The patient reports feeling weak for the past couple of days. He developed a fever this morning and has been experiencing chills. The patient denies any recent cough. He mentions intermittent burning sensation during urination which is not acute. The patient reports wife at home also found to have fever yesterday with unknown source. The patient took extra strength Tylenol at 10:30 AM for symptom relief. The patient lives with his wife, son, and daughter-in-law. patient denies chest pain, Shortness of Breath, abdominal pain, Nausea/Vomiting/Diarrhea, dizziness, confusion, vision changes, urinary symptoms, syncope, falls, trauma, discharge, bleeding,

## 2025-06-12 NOTE — H&P ADULT - ASSESSMENT
65 male h/o MS, sz, colitis, gerd, bph, here with sepsis     sepsis  likely uti vs pyelo  CT noted  empiric abx as ordered  f/u cult  urology consult pending    MS  cont home meds  supportive care    gerd  ppi    dvt ppx    I reviewed the course of events on the unit, re-confirming the patient history.   I discussed the care with the patient and their family. The plan of care was discussed with the ACP team.  Advanced care planning was discussed with patient and family.  Advanced care planning forms were reviewed and discussed as appropriate.  Differential diagnosis and plan of care discussed with patient after the evaluation.   Pain assessed and judicious use of narcotics when appropriate was discussed.  Importance of Fall prevention discussed.  Counseling on Smoking and Alcohol cessation was offered when appropriate.  Counseling on Diet, exercise, and medication compliance was done.

## 2025-06-12 NOTE — PATIENT PROFILE ADULT - FUNCTIONAL ASSESSMENT - BASIC MOBILITY 6.
2-calculated by average/Not able to assess (calculate score using [facility] averaging method)

## 2025-06-13 LAB
ANION GAP SERPL CALC-SCNC: 11 MMOL/L — SIGNIFICANT CHANGE UP (ref 5–17)
BUN SERPL-MCNC: 15 MG/DL — SIGNIFICANT CHANGE UP (ref 7–23)
CALCIUM SERPL-MCNC: 8.2 MG/DL — LOW (ref 8.4–10.5)
CHLORIDE SERPL-SCNC: 101 MMOL/L — SIGNIFICANT CHANGE UP (ref 96–108)
CO2 SERPL-SCNC: 22 MMOL/L — SIGNIFICANT CHANGE UP (ref 22–31)
CREAT SERPL-MCNC: 0.92 MG/DL — SIGNIFICANT CHANGE UP (ref 0.5–1.3)
EGFR: 91 ML/MIN/1.73M2 — SIGNIFICANT CHANGE UP
EGFR: 91 ML/MIN/1.73M2 — SIGNIFICANT CHANGE UP
GLUCOSE SERPL-MCNC: 96 MG/DL — SIGNIFICANT CHANGE UP (ref 70–99)
HCT VFR BLD CALC: 44.3 % — SIGNIFICANT CHANGE UP (ref 39–50)
HGB BLD-MCNC: 14.1 G/DL — SIGNIFICANT CHANGE UP (ref 13–17)
MCHC RBC-ENTMCNC: 29.9 PG — SIGNIFICANT CHANGE UP (ref 27–34)
MCHC RBC-ENTMCNC: 31.8 G/DL — LOW (ref 32–36)
MCV RBC AUTO: 94.1 FL — SIGNIFICANT CHANGE UP (ref 80–100)
NRBC BLD AUTO-RTO: 0 /100 WBCS — SIGNIFICANT CHANGE UP (ref 0–0)
PLATELET # BLD AUTO: 149 K/UL — LOW (ref 150–400)
POTASSIUM SERPL-MCNC: 3.9 MMOL/L — SIGNIFICANT CHANGE UP (ref 3.5–5.3)
POTASSIUM SERPL-SCNC: 3.9 MMOL/L — SIGNIFICANT CHANGE UP (ref 3.5–5.3)
RBC # BLD: 4.71 M/UL — SIGNIFICANT CHANGE UP (ref 4.2–5.8)
RBC # FLD: 13.8 % — SIGNIFICANT CHANGE UP (ref 10.3–14.5)
SODIUM SERPL-SCNC: 134 MMOL/L — LOW (ref 135–145)
WBC # BLD: 10.87 K/UL — HIGH (ref 3.8–10.5)
WBC # FLD AUTO: 10.87 K/UL — HIGH (ref 3.8–10.5)

## 2025-06-13 PROCEDURE — 99222 1ST HOSP IP/OBS MODERATE 55: CPT | Mod: FS

## 2025-06-13 PROCEDURE — 99223 1ST HOSP IP/OBS HIGH 75: CPT

## 2025-06-13 PROCEDURE — 99222 1ST HOSP IP/OBS MODERATE 55: CPT

## 2025-06-13 PROCEDURE — G0545: CPT

## 2025-06-13 RX ADMIN — HEPARIN SODIUM 5000 UNIT(S): 1000 INJECTION INTRAVENOUS; SUBCUTANEOUS at 13:18

## 2025-06-13 RX ADMIN — DICLOFENAC SODIUM 75 MILLIGRAM(S): 75 TABLET, DELAYED RELEASE ORAL at 17:27

## 2025-06-13 RX ADMIN — DICLOFENAC SODIUM 75 MILLIGRAM(S): 75 TABLET, DELAYED RELEASE ORAL at 09:43

## 2025-06-13 RX ADMIN — Medication 1 TABLET(S): at 13:18

## 2025-06-13 RX ADMIN — Medication 40 MILLIGRAM(S): at 17:27

## 2025-06-13 RX ADMIN — BACLOFEN 5 MILLIGRAM(S): 10 INJECTION INTRATHECAL at 22:00

## 2025-06-13 RX ADMIN — BACLOFEN 5 MILLIGRAM(S): 10 INJECTION INTRATHECAL at 06:19

## 2025-06-13 RX ADMIN — OXYBUTYNIN CHLORIDE 10 MILLIGRAM(S): 5 TABLET, FILM COATED, EXTENDED RELEASE ORAL at 13:18

## 2025-06-13 RX ADMIN — Medication 25 GRAM(S): at 20:30

## 2025-06-13 RX ADMIN — Medication 25 GRAM(S): at 13:17

## 2025-06-13 RX ADMIN — DICLOFENAC SODIUM 75 MILLIGRAM(S): 75 TABLET, DELAYED RELEASE ORAL at 18:15

## 2025-06-13 RX ADMIN — DICLOFENAC SODIUM 75 MILLIGRAM(S): 75 TABLET, DELAYED RELEASE ORAL at 12:00

## 2025-06-13 RX ADMIN — Medication 25 GRAM(S): at 06:20

## 2025-06-13 RX ADMIN — HEPARIN SODIUM 5000 UNIT(S): 1000 INJECTION INTRAVENOUS; SUBCUTANEOUS at 22:00

## 2025-06-13 RX ADMIN — SERTRALINE 50 MILLIGRAM(S): 100 TABLET, FILM COATED ORAL at 13:19

## 2025-06-13 RX ADMIN — HEPARIN SODIUM 5000 UNIT(S): 1000 INJECTION INTRAVENOUS; SUBCUTANEOUS at 06:20

## 2025-06-13 RX ADMIN — TAMSULOSIN HYDROCHLORIDE 0.8 MILLIGRAM(S): 0.4 CAPSULE ORAL at 22:00

## 2025-06-13 RX ADMIN — Medication 40 MILLIGRAM(S): at 06:18

## 2025-06-13 RX ADMIN — BACLOFEN 5 MILLIGRAM(S): 10 INJECTION INTRATHECAL at 13:18

## 2025-06-13 NOTE — DIETITIAN INITIAL EVALUATION ADULT - OTHER CALCULATIONS
Fluid needs defer to MD team, calculation based on IBW with consideration for BMI >40, and patient noted with suspected deep tissue injury and stage II and III pressure injuries

## 2025-06-13 NOTE — OCCUPATIONAL THERAPY INITIAL EVALUATION ADULT - PERTINENT HX OF CURRENT PROBLEM, REHAB EVAL
65 year old male patient with a past medical history of Multiple Sclerosis (MS), seizures, colitis, GERD, BPH presents to the emergency department for fever and generalized weakness. The patient reports feeling weak for the past couple of days. He developed a fever this morning and has been experiencing chills. The patient denies any recent cough. He mentions intermittent burning sensation during urination which is not acute. The patient reports wife at home also found to have fever yesterday with unknown source. The patient took extra strength Tylenol at 10:30 AM for symptom relief. The patient lives with his wife, son, and daughter-in-law. patient denies chest pain, Shortness of Breath, abdominal pain, Nausea/Vomiting/Diarrhea, dizziness, confusion, vision changes, urinary symptoms, syncope, falls, trauma, discharge, bleeding. CT ABDOMEN/PELVIS: Probable pyelitis/UTI. Soft tissue density in the left renal collecting system with mild hydronephrosis, possibly pyonephrosis, also recommend urological follow-up to exclude superimposed urothelial neoplasm. CXR: clear lungs.

## 2025-06-13 NOTE — CONSULT NOTE ADULT - SUBJECTIVE AND OBJECTIVE BOX
Wound SURGERY CONSULT NOTE    HPI:   65 year old male patient with a past medical history of Multiple Sclerosis (MS), seizures, colitis, GERD, BPH presents to the emergency department for fever and generalized weakness. The patient reports feeling weak for the past couple of days. He developed a fever this morning and has been experiencing chills. The patient denies any recent cough. He mentions intermittent burning sensation during urination which is not acute. The patient reports wife at home also found to have fever yesterday with unknown source. The patient took extra strength Tylenol at 10:30 AM for symptom relief. The patient lives with his wife, son, and daughter-in-law. patient denies chest pain, Shortness of Breath, abdominal pain, Nausea/Vomiting/Diarrhea, dizziness, confusion, vision changes, urinary symptoms, syncope, falls, trauma, discharge, bleeding, (12 Jun 2025 18:57)        Wound consult requested by team to assist w/ management of skin injury. Patient found laying on low air loss specialty bed.  Pt w/o  c/o pain, drainage, odor, color change,  or worsening swelling. Offloading and pericare initiated upon admission as pt Increasingly sedentary 2/2 to illness. Pt is continent of urine & stool.   Appetite good. Morbidly obese discussed dieting and good food choices. All questions asked and answered to pt's expressed understanding and satisfaction. Safety maintained throughout consult.     Current Diet: Diet, Regular (06-12-25 @ 19:03)      PAST MEDICAL & SURGICAL HISTORY:  Colitis      BPH (benign prostatic hypertrophy)      GERD (gastroesophageal reflux disease)      Seizures      Multiple sclerosis      No significant past surgical history          REVIEW OF SYSTEMS:   General/ Breast/ Skin/Vasc/ Neuro/ MSK: see HPI  All other systems negative    MEDICATIONS  (STANDING):  baclofen 5 milliGRAM(s) Oral every 8 hours  diclofenac 75 milliGRAM(s) Oral two times a day  heparin   Injectable 5000 Unit(s) SubCutaneous every 8 hours  multivitamin 1 Tablet(s) Oral daily  oxybutynin XL 10 milliGRAM(s) Oral daily  pantoprazole    Tablet 40 milliGRAM(s) Oral two times a day  piperacillin/tazobactam IVPB.. 3.375 Gram(s) IV Intermittent every 8 hours  sertraline 50 milliGRAM(s) Oral daily  tamsulosin 0.8 milliGRAM(s) Oral at bedtime    MEDICATIONS  (PRN):      Allergies    No Known Allergies    Intolerances        SOCIAL HISTORY:       No hx smoking, ETOH, drugs    FAMILY HISTORY:    No pertinent family history in first degree relatives    PHYSICAL EXAM:  Vital Signs Last 24 Hrs  T(C): 36.6 (13 Jun 2025 04:51), Max: 37.9 (12 Jun 2025 12:02)  T(F): 97.8 (13 Jun 2025 04:51), Max: 100.2 (12 Jun 2025 12:02)  HR: 91 (13 Jun 2025 04:51) (91 - 108)  BP: 134/85 (13 Jun 2025 04:51) (106/71 - 178/79)  BP(mean): --  RR: 18 (13 Jun 2025 04:51) (15 - 22)  SpO2: 94% (13 Jun 2025 04:51) (93% - 96%)    Parameters below as of 13 Jun 2025 04:51  Patient On (Oxygen Delivery Method): room air        NAD/ A&Ox3/ Alert/ MO  Versa Care P500 bed  HEENT: sclera clear, mucosa moist, throat clear, trachea midline, neck supple  Respiratory: nonlabored w/ equal chest rise  Gastrointestinal: soft NT/ND   : Deferred  Neurology:  verbal, can understand commands  Psych: calm/ appropriate  Musculoskeletal: no deformities/ contractures  Vascular: BLE  no cyanosis, clubbing,  nor acute ischemia              RLE erythema warmer than LLE              BLE edema Rt> Lt        BLE DP/PT pulses palpable      BLE hemosiderin staining    Dystrophic toe nails  Skin:  moist w/ good turgor    BL buttocks/sacral region Moist thickened mild blanchable erythema       there is no blistering,  drainage, no increased warmth        no tenderness, induration, fluctuance, nor crepitus    LABS/ CULTURES/ RADIOLOGY:                        14.1   10.87 )-----------( 149      ( 13 Jun 2025 07:02 )             44.3       134  |  101  |  15  ----------------------------<  96      [06-13-25 @ 06:59]  3.9   |  22  |  0.92        Ca     8.2     [06-13-25 @ 06:59]    TPro  6.4  /  Alb  3.5  /  TBili  0.5  /  DBili  x   /  AST  20  /  ALT  14  /  AlkPhos  127  [06-12-25 @ 13:04]    PT/INR: PT 12.2 , INR 1.07       [06-12-25 @ 13:04]  PTT: 29.3       [06-12-25 @ 13:04]

## 2025-06-13 NOTE — OCCUPATIONAL THERAPY INITIAL EVALUATION ADULT - DIAGNOSIS, OT EVAL
Pt with decreased ADL status and impairments with functional mobility due to ROM, strength, balance, muscle tone, coordination.

## 2025-06-13 NOTE — CONSULT NOTE ADULT - ASSESSMENT
A/P: 65 year old male patient with a past medical history of Multiple Sclerosis (MS), seizures, colitis, GERD, BPH presents to the emergency department for fever and generalized weakness. The patient reports feeling weak for the past couple of days. He developed a fever this morning and has been experiencing chills. The patient denies any recent cough. He mentions intermittent burning sensation during urination which is not acute. The patient reports wife at home also found to have fever yesterday with unknown source. The patient took extra strength Tylenol at 10:30 AM for symptom relief. The patient lives with his wife, son, and daughter-in-law.     Wound Consult requested to assist w/ management of  Prophylactic treatment     Recommendations:   Cavilon daily Angle BID and PRN soiling    Appreciate ID  Moisturize intact skin w/ SWEEN cream BID  Nutrition Consult for optimization in pt w/  Increased nutritional needs      Continue turning and positioning w/ offloading assistive devices as per protocol       Continue w/ attends under pads and Pericare as per protocol  Waffle Cushion to chair when oob to chair  Heels appropriately offloaded with positioners  Continue w/ low air loss pressure redistribution bed surface     Care as per medicine, will remain available as requested  If needed upon discharge f/u as outpatient at [facility] [address] [place] [address] [phone]  Seen w/ attng D/w team & RN  Thank you for this consult,  AGNES Genao-BC, [facility]  [phone]  Nights/ Weekends/ Holidays please call:  General Surgery Consult pager [phone]) for emergencies  Wound PT for multilayer leg wrapping or VAC issues (x [phone]) 
66y/o male patient with a PMHx of MS, seizures, colitis, GERD, BPH admitted with fever and generalized weakness for a couple of days from likely UTI. CT finding with probable pyelitis/UTI, soft tissue density in the left renal collecting system with mild hydronephrosis, has a normal SCr 0.92    -Continue antibiotics  -Follow up urine and blood cultures   -Will need outpatient follow up for repeat imaging after infection has been treated   -Document PVR within 10 minutes of voiding  -No acute  intervention      [facility]  [address] [place] [phone]  
Patient seen and evaluated   - toenails debrided x10 using sterile nippers  - all offending ingrowing nail boarders excised   - patient educated on proper foot care and importance of regular examinations   - recommend z flow boots at all times while in bed  - follow up as outpatient for routine care
 65 year old male patient with a past medical history of Multiple Sclerosis (MS), seizures, colitis, GERD, BPH presents to the emergency department for fever and generalized weakness.       Overall fever, tachycardia, leucocytosis, sepsis, pyelonephritis, pyelitis.       PLAN:   C/W zosyn   follow up prelim urine cx  f/u blood cx, NTD   urology on board.   trend cbc, downtrending leucocytosis.       Plan discussed with Medicine Attending.     Kelly Camacho  Please contact through MS Teams   If no response or past 5 pm/weekend call [phone].

## 2025-06-13 NOTE — CONSULT NOTE ADULT - SUBJECTIVE AND OBJECTIVE BOX
HPI: 66y/o male patient with a PMHx of MS, seizures, colitis, GERD, BPH admitted with fever and generalized weakness for a couple of days. Admits to associated chills and dysuria. He was previously following with Dr. Vasquez but had not seen urologist since becoming bed bound. CT scan showed probable pyelitis and soft tissue density in the left renal collecting system with mild hydronephrosis, possibly pyonephrosis but can't exclude superimposed urothelial neoplasm. Denies abdominal/flank pain, urgency, frequency, gross hematuria.     PAST MEDICAL & SURGICAL HISTORY:  Colitis    BPH (benign prostatic hypertrophy)    GERD (gastroesophageal reflux disease)    Seizures    Multiple sclerosis    No significant past surgical history    FAMILY HISTORY:  No pertinent family history in first degree relatives    No known  malignancy     SOCIAL HISTORY  Denies alcohol and drug abuse, nonsmoker     MEDICATIONS  (STANDING):  baclofen 5 milliGRAM(s) Oral every 8 hours  diclofenac 75 milliGRAM(s) Oral two times a day  heparin   Injectable 5000 Unit(s) SubCutaneous every 8 hours  multivitamin 1 Tablet(s) Oral daily  oxybutynin XL 10 milliGRAM(s) Oral daily  pantoprazole    Tablet 40 milliGRAM(s) Oral two times a day  piperacillin/tazobactam IVPB.. 3.375 Gram(s) IV Intermittent every 8 hours  sertraline 50 milliGRAM(s) Oral daily  tamsulosin 0.8 milliGRAM(s) Oral at bedtime    MEDICATIONS  (PRN):    Allergies    No Known Allergies    Intolerances    REVIEW OF SYSTEMS: Pertinent positives and negatives as stated in HPI, otherwise negative    Vital signs  T(C): 36.5 (06-13-25 @ 12:48), Max: 37.8 (06-12-25 @ 16:42)  HR: 78 (06-13-25 @ 12:48)  BP: 116/76 (06-13-25 @ 12:48)  SpO2: 93% (06-13-25 @ 12:48)  Wt(kg): --    Physical Exam  Gen: NAD  HEENT: normocephalic, atraumatic, no scleral icterus  Pulm: No respiratory distress, no subcostal retractions, no accessory muscle use   CV: RRR,  no JVD  Abd: Soft, NT, ND, no rebound tenderness or guarding  : condom catheter in place draining clear yellow urine   MSK:  No CVAT, No edema present  NEURO: A&Ox3, no focal neurological deficits, CN 2-12 grossly intact  SKIN: warm, dry, no rash.    LABS:  CBC                       14.1   10.87 )-----------( 149      ( 13 Jun 2025 07:02 )             44.3     BMP   06-13    134[L]  |  101  |  15  ----------------------------<  96  3.9   |  22  |  0.92    Ca    8.2[L]      13 Jun 2025 06:59    TPro  6.4  /  Alb  3.5  /  TBili  0.5  /  DBili  x   /  AST  20  /  ALT  14  /  AlkPhos  127[H]  06-12    PT/INR - ( 12 Jun 2025 13:04 )   PT: 12.2 sec;   INR: 1.07 ratio       PTT - ( 12 Jun 2025 13:04 )  PTT:29.3 sec    Urinalysis Basic - ( 13 Jun 2025 06:59 )    Color: x / Appearance: x / SG: x / pH: x  Gluc: 96 mg/dL / Ketone: x  / Bili: x / Urobili: x   Blood: x / Protein: x / Nitrite: x   Leuk Esterase: x / RBC: x / WBC x   Sq Epi: x / Non Sq Epi: x / Bacteria: x    Urine Cx: pending   Blood Cx: pending     Radiology: < from: CT Abdomen and Pelvis w/ IV Cont (06.12.25 @ 16:28) >  BONES: Within normal limits.    IMPRESSION:  Probable pyelitis/UTI. Soft tissue density in the left renal collecting   system with mild hydronephrosis, possibly pyonephrosis, also recommend   urological follow-up to exclude superimposed urothelial neoplasm.      --- End of Report ---      < end of copied text >    Exam chaperoned by Dr. Flynn

## 2025-06-13 NOTE — CONSULT NOTE ADULT - SUBJECTIVE AND OBJECTIVE BOX
Patient is a 65y old  Male who presents with a chief complaint of Sepsis     (13 Jun 2025 11:08)      HPI:   65 year old male patient with a past medical history of Multiple Sclerosis (MS), seizures, colitis, GERD, BPH presents to the emergency department for fever and generalized weakness. The patient reports feeling weak for the past couple of days. He developed a fever this morning and has been experiencing chills. The patient denies any recent cough. He mentions intermittent burning sensation during urination which is not acute. The patient reports wife at home also found to have fever yesterday with unknown source. The patient took extra strength Tylenol at 10:30 AM for symptom relief. The patient lives with his wife, son, and daughter-in-law. patient denies chest pain, Shortness of Breath, abdominal pain, Nausea/Vomiting/Diarrhea, dizziness, confusion, vision changes, urinary symptoms, syncope, falls, trauma, discharge, bleeding, (12 Jun 2025 18:57)      PAST MEDICAL & SURGICAL HISTORY:  Colitis      BPH (benign prostatic hypertrophy)      GERD (gastroesophageal reflux disease)      Seizures      Multiple sclerosis      No significant past surgical history          MEDICATIONS  (STANDING):  baclofen 5 milliGRAM(s) Oral every 8 hours  diclofenac 75 milliGRAM(s) Oral two times a day  heparin   Injectable 5000 Unit(s) SubCutaneous every 8 hours  multivitamin 1 Tablet(s) Oral daily  oxybutynin XL 10 milliGRAM(s) Oral daily  pantoprazole    Tablet 40 milliGRAM(s) Oral two times a day  piperacillin/tazobactam IVPB.. 3.375 Gram(s) IV Intermittent every 8 hours  sertraline 50 milliGRAM(s) Oral daily  tamsulosin 0.8 milliGRAM(s) Oral at bedtime    MEDICATIONS  (PRN):      Allergies    No Known Allergies    Intolerances        VITALS:    Vital Signs Last 24 Hrs  T(C): 36.5 (13 Jun 2025 12:48), Max: 37.2 (12 Jun 2025 19:58)  T(F): 97.7 (13 Jun 2025 12:48), Max: 98.9 (12 Jun 2025 19:58)  HR: 78 (13 Jun 2025 12:48) (78 - 98)  BP: 116/76 (13 Jun 2025 12:48) (106/71 - 134/85)  BP(mean): --  RR: 18 (13 Jun 2025 12:48) (17 - 18)  SpO2: 93% (13 Jun 2025 12:48) (93% - 96%)    Parameters below as of 13 Jun 2025 12:48  Patient On (Oxygen Delivery Method): room air        LABS:                          14.1   10.87 )-----------( 149      ( 13 Jun 2025 07:02 )             44.3       06-13    134[L]  |  101  |  15  ----------------------------<  96  3.9   |  22  |  0.92    Ca    8.2[L]      13 Jun 2025 06:59    TPro  6.4  /  Alb  3.5  /  TBili  0.5  /  DBili  x   /  AST  20  /  ALT  14  /  AlkPhos  127[H]  06-12      CAPILLARY BLOOD GLUCOSE          PT/INR - ( 12 Jun 2025 13:04 )   PT: 12.2 sec;   INR: 1.07 ratio         PTT - ( 12 Jun 2025 13:04 )  PTT:29.3 sec    LOWER EXTREMITY PHYSICAL EXAM:    Vascular: DP/PT 2/4, B/L, CFT <3 seconds B/L, Temperature gradient WNL B/L, bilat LE edema.   Neuro: Epicritic sensation decreased to the level of foot, B/L.  Skin: toenails severely thickened elongated dystrophic incurvated with subungual debris x10

## 2025-06-13 NOTE — DIETITIAN INITIAL EVALUATION ADULT - ETIOLOGY
related to increased metabolic and physical demand for nutrients  related to lack of exposure to previous diet education/ incomplete exposure to diet education

## 2025-06-13 NOTE — DIETITIAN INITIAL EVALUATION ADULT - SIGNS/SYMPTOMS
patient noted with suspected deep tissue injury and stage II and III pressure injuries pt asking for diet-related question and receptive to diet education

## 2025-06-13 NOTE — CONSULT NOTE ADULT - SUBJECTIVE AND OBJECTIVE BOX
Patient is a 65y old  Male who presents with a chief complaint of Sepsis     (13 Jun 2025 11:08)      HPI:   65 year old male patient with a past medical history of Multiple Sclerosis (MS), seizures, colitis, GERD, BPH presents to the emergency department for fever and generalized weakness. The patient reports feeling weak for the past couple of days. He developed a fever this morning and has been experiencing chills. The patient denies any recent cough. He mentions intermittent burning sensation during urination which is not acute. The patient reports wife at home also found to have fever yesterday with unknown source. The patient took extra strength Tylenol at 10:30 AM for symptom relief. The patient lives with his wife, son, and daughter-in-law. patient denies chest pain, Shortness of Breath, abdominal pain, Nausea/Vomiting/Diarrhea, dizziness, confusion, vision changes, urinary symptoms, syncope, falls, trauma, discharge, bleeding, (12 Jun 2025 18:57)  Above reviewed:   Per pt he has a RN who comes to the house and checks urine, had urine checked around a month ago, received a course of abx for 7 days and repeat urine still showed infection so was given another 2 weeks of abx which he finished a week ago. Noted some dysuria and unable to urinate properly and fever so came in to ER. feeling better now, offers no complains except feeling weak.         PAST MEDICAL & SURGICAL HISTORY:  Colitis      BPH (benign prostatic hypertrophy)      GERD (gastroesophageal reflux disease)      Seizures      Multiple sclerosis      No significant past surgical history          REVIEW OF SYSTEMS    General:  Fevers, no chills     Skin: No rash  	  Ophthalmologic: Denies any discharge, redness.  	  ENMT: No nasal congestion or throat pain.     Respiratory and Thorax: No cough, sputum. Denies shortness of breath.  	  Cardiovascular: No chest pain,     Gastrointestinal: No nausea, abdominal pain or diarrhea.    Genitourinary: No dysuria,     Musculoskeletal: No joint swelling     Neurological: No new extremity weakness.    Psychiatric: No hallucinations	    Extremities: No swelling     Endocrine: No polyuria or polydipsia, no abnormal heat or cold intolerance     Allergic/Immunologic:	No hives    Social history:    FAMILY HISTORY:  No pertinent family history in first degree relatives        Allergies    No Known Allergies    Intolerances        Antimicrobials:    piperacillin/tazobactam IVPB.. 3.375 Gram(s) IV Intermittent every 8 hours        Vital Signs Last 24 Hrs  T(C): 36.5 (13 Jun 2025 12:48), Max: 37.2 (12 Jun 2025 19:58)  T(F): 97.7 (13 Jun 2025 12:48), Max: 98.9 (12 Jun 2025 19:58)  HR: 78 (13 Jun 2025 12:48) (78 - 99)  BP: 116/76 (13 Jun 2025 12:48) (106/71 - 158/75)  BP(mean): --  RR: 18 (13 Jun 2025 12:48) (17 - 20)  SpO2: 93% (13 Jun 2025 12:48) (93% - 96%)    Parameters below as of 13 Jun 2025 12:48  Patient On (Oxygen Delivery Method): room air        PHYSICAL EXAM: Patient in no acute distress.    Constitutional: Comfortable. Awake and alert    Eyes: No discharge or conjunctival injection    ENMT: No thrush. No pharyngeal erythema.    Neck: Supple,     Respiratory:  + air entry bilaterally.    Cardiovascular: S1 S2 wnl,     Gastrointestinal: Soft BS(+) no tenderness, non distended.    Genitourinary: No CVA tenderness     Extremities: No edema.    Vascular: peripheral pulses felt    Neurological: No new gross focal deficits.    Skin: No rash     Musculoskeletal: No joint swelling.    Psychiatric: Affect normal.                              14.1   10.87 )-----------( 149      ( 13 Jun 2025 07:02 )             44.3       06-13    134[L]  |  101  |  15  ----------------------------<  96  3.9   |  22  |  0.92    Ca    8.2[L]      13 Jun 2025 06:59    TPro  6.4  /  Alb  3.5  /  TBili  0.5  /  DBili  x   /  AST  20  /  ALT  14  /  AlkPhos  127[H]  06-12        Culture - Urine (collected 12 Jun 2025 13:04)  Source: Clean Catch Clean Catch (Midstream)  Preliminary Report (13 Jun 2025 16:46):    Culture positive, 50,000 - 99,000 CFU/mL . Identification to follow.    Culture - Blood (collected 12 Jun 2025 12:45)  Source: Blood Blood-Peripheral  Preliminary Report (13 Jun 2025 16:01):    No growth at 24 hours    Culture - Blood (collected 12 Jun 2025 12:29)  Source: Blood Blood-Peripheral  Preliminary Report (13 Jun 2025 16:01):    No growth at 24 hours          Radiology: Imaging reviewed and visualized personally [ x]               Patient is a 65y old  Male who presents with a chief complaint of Sepsis     (13 Jun 2025 11:08)      HPI:   65 year old male patient with a past medical history of Multiple Sclerosis (MS), seizures, colitis, GERD, BPH presents to the emergency department for fever and generalized weakness. The patient reports feeling weak for the past couple of days. He developed a fever this morning and has been experiencing chills. The patient denies any recent cough. He mentions intermittent burning sensation during urination which is not acute. The patient reports wife at home also found to have fever yesterday with unknown source. The patient took extra strength Tylenol at 10:30 AM for symptom relief. The patient lives with his wife, son, and daughter-in-law. patient denies chest pain, Shortness of Breath, abdominal pain, Nausea/Vomiting/Diarrhea, dizziness, confusion, vision changes, urinary symptoms, syncope, falls, trauma, discharge, bleeding, (12 Jun 2025 18:57)  Above reviewed:   Per pt he has a RN who comes to the house and checks urine, had urine checked around a month ago, received a course of abx for 7 days and repeat urine still showed infection so was given another 2 weeks of abx which he finished a week ago. Noted some dysuria and unable to urinate properly and fever so came in to ER. Feeling better now, offers no complains except feeling weak.         PAST MEDICAL & SURGICAL HISTORY:  Colitis      BPH (benign prostatic hypertrophy)      GERD (gastroesophageal reflux disease)      Seizures      Multiple sclerosis      No significant past surgical history          REVIEW OF SYSTEMS    General:  low grade fever     Skin: No rash  	  Ophthalmologic: Denies any discharge  	  ENMT: No nasal congestion     Respiratory and Thorax: No cough, sputum. Denies shortness of breath.  	  Cardiovascular: No chest pain,     Gastrointestinal: No nausea, abdominal pain or diarrhea.    Genitourinary: No dysuria,     Musculoskeletal: No joint swelling 	    Extremities: No swelling     Endocrine: No polyuria or polydipsia    Allergic/Immunologic: No hives        Social history:  From home, lives with family.         FAMILY HISTORY:  No pertinent family history of MS in first degree relatives        Allergies  No Known Allergies          Antimicrobials:  piperacillin/tazobactam IVPB.. 3.375 Gram(s) IV Intermittent every 8 hours        Vital Signs Last 24 Hrs  T(C): 36.5 (13 Jun 2025 12:48), Max: 37.2 (12 Jun 2025 19:58)  T(F): 97.7 (13 Jun 2025 12:48), Max: 98.9 (12 Jun 2025 19:58)  HR: 78 (13 Jun 2025 12:48) (78 - 99)  BP: 116/76 (13 Jun 2025 12:48) (106/71 - 158/75)  BP(mean): --  RR: 18 (13 Jun 2025 12:48) (17 - 20)  SpO2: 93% (13 Jun 2025 12:48) (93% - 96%)    Parameters below as of 13 Jun 2025 12:48  Patient On (Oxygen Delivery Method): room air        PHYSICAL EXAM: Patient in no acute distress.    Constitutional: Comfortable. Awake and alert    Eyes: No discharge or conjunctival injection    ENMT: No thrush. No pharyngeal erythema.    Neck: Supple,     Respiratory:  + air entry bilaterally.    Cardiovascular: S1 S2 wnl,     Gastrointestinal: Soft, no tenderness, non distended.    Genitourinary: No ferrari    Extremities: No edema.    Skin: No rash     Musculoskeletal: No joint swelling.    Psychiatric: Affect normal.                              14.1   10.87 )-----------( 149      ( 13 Jun 2025 07:02 )             44.3       06-13    134[L]  |  101  |  15  ----------------------------<  96  3.9   |  22  |  0.92    Ca    8.2[L]      13 Jun 2025 06:59    TPro  6.4  /  Alb  3.5  /  TBili  0.5  /  DBili  x   /  AST  20  /  ALT  14  /  AlkPhos  127[H]  06-12        Urinalysis + Microscopic Examination (06.12.25 @ 13:04)   pH Urine: 6.0  Urine Appearance: Clear  Color: Orange  Specific Gravity: 1.015  Protein, Urine: 30 mg/dL  Glucose Qualitative, Urine: Negative mg/dL  Ketone , Urine: Negative mg/dL  Blood, Urine: Moderate  Bilirubin: Negative  Urobilinogen: 1.0 mg/dL  Leukocyte Esterase Concentration: Trace  Nitrite: Negative  White Blood Cell - Urine: 5 /HPF  Red Blood Cell - Urine: 32 /HPF  Bacteria: Negative /HPF  Cast: 1 /LPF  Epithelial Cells: 1 /HPF      Culture - Urine (collected 12 Jun 2025 13:04)  Source: Clean Catch Clean Catch (Midstream)  Preliminary Report (13 Jun 2025 16:46):    Culture positive, 50,000 - 99,000 CFU/mL . Identification to follow.    Culture - Blood (collected 12 Jun 2025 12:45)  Source: Blood Blood-Peripheral  Preliminary Report (13 Jun 2025 16:01):    No growth at 24 hours    Culture - Blood (collected 12 Jun 2025 12:29)  Source: Blood Blood-Peripheral  Preliminary Report (13 Jun 2025 16:01):    No growth at 24 hours          Radiology: Imaging reviewed and visualized personally [ x]      < from: CT Abdomen and Pelvis w/ IV Cont (06.12.25 @ 16:28) >  IMPRESSION:  Probable pyelitis/UTI. Soft tissue density in the left renal collecting   system with mild hydronephrosis, possibly pyonephrosis, also recommend   urological follow-up to exclude superimposed urothelial neoplasm.        < from: Xray Chest 1 View- PORTABLE-Urgent (06.12.25 @ 14:36) >  IMPRESSION: Clear lungs.

## 2025-06-13 NOTE — DIETITIAN INITIAL EVALUATION ADULT - PERSON TAUGHT/METHOD
Emphasized importance of adequate lean protein intake and optimal blood glucose levels for wound healing. Advised pt to limit concentrated sweets, portion control, and importance of fiber intake. Provided education on T2DM nutrition therapy. Provided education on foods containing carbohydrates, foods containing proteins, and portion sizes. Stressed the importance of a balanced meal to maintain blood glucose. Pt made aware RD remains available and will follow up for any additional questions/concerns and per protocol./verbal instruction/written material/patient instructed

## 2025-06-13 NOTE — DIETITIAN INITIAL EVALUATION ADULT - LITERATURE/VIDEOS GIVEN
Pressure injury nutrition therapy; Carbohydrate Counting for People with Diabetes; Plate Method for Diabetes

## 2025-06-13 NOTE — OCCUPATIONAL THERAPY INITIAL EVALUATION ADULT - ADDITIONAL COMMENTS
Pt lives in private home with no steps with wife and son. Pt is bedbound for past 3 years, using mechanical lift device when out of bed to high back reclining wheelchair, assist with all ADLs, able to manage feeding and oral hygiene with set up in semi supine. Pt sponge bathes and completes toileting and dressing via rolling. Pt reports his MS affects his R side more than his L side. Pt R hand dominant, +glasses.

## 2025-06-13 NOTE — CONSULT NOTE ADULT - ATTENDING COMMENTS
Agree with above  CT imaging reviewed. Imaging more consistent with infectious process in left kidney  Abx for pyelo  Will need outpatient imaging after treatment of UTI to reassess kidney

## 2025-06-13 NOTE — DIETITIAN INITIAL EVALUATION ADULT - REASON INDICATOR FOR ASSESSMENT
Patient seen for "Consult for  Pressure injury stage 2 or >", BMI >40; Source: Pt, Electronic Medical Record. Chart reviewed, events noted.

## 2025-06-13 NOTE — DIETITIAN INITIAL EVALUATION ADULT - NSFNSPHYEXAMSKINFT_GEN_A_CORE
Pressure Injury 1: Bilateral:, buttocks, coccyx, sacrum, Suspected deep tissue injury  Pressure Injury 2: Bilateral:, buttocks, Stage III  Pressure Injury 3: Left:, ischium, Suspected deep tissue injury  Pressure Injury 4: Left:, ischium, Stage II  Pressure Injury 5: Right:, heel, Suspected deep tissue injury  Pressure Injury 6: Left:, heel, Suspected deep tissue injury  Pressure Injury 7: Right:, thigh, Stage III  Pressure Injury 8: Right:, lateral, side , Stage III

## 2025-06-13 NOTE — DIETITIAN INITIAL EVALUATION ADULT - ADD RECOMMEND
1. Recommend Carbohydrate Consistent Diet with no snacks in setting of suspected deep tissue injury and multiple pressure injuries for wound healing  2. RD to add diet Mighty shake BID to optimize PO intake   3. Multivitamin/mineral supplementation: Consider ascorbic acid to promote wound healing pending no medical contraindications, continue daily multivitamin as ordered  4. Continue to monitor PO intake, weight trend, electrolytes, blood glucose, labs, BMs in-house   5. Education provided as documented below   6. BMI >40 notification placed in chart.

## 2025-06-13 NOTE — DIETITIAN INITIAL EVALUATION ADULT - PERTINENT LABORATORY DATA
Dear Shekhar,    Based on your exposure to COVID-19 (coronavirus), we would like to test you for this virus. I have placed an order for this test. The best time for testing is 5-7 days after the exposure.    How to schedule:  Go to your [employer] home page and scroll down to the section that says  You have an appointment that needs to be scheduled  and click the large green button that says  Schedule Now  and follow the steps to find the next available opening.     If you are unable to complete these [employer] scheduling steps, please call [phone] to schedule your testing.     Monoclonal antibody treatment after exposure:  Because you have been exposed to COVID-19, you may be able to receive a treatment with monoclonal antibodies. This treatment can lower your risk of severe illness and going to the hospital. It is given through an IV or under your skin (subcutaneous) and must be given at an infusion center.   To be eligible, you must be 12 years or older, at least 88 pounds and:    Are not fully vaccinated against COVID-19, OR    Are immunocompromised     If you would like to sign up to be considered to receive the monoclonal antibody medicine, please complete a participation form through the [facility] here:  MNRAP ([email or web address]). You may also call the [facility] COVID-19 Public Hotline at 1-607.177.9851 (open Mon-Fri: 9am-7pm and Sat: 10am-6pm).     Not all people who are eligible will receive the medicine since supply is limited. You will be contacted in the next 1 to 2 business days only if you are selected. If you do not receive a call, you have not been selected to receive the medicine. If you have any questions about this medication, please contact your primary care provider. For more information, see [email or web address]    Return to work/school/ guidance:   Please let your workplace manager and  staffing office know when your quarantine ends. We cannot give you an exact date as it depends on the information below. You can calculate this on your own or work with your manager/staffing office to calculate this.    Quarantine Guidelines:  You are considered exposed if you have been within 6 feet of an infected person(s) for 15 minutes or more over a 24-hour period. Quarantine will start after the LAST time you had contact with the infected person while they were contagious (for example, if you saw someone on Monday and Wednesday, your quarantine would start after Wednesday).     If you have NO symptoms (asymptomatic):    Stay home for 14 days (quarantine) after your LAST contact with a person who has COVID-19 (this remains the CDC recommendation for greatest protection against spread of COVID-19), OR    10-day quarantine with no test, OR    Minimum 7-day quarantine with negative RT-PCR test collected on day 5 or later    Quarantine Guideline exceptions:    People who have been fully vaccinated do not need to quarantine unless they have symptoms. You are considered fully vaccinated 2 weeks after your 2nd dose in a 2-dose series or 2 weeks after a single-dose series. This includes vaccinated health care workers.  o Fully vaccinated people should still get tested 5-7 days after exposure, even if they don t have symptoms.   Note: If you have ongoing exposure to the COVID-positive person, this quarantine period may be more than 14 days. For example, if you continue to be exposed to your child who tested positive and cannot isolate from them, then the quarantine of 7-14 days can't start until your child is no longer contagious. This is typically 10 days from onset of the child's symptoms. So, the total duration may be 17-24 days in this case.   Please contact your doctor if you have questions or call the [facility] Public Hotline: 1-745.564.8502 (Mon-Fri: 9am-7pm and Sat: 10am-6pm).     How to Quarantine:     Monitor your  symptoms until 14 days after your last exposure. If you develop signs or symptoms, isolate and get tested (even if you have been tested already).    Stay home and away from others. Don't go to school or anywhere else. Generally, quarantine means staying home from work but there are some exceptions to this. Please contact your workplace. Cover your mouth and nose with a face covering if you must be around other people.     Wash your hands and face often. Use soap and water.    What are the symptoms of COVID-19?  The most common symptoms are cough, fever and trouble breathing. Less common symptoms include headache, body aches, fatigue (feeling very tired), chills, sore throat, stuffy or runny nose, diarrhea (loose poop), loss of taste or smell, belly pain, and nausea or vomiting (feeling sick to your stomach or throwing up).  If you develop symptoms, follow these guidelines:    If you're normally healthy: Please start another eVisit.    If you have a serious health problem (like cancer, heart failure, an organ transplant or kidney disease): Call your specialty clinic. Let them know that you might have COVID-19.    Where can I get more information?    [facility] - About COVID-19: [email or web address]     CDC - What to Do If You're Sick:     www.cdc.gov/coronavirus/2019-ncov/about/steps-when-sick.html    CDC - Ending Home Isolation:  https://www.cdc.gov/coronavirus/2019-ncov/your-health/quarantine-isolation.html    CDC - Caring for Someone:  www.cdc.gov/coronavirus/2019-ncov/if-you-are-sick/care-for-someone.html    [facility] clinical trials (COVID-19 research studies): [email or web address]    Below are the COVID-19 hotlines at the [facility] ([facility]). Interpreters are available.  o For health questions: Call 809-316-2234 or 1-363.712.2153 (7 am to 7 pm)  o For questions about schools and childcare: Call 099-036-5755 or 1-881.862.3788 (7 a.m. to 7  p.m.)     06-13    134[L]  |  101  |  15  ----------------------------<  96  3.9   |  22  |  0.92    Ca    8.2[L]      13 Jun 2025 06:59    TPro  6.4  /  Alb  3.5  /  TBili  0.5  /  DBili  x   /  AST  20  /  ALT  14  /  AlkPhos  127[H]  06-12

## 2025-06-13 NOTE — DIETITIAN INITIAL EVALUATION ADULT - PERTINENT MEDS FT
MEDICATIONS  (STANDING):  baclofen 5 milliGRAM(s) Oral every 8 hours  diclofenac 75 milliGRAM(s) Oral two times a day  heparin   Injectable 5000 Unit(s) SubCutaneous every 8 hours  multivitamin 1 Tablet(s) Oral daily  oxybutynin XL 10 milliGRAM(s) Oral daily  pantoprazole    Tablet 40 milliGRAM(s) Oral two times a day  piperacillin/tazobactam IVPB.. 3.375 Gram(s) IV Intermittent every 8 hours  sertraline 50 milliGRAM(s) Oral daily  tamsulosin 0.8 milliGRAM(s) Oral at bedtime    MEDICATIONS  (PRN):

## 2025-06-13 NOTE — PHYSICAL THERAPY INITIAL EVALUATION ADULT - ADDITIONAL COMMENTS
Pt lives with wife and son in a private house with no steps to enter. At baseline Pt was bed bound and would get OOB to wheelchair with Kelley lift. Son and wife assisted with all functional mobility and ADLs, Pt able to feed himself.

## 2025-06-13 NOTE — PHYSICAL THERAPY INITIAL EVALUATION ADULT - PERTINENT HX OF CURRENT PROBLEM, REHAB EVAL
65 year old male patient with a past medical history of Multiple Sclerosis (MS), seizures, colitis, GERD, BPH presents to the emergency department for fever and generalized weakness. The patient reports feeling weak for the past couple of days. He developed a fever this morning and has been experiencing chills. The patient denies any recent cough. He mentions intermittent burning sensation during urination which is not acute. The patient reports wife at home also found to have fever yesterday with unknown source. The patient took extra strength Tylenol at 10:30 AM for symptom relief. The patient lives with his wife, son, and daughter-in-law. patient denies chest pain, Shortness of Breath, abdominal pain, Nausea/Vomiting/Diarrhea, dizziness, confusion, vision changes, urinary symptoms, syncope, falls, trauma, discharge, bleeding. hospital stay: CXR 6/12: clear lungs. CT abdomen and pelvis 6/12:Probable pyelitis/UTI. Soft tissue density in the left renal collecting  system with mild hydronephrosis, possibly pyonephrosis, also recommend urological follow-up to exclude superimposed urothelial neoplasm.

## 2025-06-13 NOTE — DIETITIAN INITIAL EVALUATION ADULT - OTHER INFO
- Per chart, "history of Multiple Sclerosis (MS), seizures, colitis, GERD, BPH presents to the emergency department for fever and generalized weakness"  - Pt on antibiotics  - Hyponatremia noted   - Multivitamin/mineral supplementation: ordered for daily multivitamin   - GI: ordered for protonix

## 2025-06-13 NOTE — DIETITIAN INITIAL EVALUATION ADULT - ORAL INTAKE PTA/DIET HISTORY
Patient reports good appetite/PO intake PTA. Usually consumes 2 meals daily. Family prepares meals daily at home. Follows a Regular diet, does not follow any dietary restrictions. Confirms no known food allergies. Denies micronutrient supplement uses, denies liquid protein supplement uses. Denies chewing/swallowing difficulties. No nausea/vomiting reported. States having regular bowel movements daily at home.

## 2025-06-13 NOTE — DIETITIAN INITIAL EVALUATION ADULT - PHYSCIAL ASSESSMENT
Nutrition focused physical exam not warranted at this time. No overt sign of muscle/fat depletion noted. Per patient, UBW~300 lbs, pt has not experienced any recent unintentional weight changes.    [facility];    IBW: 201lbs +10%  IBW%: 174%

## 2025-06-13 NOTE — DIETITIAN INITIAL EVALUATION ADULT - ENERGY INTAKE
Fair (50-75%) Pt reports slightly decreased appetite/PO intake secondary to generalized weakness, breakfast tray observed, noted consuming ~75% of his meal. RD to add diet Mighty shake BID to optimize PO intake. Pt made aware RD remains available and will follow up for any additional questions/concerns and per protocol.

## 2025-06-14 LAB
ANION GAP SERPL CALC-SCNC: 14 MMOL/L — SIGNIFICANT CHANGE UP (ref 5–17)
BUN SERPL-MCNC: 18 MG/DL — SIGNIFICANT CHANGE UP (ref 7–23)
CALCIUM SERPL-MCNC: 8 MG/DL — LOW (ref 8.4–10.5)
CHLORIDE SERPL-SCNC: 103 MMOL/L — SIGNIFICANT CHANGE UP (ref 96–108)
CO2 SERPL-SCNC: 21 MMOL/L — LOW (ref 22–31)
CREAT SERPL-MCNC: 0.95 MG/DL — SIGNIFICANT CHANGE UP (ref 0.5–1.3)
CULTURE RESULTS: ABNORMAL
EGFR: 88 ML/MIN/1.73M2 — SIGNIFICANT CHANGE UP
EGFR: 88 ML/MIN/1.73M2 — SIGNIFICANT CHANGE UP
GLUCOSE SERPL-MCNC: 103 MG/DL — HIGH (ref 70–99)
HCT VFR BLD CALC: 42.6 % — SIGNIFICANT CHANGE UP (ref 39–50)
HGB BLD-MCNC: 13.6 G/DL — SIGNIFICANT CHANGE UP (ref 13–17)
MCHC RBC-ENTMCNC: 29.9 PG — SIGNIFICANT CHANGE UP (ref 27–34)
MCHC RBC-ENTMCNC: 31.9 G/DL — LOW (ref 32–36)
MCV RBC AUTO: 93.6 FL — SIGNIFICANT CHANGE UP (ref 80–100)
NRBC BLD AUTO-RTO: 0 /100 WBCS — SIGNIFICANT CHANGE UP (ref 0–0)
PLATELET # BLD AUTO: 124 K/UL — LOW (ref 150–400)
POTASSIUM SERPL-MCNC: 3.7 MMOL/L — SIGNIFICANT CHANGE UP (ref 3.5–5.3)
POTASSIUM SERPL-SCNC: 3.7 MMOL/L — SIGNIFICANT CHANGE UP (ref 3.5–5.3)
RBC # BLD: 4.55 M/UL — SIGNIFICANT CHANGE UP (ref 4.2–5.8)
RBC # FLD: 13.7 % — SIGNIFICANT CHANGE UP (ref 10.3–14.5)
SODIUM SERPL-SCNC: 138 MMOL/L — SIGNIFICANT CHANGE UP (ref 135–145)
SPECIMEN SOURCE: SIGNIFICANT CHANGE UP
WBC # BLD: 5.08 K/UL — SIGNIFICANT CHANGE UP (ref 3.8–10.5)
WBC # FLD AUTO: 5.08 K/UL — SIGNIFICANT CHANGE UP (ref 3.8–10.5)

## 2025-06-14 RX ORDER — BISACODYL 5 MG
10 TABLET, DELAYED RELEASE (ENTERIC COATED) ORAL DAILY
Refills: 0 | Status: DISCONTINUED | OUTPATIENT
Start: 2025-06-14 | End: 2025-06-20

## 2025-06-14 RX ADMIN — DICLOFENAC SODIUM 75 MILLIGRAM(S): 75 TABLET, DELAYED RELEASE ORAL at 17:03

## 2025-06-14 RX ADMIN — DICLOFENAC SODIUM 75 MILLIGRAM(S): 75 TABLET, DELAYED RELEASE ORAL at 18:18

## 2025-06-14 RX ADMIN — Medication 25 GRAM(S): at 03:07

## 2025-06-14 RX ADMIN — OXYBUTYNIN CHLORIDE 10 MILLIGRAM(S): 5 TABLET, FILM COATED, EXTENDED RELEASE ORAL at 13:24

## 2025-06-14 RX ADMIN — SERTRALINE 50 MILLIGRAM(S): 100 TABLET, FILM COATED ORAL at 12:39

## 2025-06-14 RX ADMIN — BACLOFEN 5 MILLIGRAM(S): 10 INJECTION INTRATHECAL at 05:15

## 2025-06-14 RX ADMIN — Medication 40 MILLIGRAM(S): at 05:14

## 2025-06-14 RX ADMIN — BACLOFEN 5 MILLIGRAM(S): 10 INJECTION INTRATHECAL at 21:26

## 2025-06-14 RX ADMIN — Medication 1 TABLET(S): at 12:39

## 2025-06-14 RX ADMIN — Medication 25 GRAM(S): at 19:15

## 2025-06-14 RX ADMIN — TAMSULOSIN HYDROCHLORIDE 0.8 MILLIGRAM(S): 0.4 CAPSULE ORAL at 21:27

## 2025-06-14 RX ADMIN — DICLOFENAC SODIUM 75 MILLIGRAM(S): 75 TABLET, DELAYED RELEASE ORAL at 05:14

## 2025-06-14 RX ADMIN — Medication 25 GRAM(S): at 12:41

## 2025-06-14 RX ADMIN — Medication 40 MILLIGRAM(S): at 17:03

## 2025-06-14 RX ADMIN — Medication 10 MILLIGRAM(S): at 13:24

## 2025-06-14 RX ADMIN — DICLOFENAC SODIUM 75 MILLIGRAM(S): 75 TABLET, DELAYED RELEASE ORAL at 05:39

## 2025-06-14 RX ADMIN — HEPARIN SODIUM 5000 UNIT(S): 1000 INJECTION INTRAVENOUS; SUBCUTANEOUS at 21:26

## 2025-06-14 RX ADMIN — HEPARIN SODIUM 5000 UNIT(S): 1000 INJECTION INTRAVENOUS; SUBCUTANEOUS at 15:37

## 2025-06-14 RX ADMIN — BACLOFEN 5 MILLIGRAM(S): 10 INJECTION INTRATHECAL at 12:40

## 2025-06-14 RX ADMIN — HEPARIN SODIUM 5000 UNIT(S): 1000 INJECTION INTRAVENOUS; SUBCUTANEOUS at 05:14

## 2025-06-15 PROCEDURE — G0545: CPT

## 2025-06-15 PROCEDURE — 93971 EXTREMITY STUDY: CPT | Mod: 26,LT

## 2025-06-15 PROCEDURE — 99232 SBSQ HOSP IP/OBS MODERATE 35: CPT

## 2025-06-15 RX ORDER — VANCOMYCIN HCL IN 5 % DEXTROSE 1.5G/250ML
1250 PLASTIC BAG, INJECTION (ML) INTRAVENOUS EVERY 12 HOURS
Refills: 0 | Status: DISCONTINUED | OUTPATIENT
Start: 2025-06-15 | End: 2025-06-18

## 2025-06-15 RX ORDER — CEFTRIAXONE 500 MG/1
2000 INJECTION, POWDER, FOR SOLUTION INTRAMUSCULAR; INTRAVENOUS EVERY 24 HOURS
Refills: 0 | Status: DISCONTINUED | OUTPATIENT
Start: 2025-06-15 | End: 2025-06-18

## 2025-06-15 RX ORDER — ACETAMINOPHEN 500 MG/5ML
650 LIQUID (ML) ORAL EVERY 6 HOURS
Refills: 0 | Status: DISCONTINUED | OUTPATIENT
Start: 2025-06-15 | End: 2025-06-20

## 2025-06-15 RX ORDER — DIPHENHYDRAMINE HCL 12.5MG/5ML
25 ELIXIR ORAL ONCE
Refills: 0 | Status: COMPLETED | OUTPATIENT
Start: 2025-06-15 | End: 2025-06-15

## 2025-06-15 RX ADMIN — Medication 650 MILLIGRAM(S): at 06:54

## 2025-06-15 RX ADMIN — Medication 650 MILLIGRAM(S): at 06:32

## 2025-06-15 RX ADMIN — Medication 40 MILLIGRAM(S): at 05:06

## 2025-06-15 RX ADMIN — HEPARIN SODIUM 5000 UNIT(S): 1000 INJECTION INTRAVENOUS; SUBCUTANEOUS at 13:40

## 2025-06-15 RX ADMIN — BACLOFEN 5 MILLIGRAM(S): 10 INJECTION INTRATHECAL at 13:40

## 2025-06-15 RX ADMIN — SERTRALINE 50 MILLIGRAM(S): 100 TABLET, FILM COATED ORAL at 13:39

## 2025-06-15 RX ADMIN — Medication 650 MILLIGRAM(S): at 21:02

## 2025-06-15 RX ADMIN — Medication 25 GRAM(S): at 03:54

## 2025-06-15 RX ADMIN — HEPARIN SODIUM 5000 UNIT(S): 1000 INJECTION INTRAVENOUS; SUBCUTANEOUS at 21:03

## 2025-06-15 RX ADMIN — BACLOFEN 5 MILLIGRAM(S): 10 INJECTION INTRATHECAL at 05:06

## 2025-06-15 RX ADMIN — DICLOFENAC SODIUM 75 MILLIGRAM(S): 75 TABLET, DELAYED RELEASE ORAL at 05:06

## 2025-06-15 RX ADMIN — CEFTRIAXONE 100 MILLIGRAM(S): 500 INJECTION, POWDER, FOR SOLUTION INTRAMUSCULAR; INTRAVENOUS at 13:40

## 2025-06-15 RX ADMIN — Medication 25 MILLIGRAM(S): at 19:31

## 2025-06-15 RX ADMIN — Medication 166.67 MILLIGRAM(S): at 17:21

## 2025-06-15 RX ADMIN — DICLOFENAC SODIUM 75 MILLIGRAM(S): 75 TABLET, DELAYED RELEASE ORAL at 17:20

## 2025-06-15 RX ADMIN — HEPARIN SODIUM 5000 UNIT(S): 1000 INJECTION INTRAVENOUS; SUBCUTANEOUS at 05:06

## 2025-06-15 RX ADMIN — DICLOFENAC SODIUM 75 MILLIGRAM(S): 75 TABLET, DELAYED RELEASE ORAL at 05:27

## 2025-06-15 RX ADMIN — OXYBUTYNIN CHLORIDE 10 MILLIGRAM(S): 5 TABLET, FILM COATED, EXTENDED RELEASE ORAL at 13:40

## 2025-06-15 RX ADMIN — BACLOFEN 5 MILLIGRAM(S): 10 INJECTION INTRATHECAL at 21:03

## 2025-06-15 RX ADMIN — Medication 40 MILLIGRAM(S): at 17:20

## 2025-06-15 RX ADMIN — Medication 650 MILLIGRAM(S): at 21:37

## 2025-06-15 RX ADMIN — TAMSULOSIN HYDROCHLORIDE 0.8 MILLIGRAM(S): 0.4 CAPSULE ORAL at 21:03

## 2025-06-15 RX ADMIN — Medication 1 TABLET(S): at 13:39

## 2025-06-15 NOTE — PROVIDER CONTACT NOTE (OTHER) - ASSESSMENT
· Schedule follow-up appointment with Donna Alegria CNP in 3-4 months    · continue present medication and dosing schedule and make a to-do list/chart     
Pt AOx4, VSS. Pt denies cp, sob, or dizziness. Pt's left arm swollen. Pt receiving IV vancomycin at the time and complains of itchiness in the left arm.

## 2025-06-15 NOTE — PROVIDER CONTACT NOTE (OTHER) - ACTION/TREATMENT ORDERED:
Provider Marina Mcqueen notified. Provider ordered repeat blood cultures, LUE doppler to rule out DVT, and 25mg po benadryl.

## 2025-06-15 NOTE — PROVIDER CONTACT NOTE (OTHER) - BACKGROUND
65 year old male patient with a past medical history of Multiple Sclerosis and seizures presents to the emergency department for fever and generalized weakness for the past couple of days.

## 2025-06-15 NOTE — CHART NOTE - NSCHARTNOTEFT_GEN_A_CORE
Medicine NP    Notified by RN patient with LUE redness .   Patient seen and examined at bedside c/o LUE redness, swelling and itchiness . Patient reports redness started on 6/13 , itchiness started tonight . Patient receiving IV CTX and Vancomycin via IV access to Left forearm .       T(C): 36.4 (06-15-25 @ 05:28), Max: 37 (06-14-25 @ 20:28)  HR: 72 (06-15-25 @ 05:28) (68 - 72)  BP: 137/91 (06-15-25 @ 05:28) (134/78 - 137/91)  RR: 17 (06-15-25 @ 05:28) (17 - 17)  SpO2: 95% (06-15-25 @ 05:28) (95% - 96%)    CONSTITUTIONAL:  A+O x 3 ,no apparent distress  RESP: No respiratory distress  CV: RRR  SKIN: Erythema noted to proximal left forearm; soft tissue swelling noted, no induration palpable    Plan:   > Discussed case with ID - Dr. Camacho   >Send Repeat Bcx   >LUE doppler to r/o dvt ordered   >IV access removed on Left forearm and switched to Right arm  > warm compresses applied, Elevate LUE on pillow  > benadryl 25mg po x 1 for itch   >Continue IV abx  > Continue to monitor Left arm site              Marina AGUILAR-C   Department of Medicine- Day coverage

## 2025-06-16 LAB
ANION GAP SERPL CALC-SCNC: 11 MMOL/L — SIGNIFICANT CHANGE UP (ref 5–17)
BUN SERPL-MCNC: 27 MG/DL — HIGH (ref 7–23)
CALCIUM SERPL-MCNC: 8.4 MG/DL — SIGNIFICANT CHANGE UP (ref 8.4–10.5)
CHLORIDE SERPL-SCNC: 103 MMOL/L — SIGNIFICANT CHANGE UP (ref 96–108)
CO2 SERPL-SCNC: 24 MMOL/L — SIGNIFICANT CHANGE UP (ref 22–31)
CREAT SERPL-MCNC: 0.96 MG/DL — SIGNIFICANT CHANGE UP (ref 0.5–1.3)
EGFR: 87 ML/MIN/1.73M2 — SIGNIFICANT CHANGE UP
EGFR: 87 ML/MIN/1.73M2 — SIGNIFICANT CHANGE UP
GLUCOSE SERPL-MCNC: 104 MG/DL — HIGH (ref 70–99)
HCT VFR BLD CALC: 43.4 % — SIGNIFICANT CHANGE UP (ref 39–50)
HGB BLD-MCNC: 14.1 G/DL — SIGNIFICANT CHANGE UP (ref 13–17)
MCHC RBC-ENTMCNC: 30.1 PG — SIGNIFICANT CHANGE UP (ref 27–34)
MCHC RBC-ENTMCNC: 32.5 G/DL — SIGNIFICANT CHANGE UP (ref 32–36)
MCV RBC AUTO: 92.7 FL — SIGNIFICANT CHANGE UP (ref 80–100)
NRBC BLD AUTO-RTO: 0 /100 WBCS — SIGNIFICANT CHANGE UP (ref 0–0)
PLATELET # BLD AUTO: 166 K/UL — SIGNIFICANT CHANGE UP (ref 150–400)
POTASSIUM SERPL-MCNC: 4.2 MMOL/L — SIGNIFICANT CHANGE UP (ref 3.5–5.3)
POTASSIUM SERPL-SCNC: 4.2 MMOL/L — SIGNIFICANT CHANGE UP (ref 3.5–5.3)
RBC # BLD: 4.68 M/UL — SIGNIFICANT CHANGE UP (ref 4.2–5.8)
RBC # FLD: 13.6 % — SIGNIFICANT CHANGE UP (ref 10.3–14.5)
SODIUM SERPL-SCNC: 138 MMOL/L — SIGNIFICANT CHANGE UP (ref 135–145)
WBC # BLD: 4.75 K/UL — SIGNIFICANT CHANGE UP (ref 3.8–10.5)
WBC # FLD AUTO: 4.75 K/UL — SIGNIFICANT CHANGE UP (ref 3.8–10.5)

## 2025-06-16 PROCEDURE — G0545: CPT

## 2025-06-16 PROCEDURE — 99232 SBSQ HOSP IP/OBS MODERATE 35: CPT

## 2025-06-16 RX ADMIN — BACLOFEN 5 MILLIGRAM(S): 10 INJECTION INTRATHECAL at 05:15

## 2025-06-16 RX ADMIN — HEPARIN SODIUM 5000 UNIT(S): 1000 INJECTION INTRAVENOUS; SUBCUTANEOUS at 13:19

## 2025-06-16 RX ADMIN — OXYBUTYNIN CHLORIDE 10 MILLIGRAM(S): 5 TABLET, FILM COATED, EXTENDED RELEASE ORAL at 09:52

## 2025-06-16 RX ADMIN — HEPARIN SODIUM 5000 UNIT(S): 1000 INJECTION INTRAVENOUS; SUBCUTANEOUS at 05:16

## 2025-06-16 RX ADMIN — DICLOFENAC SODIUM 75 MILLIGRAM(S): 75 TABLET, DELAYED RELEASE ORAL at 18:16

## 2025-06-16 RX ADMIN — DICLOFENAC SODIUM 75 MILLIGRAM(S): 75 TABLET, DELAYED RELEASE ORAL at 05:31

## 2025-06-16 RX ADMIN — Medication 166.67 MILLIGRAM(S): at 05:15

## 2025-06-16 RX ADMIN — TAMSULOSIN HYDROCHLORIDE 0.8 MILLIGRAM(S): 0.4 CAPSULE ORAL at 21:10

## 2025-06-16 RX ADMIN — DICLOFENAC SODIUM 75 MILLIGRAM(S): 75 TABLET, DELAYED RELEASE ORAL at 17:46

## 2025-06-16 RX ADMIN — Medication 40 MILLIGRAM(S): at 05:16

## 2025-06-16 RX ADMIN — Medication 1 TABLET(S): at 09:52

## 2025-06-16 RX ADMIN — BACLOFEN 5 MILLIGRAM(S): 10 INJECTION INTRATHECAL at 21:10

## 2025-06-16 RX ADMIN — HEPARIN SODIUM 5000 UNIT(S): 1000 INJECTION INTRAVENOUS; SUBCUTANEOUS at 21:10

## 2025-06-16 RX ADMIN — CEFTRIAXONE 100 MILLIGRAM(S): 500 INJECTION, POWDER, FOR SOLUTION INTRAMUSCULAR; INTRAVENOUS at 13:18

## 2025-06-16 RX ADMIN — BACLOFEN 5 MILLIGRAM(S): 10 INJECTION INTRATHECAL at 13:18

## 2025-06-16 RX ADMIN — DICLOFENAC SODIUM 75 MILLIGRAM(S): 75 TABLET, DELAYED RELEASE ORAL at 05:16

## 2025-06-16 RX ADMIN — Medication 166.67 MILLIGRAM(S): at 17:46

## 2025-06-16 RX ADMIN — Medication 40 MILLIGRAM(S): at 17:46

## 2025-06-16 RX ADMIN — SERTRALINE 50 MILLIGRAM(S): 100 TABLET, FILM COATED ORAL at 09:52

## 2025-06-16 RX ADMIN — Medication 650 MILLIGRAM(S): at 23:59

## 2025-06-17 LAB
-  GENTAMICIN: SIGNIFICANT CHANGE UP
-  NITROFURANTOIN: SIGNIFICANT CHANGE UP
-  OXACILLIN: SIGNIFICANT CHANGE UP
-  PENICILLIN: SIGNIFICANT CHANGE UP
-  RIFAMPIN: SIGNIFICANT CHANGE UP
-  TETRACYCLINE: SIGNIFICANT CHANGE UP
-  TRIMETHOPRIM/SULFAMETHOXAZOLE: SIGNIFICANT CHANGE UP
-  VANCOMYCIN: SIGNIFICANT CHANGE UP
ANION GAP SERPL CALC-SCNC: 15 MMOL/L — SIGNIFICANT CHANGE UP (ref 5–17)
BUN SERPL-MCNC: 20 MG/DL — SIGNIFICANT CHANGE UP (ref 7–23)
CALCIUM SERPL-MCNC: 8.3 MG/DL — LOW (ref 8.4–10.5)
CHLORIDE SERPL-SCNC: 100 MMOL/L — SIGNIFICANT CHANGE UP (ref 96–108)
CO2 SERPL-SCNC: 23 MMOL/L — SIGNIFICANT CHANGE UP (ref 22–31)
CREAT SERPL-MCNC: 0.84 MG/DL — SIGNIFICANT CHANGE UP (ref 0.5–1.3)
CULTURE RESULTS: ABNORMAL
CULTURE RESULTS: ABNORMAL
CULTURE RESULTS: SIGNIFICANT CHANGE UP
CULTURE RESULTS: SIGNIFICANT CHANGE UP
EGFR: 96 ML/MIN/1.73M2 — SIGNIFICANT CHANGE UP
EGFR: 96 ML/MIN/1.73M2 — SIGNIFICANT CHANGE UP
GLUCOSE SERPL-MCNC: 90 MG/DL — SIGNIFICANT CHANGE UP (ref 70–99)
HCT VFR BLD CALC: 42.3 % — SIGNIFICANT CHANGE UP (ref 39–50)
HGB BLD-MCNC: 14.1 G/DL — SIGNIFICANT CHANGE UP (ref 13–17)
MCHC RBC-ENTMCNC: 31 PG — SIGNIFICANT CHANGE UP (ref 27–34)
MCHC RBC-ENTMCNC: 33.3 G/DL — SIGNIFICANT CHANGE UP (ref 32–36)
MCV RBC AUTO: 93 FL — SIGNIFICANT CHANGE UP (ref 80–100)
METHOD TYPE: SIGNIFICANT CHANGE UP
NRBC BLD AUTO-RTO: 0 /100 WBCS — SIGNIFICANT CHANGE UP (ref 0–0)
ORGANISM # SPEC MICROSCOPIC CNT: ABNORMAL
ORGANISM # SPEC MICROSCOPIC CNT: ABNORMAL
PLATELET # BLD AUTO: 157 K/UL — SIGNIFICANT CHANGE UP (ref 150–400)
POTASSIUM SERPL-MCNC: 3.6 MMOL/L — SIGNIFICANT CHANGE UP (ref 3.5–5.3)
POTASSIUM SERPL-SCNC: 3.6 MMOL/L — SIGNIFICANT CHANGE UP (ref 3.5–5.3)
RBC # BLD: 4.55 M/UL — SIGNIFICANT CHANGE UP (ref 4.2–5.8)
RBC # FLD: 13.7 % — SIGNIFICANT CHANGE UP (ref 10.3–14.5)
SODIUM SERPL-SCNC: 138 MMOL/L — SIGNIFICANT CHANGE UP (ref 135–145)
SPECIMEN SOURCE: SIGNIFICANT CHANGE UP
SPECIMEN SOURCE: SIGNIFICANT CHANGE UP
VANCOMYCIN TROUGH SERPL-MCNC: 14.2 UG/ML — SIGNIFICANT CHANGE UP (ref 10–20)
WBC # BLD: 4.66 K/UL — SIGNIFICANT CHANGE UP (ref 3.8–10.5)
WBC # FLD AUTO: 4.66 K/UL — SIGNIFICANT CHANGE UP (ref 3.8–10.5)

## 2025-06-17 PROCEDURE — 99232 SBSQ HOSP IP/OBS MODERATE 35: CPT

## 2025-06-17 PROCEDURE — G0545: CPT

## 2025-06-17 RX ADMIN — DICLOFENAC SODIUM 75 MILLIGRAM(S): 75 TABLET, DELAYED RELEASE ORAL at 05:21

## 2025-06-17 RX ADMIN — BACLOFEN 5 MILLIGRAM(S): 10 INJECTION INTRATHECAL at 05:20

## 2025-06-17 RX ADMIN — HEPARIN SODIUM 5000 UNIT(S): 1000 INJECTION INTRAVENOUS; SUBCUTANEOUS at 21:11

## 2025-06-17 RX ADMIN — HEPARIN SODIUM 5000 UNIT(S): 1000 INJECTION INTRAVENOUS; SUBCUTANEOUS at 13:05

## 2025-06-17 RX ADMIN — BACLOFEN 5 MILLIGRAM(S): 10 INJECTION INTRATHECAL at 21:11

## 2025-06-17 RX ADMIN — TAMSULOSIN HYDROCHLORIDE 0.8 MILLIGRAM(S): 0.4 CAPSULE ORAL at 21:12

## 2025-06-17 RX ADMIN — SERTRALINE 50 MILLIGRAM(S): 100 TABLET, FILM COATED ORAL at 08:27

## 2025-06-17 RX ADMIN — BACLOFEN 5 MILLIGRAM(S): 10 INJECTION INTRATHECAL at 13:05

## 2025-06-17 RX ADMIN — DICLOFENAC SODIUM 75 MILLIGRAM(S): 75 TABLET, DELAYED RELEASE ORAL at 17:53

## 2025-06-17 RX ADMIN — OXYBUTYNIN CHLORIDE 10 MILLIGRAM(S): 5 TABLET, FILM COATED, EXTENDED RELEASE ORAL at 08:26

## 2025-06-17 RX ADMIN — Medication 1 TABLET(S): at 08:26

## 2025-06-17 RX ADMIN — Medication 40 MILLIGRAM(S): at 17:52

## 2025-06-17 RX ADMIN — HEPARIN SODIUM 5000 UNIT(S): 1000 INJECTION INTRAVENOUS; SUBCUTANEOUS at 05:20

## 2025-06-17 RX ADMIN — Medication 166.67 MILLIGRAM(S): at 17:53

## 2025-06-17 RX ADMIN — CEFTRIAXONE 100 MILLIGRAM(S): 500 INJECTION, POWDER, FOR SOLUTION INTRAMUSCULAR; INTRAVENOUS at 12:29

## 2025-06-17 RX ADMIN — Medication 166.67 MILLIGRAM(S): at 06:01

## 2025-06-17 RX ADMIN — Medication 650 MILLIGRAM(S): at 21:11

## 2025-06-17 RX ADMIN — Medication 40 MILLIGRAM(S): at 05:21

## 2025-06-18 ENCOUNTER — TRANSCRIPTION ENCOUNTER (OUTPATIENT)
Age: 65
End: 2025-06-18

## 2025-06-18 PROCEDURE — 93971 EXTREMITY STUDY: CPT | Mod: 26,RT

## 2025-06-18 RX ORDER — CEFPODOXIME PROXETIL 200 MG/1
1 TABLET, FILM COATED ORAL
Qty: 12 | Refills: 0
Start: 2025-06-18 | End: 2025-06-23

## 2025-06-18 RX ORDER — DOXYCYCLINE HYCLATE 100 MG
100 TABLET ORAL EVERY 12 HOURS
Refills: 0 | Status: DISCONTINUED | OUTPATIENT
Start: 2025-06-18 | End: 2025-06-20

## 2025-06-18 RX ORDER — DOXYCYCLINE HYCLATE 100 MG
2 TABLET ORAL
Qty: 26 | Refills: 0
Start: 2025-06-18 | End: 2025-06-24

## 2025-06-18 RX ORDER — MELATONIN 5 MG
3 TABLET ORAL AT BEDTIME
Refills: 0 | Status: DISCONTINUED | OUTPATIENT
Start: 2025-06-18 | End: 2025-06-20

## 2025-06-18 RX ORDER — CEFPODOXIME PROXETIL 200 MG/1
200 TABLET, FILM COATED ORAL
Refills: 0 | Status: DISCONTINUED | OUTPATIENT
Start: 2025-06-19 | End: 2025-06-20

## 2025-06-18 RX ORDER — ONDANSETRON HCL/PF 4 MG/2 ML
4 VIAL (ML) INJECTION ONCE
Refills: 0 | Status: COMPLETED | OUTPATIENT
Start: 2025-06-18 | End: 2025-06-18

## 2025-06-18 RX ADMIN — DICLOFENAC SODIUM 75 MILLIGRAM(S): 75 TABLET, DELAYED RELEASE ORAL at 18:51

## 2025-06-18 RX ADMIN — Medication 4 MILLIGRAM(S): at 13:35

## 2025-06-18 RX ADMIN — HEPARIN SODIUM 5000 UNIT(S): 1000 INJECTION INTRAVENOUS; SUBCUTANEOUS at 21:13

## 2025-06-18 RX ADMIN — BACLOFEN 5 MILLIGRAM(S): 10 INJECTION INTRATHECAL at 12:41

## 2025-06-18 RX ADMIN — Medication 100 MILLIGRAM(S): at 17:49

## 2025-06-18 RX ADMIN — OXYBUTYNIN CHLORIDE 10 MILLIGRAM(S): 5 TABLET, FILM COATED, EXTENDED RELEASE ORAL at 08:49

## 2025-06-18 RX ADMIN — Medication 650 MILLIGRAM(S): at 07:32

## 2025-06-18 RX ADMIN — DICLOFENAC SODIUM 75 MILLIGRAM(S): 75 TABLET, DELAYED RELEASE ORAL at 05:23

## 2025-06-18 RX ADMIN — Medication 3 MILLIGRAM(S): at 21:14

## 2025-06-18 RX ADMIN — Medication 40 MILLIGRAM(S): at 05:24

## 2025-06-18 RX ADMIN — BACLOFEN 5 MILLIGRAM(S): 10 INJECTION INTRATHECAL at 21:13

## 2025-06-18 RX ADMIN — Medication 1 TABLET(S): at 08:48

## 2025-06-18 RX ADMIN — SERTRALINE 50 MILLIGRAM(S): 100 TABLET, FILM COATED ORAL at 08:49

## 2025-06-18 RX ADMIN — TAMSULOSIN HYDROCHLORIDE 0.8 MILLIGRAM(S): 0.4 CAPSULE ORAL at 21:14

## 2025-06-18 RX ADMIN — Medication 166.67 MILLIGRAM(S): at 05:23

## 2025-06-18 RX ADMIN — CEFTRIAXONE 100 MILLIGRAM(S): 500 INJECTION, POWDER, FOR SOLUTION INTRAMUSCULAR; INTRAVENOUS at 12:40

## 2025-06-18 RX ADMIN — HEPARIN SODIUM 5000 UNIT(S): 1000 INJECTION INTRAVENOUS; SUBCUTANEOUS at 12:41

## 2025-06-18 RX ADMIN — DICLOFENAC SODIUM 75 MILLIGRAM(S): 75 TABLET, DELAYED RELEASE ORAL at 17:50

## 2025-06-18 RX ADMIN — Medication 650 MILLIGRAM(S): at 08:32

## 2025-06-18 RX ADMIN — HEPARIN SODIUM 5000 UNIT(S): 1000 INJECTION INTRAVENOUS; SUBCUTANEOUS at 05:23

## 2025-06-18 RX ADMIN — Medication 40 MILLIGRAM(S): at 17:51

## 2025-06-18 RX ADMIN — BACLOFEN 5 MILLIGRAM(S): 10 INJECTION INTRATHECAL at 05:23

## 2025-06-18 NOTE — DISCHARGE NOTE PROVIDER - DETAILS OF MALNUTRITION DIAGNOSIS/DIAGNOSES
This patient has been assessed with a concern for Malnutrition and was treated during this hospitalization for the following Nutrition diagnosis/diagnoses:     -  06/13/2025: Morbid obesity (BMI > 40)

## 2025-06-18 NOTE — DISCHARGE NOTE PROVIDER - CARE PROVIDERS DIRECT ADDRESSES
[email or web address]

## 2025-06-18 NOTE — DISCHARGE NOTE PROVIDER - NSDCMRMEDTOKEN_GEN_ALL_CORE_FT
baclofen 5 mg oral tablet: 1 tab(s) orally every 8 hours  diclofenac sodium 75 mg oral delayed release tablet: 1 tab(s) orally 2 times a day  multivitamin: 1 tab(s) orally once a day  oxyBUTYnin 10 mg/24 hr oral tablet, extended release: 1 tab(s) orally once a day  pantoprazole 40 mg oral delayed release tablet: 1 tab(s) orally 2 times a day  Please resume PT, OT and home health aid : Resume home services  sertraline 50 mg oral tablet: 1 tab(s) orally once a day  tamsulosin 0.4 mg oral capsule: 2 cap(s) orally once a day (at bedtime)  teriflunomide 14 mg oral tablet: 1 tab(s) orally once a day   baclofen 5 mg oral tablet: 1 tab(s) orally every 8 hours  cefpodoxime 200 mg oral tablet: 1 tab(s) orally every 12 hours Take first dose at 10am on June 19, 2025; take through June 24, 2025 and then discontinue  diclofenac sodium 75 mg oral delayed release tablet: 1 tab(s) orally 2 times a day  doxycycline monohydrate 50 mg oral capsule: 2 cap(s) orally every 12 hours Take through June 24, 2025 and then discontinue  multivitamin: 1 tab(s) orally once a day  oxyBUTYnin 10 mg/24 hr oral tablet, extended release: 1 tab(s) orally once a day  pantoprazole 40 mg oral delayed release tablet: 1 tab(s) orally 2 times a day  Please resume PT, OT and home health aid : Resume home services  sertraline 50 mg oral tablet: 1 tab(s) orally once a day  tamsulosin 0.4 mg oral capsule: 2 cap(s) orally once a day (at bedtime)  teriflunomide 14 mg oral tablet: 1 tab(s) orally once a day

## 2025-06-18 NOTE — DISCHARGE NOTE PROVIDER - NSDCFUADDAPPT_GEN_ALL_CORE_FT
Follow up outpatient urologist :  [facility]  [address] [place] [id number]  [phone]     APPTS ARE READY TO BE MADE: [X] YES    Best Family or Patient Contact (if needed):    Additional Information about above appointments (if needed):    1: Appt with Dr. Vasquez 1-2 weeks   2:   3:     Other comments or requests:   Follow up outpatient urologist :  [facility]  [address] [place], [place] [id number] [phone]     APPTS ARE READY TO BE MADE: [X] YES    Best Family or Patient Contact (if needed):    Additional Information about above appointments (if needed):    1: Appt with Dr. Vasquez 1-2 weeks   2:   3:     Other comments or requests:   Follow up outpatient urologist :  [facility]  [address] [place] [id number]  [phone]    Patient is requesting a TEB for Urology - securing appointment and will return patients call    Patient informed us they already have secured a follow up appointment which is not visible on [facility] and declined to provide appointment details. Patient advised they already secured a follow up with their PCP but did not provide further information.

## 2025-06-18 NOTE — DISCHARGE NOTE PROVIDER - CARE PROVIDER_API CALL
Isai Vasquez  Urology  [address] [place] [address]  Phone: [phone]  Fax: [phone]  Follow Up Time:     Zara Roy  Family Medicine  [address] [facility], [address] [place] [address]  Phone: [phone]  Fax: [phone]  Follow Up Time:

## 2025-06-18 NOTE — DISCHARGE NOTE PROVIDER - HOSPITAL COURSE
HPI:   65 year old male patient with a past medical history of Multiple Sclerosis (MS), seizures, colitis, GERD, BPH presents to the emergency department for fever and generalized weakness. The patient reports feeling weak for the past couple of days. He developed a fever this morning and has been experiencing chills. The patient denies any recent cough. He mentions intermittent burning sensation during urination which is not acute. The patient reports wife at home also found to have fever yesterday with unknown source. The patient took extra strength Tylenol at 10:30 AM for symptom relief. The patient lives with his wife, son, and daughter-in-law. patient denies chest pain, Shortness of Breath, abdominal pain, Nausea/Vomiting/Diarrhea, dizziness, confusion, vision changes, urinary symptoms, syncope, falls, trauma, discharge, bleeding, (12 Jun 2025 18:57)    Hospital Course:  Patient admitted with sepsis, likely secondary to a UTI, pyelonephritis, pyelitis. .  A CT scan was performed, and the patient is on antibiotics as per Infectious Disease (ID) recommendations.  Blood cultures were negative, but urine cultures are pending sensitivities. ID and urology consults were obtained, with urology follow-up planned for outpatient.  Discharge planning includes oral antibiotics as per ID recommendations.  Discharge/Dispo/Med rec discussed with attending  ____. Patient medically cleared for discharge ____ with outpatient follow up    Important Medication Changes and Reason:    Active or Pending Issues Requiring Follow-up:    Advanced Directives:   [ x] Full code  [ ] DNR  [ ] Hospice    Discharge Diagnoses:  UTI  sepsis   pyelonephritis  pyelitis.

## 2025-06-18 NOTE — DISCHARGE NOTE PROVIDER - NSDCCPCAREPLAN_GEN_ALL_CORE_FT
PRINCIPAL DISCHARGE DIAGNOSIS  Diagnosis: Sepsis  Assessment and Plan of Treatment: Take all antibiotics as ordered.  Call you Health care provider upon arrival home to make a one week follow up appointment.  If you develop fever, chills, malaise, or change in mental status call your Health Care Provider or go to the Emergency Department.  Nutrition is important, eat small frequent meals to help ensure you get adequate calories.  Do not stay in bed all day!  Increase your activity daily as tolerated.        SECONDARY DISCHARGE DIAGNOSES  Diagnosis: Pyelonephritis  Assessment and Plan of Treatment: improved    Diagnosis: Pyelitis  Assessment and Plan of Treatment: improved

## 2025-06-19 LAB
ANION GAP SERPL CALC-SCNC: 16 MMOL/L — SIGNIFICANT CHANGE UP (ref 5–17)
BUN SERPL-MCNC: 20 MG/DL — SIGNIFICANT CHANGE UP (ref 7–23)
CALCIUM SERPL-MCNC: 8.8 MG/DL — SIGNIFICANT CHANGE UP (ref 8.4–10.5)
CHLORIDE SERPL-SCNC: 99 MMOL/L — SIGNIFICANT CHANGE UP (ref 96–108)
CO2 SERPL-SCNC: 22 MMOL/L — SIGNIFICANT CHANGE UP (ref 22–31)
CREAT SERPL-MCNC: 0.8 MG/DL — SIGNIFICANT CHANGE UP (ref 0.5–1.3)
EGFR: 97 ML/MIN/1.73M2 — SIGNIFICANT CHANGE UP
EGFR: 97 ML/MIN/1.73M2 — SIGNIFICANT CHANGE UP
GLUCOSE BLDC GLUCOMTR-MCNC: 102 MG/DL — HIGH (ref 70–99)
GLUCOSE BLDC GLUCOMTR-MCNC: 113 MG/DL — HIGH (ref 70–99)
GLUCOSE SERPL-MCNC: 94 MG/DL — SIGNIFICANT CHANGE UP (ref 70–99)
HCT VFR BLD CALC: 45 % — SIGNIFICANT CHANGE UP (ref 39–50)
HGB BLD-MCNC: 14.4 G/DL — SIGNIFICANT CHANGE UP (ref 13–17)
MCHC RBC-ENTMCNC: 29.8 PG — SIGNIFICANT CHANGE UP (ref 27–34)
MCHC RBC-ENTMCNC: 32 G/DL — SIGNIFICANT CHANGE UP (ref 32–36)
MCV RBC AUTO: 93 FL — SIGNIFICANT CHANGE UP (ref 80–100)
NRBC # BLD AUTO: 0 K/UL — SIGNIFICANT CHANGE UP (ref 0–0)
NRBC # FLD: 0 K/UL — SIGNIFICANT CHANGE UP (ref 0–0)
NRBC BLD AUTO-RTO: 0 /100 WBCS — SIGNIFICANT CHANGE UP (ref 0–0)
PLATELET # BLD AUTO: 172 K/UL — SIGNIFICANT CHANGE UP (ref 150–400)
PMV BLD: 10.9 FL — SIGNIFICANT CHANGE UP (ref 7–13)
POTASSIUM SERPL-MCNC: 3.9 MMOL/L — SIGNIFICANT CHANGE UP (ref 3.5–5.3)
POTASSIUM SERPL-SCNC: 3.9 MMOL/L — SIGNIFICANT CHANGE UP (ref 3.5–5.3)
RBC # BLD: 4.84 M/UL — SIGNIFICANT CHANGE UP (ref 4.2–5.8)
RBC # FLD: 13.6 % — SIGNIFICANT CHANGE UP (ref 10.3–14.5)
SODIUM SERPL-SCNC: 137 MMOL/L — SIGNIFICANT CHANGE UP (ref 135–145)
WBC # BLD: 6.96 K/UL — SIGNIFICANT CHANGE UP (ref 3.8–10.5)
WBC # FLD AUTO: 6.96 K/UL — SIGNIFICANT CHANGE UP (ref 3.8–10.5)

## 2025-06-19 RX ORDER — ONDANSETRON HCL/PF 4 MG/2 ML
4 VIAL (ML) INJECTION ONCE
Refills: 0 | Status: COMPLETED | OUTPATIENT
Start: 2025-06-19 | End: 2025-06-19

## 2025-06-19 RX ADMIN — Medication 1 TABLET(S): at 11:08

## 2025-06-19 RX ADMIN — BACLOFEN 5 MILLIGRAM(S): 10 INJECTION INTRATHECAL at 05:16

## 2025-06-19 RX ADMIN — CEFPODOXIME PROXETIL 200 MILLIGRAM(S): 200 TABLET, FILM COATED ORAL at 21:12

## 2025-06-19 RX ADMIN — HEPARIN SODIUM 5000 UNIT(S): 1000 INJECTION INTRAVENOUS; SUBCUTANEOUS at 05:16

## 2025-06-19 RX ADMIN — Medication 100 MILLIGRAM(S): at 05:16

## 2025-06-19 RX ADMIN — HEPARIN SODIUM 5000 UNIT(S): 1000 INJECTION INTRAVENOUS; SUBCUTANEOUS at 13:39

## 2025-06-19 RX ADMIN — DICLOFENAC SODIUM 75 MILLIGRAM(S): 75 TABLET, DELAYED RELEASE ORAL at 05:39

## 2025-06-19 RX ADMIN — CEFPODOXIME PROXETIL 200 MILLIGRAM(S): 200 TABLET, FILM COATED ORAL at 11:08

## 2025-06-19 RX ADMIN — Medication 3 MILLIGRAM(S): at 21:12

## 2025-06-19 RX ADMIN — OXYBUTYNIN CHLORIDE 10 MILLIGRAM(S): 5 TABLET, FILM COATED, EXTENDED RELEASE ORAL at 11:08

## 2025-06-19 RX ADMIN — Medication 100 MILLIGRAM(S): at 17:17

## 2025-06-19 RX ADMIN — DICLOFENAC SODIUM 75 MILLIGRAM(S): 75 TABLET, DELAYED RELEASE ORAL at 05:16

## 2025-06-19 RX ADMIN — TAMSULOSIN HYDROCHLORIDE 0.8 MILLIGRAM(S): 0.4 CAPSULE ORAL at 21:12

## 2025-06-19 RX ADMIN — Medication 650 MILLIGRAM(S): at 04:30

## 2025-06-19 RX ADMIN — Medication 40 MILLIGRAM(S): at 05:16

## 2025-06-19 RX ADMIN — BACLOFEN 5 MILLIGRAM(S): 10 INJECTION INTRATHECAL at 21:12

## 2025-06-19 RX ADMIN — Medication 650 MILLIGRAM(S): at 03:58

## 2025-06-19 RX ADMIN — DICLOFENAC SODIUM 75 MILLIGRAM(S): 75 TABLET, DELAYED RELEASE ORAL at 17:17

## 2025-06-19 RX ADMIN — Medication 4 MILLIGRAM(S): at 08:22

## 2025-06-19 RX ADMIN — Medication 40 MILLIGRAM(S): at 17:17

## 2025-06-19 RX ADMIN — HEPARIN SODIUM 5000 UNIT(S): 1000 INJECTION INTRAVENOUS; SUBCUTANEOUS at 21:12

## 2025-06-19 RX ADMIN — SERTRALINE 50 MILLIGRAM(S): 100 TABLET, FILM COATED ORAL at 11:08

## 2025-06-19 RX ADMIN — BACLOFEN 5 MILLIGRAM(S): 10 INJECTION INTRATHECAL at 13:39

## 2025-06-19 NOTE — PROGRESS NOTE ADULT - ASSESSMENT
65 year old male patient with a past medical history of Multiple Sclerosis (MS), seizures, colitis, GERD, BPH presents to the emergency department for fever and generalized weakness.       Overall fever, tachycardia, leucocytosis, sepsis, pyelonephritis, pyelitis.   improved fever and leucocytosis       PLAN:   c/w ceftriaxone  c/w vanco   monitor vancomycin trough and creatinine to avoid nephrotoxicity and ensure efficacy   f/u blood cx, NTD   urine cx with stap epi  urology on board.   trend cbc, resolved leucocytosis.   LUE doppler negative   can transition to po abx when ready for discharge         Kelly Camacho  Please contact through MS Teams   If no response or past 5 pm/weekend call [phone]. 
65 male h/o MS, sz, colitis, gerd, bph, here with sepsis     sepsis  likely uti vs pyelo  CT noted   abx as per id  blood cult neg  urine cult noted. f/u sens   ID consult apprec   urology consult noted. outpt f/u  dc planning on oral abx as per id  repeat CT in 1 month     MS  cont home meds  supportive care    gerd  ppi    dvt ppx    dc planning      I reviewed the course of events on the unit, re-confirming the patient history.   I discussed the care with the patient and their family. The plan of care was discussed with the ACP team.  Advanced care planning was discussed with patient and family.  Advanced care planning forms were reviewed and discussed as appropriate.  Differential diagnosis and plan of care discussed with patient after the evaluation.   Pain assessed and judicious use of narcotics when appropriate was discussed.  Importance of Fall prevention discussed.  Counseling on Smoking and Alcohol cessation was offered when appropriate.  Counseling on Diet, exercise, and medication compliance was done.         
 65 year old male patient with a past medical history of Multiple Sclerosis (MS), seizures, colitis, GERD, BPH presents to the emergency department for fever and generalized weakness.       Overall fever, tachycardia, leucocytosis, sepsis, pyelonephritis, pyelitis.   improved fever and leucocytosis       PLAN:   based on urine cx switched zosyn to ceftriaxone  and added vanco   monitor vancomycin trough and creatinine to avoid nephrotoxicity and ensure efficacy   f/u blood cx, NTD   urology on board.   trend cbc, resolved leucocytosis.         Kelly Camacho  Please contact through MS Teams   If no response or past 5 pm/weekend call [phone]. 
65 male h/o MS, sz, colitis, gerd, bph, here with sepsis     sepsis  likely uti vs pyelo  CT noted  empiric abx as per id  blood cult neg  urine cult noted. suspect contaminant. id f/u    ID consult apprec   urology consult noted. outpt f/u    MS  cont home meds  supportive care    gerd  ppi    dvt ppx    I reviewed the course of events on the unit, re-confirming the patient history.   I discussed the care with the patient and their family. The plan of care was discussed with the ACP team.  Advanced care planning was discussed with patient and family.  Advanced care planning forms were reviewed and discussed as appropriate.  Differential diagnosis and plan of care discussed with patient after the evaluation.   Pain assessed and judicious use of narcotics when appropriate was discussed.  Importance of Fall prevention discussed.  Counseling on Smoking and Alcohol cessation was offered when appropriate.  Counseling on Diet, exercise, and medication compliance was done.         
 65 year old male patient with a past medical history of Multiple Sclerosis (MS), seizures, colitis, GERD, BPH presents to the emergency department for fever and generalized weakness.       Overall fever, tachycardia, leucocytosis, sepsis, pyelonephritis, pyelitis.   improved fever and leucocytosis       PLAN:   c/w ceftriaxone and vanco while inpt   monitor vancomycin trough and creatinine to avoid nephrotoxicity and ensure efficacy   f/u blood cx, NTD   urine cx with staph epi  urology on board.   trend cbc, resolved leucocytosis.   LUE doppler negative   can transition to po doxy 100 mg q12h and cefpodoxime 200 mg q12h when ready for discharge to complete 10 days total until 6/24/25.   pt needs repeat CT abd/pelvis with contrast in a month to ensure resolution.      Plan discussed with Medicine ACP.       Kelly Camacho  Please contact through MS Teams   If no response or past 5 pm/weekend call [phone].     Will sign off, please call with questions. 
65 male h/o MS, sz, colitis, gerd, bph, here with sepsis     sepsis  likely uti vs pyelo  CT noted   abx as per id  blood cult neg  urine cult noted.    ID consult apprec   urology consult noted. outpt f/u    MS  cont home meds  supportive care    gerd  ppi    dvt ppx    I reviewed the course of events on the unit, re-confirming the patient history.   I discussed the care with the patient and their family. The plan of care was discussed with the ACP team.  Advanced care planning was discussed with patient and family.  Advanced care planning forms were reviewed and discussed as appropriate.  Differential diagnosis and plan of care discussed with patient after the evaluation.   Pain assessed and judicious use of narcotics when appropriate was discussed.  Importance of Fall prevention discussed.  Counseling on Smoking and Alcohol cessation was offered when appropriate.  Counseling on Diet, exercise, and medication compliance was done.         
65 male h/o MS, sz, colitis, gerd, bph, here with sepsis     sepsis  likely uti vs pyelo  CT noted   abx as per id  blood cult neg  urine cult noted. f/u sens   ID consult apprec   urology consult noted. outpt f/u  dc planning on oral abx as per id  repeat CT in 1 month     MS  cont home meds  supportive care    gerd  ppi    constipation  s/p dulcolax  resolved    n/v  resolved    RUE superficial thrombophlebitis  conservative mngt     dvt ppx    dc planning      I reviewed the course of events on the unit, re-confirming the patient history.   I discussed the care with the patient and their family. The plan of care was discussed with the ACP team.  Advanced care planning was discussed with patient and family.  Advanced care planning forms were reviewed and discussed as appropriate.  Differential diagnosis and plan of care discussed with patient after the evaluation.   Pain assessed and judicious use of narcotics when appropriate was discussed.  Importance of Fall prevention discussed.  Counseling on Smoking and Alcohol cessation was offered when appropriate.  Counseling on Diet, exercise, and medication compliance was done.         
65 male h/o MS, sz, colitis, gerd, bph, here with sepsis     sepsis  likely uti vs pyelo  CT noted  empiric abx as ordered  f/u cult  f/u ID consult   urology consult pending    MS  cont home meds  supportive care    gerd  ppi    dvt ppx    I reviewed the course of events on the unit, re-confirming the patient history.   I discussed the care with the patient and their family. The plan of care was discussed with the ACP team.  Advanced care planning was discussed with patient and family.  Advanced care planning forms were reviewed and discussed as appropriate.  Differential diagnosis and plan of care discussed with patient after the evaluation.   Pain assessed and judicious use of narcotics when appropriate was discussed.  Importance of Fall prevention discussed.  Counseling on Smoking and Alcohol cessation was offered when appropriate.  Counseling on Diet, exercise, and medication compliance was done.         
65 male h/o MS, sz, colitis, gerd, bph, here with sepsis     sepsis  likely uti vs pyelo  CT noted   abx as per id  blood cult neg  urine cult noted. f/u sens   ID consult apprec   urology consult noted. outpt f/u  dc planning on oral abx as per id    MS  cont home meds  supportive care    gerd  ppi    dvt ppx    I reviewed the course of events on the unit, re-confirming the patient history.   I discussed the care with the patient and their family. The plan of care was discussed with the ACP team.  Advanced care planning was discussed with patient and family.  Advanced care planning forms were reviewed and discussed as appropriate.  Differential diagnosis and plan of care discussed with patient after the evaluation.   Pain assessed and judicious use of narcotics when appropriate was discussed.  Importance of Fall prevention discussed.  Counseling on Smoking and Alcohol cessation was offered when appropriate.  Counseling on Diet, exercise, and medication compliance was done.         
65 male h/o MS, sz, colitis, gerd, bph, here with sepsis     sepsis  likely uti vs pyelo  CT noted  empiric abx as ordered  f/u cult   ID consult apprec   urology consult noted. outpt f/u    MS  cont home meds  supportive care    gerd  ppi    dvt ppx    I reviewed the course of events on the unit, re-confirming the patient history.   I discussed the care with the patient and their family. The plan of care was discussed with the ACP team.  Advanced care planning was discussed with patient and family.  Advanced care planning forms were reviewed and discussed as appropriate.  Differential diagnosis and plan of care discussed with patient after the evaluation.   Pain assessed and judicious use of narcotics when appropriate was discussed.  Importance of Fall prevention discussed.  Counseling on Smoking and Alcohol cessation was offered when appropriate.  Counseling on Diet, exercise, and medication compliance was done.

## 2025-06-19 NOTE — PROGRESS NOTE ADULT - NUTRITIONAL ASSESSMENT
This patient has been assessed with a concern for Malnutrition and has been determined to have a diagnosis/diagnoses of Morbid obesity (BMI > 40).    This patient is being managed with:   Diet Regular-  Entered: Jun 12 2025  7:03PM    The following pending diet order is being considered for treatment of Morbid obesity (BMI > 40):  Diet Consistent Carbohydrate/No Snacks-  Entered: Jun 13 2025 11:25AM  

## 2025-06-19 NOTE — PROGRESS NOTE ADULT - SUBJECTIVE AND OBJECTIVE BOX
65yPatient is a 65y old  Male who presents with a chief complaint of Sepsis     (13 Jun 2025 11:08)      Interval history:  Afebrile, had chills this am.       Allergies:   No Known Allergies      Antimicrobials:  cefTRIAXone   IVPB 2000 milliGRAM(s) IV Intermittent every 24 hours  vancomycin  IVPB 1250 milliGRAM(s) IV Intermittent every 12 hours      REVIEW OF SYSTEMS:  No chest pain   No SOB  No abdominal pain  No rash.       Vital Signs Last 24 Hrs  T(C): 36.4 (06-15-25 @ 05:28), Max: 37 (06-14-25 @ 20:28)  T(F): 97.6 (06-15-25 @ 05:28), Max: 98.6 (06-14-25 @ 20:28)  HR: 72 (06-15-25 @ 05:28) (68 - 72)  BP: 137/91 (06-15-25 @ 05:28) (134/78 - 137/91)  BP(mean): --  RR: 17 (06-15-25 @ 05:28) (17 - 17)  SpO2: 95% (06-15-25 @ 05:28) (95% - 96%)      PHYSICAL EXAM:  Pt in no acute distress, alert, awake.   breathing comfortably   non distended abdomen  no edema LE   no phlebitis                             13.6   5.08  )-----------( 124      ( 14 Jun 2025 06:48 )             42.6   06-14    138  |  103  |  18  ----------------------------<  103[H]  3.7   |  21[L]  |  0.95    Ca    8.0[L]      14 Jun 2025 06:44        Culture - Urine (06.12.25 @ 13:04)   Specimen Source: Clean Catch Clean Catch (Midstream)  Culture Results:   50,000 - 99,000 CFU/mL Staphylococcus epidermidis   "Susceptibilities not performed"    Culture - Blood (06.12.25 @ 12:45)   Specimen Source: Blood Blood-Peripheral  Culture Results:   No growth at 48 Hours        
KARTHIK PATIÑO  65y Male  MRN:[id number]    Patient is a 65y old  Male who presents with a chief complaint of Sepsis     (13 Jun 2025 11:08)    HPI:   65 year old male patient with a past medical history of Multiple Sclerosis (MS), seizures, colitis, GERD, BPH presents to the emergency department for fever and generalized weakness. The patient reports feeling weak for the past couple of days. He developed a fever this morning and has been experiencing chills. The patient denies any recent cough. He mentions intermittent burning sensation during urination which is not acute. The patient reports wife at home also found to have fever yesterday with unknown source. The patient took extra strength Tylenol at 10:30 AM for symptom relief. The patient lives with his wife, son, and daughter-in-law. patient denies chest pain, Shortness of Breath, abdominal pain, Nausea/Vomiting/Diarrhea, dizziness, confusion, vision changes, urinary symptoms, syncope, falls, trauma, discharge, bleeding, (12 Jun 2025 18:57)      Patient seen and evaluated at bedside. No acute events overnight except as noted.    Interval HPI: no acute events o/n     PAST MEDICAL & SURGICAL HISTORY:  Colitis      BPH (benign prostatic hypertrophy)      GERD (gastroesophageal reflux disease)      Seizures      Multiple sclerosis      No significant past surgical history          REVIEW OF SYSTEMS:  as per hpi     VITALS:  Vital Signs Last 24 Hrs  T(C): 36.6 (13 Jun 2025 04:51), Max: 37.8 (12 Jun 2025 16:42)  T(F): 97.8 (13 Jun 2025 04:51), Max: 100 (12 Jun 2025 16:42)  HR: 81 (13 Jun 2025 12:06) (81 - 105)  BP: 126/72 (13 Jun 2025 12:06) (106/71 - 178/79)  BP(mean): --  RR: 18 (13 Jun 2025 04:51) (15 - 22)  SpO2: 94% (13 Jun 2025 12:06) (94% - 96%)    Parameters below as of 13 Jun 2025 12:06  Patient On (Oxygen Delivery Method): room air      CAPILLARY BLOOD GLUCOSE        I&O's Summary    12 Jun 2025 07:01  -  13 Jun 2025 07:00  --------------------------------------------------------  IN: 0 mL / OUT: 400 mL / NET: -400 mL        PHYSICAL EXAM:  GENERAL: NAD, well-developed  HEAD:  Atraumatic, Normocephalic  EYES: EOMI, PERRLA, conjunctiva and sclera clear  NECK: Supple, No JVD  CHEST/LUNG: Clear to auscultation bilaterally; No wheeze  HEART: S1, S2; No murmurs, rubs, or gallops  ABDOMEN: Soft, Nontender, Nondistended; Bowel sounds present  EXTREMITIES:  2+ Peripheral Pulses, No clubbing, cyanosis, or edema  PSYCH: Normal affect  NEUROLOGY: AAOX3; b/l upper and lower ext weakness   SKIN: No rashes or lesions    Consultant(s) Notes Reviewed:  [x ] YES  [ ] NO  Care Discussed with Consultants/Other Providers [ x] YES  [ ] NO    MEDS:  MEDICATIONS  (STANDING):  baclofen 5 milliGRAM(s) Oral every 8 hours  diclofenac 75 milliGRAM(s) Oral two times a day  heparin   Injectable 5000 Unit(s) SubCutaneous every 8 hours  multivitamin 1 Tablet(s) Oral daily  oxybutynin XL 10 milliGRAM(s) Oral daily  pantoprazole    Tablet 40 milliGRAM(s) Oral two times a day  piperacillin/tazobactam IVPB.. 3.375 Gram(s) IV Intermittent every 8 hours  sertraline 50 milliGRAM(s) Oral daily  tamsulosin 0.8 milliGRAM(s) Oral at bedtime    MEDICATIONS  (PRN):    ALLERGIES:  No Known Allergies      LABS:                        14.1   10.87 )-----------( 149      ( 13 Jun 2025 07:02 )             44.3     06-13    134[L]  |  101  |  15  ----------------------------<  96  3.9   |  22  |  0.92    Ca    8.2[L]      13 Jun 2025 06:59    TPro  6.4  /  Alb  3.5  /  TBili  0.5  /  DBili  x   /  AST  20  /  ALT  14  /  AlkPhos  127[H]  06-12    PT/INR - ( 12 Jun 2025 13:04 )   PT: 12.2 sec;   INR: 1.07 ratio         PTT - ( 12 Jun 2025 13:04 )  PTT:29.3 sec      LIVER FUNCTIONS - ( 12 Jun 2025 13:04 )  Alb: 3.5 g/dL / Pro: 6.4 g/dL / ALK PHOS: 127 U/L / ALT: 14 U/L / AST: 20 U/L / GGT: x           Urinalysis Basic - ( 13 Jun 2025 06:59 )    Color: x / Appearance: x / SG: x / pH: x  Gluc: 96 mg/dL / Ketone: x  / Bili: x / Urobili: x   Blood: x / Protein: x / Nitrite: x   Leuk Esterase: x / RBC: x / WBC x   Sq Epi: x / Non Sq Epi: x / Bacteria: x        < from: CT Abdomen and Pelvis w/ IV Cont (06.12.25 @ 16:28) >    IMPRESSION:  Probable pyelitis/UTI. Soft tissue density in the left renal collecting   system with mild hydronephrosis, possibly pyonephrosis, also recommend   urological follow-up to exclude superimposed urothelial neoplasm.        --- End of Report ---    < end of copied text >  < from: Xray Chest 1 View- PORTABLE-Urgent (06.12.25 @ 14:36) >  IMPRESSION: Clear lungs.    --- End of Report---    < end of copied text >  
KARTHIK PATIÑO  65y Male  MRN:[id number]    Patient is a 65y old  Male who presents with a chief complaint of Sepsis     (13 Jun 2025 11:08)    HPI:   65 year old male patient with a past medical history of Multiple Sclerosis (MS), seizures, colitis, GERD, BPH presents to the emergency department for fever and generalized weakness. The patient reports feeling weak for the past couple of days. He developed a fever this morning and has been experiencing chills. The patient denies any recent cough. He mentions intermittent burning sensation during urination which is not acute. The patient reports wife at home also found to have fever yesterday with unknown source. The patient took extra strength Tylenol at 10:30 AM for symptom relief. The patient lives with his wife, son, and daughter-in-law. patient denies chest pain, Shortness of Breath, abdominal pain, Nausea/Vomiting/Diarrhea, dizziness, confusion, vision changes, urinary symptoms, syncope, falls, trauma, discharge, bleeding, (12 Jun 2025 18:57)      Patient seen and evaluated at bedside. No acute events overnight except as noted.    Interval HPI: no acute events o/n     PAST MEDICAL & SURGICAL HISTORY:  Colitis      BPH (benign prostatic hypertrophy)      GERD (gastroesophageal reflux disease)      Seizures      Multiple sclerosis      No significant past surgical history          REVIEW OF SYSTEMS:  as per hpi     VITALS:   Vital Signs Last 24 Hrs  T(C): 37 (14 Jun 2025 20:28), Max: 37 (14 Jun 2025 20:28)  T(F): 98.6 (14 Jun 2025 20:28), Max: 98.6 (14 Jun 2025 20:28)  HR: 68 (14 Jun 2025 20:28) (68 - 76)  BP: 134/78 (14 Jun 2025 20:28) (134/78 - 150/89)  BP(mean): --  RR: 17 (14 Jun 2025 20:28) (17 - 18)  SpO2: 96% (14 Jun 2025 20:28) (94% - 96%)    Parameters below as of 14 Jun 2025 20:28  Patient On (Oxygen Delivery Method): room air        PHYSICAL EXAM:  GENERAL: NAD, well-developed  HEAD:  Atraumatic, Normocephalic  EYES: EOMI, PERRLA, conjunctiva and sclera clear  NECK: Supple, No JVD  CHEST/LUNG: Clear to auscultation bilaterally; No wheeze  HEART: S1, S2; No murmurs, rubs, or gallops  ABDOMEN: Soft, Nontender, Nondistended; Bowel sounds present  EXTREMITIES:  2+ Peripheral Pulses, No clubbing, cyanosis, or edema  PSYCH: Normal affect  NEUROLOGY: AAOX3; b/l upper and lower ext weakness   SKIN: No rashes or lesions    Consultant(s) Notes Reviewed:  [x ] YES  [ ] NO  Care Discussed with Consultants/Other Providers [ x] YES  [ ] NO    MEDS:   MEDICATIONS  (STANDING):  baclofen 5 milliGRAM(s) Oral every 8 hours  diclofenac 75 milliGRAM(s) Oral two times a day  heparin   Injectable 5000 Unit(s) SubCutaneous every 8 hours  multivitamin 1 Tablet(s) Oral daily  oxybutynin XL 10 milliGRAM(s) Oral daily  pantoprazole    Tablet 40 milliGRAM(s) Oral two times a day  piperacillin/tazobactam IVPB.. 3.375 Gram(s) IV Intermittent every 8 hours  sertraline 50 milliGRAM(s) Oral daily  tamsulosin 0.8 milliGRAM(s) Oral at bedtime    MEDICATIONS  (PRN):  bisacodyl Suppository 10 milliGRAM(s) Rectal daily PRN Constipation      ALLERGIES:  No Known Allergies      LABS:                                    13.6   5.08  )-----------( 124      ( 14 Jun 2025 06:48 )             42.6   06-14    138  |  103  |  18  ----------------------------<  103[H]  3.7   |  21[L]  |  0.95    Ca    8.0[L]      14 Jun 2025 06:44        < from: CT Abdomen and Pelvis w/ IV Cont (06.12.25 @ 16:28) >    IMPRESSION:  Probable pyelitis/UTI. Soft tissue density in the left renal collecting   system with mild hydronephrosis, possibly pyonephrosis, also recommend   urological follow-up to exclude superimposed urothelial neoplasm.        --- End of Report ---    < end of copied text >  < from: Xray Chest 1 View- PORTABLE-Urgent (06.12.25 @ 14:36) >  IMPRESSION: Clear lungs.    --- End of Report---    < end of copied text >  
65y old  Male who presents with a chief complaint of Sepsis        Interval history:  Afebrile,       Allergies:   No Known Allergies        Antimicrobials:  cefTRIAXone   IVPB 2000 milliGRAM(s) IV Intermittent every 24 hours  vancomycin  IVPB 1250 milliGRAM(s) IV Intermittent every 12 hours      REVIEW OF SYSTEMS:  No chest pain   No SOB  No rash.       Vital Signs Last 24 Hrs  T(C): 36.4 (06-17-25 @ 12:34), Max: 36.4 (06-17-25 @ 04:43)  T(F): 97.6 (06-17-25 @ 12:34), Max: 97.6 (06-17-25 @ 12:34)  HR: 63 (06-17-25 @ 12:34) (63 - 73)  BP: 109/70 (06-17-25 @ 12:34) (109/70 - 147/83)  BP(mean): --  RR: 18 (06-17-25 @ 12:34) (17 - 18)  SpO2: 93% (06-17-25 @ 12:34) (93% - 93%)      PHYSICAL EXAM:  Pt in no acute distress, alert, awake.   breathing comfortably   non distended abdomen  Lt UE edema improved, rt UE   no phlebitis                             14.1   4.66  )-----------( 157      ( 17 Jun 2025 07:42 )             42.3   06-17    138  |  100  |  20  ----------------------------<  90  3.6   |  23  |  0.84    Ca    8.3[L]      17 Jun 2025 07:39        Culture - Blood (collected 16 Jun 2025 04:30)  Source: Blood Blood-Peripheral  Preliminary Report (17 Jun 2025 10:01):    No growth at 24 hours    Culture - Blood (collected 15 Ander 2025 21:48)  Source: Blood Blood-Peripheral  Preliminary Report (17 Jun 2025 03:01):    No growth at 24 hours        Radiology:    < from: [facility] Duplex Upper Ext Vein Scan, Left (06.15.25 @ 19:56) >  IMPRESSION:  No evidence of left upper extremity deep venous thrombosis.          
65yPatient is a 65y old  Male who presents with a chief complaint of Sepsis     (13 Jun 2025 11:08)      Interval history:  Afebrile, no more chills.       Allergies:   No Known Allergies      Antimicrobials:  cefTRIAXone   IVPB 2000 milliGRAM(s) IV Intermittent every 24 hours  vancomycin  IVPB 1250 milliGRAM(s) IV Intermittent every 12 hours      REVIEW OF SYSTEMS:  No chest pain   No SOB  No abdominal pain  No rash.       Vital Signs Last 24 Hrs  T(C): 36.6 (06-16-25 @ 12:55), Max: 36.8 (06-15-25 @ 20:20)  T(F): 97.8 (06-16-25 @ 12:55), Max: 98.2 (06-15-25 @ 20:20)  HR: 73 (06-16-25 @ 12:55) (63 - 75)  BP: 116/77 (06-16-25 @ 12:55) (107/70 - 116/77)  BP(mean): --  RR: 18 (06-16-25 @ 12:55) (17 - 18)  SpO2: 92% (06-16-25 @ 12:55) (92% - 96%)      PHYSICAL EXAM:  Pt in no acute distress, alert, awake.   breathing comfortably   non distended abdomen  no edema LE   LUE swelling with some erythema, per pt RUE always swollen.   no phlebitis                             14.1   4.75  )-----------( 166      ( 16 Jun 2025 05:45 )             43.4   06-16    138  |  103  |  27[H]  ----------------------------<  104[H]  4.2   |  24  |  0.96    Ca    8.4      16 Jun 2025 05:40          < from: [facility] Duplex Upper Ext Vein Scan, Left (06.15.25 @ 19:56) >  IMPRESSION:  No evidence of left upper extremity deep venous thrombosis.          
KARTHIK PATIÑO  65y Male  MRN:[id number]    Patient is a 65y old  Male who presents with a chief complaint of Sepsis     (13 Jun 2025 11:08)    HPI:   65 year old male patient with a past medical history of Multiple Sclerosis (MS), seizures, colitis, GERD, BPH presents to the emergency department for fever and generalized weakness. The patient reports feeling weak for the past couple of days. He developed a fever this morning and has been experiencing chills. The patient denies any recent cough. He mentions intermittent burning sensation during urination which is not acute. The patient reports wife at home also found to have fever yesterday with unknown source. The patient took extra strength Tylenol at 10:30 AM for symptom relief. The patient lives with his wife, son, and daughter-in-law. patient denies chest pain, Shortness of Breath, abdominal pain, Nausea/Vomiting/Diarrhea, dizziness, confusion, vision changes, urinary symptoms, syncope, falls, trauma, discharge, bleeding, (12 Jun 2025 18:57)      Patient seen and evaluated at bedside. No acute events overnight except as noted.    Interval HPI: no acute events o/n     PAST MEDICAL & SURGICAL HISTORY:  Colitis      BPH (benign prostatic hypertrophy)      GERD (gastroesophageal reflux disease)      Seizures      Multiple sclerosis      No significant past surgical history          REVIEW OF SYSTEMS:  as per hpi     VITALS:   Vital Signs Last 24 Hrs  T(C): 36.5 (16 Jun 2025 04:23), Max: 36.8 (15 Ander 2025 20:20)  T(F): 97.7 (16 Jun 2025 04:23), Max: 98.2 (15 Ander 2025 20:20)  HR: 63 (16 Jun 2025 04:23) (63 - 75)  BP: 116/70 (16 Jun 2025 04:23) (107/70 - 116/70)  BP(mean): --  RR: 18 (16 Jun 2025 04:23) (17 - 18)  SpO2: 96% (16 Jun 2025 04:23) (93% - 96%)    Parameters below as of 16 Jun 2025 04:23  Patient On (Oxygen Delivery Method): room air          PHYSICAL EXAM:  GENERAL: NAD, well-developed  HEAD:  Atraumatic, Normocephalic  EYES: EOMI, PERRLA, conjunctiva and sclera clear  NECK: Supple, No JVD  CHEST/LUNG: Clear to auscultation bilaterally; No wheeze  HEART: S1, S2; No murmurs, rubs, or gallops  ABDOMEN: Soft, Nontender, Nondistended; Bowel sounds present  EXTREMITIES:  2+ Peripheral Pulses, No clubbing, cyanosis, or edema  PSYCH: Normal affect  NEUROLOGY: AAOX3; b/l upper and lower ext weakness   SKIN: No rashes or lesions    Consultant(s) Notes Reviewed:  [x ] YES  [ ] NO  Care Discussed with Consultants/Other Providers [ x] YES  [ ] NO    MEDS:   MEDICATIONS  (STANDING):  baclofen 5 milliGRAM(s) Oral every 8 hours  cefTRIAXone   IVPB 2000 milliGRAM(s) IV Intermittent every 24 hours  diclofenac 75 milliGRAM(s) Oral two times a day  heparin   Injectable 5000 Unit(s) SubCutaneous every 8 hours  multivitamin 1 Tablet(s) Oral daily  oxybutynin XL 10 milliGRAM(s) Oral daily  pantoprazole    Tablet 40 milliGRAM(s) Oral two times a day  sertraline 50 milliGRAM(s) Oral daily  tamsulosin 0.8 milliGRAM(s) Oral at bedtime  vancomycin  IVPB 1250 milliGRAM(s) IV Intermittent every 12 hours    MEDICATIONS  (PRN):  acetaminophen     Tablet .. 650 milliGRAM(s) Oral every 6 hours PRN Temp greater or equal to 38C (100.4F), Mild Pain (1 - 3)  bisacodyl Suppository 10 milliGRAM(s) Rectal daily PRN Constipation      ALLERGIES:  No Known Allergies      LABS:                                                        14.1   4.75  )-----------( 166      ( 16 Jun 2025 05:45 )             43.4             < from: CT Abdomen and Pelvis w/ IV Cont (06.12.25 @ 16:28) >    IMPRESSION:  Probable pyelitis/UTI. Soft tissue density in the left renal collecting   system with mild hydronephrosis, possibly pyonephrosis, also recommend   urological follow-up to exclude superimposed urothelial neoplasm.        --- End of Report ---    < end of copied text >  < from: Xray Chest 1 View- PORTABLE-Urgent (06.12.25 @ 14:36) >  IMPRESSION: Clear lungs.    --- End of Report---    < end of copied text >  
KARTHIK PATIÑO  65y Male  MRN:[id number]    Patient is a 65y old  Male who presents with a chief complaint of Sepsis     (13 Jun 2025 11:08)    HPI:   65 year old male patient with a past medical history of Multiple Sclerosis (MS), seizures, colitis, GERD, BPH presents to the emergency department for fever and generalized weakness. The patient reports feeling weak for the past couple of days. He developed a fever this morning and has been experiencing chills. The patient denies any recent cough. He mentions intermittent burning sensation during urination which is not acute. The patient reports wife at home also found to have fever yesterday with unknown source. The patient took extra strength Tylenol at 10:30 AM for symptom relief. The patient lives with his wife, son, and daughter-in-law. patient denies chest pain, Shortness of Breath, abdominal pain, Nausea/Vomiting/Diarrhea, dizziness, confusion, vision changes, urinary symptoms, syncope, falls, trauma, discharge, bleeding, (12 Jun 2025 18:57)      Patient seen and evaluated at bedside. No acute events overnight except as noted.    Interval HPI: no acute events o/n     PAST MEDICAL & SURGICAL HISTORY:  Colitis      BPH (benign prostatic hypertrophy)      GERD (gastroesophageal reflux disease)      Seizures      Multiple sclerosis      No significant past surgical history          REVIEW OF SYSTEMS:  as per hpi     VITALS:   Vital Signs Last 24 Hrs  T(C): 36.4 (17 Jun 2025 12:34), Max: 36.6 (16 Jun 2025 12:55)  T(F): 97.6 (17 Jun 2025 12:34), Max: 97.8 (16 Jun 2025 12:55)  HR: 63 (17 Jun 2025 12:34) (63 - 73)  BP: 109/70 (17 Jun 2025 12:34) (109/70 - 147/83)  BP(mean): --  RR: 18 (17 Jun 2025 12:34) (17 - 18)  SpO2: 93% (17 Jun 2025 12:34) (92% - 93%)    Parameters below as of 17 Jun 2025 12:34  Patient On (Oxygen Delivery Method): room air          PHYSICAL EXAM:  GENERAL: NAD, well-developed  HEAD:  Atraumatic, Normocephalic  EYES: EOMI, PERRLA, conjunctiva and sclera clear  NECK: Supple, No JVD  CHEST/LUNG: Clear to auscultation bilaterally; No wheeze  HEART: S1, S2; No murmurs, rubs, or gallops  ABDOMEN: Soft, Nontender, Nondistended; Bowel sounds present  EXTREMITIES:  2+ Peripheral Pulses, No clubbing, cyanosis, or edema  PSYCH: Normal affect  NEUROLOGY: AAOX3; b/l upper and lower ext weakness   SKIN: No rashes or lesions    Consultant(s) Notes Reviewed:  [x ] YES  [ ] NO  Care Discussed with Consultants/Other Providers [ x] YES  [ ] NO    MEDS:   MEDICATIONS  (STANDING):  baclofen 5 milliGRAM(s) Oral every 8 hours  cefTRIAXone   IVPB 2000 milliGRAM(s) IV Intermittent every 24 hours  diclofenac 75 milliGRAM(s) Oral two times a day  heparin   Injectable 5000 Unit(s) SubCutaneous every 8 hours  multivitamin 1 Tablet(s) Oral daily  oxybutynin XL 10 milliGRAM(s) Oral daily  pantoprazole    Tablet 40 milliGRAM(s) Oral two times a day  sertraline 50 milliGRAM(s) Oral daily  tamsulosin 0.8 milliGRAM(s) Oral at bedtime  vancomycin  IVPB 1250 milliGRAM(s) IV Intermittent every 12 hours    MEDICATIONS  (PRN):  acetaminophen     Tablet .. 650 milliGRAM(s) Oral every 6 hours PRN Temp greater or equal to 38C (100.4F), Mild Pain (1 - 3)  bisacodyl Suppository 10 milliGRAM(s) Rectal daily PRN Constipation      ALLERGIES:  No Known Allergies      LABS:                                                                    14.1   4.66  )-----------( 157      ( 17 Jun 2025 07:42 )             42.3   06-17    138  |  100  |  20  ----------------------------<  90  3.6   |  23  |  0.84    Ca    8.3[L]      17 Jun 2025 07:39                < from: CT Abdomen and Pelvis w/ IV Cont (06.12.25 @ 16:28) >    IMPRESSION:  Probable pyelitis/UTI. Soft tissue density in the left renal collecting   system with mild hydronephrosis, possibly pyonephrosis, also recommend   urological follow-up to exclude superimposed urothelial neoplasm.        --- End of Report ---    < end of copied text >  < from: Xray Chest 1 View- PORTABLE-Urgent (06.12.25 @ 14:36) >  IMPRESSION: Clear lungs.    --- End of Report---    < end of copied text >  
KARTHIK PATIÑO  65y Male  MRN:[id number]    Patient is a 65y old  Male who presents with a chief complaint of Sepsis     (13 Jun 2025 11:08)    HPI:   65 year old male patient with a past medical history of Multiple Sclerosis (MS), seizures, colitis, GERD, BPH presents to the emergency department for fever and generalized weakness. The patient reports feeling weak for the past couple of days. He developed a fever this morning and has been experiencing chills. The patient denies any recent cough. He mentions intermittent burning sensation during urination which is not acute. The patient reports wife at home also found to have fever yesterday with unknown source. The patient took extra strength Tylenol at 10:30 AM for symptom relief. The patient lives with his wife, son, and daughter-in-law. patient denies chest pain, Shortness of Breath, abdominal pain, Nausea/Vomiting/Diarrhea, dizziness, confusion, vision changes, urinary symptoms, syncope, falls, trauma, discharge, bleeding, (12 Jun 2025 18:57)      Patient seen and evaluated at bedside. No acute events overnight except as noted.    Interval HPI: no acute events o/n     PAST MEDICAL & SURGICAL HISTORY:  Colitis      BPH (benign prostatic hypertrophy)      GERD (gastroesophageal reflux disease)      Seizures      Multiple sclerosis      No significant past surgical history          REVIEW OF SYSTEMS:  as per hpi     VITALS:   Vital Signs Last 24 Hrs  T(C): 36.3 (18 Jun 2025 12:44), Max: 36.7 (17 Jun 2025 21:14)  T(F): 97.3 (18 Jun 2025 12:44), Max: 98.1 (17 Jun 2025 21:14)  HR: 71 (18 Jun 2025 12:44) (70 - 78)  BP: 141/77 (18 Jun 2025 12:44) (141/77 - 154/79)  BP(mean): --  RR: 18 (18 Jun 2025 12:44) (17 - 18)  SpO2: 92% (18 Jun 2025 12:44) (92% - 93%)    Parameters below as of 18 Jun 2025 12:44  Patient On (Oxygen Delivery Method): room air        PHYSICAL EXAM:  GENERAL: NAD, well-developed  HEAD:  Atraumatic, Normocephalic  EYES: EOMI, PERRLA, conjunctiva and sclera clear  NECK: Supple, No JVD  CHEST/LUNG: Clear to auscultation bilaterally; No wheeze  HEART: S1, S2; No murmurs, rubs, or gallops  ABDOMEN: Soft, Nontender, Nondistended; Bowel sounds present  EXTREMITIES:  2+ Peripheral Pulses, No clubbing, cyanosis, or edema  PSYCH: Normal affect  NEUROLOGY: AAOX3; b/l upper and lower ext weakness   SKIN: No rashes or lesions    Consultant(s) Notes Reviewed:  [x ] YES  [ ] NO  Care Discussed with Consultants/Other Providers [ x] YES  [ ] NO    MEDS:   MEDICATIONS  (STANDING):  baclofen 5 milliGRAM(s) Oral every 8 hours  cefTRIAXone   IVPB 2000 milliGRAM(s) IV Intermittent every 24 hours  diclofenac 75 milliGRAM(s) Oral two times a day  doxycycline monohydrate Capsule 100 milliGRAM(s) Oral every 12 hours  heparin   Injectable 5000 Unit(s) SubCutaneous every 8 hours  melatonin 3 milliGRAM(s) Oral at bedtime  multivitamin 1 Tablet(s) Oral daily  oxybutynin XL 10 milliGRAM(s) Oral daily  pantoprazole    Tablet 40 milliGRAM(s) Oral two times a day  sertraline 50 milliGRAM(s) Oral daily  tamsulosin 0.8 milliGRAM(s) Oral at bedtime    MEDICATIONS  (PRN):  acetaminophen     Tablet .. 650 milliGRAM(s) Oral every 6 hours PRN Temp greater or equal to 38C (100.4F), Mild Pain (1 - 3)  bisacodyl Suppository 10 milliGRAM(s) Rectal daily PRN Constipation      ALLERGIES:  No Known Allergies      LABS:                                    14.1   4.66  )-----------( 157      ( 17 Jun 2025 07:42 )             42.3   06-17    138  |  100  |  20  ----------------------------<  90  3.6   |  23  |  0.84    Ca    8.3[L]      17 Jun 2025 07:39              < from: CT Abdomen and Pelvis w/ IV Cont (06.12.25 @ 16:28) >    IMPRESSION:  Probable pyelitis/UTI. Soft tissue density in the left renal collecting   system with mild hydronephrosis, possibly pyonephrosis, also recommend   urological follow-up to exclude superimposed urothelial neoplasm.        --- End of Report ---    < end of copied text >  < from: Xray Chest 1 View- PORTABLE-Urgent (06.12.25 @ 14:36) >  IMPRESSION: Clear lungs.    --- End of Report---    < end of copied text >  
KARTHIK PATIÑO  65y Male  MRN:[id number]    Patient is a 65y old  Male who presents with a chief complaint of Sepsis     (13 Jun 2025 11:08)    HPI:   65 year old male patient with a past medical history of Multiple Sclerosis (MS), seizures, colitis, GERD, BPH presents to the emergency department for fever and generalized weakness. The patient reports feeling weak for the past couple of days. He developed a fever this morning and has been experiencing chills. The patient denies any recent cough. He mentions intermittent burning sensation during urination which is not acute. The patient reports wife at home also found to have fever yesterday with unknown source. The patient took extra strength Tylenol at 10:30 AM for symptom relief. The patient lives with his wife, son, and daughter-in-law. patient denies chest pain, Shortness of Breath, abdominal pain, Nausea/Vomiting/Diarrhea, dizziness, confusion, vision changes, urinary symptoms, syncope, falls, trauma, discharge, bleeding, (12 Jun 2025 18:57)      Patient seen and evaluated at bedside. No acute events overnight except as noted.    Interval HPI: no acute events o/n     PAST MEDICAL & SURGICAL HISTORY:  Colitis      BPH (benign prostatic hypertrophy)      GERD (gastroesophageal reflux disease)      Seizures      Multiple sclerosis      No significant past surgical history          REVIEW OF SYSTEMS:  as per hpi     VITALS:   Vital Signs Last 24 Hrs  T(C): 36.4 (15 Ander 2025 05:28), Max: 37 (14 Jun 2025 20:28)  T(F): 97.6 (15 Ander 2025 05:28), Max: 98.6 (14 Jun 2025 20:28)  HR: 72 (15 Ander 2025 05:28) (68 - 72)  BP: 137/91 (15 Ander 2025 05:28) (134/78 - 137/91)  BP(mean): --  RR: 17 (15 Ander 2025 05:28) (17 - 17)  SpO2: 95% (15 Ander 2025 05:28) (95% - 96%)    Parameters below as of 15 Ander 2025 05:28  Patient On (Oxygen Delivery Method): room air        PHYSICAL EXAM:  GENERAL: NAD, well-developed  HEAD:  Atraumatic, Normocephalic  EYES: EOMI, PERRLA, conjunctiva and sclera clear  NECK: Supple, No JVD  CHEST/LUNG: Clear to auscultation bilaterally; No wheeze  HEART: S1, S2; No murmurs, rubs, or gallops  ABDOMEN: Soft, Nontender, Nondistended; Bowel sounds present  EXTREMITIES:  2+ Peripheral Pulses, No clubbing, cyanosis, or edema  PSYCH: Normal affect  NEUROLOGY: AAOX3; b/l upper and lower ext weakness   SKIN: No rashes or lesions    Consultant(s) Notes Reviewed:  [x ] YES  [ ] NO  Care Discussed with Consultants/Other Providers [ x] YES  [ ] NO    MEDS:   MEDICATIONS  (STANDING):  baclofen 5 milliGRAM(s) Oral every 8 hours  cefTRIAXone   IVPB 2000 milliGRAM(s) IV Intermittent every 24 hours  diclofenac 75 milliGRAM(s) Oral two times a day  heparin   Injectable 5000 Unit(s) SubCutaneous every 8 hours  multivitamin 1 Tablet(s) Oral daily  oxybutynin XL 10 milliGRAM(s) Oral daily  pantoprazole    Tablet 40 milliGRAM(s) Oral two times a day  sertraline 50 milliGRAM(s) Oral daily  tamsulosin 0.8 milliGRAM(s) Oral at bedtime  vancomycin  IVPB 1250 milliGRAM(s) IV Intermittent every 12 hours    MEDICATIONS  (PRN):  acetaminophen     Tablet .. 650 milliGRAM(s) Oral every 6 hours PRN Temp greater or equal to 38C (100.4F), Mild Pain (1 - 3)  bisacodyl Suppository 10 milliGRAM(s) Rectal daily PRN Constipation      ALLERGIES:  No Known Allergies      LABS:                                              13.6   5.08  )-----------( 124      ( 14 Jun 2025 06:48 )             42.6   06-14    138  |  103  |  18  ----------------------------<  103[H]  3.7   |  21[L]  |  0.95    Ca    8.0[L]      14 Jun 2025 06:44            < from: CT Abdomen and Pelvis w/ IV Cont (06.12.25 @ 16:28) >    IMPRESSION:  Probable pyelitis/UTI. Soft tissue density in the left renal collecting   system with mild hydronephrosis, possibly pyonephrosis, also recommend   urological follow-up to exclude superimposed urothelial neoplasm.        --- End of Report ---    < end of copied text >  < from: Xray Chest 1 View- PORTABLE-Urgent (06.12.25 @ 14:36) >  IMPRESSION: Clear lungs.    --- End of Report---    < end of copied text >  
KARTHIK PATIÑO  65y Male  MRN:[id number]    Patient is a 65y old  Male who presents with a chief complaint of Sepsis     (13 Jun 2025 11:08)    HPI:   65 year old male patient with a past medical history of Multiple Sclerosis (MS), seizures, colitis, GERD, BPH presents to the emergency department for fever and generalized weakness. The patient reports feeling weak for the past couple of days. He developed a fever this morning and has been experiencing chills. The patient denies any recent cough. He mentions intermittent burning sensation during urination which is not acute. The patient reports wife at home also found to have fever yesterday with unknown source. The patient took extra strength Tylenol at 10:30 AM for symptom relief. The patient lives with his wife, son, and daughter-in-law. patient denies chest pain, Shortness of Breath, abdominal pain, Nausea/Vomiting/Diarrhea, dizziness, confusion, vision changes, urinary symptoms, syncope, falls, trauma, discharge, bleeding, (12 Jun 2025 18:57)      Patient seen and evaluated at bedside. No acute events overnight except as noted.    Interval HPI: no acute events o/n     PAST MEDICAL & SURGICAL HISTORY:  Colitis      BPH (benign prostatic hypertrophy)      GERD (gastroesophageal reflux disease)      Seizures      Multiple sclerosis      No significant past surgical history          REVIEW OF SYSTEMS:  as per hpi     VITALS:   Vital Signs Last 24 Hrs  T(C): 36.4 (19 Jun 2025 12:02), Max: 36.4 (18 Jun 2025 16:37)  T(F): 97.6 (19 Jun 2025 12:02), Max: 97.6 (19 Jun 2025 12:02)  HR: 72 (19 Jun 2025 12:02) (64 - 79)  BP: 123/81 (19 Jun 2025 12:02) (123/81 - 154/87)  BP(mean): --  RR: 18 (19 Jun 2025 12:02) (17 - 18)  SpO2: 93% (19 Jun 2025 12:02) (92% - 94%)    Parameters below as of 19 Jun 2025 12:02  Patient On (Oxygen Delivery Method): room air        PHYSICAL EXAM:  GENERAL: NAD, well-developed  HEAD:  Atraumatic, Normocephalic  EYES: EOMI, PERRLA, conjunctiva and sclera clear  NECK: Supple, No JVD  CHEST/LUNG: Clear to auscultation bilaterally; No wheeze  HEART: S1, S2; No murmurs, rubs, or gallops  ABDOMEN: Soft, Nontender, Nondistended; Bowel sounds present  EXTREMITIES:  2+ Peripheral Pulses, No clubbing, cyanosis, or edema  PSYCH: Normal affect  NEUROLOGY: AAOX3; b/l upper and lower ext weakness   SKIN: No rashes or lesions    Consultant(s) Notes Reviewed:  [x ] YES  [ ] NO  Care Discussed with Consultants/Other Providers [ x] YES  [ ] NO    MEDS:   MEDICATIONS  (STANDING):  baclofen 5 milliGRAM(s) Oral every 8 hours  cefpodoxime 200 milliGRAM(s) Oral <User Schedule>  diclofenac 75 milliGRAM(s) Oral two times a day  doxycycline monohydrate Capsule 100 milliGRAM(s) Oral every 12 hours  heparin   Injectable 5000 Unit(s) SubCutaneous every 8 hours  melatonin 3 milliGRAM(s) Oral at bedtime  multivitamin 1 Tablet(s) Oral daily  oxybutynin XL 10 milliGRAM(s) Oral daily  pantoprazole    Tablet 40 milliGRAM(s) Oral two times a day  sertraline 50 milliGRAM(s) Oral daily  tamsulosin 0.8 milliGRAM(s) Oral at bedtime    MEDICATIONS  (PRN):  acetaminophen     Tablet .. 650 milliGRAM(s) Oral every 6 hours PRN Temp greater or equal to 38C (100.4F), Mild Pain (1 - 3)  bisacodyl Suppository 10 milliGRAM(s) Rectal daily PRN Constipation      ALLERGIES:  No Known Allergies      LABS:                                                14.4   6.96  )-----------( 172      ( 19 Jun 2025 07:20 )             45.0   06-19    137  |  99  |  20  ----------------------------<  94  3.9   |  22  |  0.80    Ca    8.8      19 Jun 2025 07:24    < from: [facility] Duplex Upper Ext Vein Scan, Right (06.18.25 @ 16:18) >  IMPRESSION:  No evidence of right upper extremity deep venous thrombosis.    Superficial thrombophlebitis in the right cephalic vein.        < end of copied text >          < from: CT Abdomen and Pelvis w/ IV Cont (06.12.25 @ 16:28) >    IMPRESSION:  Probable pyelitis/UTI. Soft tissue density in the left renal collecting   system with mild hydronephrosis, possibly pyonephrosis, also recommend   urological follow-up to exclude superimposed urothelial neoplasm.        --- End of Report ---    < end of copied text >  < from: Xray Chest 1 View- PORTABLE-Urgent (06.12.25 @ 14:36) >  IMPRESSION: Clear lungs.    --- End of Report---    < end of copied text >

## 2025-06-20 ENCOUNTER — TRANSCRIPTION ENCOUNTER (OUTPATIENT)
Age: 65
End: 2025-06-20

## 2025-06-20 VITALS
TEMPERATURE: 98 F | RESPIRATION RATE: 18 BRPM | SYSTOLIC BLOOD PRESSURE: 145 MMHG | HEART RATE: 80 BPM | DIASTOLIC BLOOD PRESSURE: 86 MMHG | OXYGEN SATURATION: 92 %

## 2025-06-20 PROCEDURE — 93005 ELECTROCARDIOGRAM TRACING: CPT

## 2025-06-20 PROCEDURE — 81001 URINALYSIS AUTO W/SCOPE: CPT

## 2025-06-20 PROCEDURE — 85025 COMPLETE CBC W/AUTO DIFF WBC: CPT

## 2025-06-20 PROCEDURE — 85014 HEMATOCRIT: CPT

## 2025-06-20 PROCEDURE — 97161 PT EVAL LOW COMPLEX 20 MIN: CPT

## 2025-06-20 PROCEDURE — 80048 BASIC METABOLIC PNL TOTAL CA: CPT

## 2025-06-20 PROCEDURE — 97110 THERAPEUTIC EXERCISES: CPT

## 2025-06-20 PROCEDURE — 85018 HEMOGLOBIN: CPT

## 2025-06-20 PROCEDURE — 82330 ASSAY OF CALCIUM: CPT

## 2025-06-20 PROCEDURE — 74177 CT ABD & PELVIS W/CONTRAST: CPT

## 2025-06-20 PROCEDURE — 87086 URINE CULTURE/COLONY COUNT: CPT

## 2025-06-20 PROCEDURE — 82962 GLUCOSE BLOOD TEST: CPT

## 2025-06-20 PROCEDURE — 85730 THROMBOPLASTIN TIME PARTIAL: CPT

## 2025-06-20 PROCEDURE — 80202 ASSAY OF VANCOMYCIN: CPT

## 2025-06-20 PROCEDURE — 97535 SELF CARE MNGMENT TRAINING: CPT

## 2025-06-20 PROCEDURE — 93971 EXTREMITY STUDY: CPT

## 2025-06-20 PROCEDURE — 97166 OT EVAL MOD COMPLEX 45 MIN: CPT

## 2025-06-20 PROCEDURE — 84132 ASSAY OF SERUM POTASSIUM: CPT

## 2025-06-20 PROCEDURE — 82947 ASSAY GLUCOSE BLOOD QUANT: CPT

## 2025-06-20 PROCEDURE — 82803 BLOOD GASES ANY COMBINATION: CPT

## 2025-06-20 PROCEDURE — 71045 X-RAY EXAM CHEST 1 VIEW: CPT

## 2025-06-20 PROCEDURE — 0241U: CPT

## 2025-06-20 PROCEDURE — 87040 BLOOD CULTURE FOR BACTERIA: CPT

## 2025-06-20 PROCEDURE — 87186 SC STD MICRODIL/AGAR DIL: CPT

## 2025-06-20 PROCEDURE — 84295 ASSAY OF SERUM SODIUM: CPT

## 2025-06-20 PROCEDURE — 82435 ASSAY OF BLOOD CHLORIDE: CPT

## 2025-06-20 PROCEDURE — 99285 EMERGENCY DEPT VISIT HI MDM: CPT

## 2025-06-20 PROCEDURE — 87077 CULTURE AEROBIC IDENTIFY: CPT

## 2025-06-20 PROCEDURE — 85610 PROTHROMBIN TIME: CPT

## 2025-06-20 PROCEDURE — 36415 COLL VENOUS BLD VENIPUNCTURE: CPT

## 2025-06-20 PROCEDURE — 96374 THER/PROPH/DIAG INJ IV PUSH: CPT

## 2025-06-20 PROCEDURE — 85027 COMPLETE CBC AUTOMATED: CPT

## 2025-06-20 PROCEDURE — 87637 SARSCOV2&INF A&B&RSV AMP PRB: CPT

## 2025-06-20 PROCEDURE — 83605 ASSAY OF LACTIC ACID: CPT

## 2025-06-20 PROCEDURE — 80053 COMPREHEN METABOLIC PANEL: CPT

## 2025-06-20 RX ADMIN — CEFPODOXIME PROXETIL 200 MILLIGRAM(S): 200 TABLET, FILM COATED ORAL at 10:14

## 2025-06-20 RX ADMIN — DICLOFENAC SODIUM 75 MILLIGRAM(S): 75 TABLET, DELAYED RELEASE ORAL at 05:19

## 2025-06-20 RX ADMIN — DICLOFENAC SODIUM 75 MILLIGRAM(S): 75 TABLET, DELAYED RELEASE ORAL at 06:00

## 2025-06-20 RX ADMIN — Medication 40 MILLIGRAM(S): at 05:19

## 2025-06-20 RX ADMIN — BACLOFEN 5 MILLIGRAM(S): 10 INJECTION INTRATHECAL at 05:19

## 2025-06-20 RX ADMIN — Medication 100 MILLIGRAM(S): at 05:19

## 2025-06-20 RX ADMIN — HEPARIN SODIUM 5000 UNIT(S): 1000 INJECTION INTRAVENOUS; SUBCUTANEOUS at 05:20

## 2025-06-20 NOTE — DISCHARGE NOTE NURSING/CASE MANAGEMENT/SOCIAL WORK - PATIENT PORTAL LINK FT
You can access the FollowMyHealth Patient Portal offered by [facility] by registering at the following website: [email or web address]. By joining [facility] FollowMyHealth portal, you will also be able to view your health information using other applications (apps) compatible with our system.

## 2025-06-20 NOTE — DISCHARGE NOTE NURSING/CASE MANAGEMENT/SOCIAL WORK - NSDCFUADDAPPT_GEN_ALL_CORE_FT
Follow up outpatient urologist :  [facility]  [address] [place] [id number]  [phone]

## 2025-06-20 NOTE — DISCHARGE NOTE NURSING/CASE MANAGEMENT/SOCIAL WORK - NSDCVIVACCINE_GEN_ALL_CORE_FT
COVID-19, mRNA, LNP-S, PF, 30 mcg/0.3 mL dose, keely-sucrose (Pfizer); 14-Jul-2022 12:41; Yahaira Branch (RN); [employer]; [id number] (Exp. Date: 29-Aug-2022); IntraMuscular; Deltoid Right.; 0.3 milliLiter(s);   influenza, injectable, quadrivalent, preservative free; 05-Oct-2014 13:54; Hanane Coyle (RN); [employer]; UI [id number]; IntraMuscular; Deltoid Right.; 0.5 milliLiter(s);   influenza, injectable, quadrivalent, preservative free; 26-Sep-2016 17:23; Adali Roe (RN); [employer]; [id number]; IntraMuscular; Deltoid Right.; 0.5 milliLiter(s); VIS (VIS Published: 07-Aug-2015, VIS Presented: 26-Sep-2016);

## 2025-06-20 NOTE — DISCHARGE NOTE NURSING/CASE MANAGEMENT/SOCIAL WORK - FINANCIAL ASSISTANCE
[facility] provides services at a reduced cost to those who are determined to be eligible through [facility]’s financial assistance program. Information regarding [facility]’s financial assistance program can be found by going to [email or web address] or by calling [phone].

## 2025-06-20 NOTE — DISCHARGE NOTE NURSING/CASE MANAGEMENT/SOCIAL WORK - NSDCPEFALRISK_GEN_ALL_CORE
For information on Fall & Injury Prevention, visit: [email or web address] OR  [email or web address] OR  https://www.cdc.gov/steadi/patient.html

## 2025-07-30 ENCOUNTER — INPATIENT (INPATIENT)
Facility: HOSPITAL | Age: 65
LOS: 10 days | Discharge: HOME | DRG: 872 | End: 2025-08-10
Attending: INTERNAL MEDICINE | Admitting: INTERNAL MEDICINE
Payer: MEDICARE

## 2025-07-30 VITALS
HEIGHT: 76 IN | TEMPERATURE: 101 F | SYSTOLIC BLOOD PRESSURE: 124 MMHG | WEIGHT: 225.09 LBS | OXYGEN SATURATION: 98 % | RESPIRATION RATE: 20 BRPM | DIASTOLIC BLOOD PRESSURE: 82 MMHG | HEART RATE: 82 BPM

## 2025-07-30 DIAGNOSIS — L03.90 CELLULITIS, UNSPECIFIED: ICD-10-CM

## 2025-07-30 LAB
ALBUMIN SERPL ELPH-MCNC: 2.8 G/DL — LOW (ref 3.3–5)
ALP SERPL-CCNC: 118 U/L — SIGNIFICANT CHANGE UP (ref 40–120)
ALT FLD-CCNC: 14 U/L — SIGNIFICANT CHANGE UP (ref 10–45)
ANION GAP SERPL CALC-SCNC: 10 MMOL/L — SIGNIFICANT CHANGE UP (ref 5–17)
APPEARANCE UR: CLEAR — SIGNIFICANT CHANGE UP
APTT BLD: 28.6 SEC — SIGNIFICANT CHANGE UP (ref 26.1–36.8)
AST SERPL-CCNC: 12 U/L — SIGNIFICANT CHANGE UP (ref 10–40)
BACTERIA # UR AUTO: NEGATIVE /HPF — SIGNIFICANT CHANGE UP
BASOPHILS # BLD AUTO: 0.04 K/UL — SIGNIFICANT CHANGE UP (ref 0–0.2)
BASOPHILS NFR BLD AUTO: 0.3 % — SIGNIFICANT CHANGE UP (ref 0–2)
BILIRUB SERPL-MCNC: 0.3 MG/DL — SIGNIFICANT CHANGE UP (ref 0.2–1.2)
BILIRUB UR-MCNC: NEGATIVE — SIGNIFICANT CHANGE UP
BUN SERPL-MCNC: 20 MG/DL — SIGNIFICANT CHANGE UP (ref 7–23)
CALCIUM SERPL-MCNC: 8.1 MG/DL — LOW (ref 8.4–10.5)
CAST: 2 /LPF — SIGNIFICANT CHANGE UP (ref 0–4)
CHLORIDE SERPL-SCNC: 105 MMOL/L — SIGNIFICANT CHANGE UP (ref 96–108)
CO2 SERPL-SCNC: 25 MMOL/L — SIGNIFICANT CHANGE UP (ref 22–31)
COLOR SPEC: YELLOW — SIGNIFICANT CHANGE UP
CREAT SERPL-MCNC: 0.68 MG/DL — SIGNIFICANT CHANGE UP (ref 0.5–1.3)
DIFF PNL FLD: NEGATIVE — SIGNIFICANT CHANGE UP
EGFR: 102 ML/MIN/1.73M2 — SIGNIFICANT CHANGE UP
EGFR: 102 ML/MIN/1.73M2 — SIGNIFICANT CHANGE UP
EOSINOPHIL # BLD AUTO: 0.29 K/UL — SIGNIFICANT CHANGE UP (ref 0–0.5)
EOSINOPHIL NFR BLD AUTO: 2.5 % — SIGNIFICANT CHANGE UP (ref 0–6)
FLUAV AG NPH QL: SIGNIFICANT CHANGE UP
FLUBV AG NPH QL: SIGNIFICANT CHANGE UP
GAS PNL BLDV: SIGNIFICANT CHANGE UP
GLUCOSE SERPL-MCNC: 95 MG/DL — SIGNIFICANT CHANGE UP (ref 70–99)
GLUCOSE UR QL: NEGATIVE MG/DL — SIGNIFICANT CHANGE UP
HCT VFR BLD CALC: 41.5 % — SIGNIFICANT CHANGE UP (ref 39–50)
HGB BLD-MCNC: 13.5 G/DL — SIGNIFICANT CHANGE UP (ref 13–17)
IMM GRANULOCYTES # BLD AUTO: 0.06 K/UL — SIGNIFICANT CHANGE UP (ref 0–0.07)
IMM GRANULOCYTES NFR BLD AUTO: 0.5 % — SIGNIFICANT CHANGE UP (ref 0–0.9)
INR BLD: 1.13 RATIO — SIGNIFICANT CHANGE UP (ref 0.85–1.16)
KETONES UR QL: NEGATIVE MG/DL — SIGNIFICANT CHANGE UP
LEUKOCYTE ESTERASE UR-ACNC: ABNORMAL
LYMPHOCYTES # BLD AUTO: 0.78 K/UL — LOW (ref 1–3.3)
LYMPHOCYTES NFR BLD AUTO: 6.8 % — LOW (ref 13–44)
MCHC RBC-ENTMCNC: 30.3 PG — SIGNIFICANT CHANGE UP (ref 27–34)
MCHC RBC-ENTMCNC: 32.5 G/DL — SIGNIFICANT CHANGE UP (ref 32–36)
MCV RBC AUTO: 93.3 FL — SIGNIFICANT CHANGE UP (ref 80–100)
MONOCYTES # BLD AUTO: 0.66 K/UL — SIGNIFICANT CHANGE UP (ref 0–0.9)
MONOCYTES NFR BLD AUTO: 5.7 % — SIGNIFICANT CHANGE UP (ref 2–14)
NEUTROPHILS # BLD AUTO: 9.7 K/UL — HIGH (ref 1.8–7.4)
NEUTROPHILS NFR BLD AUTO: 84.2 % — HIGH (ref 43–77)
NITRITE UR-MCNC: NEGATIVE — SIGNIFICANT CHANGE UP
NRBC # BLD AUTO: 0 K/UL — SIGNIFICANT CHANGE UP (ref 0–0)
NRBC # FLD: 0 K/UL — SIGNIFICANT CHANGE UP (ref 0–0)
NRBC BLD AUTO-RTO: 0 /100 WBCS — SIGNIFICANT CHANGE UP (ref 0–0)
PH UR: 5.5 — SIGNIFICANT CHANGE UP (ref 5–8)
PLATELET # BLD AUTO: 175 K/UL — SIGNIFICANT CHANGE UP (ref 150–400)
PMV BLD: 10.9 FL — SIGNIFICANT CHANGE UP (ref 7–13)
POTASSIUM SERPL-MCNC: 3.5 MMOL/L — SIGNIFICANT CHANGE UP (ref 3.5–5.3)
POTASSIUM SERPL-SCNC: 3.5 MMOL/L — SIGNIFICANT CHANGE UP (ref 3.5–5.3)
PROT SERPL-MCNC: 5.7 G/DL — LOW (ref 6–8.3)
PROT UR-MCNC: 100 MG/DL
PROTHROM AB SERPL-ACNC: 12.9 SEC — SIGNIFICANT CHANGE UP (ref 9.9–13.4)
RBC # BLD: 4.45 M/UL — SIGNIFICANT CHANGE UP (ref 4.2–5.8)
RBC # FLD: 14 % — SIGNIFICANT CHANGE UP (ref 10.3–14.5)
RBC CASTS # UR COMP ASSIST: 1 /HPF — SIGNIFICANT CHANGE UP (ref 0–4)
REVIEW: SIGNIFICANT CHANGE UP
RSV RNA NPH QL NAA+NON-PROBE: SIGNIFICANT CHANGE UP
SARS-COV-2 RNA SPEC QL NAA+PROBE: SIGNIFICANT CHANGE UP
SODIUM SERPL-SCNC: 140 MMOL/L — SIGNIFICANT CHANGE UP (ref 135–145)
SOURCE RESPIRATORY: SIGNIFICANT CHANGE UP
SP GR SPEC: 1.02 — SIGNIFICANT CHANGE UP (ref 1–1.03)
SQUAMOUS # UR AUTO: 4 /HPF — SIGNIFICANT CHANGE UP (ref 0–5)
UROBILINOGEN FLD QL: 1 MG/DL — SIGNIFICANT CHANGE UP (ref 0.2–1)
WBC # BLD: 11.53 K/UL — HIGH (ref 3.8–10.5)
WBC # FLD AUTO: 11.53 K/UL — HIGH (ref 3.8–10.5)
WBC UR QL: 7 /HPF — HIGH (ref 0–5)

## 2025-07-30 PROCEDURE — 85018 HEMOGLOBIN: CPT

## 2025-07-30 PROCEDURE — 84295 ASSAY OF SERUM SODIUM: CPT

## 2025-07-30 PROCEDURE — 84132 ASSAY OF SERUM POTASSIUM: CPT

## 2025-07-30 PROCEDURE — 83605 ASSAY OF LACTIC ACID: CPT

## 2025-07-30 PROCEDURE — 71045 X-RAY EXAM CHEST 1 VIEW: CPT | Mod: 26

## 2025-07-30 PROCEDURE — 82435 ASSAY OF BLOOD CHLORIDE: CPT

## 2025-07-30 PROCEDURE — 99285 EMERGENCY DEPT VISIT HI MDM: CPT | Mod: GC

## 2025-07-30 PROCEDURE — 93971 EXTREMITY STUDY: CPT

## 2025-07-30 PROCEDURE — 81001 URINALYSIS AUTO W/SCOPE: CPT

## 2025-07-30 PROCEDURE — 85025 COMPLETE CBC W/AUTO DIFF WBC: CPT

## 2025-07-30 PROCEDURE — 82330 ASSAY OF CALCIUM: CPT

## 2025-07-30 PROCEDURE — 85610 PROTHROMBIN TIME: CPT

## 2025-07-30 PROCEDURE — 82947 ASSAY GLUCOSE BLOOD QUANT: CPT

## 2025-07-30 PROCEDURE — 80053 COMPREHEN METABOLIC PANEL: CPT

## 2025-07-30 PROCEDURE — 82803 BLOOD GASES ANY COMBINATION: CPT

## 2025-07-30 PROCEDURE — 71045 X-RAY EXAM CHEST 1 VIEW: CPT

## 2025-07-30 PROCEDURE — 93971 EXTREMITY STUDY: CPT | Mod: 26,LT

## 2025-07-30 PROCEDURE — 85014 HEMATOCRIT: CPT

## 2025-07-30 PROCEDURE — 87637 SARSCOV2&INF A&B&RSV AMP PRB: CPT

## 2025-07-30 PROCEDURE — 85730 THROMBOPLASTIN TIME PARTIAL: CPT

## 2025-07-30 PROCEDURE — 87040 BLOOD CULTURE FOR BACTERIA: CPT

## 2025-07-30 RX ORDER — DICLOFENAC SODIUM 75 MG/1
75 TABLET, DELAYED RELEASE ORAL
Refills: 0 | Status: DISCONTINUED | OUTPATIENT
Start: 2025-07-30 | End: 2025-08-10

## 2025-07-30 RX ORDER — NYSTATIN 100000 [USP'U]/G
1 CREAM TOPICAL
Refills: 0 | DISCHARGE

## 2025-07-30 RX ORDER — CEFTRIAXONE 500 MG/1
1000 INJECTION, POWDER, FOR SOLUTION INTRAMUSCULAR; INTRAVENOUS ONCE
Refills: 0 | Status: COMPLETED | OUTPATIENT
Start: 2025-07-30 | End: 2025-07-30

## 2025-07-30 RX ORDER — SERTRALINE 100 MG/1
50 TABLET, FILM COATED ORAL DAILY
Refills: 0 | Status: DISCONTINUED | OUTPATIENT
Start: 2025-07-30 | End: 2025-08-10

## 2025-07-30 RX ORDER — TAMSULOSIN HYDROCHLORIDE 0.4 MG/1
0.8 CAPSULE ORAL AT BEDTIME
Refills: 0 | Status: DISCONTINUED | OUTPATIENT
Start: 2025-07-30 | End: 2025-08-10

## 2025-07-30 RX ORDER — OXYBUTYNIN CHLORIDE 5 MG/1
10 TABLET, FILM COATED, EXTENDED RELEASE ORAL DAILY
Refills: 0 | Status: DISCONTINUED | OUTPATIENT
Start: 2025-07-30 | End: 2025-08-10

## 2025-07-30 RX ORDER — BACLOFEN 10 MG/20ML
5 INJECTION INTRATHECAL EVERY 8 HOURS
Refills: 0 | Status: DISCONTINUED | OUTPATIENT
Start: 2025-07-30 | End: 2025-08-10

## 2025-07-30 RX ORDER — BISACODYL 5 MG
5 TABLET, DELAYED RELEASE (ENTERIC COATED) ORAL ONCE
Refills: 0 | Status: COMPLETED | OUTPATIENT
Start: 2025-07-30 | End: 2025-07-30

## 2025-07-30 RX ORDER — CEFTRIAXONE 500 MG/1
1000 INJECTION, POWDER, FOR SOLUTION INTRAMUSCULAR; INTRAVENOUS EVERY 24 HOURS
Refills: 0 | Status: COMPLETED | OUTPATIENT
Start: 2025-07-30 | End: 2025-08-04

## 2025-07-30 RX ORDER — ACETAMINOPHEN 500 MG/5ML
1000 LIQUID (ML) ORAL ONCE
Refills: 0 | Status: COMPLETED | OUTPATIENT
Start: 2025-07-30 | End: 2025-07-30

## 2025-07-30 RX ORDER — VANCOMYCIN HCL IN 5 % DEXTROSE 1.5G/250ML
1250 PLASTIC BAG, INJECTION (ML) INTRAVENOUS EVERY 12 HOURS
Refills: 0 | Status: DISCONTINUED | OUTPATIENT
Start: 2025-07-30 | End: 2025-07-31

## 2025-07-30 RX ORDER — SILVER SULFADIAZINE 1 %
1 CREAM (GRAM) TOPICAL
Refills: 0 | DISCHARGE

## 2025-07-30 RX ADMIN — Medication 1000 MILLILITER(S): at 15:14

## 2025-07-30 RX ADMIN — Medication 400 MILLIGRAM(S): at 15:13

## 2025-07-30 RX ADMIN — Medication 5 MILLIGRAM(S): at 21:38

## 2025-07-30 RX ADMIN — CEFTRIAXONE 100 MILLIGRAM(S): 500 INJECTION, POWDER, FOR SOLUTION INTRAMUSCULAR; INTRAVENOUS at 15:49

## 2025-07-30 RX ADMIN — TAMSULOSIN HYDROCHLORIDE 0.8 MILLIGRAM(S): 0.4 CAPSULE ORAL at 21:38

## 2025-07-30 RX ADMIN — BACLOFEN 5 MILLIGRAM(S): 10 INJECTION INTRATHECAL at 21:38

## 2025-07-31 LAB
BASOPHILS # BLD AUTO: 0.1 K/UL — SIGNIFICANT CHANGE UP (ref 0–0.2)
BASOPHILS NFR BLD AUTO: 0.8 % — SIGNIFICANT CHANGE UP (ref 0–2)
EOSINOPHIL # BLD AUTO: 0.4 K/UL — SIGNIFICANT CHANGE UP (ref 0–0.5)
EOSINOPHIL NFR BLD AUTO: 3 % — SIGNIFICANT CHANGE UP (ref 0–6)
HCT VFR BLD CALC: 44 % — SIGNIFICANT CHANGE UP (ref 39–50)
HGB BLD-MCNC: 14.1 G/DL — SIGNIFICANT CHANGE UP (ref 13–17)
IMM GRANULOCYTES # BLD AUTO: 0.09 K/UL — HIGH (ref 0–0.07)
IMM GRANULOCYTES NFR BLD AUTO: 0.7 % — SIGNIFICANT CHANGE UP (ref 0–0.9)
LYMPHOCYTES # BLD AUTO: 1.12 K/UL — SIGNIFICANT CHANGE UP (ref 1–3.3)
LYMPHOCYTES NFR BLD AUTO: 8.4 % — LOW (ref 13–44)
MCHC RBC-ENTMCNC: 30.9 PG — SIGNIFICANT CHANGE UP (ref 27–34)
MCHC RBC-ENTMCNC: 32 G/DL — SIGNIFICANT CHANGE UP (ref 32–36)
MCV RBC AUTO: 96.5 FL — SIGNIFICANT CHANGE UP (ref 80–100)
MONOCYTES # BLD AUTO: 0.99 K/UL — HIGH (ref 0–0.9)
MONOCYTES NFR BLD AUTO: 7.4 % — SIGNIFICANT CHANGE UP (ref 2–14)
MRSA PCR RESULT.: SIGNIFICANT CHANGE UP
NEUTROPHILS # BLD AUTO: 10.6 K/UL — HIGH (ref 1.8–7.4)
NEUTROPHILS NFR BLD AUTO: 79.7 % — HIGH (ref 43–77)
NRBC # BLD AUTO: 0 K/UL — SIGNIFICANT CHANGE UP (ref 0–0)
NRBC # FLD: 0 K/UL — SIGNIFICANT CHANGE UP (ref 0–0)
NRBC BLD AUTO-RTO: 0 /100 WBCS — SIGNIFICANT CHANGE UP (ref 0–0)
PLATELET # BLD AUTO: 162 K/UL — SIGNIFICANT CHANGE UP (ref 150–400)
PMV BLD: 11.8 FL — SIGNIFICANT CHANGE UP (ref 7–13)
RBC # BLD: 4.56 M/UL — SIGNIFICANT CHANGE UP (ref 4.2–5.8)
RBC # FLD: 14.2 % — SIGNIFICANT CHANGE UP (ref 10.3–14.5)
S AUREUS DNA NOSE QL NAA+PROBE: SIGNIFICANT CHANGE UP
WBC # BLD: 13.3 K/UL — HIGH (ref 3.8–10.5)
WBC # FLD AUTO: 13.3 K/UL — HIGH (ref 3.8–10.5)

## 2025-07-31 PROCEDURE — 85018 HEMOGLOBIN: CPT

## 2025-07-31 PROCEDURE — 82947 ASSAY GLUCOSE BLOOD QUANT: CPT

## 2025-07-31 PROCEDURE — 80053 COMPREHEN METABOLIC PANEL: CPT

## 2025-07-31 PROCEDURE — 85014 HEMATOCRIT: CPT

## 2025-07-31 PROCEDURE — 87040 BLOOD CULTURE FOR BACTERIA: CPT

## 2025-07-31 PROCEDURE — 84295 ASSAY OF SERUM SODIUM: CPT

## 2025-07-31 PROCEDURE — G0545: CPT

## 2025-07-31 PROCEDURE — 99223 1ST HOSP IP/OBS HIGH 75: CPT

## 2025-07-31 PROCEDURE — 71045 X-RAY EXAM CHEST 1 VIEW: CPT

## 2025-07-31 PROCEDURE — 82330 ASSAY OF CALCIUM: CPT

## 2025-07-31 PROCEDURE — 76770 US EXAM ABDO BACK WALL COMP: CPT | Mod: 26

## 2025-07-31 PROCEDURE — 82435 ASSAY OF BLOOD CHLORIDE: CPT

## 2025-07-31 PROCEDURE — 87086 URINE CULTURE/COLONY COUNT: CPT

## 2025-07-31 PROCEDURE — 87641 MR-STAPH DNA AMP PROBE: CPT

## 2025-07-31 PROCEDURE — 83605 ASSAY OF LACTIC ACID: CPT

## 2025-07-31 PROCEDURE — 82803 BLOOD GASES ANY COMBINATION: CPT

## 2025-07-31 PROCEDURE — 76770 US EXAM ABDO BACK WALL COMP: CPT

## 2025-07-31 PROCEDURE — 84132 ASSAY OF SERUM POTASSIUM: CPT

## 2025-07-31 PROCEDURE — 93971 EXTREMITY STUDY: CPT

## 2025-07-31 PROCEDURE — 87637 SARSCOV2&INF A&B&RSV AMP PRB: CPT

## 2025-07-31 PROCEDURE — 85025 COMPLETE CBC W/AUTO DIFF WBC: CPT

## 2025-07-31 PROCEDURE — 85610 PROTHROMBIN TIME: CPT

## 2025-07-31 PROCEDURE — 81001 URINALYSIS AUTO W/SCOPE: CPT

## 2025-07-31 PROCEDURE — 87640 STAPH A DNA AMP PROBE: CPT

## 2025-07-31 PROCEDURE — 97161 PT EVAL LOW COMPLEX 20 MIN: CPT

## 2025-07-31 PROCEDURE — 85730 THROMBOPLASTIN TIME PARTIAL: CPT

## 2025-07-31 RX ORDER — VANCOMYCIN HCL IN 5 % DEXTROSE 1.5G/250ML
1000 PLASTIC BAG, INJECTION (ML) INTRAVENOUS EVERY 12 HOURS
Refills: 0 | Status: DISCONTINUED | OUTPATIENT
Start: 2025-08-01 | End: 2025-08-07

## 2025-07-31 RX ORDER — BISACODYL 5 MG
10 TABLET, DELAYED RELEASE (ENTERIC COATED) ORAL ONCE
Refills: 0 | Status: COMPLETED | OUTPATIENT
Start: 2025-07-31 | End: 2025-07-31

## 2025-07-31 RX ADMIN — DICLOFENAC SODIUM 75 MILLIGRAM(S): 75 TABLET, DELAYED RELEASE ORAL at 18:24

## 2025-07-31 RX ADMIN — Medication 10 MILLIGRAM(S): at 14:05

## 2025-07-31 RX ADMIN — TAMSULOSIN HYDROCHLORIDE 0.8 MILLIGRAM(S): 0.4 CAPSULE ORAL at 21:43

## 2025-07-31 RX ADMIN — Medication 166.67 MILLIGRAM(S): at 05:15

## 2025-07-31 RX ADMIN — SERTRALINE 50 MILLIGRAM(S): 100 TABLET, FILM COATED ORAL at 12:41

## 2025-07-31 RX ADMIN — CEFTRIAXONE 100 MILLIGRAM(S): 500 INJECTION, POWDER, FOR SOLUTION INTRAMUSCULAR; INTRAVENOUS at 15:36

## 2025-07-31 RX ADMIN — BACLOFEN 5 MILLIGRAM(S): 10 INJECTION INTRATHECAL at 21:43

## 2025-07-31 RX ADMIN — DICLOFENAC SODIUM 75 MILLIGRAM(S): 75 TABLET, DELAYED RELEASE ORAL at 05:15

## 2025-07-31 RX ADMIN — Medication 166.67 MILLIGRAM(S): at 17:47

## 2025-07-31 RX ADMIN — OXYBUTYNIN CHLORIDE 10 MILLIGRAM(S): 5 TABLET, FILM COATED, EXTENDED RELEASE ORAL at 12:41

## 2025-07-31 RX ADMIN — Medication 40 MILLIGRAM(S): at 05:16

## 2025-07-31 RX ADMIN — DICLOFENAC SODIUM 75 MILLIGRAM(S): 75 TABLET, DELAYED RELEASE ORAL at 17:47

## 2025-07-31 RX ADMIN — BACLOFEN 5 MILLIGRAM(S): 10 INJECTION INTRATHECAL at 05:15

## 2025-07-31 RX ADMIN — BACLOFEN 5 MILLIGRAM(S): 10 INJECTION INTRATHECAL at 15:35

## 2025-08-01 LAB
CULTURE RESULTS: NO GROWTH — SIGNIFICANT CHANGE UP
SPECIMEN SOURCE: SIGNIFICANT CHANGE UP
VANCOMYCIN TROUGH SERPL-MCNC: 9.8 UG/ML — LOW (ref 10–20)

## 2025-08-01 PROCEDURE — 99232 SBSQ HOSP IP/OBS MODERATE 35: CPT

## 2025-08-01 PROCEDURE — G0545: CPT

## 2025-08-01 RX ORDER — FUROSEMIDE 10 MG/ML
20 INJECTION INTRAMUSCULAR; INTRAVENOUS ONCE
Refills: 0 | Status: COMPLETED | OUTPATIENT
Start: 2025-08-01 | End: 2025-08-01

## 2025-08-01 RX ORDER — MAGNESIUM HYDROXIDE 400 MG/5ML
30 SUSPENSION ORAL ONCE
Refills: 0 | Status: COMPLETED | OUTPATIENT
Start: 2025-08-01 | End: 2025-08-01

## 2025-08-01 RX ORDER — FUROSEMIDE 10 MG/ML
20 INJECTION INTRAMUSCULAR; INTRAVENOUS
Refills: 0 | Status: DISCONTINUED | OUTPATIENT
Start: 2025-08-05 | End: 2025-08-10

## 2025-08-01 RX ADMIN — CEFTRIAXONE 100 MILLIGRAM(S): 500 INJECTION, POWDER, FOR SOLUTION INTRAMUSCULAR; INTRAVENOUS at 17:30

## 2025-08-01 RX ADMIN — Medication 40 MILLIGRAM(S): at 05:59

## 2025-08-01 RX ADMIN — BACLOFEN 5 MILLIGRAM(S): 10 INJECTION INTRATHECAL at 21:01

## 2025-08-01 RX ADMIN — DICLOFENAC SODIUM 75 MILLIGRAM(S): 75 TABLET, DELAYED RELEASE ORAL at 18:08

## 2025-08-01 RX ADMIN — FUROSEMIDE 20 MILLIGRAM(S): 10 INJECTION INTRAMUSCULAR; INTRAVENOUS at 18:33

## 2025-08-01 RX ADMIN — SERTRALINE 50 MILLIGRAM(S): 100 TABLET, FILM COATED ORAL at 11:53

## 2025-08-01 RX ADMIN — Medication 250 MILLIGRAM(S): at 06:00

## 2025-08-01 RX ADMIN — BACLOFEN 5 MILLIGRAM(S): 10 INJECTION INTRATHECAL at 11:54

## 2025-08-01 RX ADMIN — Medication 250 MILLIGRAM(S): at 18:33

## 2025-08-01 RX ADMIN — MAGNESIUM HYDROXIDE 30 MILLILITER(S): 400 SUSPENSION ORAL at 11:53

## 2025-08-01 RX ADMIN — TAMSULOSIN HYDROCHLORIDE 0.8 MILLIGRAM(S): 0.4 CAPSULE ORAL at 21:01

## 2025-08-01 RX ADMIN — DICLOFENAC SODIUM 75 MILLIGRAM(S): 75 TABLET, DELAYED RELEASE ORAL at 05:59

## 2025-08-01 RX ADMIN — OXYBUTYNIN CHLORIDE 10 MILLIGRAM(S): 5 TABLET, FILM COATED, EXTENDED RELEASE ORAL at 11:54

## 2025-08-01 RX ADMIN — BACLOFEN 5 MILLIGRAM(S): 10 INJECTION INTRATHECAL at 06:00

## 2025-08-01 RX ADMIN — DICLOFENAC SODIUM 75 MILLIGRAM(S): 75 TABLET, DELAYED RELEASE ORAL at 17:29

## 2025-08-02 LAB
ANION GAP SERPL CALC-SCNC: 10 MMOL/L — SIGNIFICANT CHANGE UP (ref 5–17)
BUN SERPL-MCNC: 14 MG/DL — SIGNIFICANT CHANGE UP (ref 7–23)
CALCIUM SERPL-MCNC: 8.2 MG/DL — LOW (ref 8.4–10.5)
CHLORIDE SERPL-SCNC: 101 MMOL/L — SIGNIFICANT CHANGE UP (ref 96–108)
CO2 SERPL-SCNC: 28 MMOL/L — SIGNIFICANT CHANGE UP (ref 22–31)
CREAT SERPL-MCNC: 0.78 MG/DL — SIGNIFICANT CHANGE UP (ref 0.5–1.3)
EGFR: 98 ML/MIN/1.73M2 — SIGNIFICANT CHANGE UP
EGFR: 98 ML/MIN/1.73M2 — SIGNIFICANT CHANGE UP
GLUCOSE SERPL-MCNC: 127 MG/DL — HIGH (ref 70–99)
HCT VFR BLD CALC: 41.5 % — SIGNIFICANT CHANGE UP (ref 39–50)
HGB BLD-MCNC: 13.4 G/DL — SIGNIFICANT CHANGE UP (ref 13–17)
MCHC RBC-ENTMCNC: 29.8 PG — SIGNIFICANT CHANGE UP (ref 27–34)
MCHC RBC-ENTMCNC: 32.3 G/DL — SIGNIFICANT CHANGE UP (ref 32–36)
MCV RBC AUTO: 92.4 FL — SIGNIFICANT CHANGE UP (ref 80–100)
NRBC # BLD AUTO: 0 K/UL — SIGNIFICANT CHANGE UP (ref 0–0)
NRBC # FLD: 0 K/UL — SIGNIFICANT CHANGE UP (ref 0–0)
NRBC BLD AUTO-RTO: 0 /100 WBCS — SIGNIFICANT CHANGE UP (ref 0–0)
PLATELET # BLD AUTO: 196 K/UL — SIGNIFICANT CHANGE UP (ref 150–400)
PMV BLD: 10.6 FL — SIGNIFICANT CHANGE UP (ref 7–13)
POTASSIUM SERPL-MCNC: 3.2 MMOL/L — LOW (ref 3.5–5.3)
POTASSIUM SERPL-SCNC: 3.2 MMOL/L — LOW (ref 3.5–5.3)
RBC # BLD: 4.49 M/UL — SIGNIFICANT CHANGE UP (ref 4.2–5.8)
RBC # FLD: 13.7 % — SIGNIFICANT CHANGE UP (ref 10.3–14.5)
SODIUM SERPL-SCNC: 139 MMOL/L — SIGNIFICANT CHANGE UP (ref 135–145)
WBC # BLD: 8.51 K/UL — SIGNIFICANT CHANGE UP (ref 3.8–10.5)
WBC # FLD AUTO: 8.51 K/UL — SIGNIFICANT CHANGE UP (ref 3.8–10.5)

## 2025-08-02 PROCEDURE — 74177 CT ABD & PELVIS W/CONTRAST: CPT | Mod: 26

## 2025-08-02 RX ORDER — MELATONIN 5 MG
5 TABLET ORAL AT BEDTIME
Refills: 0 | Status: DISCONTINUED | OUTPATIENT
Start: 2025-08-02 | End: 2025-08-10

## 2025-08-02 RX ADMIN — Medication 40 MILLIGRAM(S): at 05:10

## 2025-08-02 RX ADMIN — BACLOFEN 5 MILLIGRAM(S): 10 INJECTION INTRATHECAL at 21:11

## 2025-08-02 RX ADMIN — Medication 40 MILLIEQUIVALENT(S): at 15:17

## 2025-08-02 RX ADMIN — Medication 40 MILLIEQUIVALENT(S): at 17:39

## 2025-08-02 RX ADMIN — SERTRALINE 50 MILLIGRAM(S): 100 TABLET, FILM COATED ORAL at 12:14

## 2025-08-02 RX ADMIN — Medication 250 MILLIGRAM(S): at 05:10

## 2025-08-02 RX ADMIN — CEFTRIAXONE 100 MILLIGRAM(S): 500 INJECTION, POWDER, FOR SOLUTION INTRAMUSCULAR; INTRAVENOUS at 15:19

## 2025-08-02 RX ADMIN — BACLOFEN 5 MILLIGRAM(S): 10 INJECTION INTRATHECAL at 05:11

## 2025-08-02 RX ADMIN — Medication 5 MILLIGRAM(S): at 22:30

## 2025-08-02 RX ADMIN — TAMSULOSIN HYDROCHLORIDE 0.8 MILLIGRAM(S): 0.4 CAPSULE ORAL at 21:11

## 2025-08-02 RX ADMIN — Medication 250 MILLIGRAM(S): at 17:40

## 2025-08-02 RX ADMIN — DICLOFENAC SODIUM 75 MILLIGRAM(S): 75 TABLET, DELAYED RELEASE ORAL at 18:26

## 2025-08-02 RX ADMIN — DICLOFENAC SODIUM 75 MILLIGRAM(S): 75 TABLET, DELAYED RELEASE ORAL at 05:10

## 2025-08-02 RX ADMIN — DICLOFENAC SODIUM 75 MILLIGRAM(S): 75 TABLET, DELAYED RELEASE ORAL at 17:39

## 2025-08-02 RX ADMIN — DICLOFENAC SODIUM 75 MILLIGRAM(S): 75 TABLET, DELAYED RELEASE ORAL at 07:02

## 2025-08-02 RX ADMIN — OXYBUTYNIN CHLORIDE 10 MILLIGRAM(S): 5 TABLET, FILM COATED, EXTENDED RELEASE ORAL at 12:14

## 2025-08-02 RX ADMIN — BACLOFEN 5 MILLIGRAM(S): 10 INJECTION INTRATHECAL at 14:03

## 2025-08-03 LAB
ANION GAP SERPL CALC-SCNC: 11 MMOL/L — SIGNIFICANT CHANGE UP (ref 5–17)
BUN SERPL-MCNC: 14 MG/DL — SIGNIFICANT CHANGE UP (ref 7–23)
CALCIUM SERPL-MCNC: 8.6 MG/DL — SIGNIFICANT CHANGE UP (ref 8.4–10.5)
CHLORIDE SERPL-SCNC: 102 MMOL/L — SIGNIFICANT CHANGE UP (ref 96–108)
CO2 SERPL-SCNC: 27 MMOL/L — SIGNIFICANT CHANGE UP (ref 22–31)
CREAT SERPL-MCNC: 0.69 MG/DL — SIGNIFICANT CHANGE UP (ref 0.5–1.3)
EGFR: 101 ML/MIN/1.73M2 — SIGNIFICANT CHANGE UP
EGFR: 101 ML/MIN/1.73M2 — SIGNIFICANT CHANGE UP
GLUCOSE SERPL-MCNC: 154 MG/DL — HIGH (ref 70–99)
POTASSIUM SERPL-MCNC: 3.7 MMOL/L — SIGNIFICANT CHANGE UP (ref 3.5–5.3)
POTASSIUM SERPL-SCNC: 3.7 MMOL/L — SIGNIFICANT CHANGE UP (ref 3.5–5.3)
SODIUM SERPL-SCNC: 140 MMOL/L — SIGNIFICANT CHANGE UP (ref 135–145)
VANCOMYCIN TROUGH SERPL-MCNC: 22.3 UG/ML — HIGH (ref 10–20)
VANCOMYCIN TROUGH SERPL-MCNC: 9.5 UG/ML — LOW (ref 10–20)

## 2025-08-03 PROCEDURE — 70553 MRI BRAIN STEM W/O & W/DYE: CPT | Mod: 26

## 2025-08-03 RX ORDER — CYANOCOBALAMIN 1000 UG/ML
1000 INJECTION INTRAMUSCULAR; SUBCUTANEOUS DAILY
Refills: 0 | Status: DISCONTINUED | OUTPATIENT
Start: 2025-08-03 | End: 2025-08-04

## 2025-08-03 RX ADMIN — Medication 40 MILLIGRAM(S): at 05:03

## 2025-08-03 RX ADMIN — DICLOFENAC SODIUM 75 MILLIGRAM(S): 75 TABLET, DELAYED RELEASE ORAL at 18:00

## 2025-08-03 RX ADMIN — BACLOFEN 5 MILLIGRAM(S): 10 INJECTION INTRATHECAL at 05:03

## 2025-08-03 RX ADMIN — CEFTRIAXONE 100 MILLIGRAM(S): 500 INJECTION, POWDER, FOR SOLUTION INTRAMUSCULAR; INTRAVENOUS at 13:42

## 2025-08-03 RX ADMIN — DICLOFENAC SODIUM 75 MILLIGRAM(S): 75 TABLET, DELAYED RELEASE ORAL at 18:15

## 2025-08-03 RX ADMIN — TAMSULOSIN HYDROCHLORIDE 0.8 MILLIGRAM(S): 0.4 CAPSULE ORAL at 21:01

## 2025-08-03 RX ADMIN — DICLOFENAC SODIUM 75 MILLIGRAM(S): 75 TABLET, DELAYED RELEASE ORAL at 05:03

## 2025-08-03 RX ADMIN — CYANOCOBALAMIN 1000 MICROGRAM(S): 1000 INJECTION INTRAMUSCULAR; SUBCUTANEOUS at 13:34

## 2025-08-03 RX ADMIN — DICLOFENAC SODIUM 75 MILLIGRAM(S): 75 TABLET, DELAYED RELEASE ORAL at 06:03

## 2025-08-03 RX ADMIN — SERTRALINE 50 MILLIGRAM(S): 100 TABLET, FILM COATED ORAL at 12:19

## 2025-08-03 RX ADMIN — BACLOFEN 5 MILLIGRAM(S): 10 INJECTION INTRATHECAL at 21:01

## 2025-08-03 RX ADMIN — OXYBUTYNIN CHLORIDE 10 MILLIGRAM(S): 5 TABLET, FILM COATED, EXTENDED RELEASE ORAL at 12:19

## 2025-08-03 RX ADMIN — Medication 250 MILLIGRAM(S): at 19:24

## 2025-08-03 RX ADMIN — BACLOFEN 5 MILLIGRAM(S): 10 INJECTION INTRATHECAL at 13:15

## 2025-08-03 RX ADMIN — Medication 250 MILLIGRAM(S): at 05:03

## 2025-08-04 LAB
ANION GAP SERPL CALC-SCNC: 11 MMOL/L — SIGNIFICANT CHANGE UP (ref 5–17)
BUN SERPL-MCNC: 14 MG/DL — SIGNIFICANT CHANGE UP (ref 7–23)
CALCIUM SERPL-MCNC: 8.5 MG/DL — SIGNIFICANT CHANGE UP (ref 8.4–10.5)
CHLORIDE SERPL-SCNC: 102 MMOL/L — SIGNIFICANT CHANGE UP (ref 96–108)
CO2 SERPL-SCNC: 25 MMOL/L — SIGNIFICANT CHANGE UP (ref 22–31)
CREAT SERPL-MCNC: 0.75 MG/DL — SIGNIFICANT CHANGE UP (ref 0.5–1.3)
CULTURE RESULTS: SIGNIFICANT CHANGE UP
CULTURE RESULTS: SIGNIFICANT CHANGE UP
EGFR: 99 ML/MIN/1.73M2 — SIGNIFICANT CHANGE UP
EGFR: 99 ML/MIN/1.73M2 — SIGNIFICANT CHANGE UP
GLUCOSE SERPL-MCNC: 106 MG/DL — HIGH (ref 70–99)
POTASSIUM SERPL-MCNC: 3.7 MMOL/L — SIGNIFICANT CHANGE UP (ref 3.5–5.3)
POTASSIUM SERPL-SCNC: 3.7 MMOL/L — SIGNIFICANT CHANGE UP (ref 3.5–5.3)
SODIUM SERPL-SCNC: 138 MMOL/L — SIGNIFICANT CHANGE UP (ref 135–145)
SPECIMEN SOURCE: SIGNIFICANT CHANGE UP
SPECIMEN SOURCE: SIGNIFICANT CHANGE UP
VIT B12 SERPL-MCNC: >2000 PG/ML — HIGH (ref 232–1245)

## 2025-08-04 PROCEDURE — 99232 SBSQ HOSP IP/OBS MODERATE 35: CPT

## 2025-08-04 PROCEDURE — 71260 CT THORAX DX C+: CPT | Mod: 26

## 2025-08-04 PROCEDURE — 99222 1ST HOSP IP/OBS MODERATE 55: CPT

## 2025-08-04 PROCEDURE — G0545: CPT

## 2025-08-04 RX ORDER — BISACODYL 5 MG
10 TABLET, DELAYED RELEASE (ENTERIC COATED) ORAL ONCE
Refills: 0 | Status: COMPLETED | OUTPATIENT
Start: 2025-08-04 | End: 2025-08-04

## 2025-08-04 RX ORDER — CEFTRIAXONE 500 MG/1
1000 INJECTION, POWDER, FOR SOLUTION INTRAMUSCULAR; INTRAVENOUS EVERY 24 HOURS
Refills: 0 | Status: DISCONTINUED | OUTPATIENT
Start: 2025-08-05 | End: 2025-08-10

## 2025-08-04 RX ADMIN — Medication 250 MILLIGRAM(S): at 17:11

## 2025-08-04 RX ADMIN — CEFTRIAXONE 100 MILLIGRAM(S): 500 INJECTION, POWDER, FOR SOLUTION INTRAMUSCULAR; INTRAVENOUS at 23:54

## 2025-08-04 RX ADMIN — SERTRALINE 50 MILLIGRAM(S): 100 TABLET, FILM COATED ORAL at 12:46

## 2025-08-04 RX ADMIN — TAMSULOSIN HYDROCHLORIDE 0.8 MILLIGRAM(S): 0.4 CAPSULE ORAL at 21:21

## 2025-08-04 RX ADMIN — BACLOFEN 5 MILLIGRAM(S): 10 INJECTION INTRATHECAL at 21:21

## 2025-08-04 RX ADMIN — CEFTRIAXONE 100 MILLIGRAM(S): 500 INJECTION, POWDER, FOR SOLUTION INTRAMUSCULAR; INTRAVENOUS at 14:02

## 2025-08-04 RX ADMIN — BACLOFEN 5 MILLIGRAM(S): 10 INJECTION INTRATHECAL at 05:07

## 2025-08-04 RX ADMIN — DICLOFENAC SODIUM 75 MILLIGRAM(S): 75 TABLET, DELAYED RELEASE ORAL at 05:07

## 2025-08-04 RX ADMIN — Medication 250 MILLIGRAM(S): at 05:07

## 2025-08-04 RX ADMIN — CYANOCOBALAMIN 1000 MICROGRAM(S): 1000 INJECTION INTRAMUSCULAR; SUBCUTANEOUS at 12:47

## 2025-08-04 RX ADMIN — OXYBUTYNIN CHLORIDE 10 MILLIGRAM(S): 5 TABLET, FILM COATED, EXTENDED RELEASE ORAL at 12:47

## 2025-08-04 RX ADMIN — Medication 10 MILLIGRAM(S): at 10:27

## 2025-08-04 RX ADMIN — DICLOFENAC SODIUM 75 MILLIGRAM(S): 75 TABLET, DELAYED RELEASE ORAL at 17:11

## 2025-08-04 RX ADMIN — Medication 40 MILLIGRAM(S): at 05:07

## 2025-08-04 RX ADMIN — DICLOFENAC SODIUM 75 MILLIGRAM(S): 75 TABLET, DELAYED RELEASE ORAL at 06:52

## 2025-08-04 RX ADMIN — DICLOFENAC SODIUM 75 MILLIGRAM(S): 75 TABLET, DELAYED RELEASE ORAL at 17:51

## 2025-08-04 RX ADMIN — BACLOFEN 5 MILLIGRAM(S): 10 INJECTION INTRATHECAL at 14:03

## 2025-08-05 DIAGNOSIS — E04.1 NONTOXIC SINGLE THYROID NODULE: ICD-10-CM

## 2025-08-05 LAB
ANION GAP SERPL CALC-SCNC: 13 MMOL/L — SIGNIFICANT CHANGE UP (ref 5–17)
BUN SERPL-MCNC: 13 MG/DL — SIGNIFICANT CHANGE UP (ref 7–23)
CALCIUM SERPL-MCNC: 8.6 MG/DL — SIGNIFICANT CHANGE UP (ref 8.4–10.5)
CHLORIDE SERPL-SCNC: 103 MMOL/L — SIGNIFICANT CHANGE UP (ref 96–108)
CO2 SERPL-SCNC: 24 MMOL/L — SIGNIFICANT CHANGE UP (ref 22–31)
CREAT SERPL-MCNC: 0.72 MG/DL — SIGNIFICANT CHANGE UP (ref 0.5–1.3)
EGFR: 100 ML/MIN/1.73M2 — SIGNIFICANT CHANGE UP
EGFR: 100 ML/MIN/1.73M2 — SIGNIFICANT CHANGE UP
GLUCOSE SERPL-MCNC: 90 MG/DL — SIGNIFICANT CHANGE UP (ref 70–99)
HCT VFR BLD CALC: 41 % — SIGNIFICANT CHANGE UP (ref 39–50)
HGB BLD-MCNC: 13.3 G/DL — SIGNIFICANT CHANGE UP (ref 13–17)
LDH SERPL L TO P-CCNC: 250 U/L — HIGH (ref 50–242)
MCHC RBC-ENTMCNC: 30.1 PG — SIGNIFICANT CHANGE UP (ref 27–34)
MCHC RBC-ENTMCNC: 32.4 G/DL — SIGNIFICANT CHANGE UP (ref 32–36)
MCV RBC AUTO: 92.8 FL — SIGNIFICANT CHANGE UP (ref 80–100)
NRBC # BLD AUTO: 0 K/UL — SIGNIFICANT CHANGE UP (ref 0–0)
NRBC # FLD: 0 K/UL — SIGNIFICANT CHANGE UP (ref 0–0)
NRBC BLD AUTO-RTO: 0 /100 WBCS — SIGNIFICANT CHANGE UP (ref 0–0)
PLATELET # BLD AUTO: 227 K/UL — SIGNIFICANT CHANGE UP (ref 150–400)
PMV BLD: 10.4 FL — SIGNIFICANT CHANGE UP (ref 7–13)
POTASSIUM SERPL-MCNC: 3.9 MMOL/L — SIGNIFICANT CHANGE UP (ref 3.5–5.3)
POTASSIUM SERPL-SCNC: 3.9 MMOL/L — SIGNIFICANT CHANGE UP (ref 3.5–5.3)
RBC # BLD: 4.42 M/UL — SIGNIFICANT CHANGE UP (ref 4.2–5.8)
RBC # FLD: 13.4 % — SIGNIFICANT CHANGE UP (ref 10.3–14.5)
SODIUM SERPL-SCNC: 140 MMOL/L — SIGNIFICANT CHANGE UP (ref 135–145)
VANCOMYCIN TROUGH SERPL-MCNC: 12.2 UG/ML — SIGNIFICANT CHANGE UP (ref 10–20)
WBC # BLD: 7.68 K/UL — SIGNIFICANT CHANGE UP (ref 3.8–10.5)
WBC # FLD AUTO: 7.68 K/UL — SIGNIFICANT CHANGE UP (ref 3.8–10.5)

## 2025-08-05 PROCEDURE — 76536 US EXAM OF HEAD AND NECK: CPT | Mod: 26

## 2025-08-05 RX ORDER — SALINE 7; 19 G/118ML; G/118ML
1 ENEMA RECTAL ONCE
Refills: 0 | Status: COMPLETED | OUTPATIENT
Start: 2025-08-05 | End: 2025-08-05

## 2025-08-05 RX ADMIN — DICLOFENAC SODIUM 75 MILLIGRAM(S): 75 TABLET, DELAYED RELEASE ORAL at 17:12

## 2025-08-05 RX ADMIN — SALINE 1 ENEMA: 7; 19 ENEMA RECTAL at 13:05

## 2025-08-05 RX ADMIN — Medication 250 MILLIGRAM(S): at 17:11

## 2025-08-05 RX ADMIN — Medication 250 MILLIGRAM(S): at 05:19

## 2025-08-05 RX ADMIN — FUROSEMIDE 20 MILLIGRAM(S): 10 INJECTION INTRAMUSCULAR; INTRAVENOUS at 05:19

## 2025-08-05 RX ADMIN — DICLOFENAC SODIUM 75 MILLIGRAM(S): 75 TABLET, DELAYED RELEASE ORAL at 17:51

## 2025-08-05 RX ADMIN — SERTRALINE 50 MILLIGRAM(S): 100 TABLET, FILM COATED ORAL at 11:25

## 2025-08-05 RX ADMIN — BACLOFEN 5 MILLIGRAM(S): 10 INJECTION INTRATHECAL at 13:05

## 2025-08-05 RX ADMIN — CEFTRIAXONE 100 MILLIGRAM(S): 500 INJECTION, POWDER, FOR SOLUTION INTRAMUSCULAR; INTRAVENOUS at 21:54

## 2025-08-05 RX ADMIN — TAMSULOSIN HYDROCHLORIDE 0.8 MILLIGRAM(S): 0.4 CAPSULE ORAL at 21:54

## 2025-08-05 RX ADMIN — BACLOFEN 5 MILLIGRAM(S): 10 INJECTION INTRATHECAL at 21:54

## 2025-08-05 RX ADMIN — DICLOFENAC SODIUM 75 MILLIGRAM(S): 75 TABLET, DELAYED RELEASE ORAL at 05:19

## 2025-08-05 RX ADMIN — Medication 40 MILLIGRAM(S): at 05:19

## 2025-08-05 RX ADMIN — OXYBUTYNIN CHLORIDE 10 MILLIGRAM(S): 5 TABLET, FILM COATED, EXTENDED RELEASE ORAL at 11:25

## 2025-08-05 RX ADMIN — BACLOFEN 5 MILLIGRAM(S): 10 INJECTION INTRATHECAL at 05:19

## 2025-08-06 LAB
T4 FREE SERPL-MCNC: 1.5 NG/DL — SIGNIFICANT CHANGE UP (ref 0.9–1.8)
TSH SERPL-MCNC: 1.91 UIU/ML — SIGNIFICANT CHANGE UP (ref 0.27–4.2)

## 2025-08-06 PROCEDURE — 72197 MRI PELVIS W/O & W/DYE: CPT | Mod: 26

## 2025-08-06 PROCEDURE — 74183 MRI ABD W/O CNTR FLWD CNTR: CPT | Mod: 26

## 2025-08-06 RX ADMIN — OXYBUTYNIN CHLORIDE 10 MILLIGRAM(S): 5 TABLET, FILM COATED, EXTENDED RELEASE ORAL at 13:40

## 2025-08-06 RX ADMIN — SERTRALINE 50 MILLIGRAM(S): 100 TABLET, FILM COATED ORAL at 13:40

## 2025-08-06 RX ADMIN — DICLOFENAC SODIUM 75 MILLIGRAM(S): 75 TABLET, DELAYED RELEASE ORAL at 05:41

## 2025-08-06 RX ADMIN — BACLOFEN 5 MILLIGRAM(S): 10 INJECTION INTRATHECAL at 05:41

## 2025-08-06 RX ADMIN — Medication 250 MILLIGRAM(S): at 05:41

## 2025-08-06 RX ADMIN — DICLOFENAC SODIUM 75 MILLIGRAM(S): 75 TABLET, DELAYED RELEASE ORAL at 18:12

## 2025-08-06 RX ADMIN — BACLOFEN 5 MILLIGRAM(S): 10 INJECTION INTRATHECAL at 21:01

## 2025-08-06 RX ADMIN — DICLOFENAC SODIUM 75 MILLIGRAM(S): 75 TABLET, DELAYED RELEASE ORAL at 06:11

## 2025-08-06 RX ADMIN — CEFTRIAXONE 100 MILLIGRAM(S): 500 INJECTION, POWDER, FOR SOLUTION INTRAMUSCULAR; INTRAVENOUS at 23:27

## 2025-08-06 RX ADMIN — Medication 40 MILLIGRAM(S): at 05:41

## 2025-08-06 RX ADMIN — Medication 250 MILLIGRAM(S): at 18:13

## 2025-08-06 RX ADMIN — BACLOFEN 5 MILLIGRAM(S): 10 INJECTION INTRATHECAL at 13:40

## 2025-08-06 RX ADMIN — TAMSULOSIN HYDROCHLORIDE 0.8 MILLIGRAM(S): 0.4 CAPSULE ORAL at 21:00

## 2025-08-06 RX ADMIN — DICLOFENAC SODIUM 75 MILLIGRAM(S): 75 TABLET, DELAYED RELEASE ORAL at 19:15

## 2025-08-07 ENCOUNTER — APPOINTMENT (OUTPATIENT)
Dept: UROLOGY | Facility: CLINIC | Age: 65
End: 2025-08-07

## 2025-08-07 LAB — VANCOMYCIN TROUGH SERPL-MCNC: 15.9 UG/ML — SIGNIFICANT CHANGE UP (ref 10–20)

## 2025-08-07 PROCEDURE — 99232 SBSQ HOSP IP/OBS MODERATE 35: CPT

## 2025-08-07 RX ORDER — VANCOMYCIN HCL IN 5 % DEXTROSE 1.5G/250ML
750 PLASTIC BAG, INJECTION (ML) INTRAVENOUS EVERY 12 HOURS
Refills: 0 | Status: DISCONTINUED | OUTPATIENT
Start: 2025-08-08 | End: 2025-08-10

## 2025-08-07 RX ADMIN — Medication 40 MILLIGRAM(S): at 05:07

## 2025-08-07 RX ADMIN — SERTRALINE 50 MILLIGRAM(S): 100 TABLET, FILM COATED ORAL at 11:41

## 2025-08-07 RX ADMIN — BACLOFEN 5 MILLIGRAM(S): 10 INJECTION INTRATHECAL at 05:07

## 2025-08-07 RX ADMIN — CEFTRIAXONE 100 MILLIGRAM(S): 500 INJECTION, POWDER, FOR SOLUTION INTRAMUSCULAR; INTRAVENOUS at 22:12

## 2025-08-07 RX ADMIN — BACLOFEN 5 MILLIGRAM(S): 10 INJECTION INTRATHECAL at 21:25

## 2025-08-07 RX ADMIN — Medication 250 MILLIGRAM(S): at 05:36

## 2025-08-07 RX ADMIN — BACLOFEN 5 MILLIGRAM(S): 10 INJECTION INTRATHECAL at 15:20

## 2025-08-07 RX ADMIN — TAMSULOSIN HYDROCHLORIDE 0.8 MILLIGRAM(S): 0.4 CAPSULE ORAL at 21:25

## 2025-08-07 RX ADMIN — DICLOFENAC SODIUM 75 MILLIGRAM(S): 75 TABLET, DELAYED RELEASE ORAL at 17:29

## 2025-08-07 RX ADMIN — DICLOFENAC SODIUM 75 MILLIGRAM(S): 75 TABLET, DELAYED RELEASE ORAL at 05:25

## 2025-08-07 RX ADMIN — DICLOFENAC SODIUM 75 MILLIGRAM(S): 75 TABLET, DELAYED RELEASE ORAL at 17:23

## 2025-08-07 RX ADMIN — OXYBUTYNIN CHLORIDE 10 MILLIGRAM(S): 5 TABLET, FILM COATED, EXTENDED RELEASE ORAL at 11:41

## 2025-08-07 RX ADMIN — DICLOFENAC SODIUM 75 MILLIGRAM(S): 75 TABLET, DELAYED RELEASE ORAL at 05:07

## 2025-08-08 RX ORDER — HEPARIN SODIUM 1000 [USP'U]/ML
5000 INJECTION INTRAVENOUS; SUBCUTANEOUS EVERY 8 HOURS
Refills: 0 | Status: DISCONTINUED | OUTPATIENT
Start: 2025-08-08 | End: 2025-08-10

## 2025-08-08 RX ADMIN — Medication 250 MILLIGRAM(S): at 05:43

## 2025-08-08 RX ADMIN — HEPARIN SODIUM 5000 UNIT(S): 1000 INJECTION INTRAVENOUS; SUBCUTANEOUS at 05:43

## 2025-08-08 RX ADMIN — FUROSEMIDE 20 MILLIGRAM(S): 10 INJECTION INTRAMUSCULAR; INTRAVENOUS at 05:42

## 2025-08-08 RX ADMIN — CEFTRIAXONE 100 MILLIGRAM(S): 500 INJECTION, POWDER, FOR SOLUTION INTRAMUSCULAR; INTRAVENOUS at 21:25

## 2025-08-08 RX ADMIN — TAMSULOSIN HYDROCHLORIDE 0.8 MILLIGRAM(S): 0.4 CAPSULE ORAL at 21:25

## 2025-08-08 RX ADMIN — Medication 40 MILLIGRAM(S): at 05:42

## 2025-08-08 RX ADMIN — BACLOFEN 5 MILLIGRAM(S): 10 INJECTION INTRATHECAL at 13:50

## 2025-08-08 RX ADMIN — HEPARIN SODIUM 5000 UNIT(S): 1000 INJECTION INTRAVENOUS; SUBCUTANEOUS at 13:42

## 2025-08-08 RX ADMIN — OXYBUTYNIN CHLORIDE 10 MILLIGRAM(S): 5 TABLET, FILM COATED, EXTENDED RELEASE ORAL at 13:41

## 2025-08-08 RX ADMIN — DICLOFENAC SODIUM 75 MILLIGRAM(S): 75 TABLET, DELAYED RELEASE ORAL at 17:42

## 2025-08-08 RX ADMIN — HEPARIN SODIUM 5000 UNIT(S): 1000 INJECTION INTRAVENOUS; SUBCUTANEOUS at 21:26

## 2025-08-08 RX ADMIN — BACLOFEN 5 MILLIGRAM(S): 10 INJECTION INTRATHECAL at 21:26

## 2025-08-08 RX ADMIN — SERTRALINE 50 MILLIGRAM(S): 100 TABLET, FILM COATED ORAL at 13:41

## 2025-08-08 RX ADMIN — DICLOFENAC SODIUM 75 MILLIGRAM(S): 75 TABLET, DELAYED RELEASE ORAL at 05:43

## 2025-08-08 RX ADMIN — BACLOFEN 5 MILLIGRAM(S): 10 INJECTION INTRATHECAL at 05:43

## 2025-08-08 RX ADMIN — Medication 250 MILLIGRAM(S): at 17:41

## 2025-08-09 ENCOUNTER — TRANSCRIPTION ENCOUNTER (OUTPATIENT)
Age: 65
End: 2025-08-09

## 2025-08-09 LAB — VANCOMYCIN TROUGH SERPL-MCNC: 12.8 UG/ML — SIGNIFICANT CHANGE UP (ref 10–20)

## 2025-08-09 RX ADMIN — CEFTRIAXONE 100 MILLIGRAM(S): 500 INJECTION, POWDER, FOR SOLUTION INTRAMUSCULAR; INTRAVENOUS at 23:36

## 2025-08-09 RX ADMIN — DICLOFENAC SODIUM 75 MILLIGRAM(S): 75 TABLET, DELAYED RELEASE ORAL at 17:30

## 2025-08-09 RX ADMIN — Medication 250 MILLIGRAM(S): at 17:30

## 2025-08-09 RX ADMIN — HEPARIN SODIUM 5000 UNIT(S): 1000 INJECTION INTRAVENOUS; SUBCUTANEOUS at 13:20

## 2025-08-09 RX ADMIN — SERTRALINE 50 MILLIGRAM(S): 100 TABLET, FILM COATED ORAL at 11:16

## 2025-08-09 RX ADMIN — HEPARIN SODIUM 5000 UNIT(S): 1000 INJECTION INTRAVENOUS; SUBCUTANEOUS at 05:39

## 2025-08-09 RX ADMIN — Medication 250 MILLIGRAM(S): at 05:40

## 2025-08-09 RX ADMIN — Medication 40 MILLIGRAM(S): at 05:39

## 2025-08-09 RX ADMIN — BACLOFEN 5 MILLIGRAM(S): 10 INJECTION INTRATHECAL at 13:20

## 2025-08-09 RX ADMIN — TAMSULOSIN HYDROCHLORIDE 0.8 MILLIGRAM(S): 0.4 CAPSULE ORAL at 21:13

## 2025-08-09 RX ADMIN — BACLOFEN 5 MILLIGRAM(S): 10 INJECTION INTRATHECAL at 21:13

## 2025-08-09 RX ADMIN — HEPARIN SODIUM 5000 UNIT(S): 1000 INJECTION INTRAVENOUS; SUBCUTANEOUS at 21:13

## 2025-08-09 RX ADMIN — DICLOFENAC SODIUM 75 MILLIGRAM(S): 75 TABLET, DELAYED RELEASE ORAL at 05:39

## 2025-08-09 RX ADMIN — DICLOFENAC SODIUM 75 MILLIGRAM(S): 75 TABLET, DELAYED RELEASE ORAL at 17:53

## 2025-08-09 RX ADMIN — BACLOFEN 5 MILLIGRAM(S): 10 INJECTION INTRATHECAL at 05:39

## 2025-08-09 RX ADMIN — OXYBUTYNIN CHLORIDE 10 MILLIGRAM(S): 5 TABLET, FILM COATED, EXTENDED RELEASE ORAL at 11:16

## 2025-08-10 VITALS — RESPIRATION RATE: 18 BRPM | OXYGEN SATURATION: 93 % | HEART RATE: 79 BPM

## 2025-08-10 PROCEDURE — 74183 MRI ABD W/O CNTR FLWD CNTR: CPT

## 2025-08-10 PROCEDURE — 93971 EXTREMITY STUDY: CPT

## 2025-08-10 PROCEDURE — 82330 ASSAY OF CALCIUM: CPT

## 2025-08-10 PROCEDURE — 80202 ASSAY OF VANCOMYCIN: CPT

## 2025-08-10 PROCEDURE — 99285 EMERGENCY DEPT VISIT HI MDM: CPT

## 2025-08-10 PROCEDURE — 87040 BLOOD CULTURE FOR BACTERIA: CPT

## 2025-08-10 PROCEDURE — 82607 VITAMIN B-12: CPT

## 2025-08-10 PROCEDURE — 74177 CT ABD & PELVIS W/CONTRAST: CPT

## 2025-08-10 PROCEDURE — 82947 ASSAY GLUCOSE BLOOD QUANT: CPT

## 2025-08-10 PROCEDURE — 87640 STAPH A DNA AMP PROBE: CPT

## 2025-08-10 PROCEDURE — 70553 MRI BRAIN STEM W/O & W/DYE: CPT

## 2025-08-10 PROCEDURE — 87637 SARSCOV2&INF A&B&RSV AMP PRB: CPT

## 2025-08-10 PROCEDURE — 36415 COLL VENOUS BLD VENIPUNCTURE: CPT

## 2025-08-10 PROCEDURE — 84443 ASSAY THYROID STIM HORMONE: CPT

## 2025-08-10 PROCEDURE — 85027 COMPLETE CBC AUTOMATED: CPT

## 2025-08-10 PROCEDURE — 83605 ASSAY OF LACTIC ACID: CPT

## 2025-08-10 PROCEDURE — 85610 PROTHROMBIN TIME: CPT

## 2025-08-10 PROCEDURE — 71045 X-RAY EXAM CHEST 1 VIEW: CPT

## 2025-08-10 PROCEDURE — 87641 MR-STAPH DNA AMP PROBE: CPT

## 2025-08-10 PROCEDURE — 96374 THER/PROPH/DIAG INJ IV PUSH: CPT

## 2025-08-10 PROCEDURE — 84132 ASSAY OF SERUM POTASSIUM: CPT

## 2025-08-10 PROCEDURE — 82803 BLOOD GASES ANY COMBINATION: CPT

## 2025-08-10 PROCEDURE — 72197 MRI PELVIS W/O & W/DYE: CPT

## 2025-08-10 PROCEDURE — 85018 HEMOGLOBIN: CPT

## 2025-08-10 PROCEDURE — 83615 LACTATE (LD) (LDH) ENZYME: CPT

## 2025-08-10 PROCEDURE — 85014 HEMATOCRIT: CPT

## 2025-08-10 PROCEDURE — 81001 URINALYSIS AUTO W/SCOPE: CPT

## 2025-08-10 PROCEDURE — 80053 COMPREHEN METABOLIC PANEL: CPT

## 2025-08-10 PROCEDURE — 85025 COMPLETE CBC W/AUTO DIFF WBC: CPT

## 2025-08-10 PROCEDURE — A9585: CPT

## 2025-08-10 PROCEDURE — 71260 CT THORAX DX C+: CPT

## 2025-08-10 PROCEDURE — 82435 ASSAY OF BLOOD CHLORIDE: CPT

## 2025-08-10 PROCEDURE — 80048 BASIC METABOLIC PNL TOTAL CA: CPT

## 2025-08-10 PROCEDURE — 87086 URINE CULTURE/COLONY COUNT: CPT

## 2025-08-10 PROCEDURE — 97161 PT EVAL LOW COMPLEX 20 MIN: CPT

## 2025-08-10 PROCEDURE — 76536 US EXAM OF HEAD AND NECK: CPT

## 2025-08-10 PROCEDURE — 84295 ASSAY OF SERUM SODIUM: CPT

## 2025-08-10 PROCEDURE — 76770 US EXAM ABDO BACK WALL COMP: CPT

## 2025-08-10 PROCEDURE — 84439 ASSAY OF FREE THYROXINE: CPT

## 2025-08-10 PROCEDURE — 96375 TX/PRO/DX INJ NEW DRUG ADDON: CPT

## 2025-08-10 PROCEDURE — 85730 THROMBOPLASTIN TIME PARTIAL: CPT

## 2025-08-10 RX ORDER — FLUCONAZOLE 150 MG
1 TABLET ORAL
Refills: 0 | DISCHARGE

## 2025-08-10 RX ORDER — CEFPODOXIME PROXETIL 200 MG/1
1 TABLET, FILM COATED ORAL
Qty: 10 | Refills: 0
Start: 2025-08-10 | End: 2025-08-14

## 2025-08-10 RX ORDER — DOXYCYCLINE HYCLATE 100 MG
1 TABLET ORAL
Qty: 10 | Refills: 0
Start: 2025-08-10 | End: 2025-08-14

## 2025-08-10 RX ORDER — FUROSEMIDE 10 MG/ML
1 INJECTION INTRAMUSCULAR; INTRAVENOUS
Qty: 12 | Refills: 0
Start: 2025-08-10

## 2025-08-10 RX ADMIN — Medication 250 MILLIGRAM(S): at 06:16

## 2025-08-10 RX ADMIN — DICLOFENAC SODIUM 75 MILLIGRAM(S): 75 TABLET, DELAYED RELEASE ORAL at 06:17

## 2025-08-10 RX ADMIN — HEPARIN SODIUM 5000 UNIT(S): 1000 INJECTION INTRAVENOUS; SUBCUTANEOUS at 06:17

## 2025-08-10 RX ADMIN — OXYBUTYNIN CHLORIDE 10 MILLIGRAM(S): 5 TABLET, FILM COATED, EXTENDED RELEASE ORAL at 11:20

## 2025-08-10 RX ADMIN — BACLOFEN 5 MILLIGRAM(S): 10 INJECTION INTRATHECAL at 06:17

## 2025-08-10 RX ADMIN — SERTRALINE 50 MILLIGRAM(S): 100 TABLET, FILM COATED ORAL at 11:20

## 2025-08-10 RX ADMIN — Medication 40 MILLIGRAM(S): at 06:16
